# Patient Record
Sex: FEMALE | Race: WHITE | NOT HISPANIC OR LATINO | Employment: OTHER | URBAN - METROPOLITAN AREA
[De-identification: names, ages, dates, MRNs, and addresses within clinical notes are randomized per-mention and may not be internally consistent; named-entity substitution may affect disease eponyms.]

---

## 2017-01-04 RX ORDER — GABAPENTIN 600 MG/1
1200 TABLET ORAL 3 TIMES DAILY
Qty: 180 TAB | Refills: 3 | Status: SHIPPED | OUTPATIENT
Start: 2017-01-04 | End: 2017-02-23

## 2017-01-16 ENCOUNTER — TELEPHONE (OUTPATIENT)
Dept: NEUROLOGY | Facility: MEDICAL CENTER | Age: 67
End: 2017-01-16

## 2017-01-16 DIAGNOSIS — G24.9 DYSTONIA: ICD-10-CM

## 2017-01-16 RX ORDER — CARISOPRODOL 350 MG/1
350 TABLET ORAL 4 TIMES DAILY
Qty: 120 TAB | Refills: 0 | Status: SHIPPED | OUTPATIENT
Start: 2017-01-16 | End: 2017-02-23

## 2017-01-17 NOTE — TELEPHONE ENCOUNTER
Message  Received: Today       Kevon Patel M.D.  Aminta Llamas, [facility]       Caller: Unspecified (Today, 2:05 PM)                     Done. Printed out.      Rx faxed to the pharmacy on file. Called pt and informed her of this. Fax confirmation is scanned into media.

## 2017-02-22 ENCOUNTER — RX ONLY (OUTPATIENT)
Age: 67
Setting detail: RX ONLY
End: 2017-02-22

## 2017-02-22 ENCOUNTER — TELEPHONE (OUTPATIENT)
Dept: NEUROLOGY | Facility: MEDICAL CENTER | Age: 67
End: 2017-02-22

## 2017-02-22 NOTE — TELEPHONE ENCOUNTER
Pt is calling and stating that the Dystonia has increased. She now states it starts at her ankle and goes all the way up to her neck. She is getting really bad yadira horses in her left calf muscle. It is extremely painful. She is asking for a referral to a dystonia specialist for this. Also she stated that her left hand tremor has increased and she is taking the Sinemet 25/100 mg 1 tablet every 2 hours and the Sinemet CR 50/200 mg at bedtime and Gabapentin 600 mg  2 tablets TID. She is asking if there can be a medication change. Please advise. Thank you. ERROL

## 2017-02-23 ENCOUNTER — OFFICE VISIT (OUTPATIENT)
Dept: NEUROLOGY | Facility: MEDICAL CENTER | Age: 67
End: 2017-02-23
Payer: MEDICARE

## 2017-02-23 VITALS
BODY MASS INDEX: 19.12 KG/M2 | OXYGEN SATURATION: 95 % | SYSTOLIC BLOOD PRESSURE: 98 MMHG | TEMPERATURE: 98.8 F | HEART RATE: 72 BPM | HEIGHT: 64 IN | RESPIRATION RATE: 16 BRPM | WEIGHT: 112 LBS | DIASTOLIC BLOOD PRESSURE: 60 MMHG

## 2017-02-23 DIAGNOSIS — G20.A1 PARKINSON'S DISEASE: Primary | ICD-10-CM

## 2017-02-23 DIAGNOSIS — G24.9 DYSTONIA: ICD-10-CM

## 2017-02-23 DIAGNOSIS — M62.838 MUSCLE SPASM: ICD-10-CM

## 2017-02-23 PROCEDURE — 3014F SCREEN MAMMO DOC REV: CPT | Mod: 8P | Performed by: PSYCHIATRY & NEUROLOGY

## 2017-02-23 PROCEDURE — G8484 FLU IMMUNIZE NO ADMIN: HCPCS | Performed by: PSYCHIATRY & NEUROLOGY

## 2017-02-23 PROCEDURE — 99214 OFFICE O/P EST MOD 30 MIN: CPT | Performed by: PSYCHIATRY & NEUROLOGY

## 2017-02-23 PROCEDURE — G8419 CALC BMI OUT NRM PARAM NOF/U: HCPCS | Performed by: PSYCHIATRY & NEUROLOGY

## 2017-02-23 PROCEDURE — 4040F PNEUMOC VAC/ADMIN/RCVD: CPT | Performed by: PSYCHIATRY & NEUROLOGY

## 2017-02-23 PROCEDURE — G8432 DEP SCR NOT DOC, RNG: HCPCS | Performed by: PSYCHIATRY & NEUROLOGY

## 2017-02-23 PROCEDURE — 1101F PT FALLS ASSESS-DOCD LE1/YR: CPT | Mod: 8P | Performed by: PSYCHIATRY & NEUROLOGY

## 2017-02-23 PROCEDURE — 3017F COLORECTAL CA SCREEN DOC REV: CPT | Performed by: PSYCHIATRY & NEUROLOGY

## 2017-02-23 PROCEDURE — 1036F TOBACCO NON-USER: CPT | Performed by: PSYCHIATRY & NEUROLOGY

## 2017-02-23 RX ORDER — METAXALONE 800 MG/1
800 TABLET ORAL 4 TIMES DAILY
Qty: 150 TAB | Refills: 0 | Status: SHIPPED | OUTPATIENT
Start: 2017-02-23 | End: 2017-03-30 | Stop reason: SDUPTHER

## 2017-02-23 RX ORDER — PRAMIPEXOLE DIHYDROCHLORIDE 0.12 MG/1
0.12 TABLET ORAL 3 TIMES DAILY
COMMUNITY
End: 2017-08-29

## 2017-02-23 RX ORDER — TRIHEXYPHENIDYL HYDROCHLORIDE 2 MG/1
2 TABLET ORAL 3 TIMES DAILY
Qty: 90 TAB | Refills: 3 | Status: SHIPPED | OUTPATIENT
Start: 2017-02-23 | End: 2017-04-07

## 2017-02-23 RX ORDER — GABAPENTIN 600 MG/1
TABLET ORAL
Qty: 180 TAB | Refills: 2 | Status: SHIPPED | OUTPATIENT
Start: 2017-02-23 | End: 2017-08-31 | Stop reason: SDUPTHER

## 2017-02-23 NOTE — PROGRESS NOTES
Subjective:      Delmis Draper is a 66 y.o. 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    HPI    Dystonia: Delmis's dystonic symptoms, still independent of the Sinemet dosing regimen she is on, seemed to be worsening. Originally just in the right foot and leg, it seems to be involving the axial muscles and the head and right upper extremity. This results in flexion of the head and neck to the right, as well as the dystonic posturing with the leg and now with wrist flexion. The episodes can be quite painful and sustained, especially they occur at nighttime. Skelaxin 800 mg 3 times a day provides benefit, she would like to take this at least 4 times a day understanding measure. Review of the records historically indicates that she has never been on Artane or Cogentin, she is not sure if she had been on them in the past. She still uses her Valium 5 mg twice a day, would like to be able to increase the dose slightly.    Parkinson's disease: She is still on Sinemet 25/100, 1 tablet every 2 hours for a total of 7 doses/day, Sinemet CR 50/200 at bedtime, and Mirapex 0.125 mg at bedtime was added by her primary care physician. The latter addition his surgery helped her sleep, but she is not aware of notable improvement throughout the daytime. If she waits longer on the interval between doses of Sinemet, the drug does wear off. There still can be peak dose dyskinesias at times. She denies major, daily fluctuations, on/off episodes, or freezing episodes. She still has to walk using a cane or walker to maintain balance. Her sleep remains disturbed, but with medication, this is helped. Swallowing is not adversely affected. Voice volume and quality as well are being maintained. Her tremor is still problematic when it is present.    Medical, surgical and family histories are reviewed, there are no new drug allergies. She is asking about the possibility of a  "referral to a movement disorder specialist. She is on her Sinemet regimen as above, gabapentin 600 mg, 3 times a day and 1800 mg daily at bedtime, Skelaxin 800 mg 3 times a day, melatonin, multivitamin daily, and the rest as per the electronic health record.    Review of Systems   All other systems reviewed and are negative.       Objective:     BP 98/60 mmHg  Pulse 72  Temp(Src) 37.1 °C (98.8 °F)  Resp 16  Ht 1.626 m (5' 4.02\")  Wt 50.803 kg (112 lb)  BMI 19.22 kg/m2  SpO2 95%  LMP 01/01/1990     Physical Exam    She appears in no acute distress. Vital signs are stable. There is no malar rash or sialorrhea and excessive drooling. The neck is supple. She is fully oriented, there is no aphasia. PERRLA/EOMI, visual fields are grossly full, there is no hypophonia. She is in a continuous state of movement involving both axial as well as all 4 extremities. There is no painful, sustained dystonic posturing with any of the extremities. I cannot document tremor on today's exam when she is at rest, in the upper extremities. There is no appendicular dystaxia. She walks with a walker, station is widened.     Assessment/Plan:     1. Parkinson's disease (CMS-[facility])  We will maintain her present regimen unchanged for now. Referral to a movement disorder specialist at [facility] will be forwarded. Mirapex is at such a low dose, it is not going to provide benefit for her Parkinson symptoms, though it might help with some of the sleep problems that she has (? RLS), so it is reasonable to continue it.    - REFERRAL TO NEUROLOGY    2. Dystonia  The bigger issue, is become almost a bridgette-dystonia associated with her Parkinson's disease. I doubt that this is a drug-induced process, at the same time, dopamine certainly isn't helping. Artane 2 mg, twice a day, will be added, I'm going to hold on changing her other medications being used including Skelaxin and Valium. It is unclear whether gabapentin is providing much benefit, but " again, I will make one change at a time. Side effects of Artane were reviewed in full. Bob would be another option for her. DBS might be an indication both of this condition as well as her Parkinson's disease, again another reason to refer her. Otherwise I will follow-up with her in about 4 or 5 months.    - REFERRAL TO NEUROLOGY  - trihexyphenidyl (ARTANE) 2 MG Tab; Take 1 Tab by mouth 3 times a day.  Dispense: 90 Tab; Refill: 3  - gabapentin (NEURONTIN) 600 MG tablet; 1 tab tid and 3 tab qhs  Dispense: 180 Tab; Refill: 2    Time: Evaluation 25 minutes for exam, review, discussion, and education  Discussion: As mentioned in assessment, over 50% of the time spent face-to-face counseling and coordinating care

## 2017-02-23 NOTE — MR AVS SNAPSHOT
"        Delmis Draper   2017 11:40 AM   Office Visit   MRN: [id number]    Department:  [facility]   Dept Phone:  [phone]    Description:  Female : 1950   Provider:  Kevon Patel M.D.           Reason for Visit     Follow-Up dystonia and Parkinsons      Allergies as of 2017     Allergen Noted Reactions    Bactrim [Sulfamethoxazole W-Trimethoprim] 2014   Hives    Actonel [Risedronate Sodium] 2010       Latex 2011       Sulfa Drugs 2015       Tape 03/15/2011   Rash    Paper tape is ok      You were diagnosed with     Parkinson's disease (CMS-HCC)   [3673466]  -  Primary     Dystonia   [336455]       Muscle spasm   [257092]         Vital Signs     Blood Pressure Pulse Temperature Respirations Height Weight    98/60 mmHg 72 37.1 °C (98.8 °F) 16 1.626 m (5' 4.02\") 50.803 kg (112 lb)    Body Mass Index Oxygen Saturation Last Menstrual Period Smoking Status          19.22 kg/m2 95% 1990 Former Smoker        Basic Information     Date Of Birth Sex Race Ethnicity Preferred Language    1950 Female White Non- English      Problem List              ICD-10-CM Priority Class Noted - Resolved    Osteoporosis M81.0   3/22/2010 - Present    vitamin D deficiency G20   Unknown - Present    Dystonia G24.9   Unknown - Present    Lumbar stenosis Dr. Wyatt M48.06   2012 - Present    Bursitis of hip, right Dr. Wyatt M70.71   2012 - Present    Tendonitis Dr. Wyatt M77.9   2012 - Present    Scoliosis of lumbar spine Dr. 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  2015 - Present    Protein-calorie malnutrition (CMS-[facility]) E46   2015 - Present    Mammographic microcalcification R92.0   2015 - Present    Cellulitis L03.90   2015 - Present    Constipation K59.00   10/17/2015 " - Present    Urinary retention R33.9   10/18/2015 - Present    Weakness R53.1 High  1/9/2016 - Present    Chronic pain G89.29 Medium  7/16/2016 - Present    Dehydration E86.0   7/16/2016 - Present    Hypotension I95.9 High  7/17/2016 - Present    Leukopenia D72.819 Medium  7/17/2016 - Present    Parkinson's disease (CMS-[facility]) G20   8/15/2016 - Present      Health Maintenance        Date Due Completion Dates    IMM DTaP/Tdap/Td Vaccine (1 - Tdap) 8/22/2009 8/21/2009    IMM ZOSTER VACCINE 3/20/2011 ---    BONE DENSITY 3/20/2016 ---    IMM PNEUMOCOCCAL 65+ (ADULT) LOW/MEDIUM RISK SERIES (1 of 2 - PCV13) 3/20/2016 3/13/2012    MAMMOGRAM 3/25/2016 3/25/2015, 5/16/2007, 5/16/2007    IMM INFLUENZA (1) 9/1/2016 10/16/2012    COLONOSCOPY 2/10/2024 2/10/2014 (Done)    Override on 2/10/2014: Done            Current Immunizations     Influenza TIV (IM) 10/16/2012    Pneumococcal polysaccharide vaccine (PPSV-23) 3/13/2012    TD Vaccine 8/21/2009      Below and/or attached are the medications your provider expects you to take. Review all of your home medications and newly ordered medications with your provider and/or pharmacist. Follow medication instructions as directed by your provider and/or pharmacist. Please keep your medication list with you and share with your provider. Update the information when medications are discontinued, doses are changed, or new medications (including over-the-counter products) are added; and carry medication information at all times in the event of emergency situations     Allergies:  BACTRIM - Hives     ACTONEL - (reactions not documented)     LATEX - (reactions not documented)     SULFA DRUGS - (reactions not documented)     TAPE - Rash               Medications  Valid as of: February 23, 2017 - 12:28 PM    Generic Name Brand Name Tablet Size Instructions for use    5-Hydroxytryptophan   Take 10 mg by mouth.        Carbidopa-Levodopa (Tab) SINEMET  MG Take 1 Tab by mouth every 2 hours. Pt  states she takes at 0700/0900/1100/1300/1500/1700/1900 Total of 7 doses during day        Carbidopa-Levodopa (Tab CR) SINEMET CR  MG Take 1 Tab by mouth every bedtime.        DiazePAM (Tab) VALIUM 5 MG Take 10 mg by mouth 2 Times a Day.        Gabapentin (Tab) NEURONTIN 600 MG 1 tab tid and 3 tab qhs        Linaclotide (Cap) Linaclotide 145 MCG Take 145 mg by mouth every morning before breakfast.        Melatonin (Tab) Melatonin 1 MG Take 1 mg by mouth every bedtime.        Metaxalone (Tab) SKELAXIN 800 MG Take 1 Tab by mouth 4 times a day.        Multiple Vitamin   Take 1 Tab by mouth every day.        Polyethylene Glycol 3350   Take  by mouth.        Pramipexole Dihydrochloride (Tab) MIRAPEX 0.125 MG Take 0.125 mg by mouth every day.        Trihexyphenidyl HCl (Tab) ARTANE 2 MG Take 1 Tab by mouth 3 times a day.        Ubiquinol (Cap) Ubiquinol 100 MG Take 100 mg by mouth every day.        .                 Medicines prescribed today were sent to:     [facility] #[id number] - [place], [place] - [facility] [address]    [facility] [address] [place] [address]    Phone: [phone] Fax: [phone]    Open 24 Hours?: No    [facility] - [address] [address]    Phone: [phone] Fax: [phone]    Open 24 Hours?: No      Medication refill instructions:       If your prescription bottle indicates you have medication refills left, it is not necessary to call your provider’s office. Please contact your pharmacy and they will refill your medication.    If your prescription bottle indicates you do not have any refills left, you may request refills at any time through one of the following ways: The online OurStay system (except Urgent Care), by calling your provider’s office, or by asking your pharmacy to contact your provider’s office with a refill request. Medication refills are processed only during regular business hours and may not be available until the next business day. Your provider may  request additional information or to have a follow-up visit with you prior to refilling your medication.   *Please Note: Medication refills are assigned a new Rx number when refilled electronically. Your pharmacy may indicate that no refills were authorized even though a new prescription for the same medication is available at the pharmacy. Please request the medicine by name with the pharmacy before contacting your provider for a refill.        Referral     A referral request has been sent to our patient care coordination department. Please allow 3-5 business days for us to process this request and contact you either by phone or mail. If you do not hear from us by the 5th business day, please call us at [phone].        Other Notes About Your Plan     This appointment is 2-24-16. 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 Comment benzo dependence  2. Comment malnutrition, the patient was given Megace in 4/2015  3. Comment ETOH dependence, or if alcohol use in remission  4. Revise/specify anxiety and depression, possible adjustment disorder: if have both, code as TRACEY, Major depressive disorder; Panic attack if has  5. If the patient is on home oxygen (even on/off) add diagnoses: Chr respiratory failure and home oxygen use  6. Notified in documents that patient had CABG; if so, add CAD as diagnosis, with appropriate medication for the condition  7. Notified in documents that patient hadCVA; if so, add diagnosis of vascular disease  8. Notified in documents that patient had seizures           MyChart Access Code: Activation code not generated  Current MyChart Status: Active

## 2017-02-23 NOTE — TELEPHONE ENCOUNTER
Pt is at appointment with  today at 1140 am and she will address all questions and concerns with him at that time. ERROL

## 2017-02-27 ENCOUNTER — TELEPHONE (OUTPATIENT)
Dept: NEUROLOGY | Facility: MEDICAL CENTER | Age: 67
End: 2017-02-27

## 2017-02-27 NOTE — TELEPHONE ENCOUNTER
Pt is calling and stating that the Artane is really helping her symptoms. She had questions about her medications. All questions and concerns were addressed. ERROL

## 2017-03-02 ENCOUNTER — TELEPHONE (OUTPATIENT)
Dept: NEUROLOGY | Facility: MEDICAL CENTER | Age: 67
End: 2017-03-02

## 2017-03-02 DIAGNOSIS — G20.A1 PARKINSON'S DISEASE: ICD-10-CM

## 2017-03-02 NOTE — TELEPHONE ENCOUNTER
Pt is calling and asking for another referral to PT. Her last one from you on 16 has . She is asking for a new Referral to be sent to [facility] Phone number is [phone]. Please advise. Thank you. ERROL

## 2017-03-03 NOTE — TELEPHONE ENCOUNTER
Pt Notified. She has changed her mind. She now wants to go back to see [facility]      [address]  [place] [address] [phone]       Called and LM for Referrals (Deedee) that the pt has changed her mind and to help make corrections. ERROL

## 2017-03-08 ENCOUNTER — TELEPHONE (OUTPATIENT)
Dept: NEUROLOGY | Facility: MEDICAL CENTER | Age: 67
End: 2017-03-08

## 2017-03-08 DIAGNOSIS — G20.A1 PARKINSON'S DISEASE: ICD-10-CM

## 2017-03-08 NOTE — TELEPHONE ENCOUNTER
Pt is calling and stating that Dr. Hayden at [facility] is not taking new patients so she has to see someone else. She is upset about this. She also states that this morning she has 2 episodes of full body dystonia. She states it took her a very long time to recover from them. She is asking what she can do for thus until she sees a doctor down at [facility]. Also she is asking for a referral for home health. Her 's license is about to  on 3/20/17 since you just saw her can she send over the paperwork from the DMV to complete it or does she need another appointment.  Please advise. Thank you. ERROL

## 2017-03-10 ENCOUNTER — OFFICE VISIT (OUTPATIENT)
Dept: NEUROLOGY | Facility: MEDICAL CENTER | Age: 67
End: 2017-03-10
Payer: MEDICARE

## 2017-03-10 DIAGNOSIS — G20.A1 PARKINSON'S DISEASE: ICD-10-CM

## 2017-03-10 PROCEDURE — 99213 OFFICE O/P EST LOW 20 MIN: CPT | Performed by: PHYSICIAN ASSISTANT

## 2017-03-10 PROCEDURE — 1036F TOBACCO NON-USER: CPT | Performed by: PHYSICIAN ASSISTANT

## 2017-03-10 PROCEDURE — 1101F PT FALLS ASSESS-DOCD LE1/YR: CPT | Mod: 8P | Performed by: PHYSICIAN ASSISTANT

## 2017-03-10 PROCEDURE — 4040F PNEUMOC VAC/ADMIN/RCVD: CPT | Performed by: PHYSICIAN ASSISTANT

## 2017-03-10 PROCEDURE — G8432 DEP SCR NOT DOC, RNG: HCPCS | Performed by: PHYSICIAN ASSISTANT

## 2017-03-10 PROCEDURE — G8484 FLU IMMUNIZE NO ADMIN: HCPCS | Performed by: PHYSICIAN ASSISTANT

## 2017-03-10 PROCEDURE — G8419 CALC BMI OUT NRM PARAM NOF/U: HCPCS | Performed by: PHYSICIAN ASSISTANT

## 2017-03-10 PROCEDURE — 3014F SCREEN MAMMO DOC REV: CPT | Mod: 8P | Performed by: PHYSICIAN ASSISTANT

## 2017-03-10 PROCEDURE — 3017F COLORECTAL CA SCREEN DOC REV: CPT | Performed by: PHYSICIAN ASSISTANT

## 2017-03-10 NOTE — PROGRESS NOTES
Cc:  - parkinson's    Established patient of romel  who suffers from PD  Here today for completion of paperwork for DMV related to their condition.    They report to me that they have been stable but DMV for some reason requires annual completion of DMV paperwork.  DEnies any syncopal spells, seizures, or loss of consciousness.    Review of the medical chart and interviewing patient, I completed paperwork and it is scanned into media.     RTC as previously scheduled.    Total time with this visit: 15    Minutes face-to-face with patient. More than 50% of this visit was spent reviewing medical chart and speaking with the patient about their condition and how it affects their ability to do their job.

## 2017-03-10 NOTE — MR AVS SNAPSHOT
Delmis Draper   3/10/2017 1:40 PM   Office Visit   MRN: [id number]    Department:  [facility]   Dept Phone:  [phone]    Description:  Female : 1950   Provider:  Angélica Sosa PA-C           Reason for Visit     Follow-Up DMV paper work      Allergies as of 3/10/2017     Allergen Noted Reactions    Bactrim [Sulfamethoxazole W-Trimethoprim] 2014   Hives    Actonel [Risedronate Sodium] 2010       Latex 2011       Sulfa Drugs 2015       Tape 03/15/2011   Rash    Paper tape is ok      Vital Signs     Last Menstrual Period Smoking Status                1990 Former Smoker          Basic Information     Date Of Birth Sex Race Ethnicity Preferred Language    1950 Female White Non- English      Your appointments     Mar 30, 2017  2:00 PM   Follow Up Visit with Kevon Patel M.D.   [facility] (--)    [address], [address]  [facility] [address] [phone]           You will be receiving a confirmation call a few days before your appointment from our automated call confirmation system.              Problem List              ICD-10-CM Priority Class Noted - Resolved    Osteoporosis M81.0   3/22/2010 - Present    vitamin D deficiency G20   Unknown - Present    Dystonia G24.9   Unknown - Present    Lumbar stenosis Dr. Wyatt M48.06   2012 - Present    Bursitis of hip, right Dr. Wyatt M70.71   2012 - Present    Tendonitis Dr. Wyatt M77.9   2012 - Present    Scoliosis of lumbar spine Dr. 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  2015 - Present    Protein-calorie malnutrition (CMS-HCC) E46   2015 - Present    Mammographic microcalcification R92.0   2015 - Present    Cellulitis L03.90   2015 - Present    Constipation K59.00   10/17/2015  - Present    Urinary retention R33.9   10/18/2015 - Present    Weakness R53.1 High  1/9/2016 - Present    Chronic pain G89.29 Medium  7/16/2016 - Present    Dehydration E86.0   7/16/2016 - Present    Hypotension I95.9 High  7/17/2016 - Present    Leukopenia D72.819 Medium  7/17/2016 - Present    Parkinson's disease (CMS-[facility]) G20   8/15/2016 - Present      Health Maintenance        Date Due Completion Dates    IMM DTaP/Tdap/Td Vaccine (1 - Tdap) 8/22/2009 8/21/2009    IMM ZOSTER VACCINE 3/20/2011 ---    BONE DENSITY 3/20/2016 ---    IMM PNEUMOCOCCAL 65+ (ADULT) LOW/MEDIUM RISK SERIES (1 of 2 - PCV13) 3/20/2016 3/13/2012    MAMMOGRAM 3/25/2016 3/25/2015, 5/16/2007, 5/16/2007    IMM INFLUENZA (1) 9/1/2016 10/16/2012    COLONOSCOPY 2/10/2024 2/10/2014 (Done)    Override on 2/10/2014: Done            Current Immunizations     Influenza TIV (IM) 10/16/2012    Pneumococcal polysaccharide vaccine (PPSV-23) 3/13/2012    TD Vaccine 8/21/2009      Below and/or attached are the medications your provider expects you to take. Review all of your home medications and newly ordered medications with your provider and/or pharmacist. Follow medication instructions as directed by your provider and/or pharmacist. Please keep your medication list with you and share with your provider. Update the information when medications are discontinued, doses are changed, or new medications (including over-the-counter products) are added; and carry medication information at all times in the event of emergency situations     Allergies:  BACTRIM - Hives     ACTONEL - (reactions not documented)     LATEX - (reactions not documented)     SULFA DRUGS - (reactions not documented)     TAPE - Rash               Medications  Valid as of: March 10, 2017 -  2:10 PM    Generic Name Brand Name Tablet Size Instructions for use    5-Hydroxytryptophan   Take 10 mg by mouth.        Carbidopa-Levodopa (Tab) SINEMET  MG Take 1 Tab by mouth every 2 hours. Pt  states she takes at 0700/0900/1100/1300/1500/1700/1900 Total of 7 doses during day        Carbidopa-Levodopa (Tab CR) SINEMET CR  MG Take 1 Tab by mouth every bedtime.        DiazePAM (Tab) VALIUM 5 MG Take 10 mg by mouth 2 Times a Day.        Gabapentin (Tab) NEURONTIN 600 MG 1 tab tid and 3 tab qhs        Linaclotide (Cap) Linaclotide 145 MCG Take 145 mg by mouth every morning before breakfast.        Melatonin (Tab) Melatonin 1 MG Take 1 mg by mouth every bedtime.        Metaxalone (Tab) SKELAXIN 800 MG Take 1 Tab by mouth 4 times a day.        Multiple Vitamin   Take 1 Tab by mouth every day.        Polyethylene Glycol 3350   Take  by mouth.        Pramipexole Dihydrochloride (Tab) MIRAPEX 0.125 MG Take 0.125 mg by mouth every day.        Trihexyphenidyl HCl (Tab) ARTANE 2 MG Take 1 Tab by mouth 3 times a day.        Ubiquinol (Cap) Ubiquinol 100 MG Take 100 mg by mouth every day.        .                 Medicines prescribed today were sent to:     [facility] #[id number] - [place], [place] - [facility] [address]    [facility] [place] [address]    Phone: [phone] Fax: [phone]    Open 24 Hours?: No    [facility] - [address] [address]    Phone: [phone] Fax: [phone]    Open 24 Hours?: No      Medication refill instructions:       If your prescription bottle indicates you have medication refills left, it is not necessary to call your provider’s office. Please contact your pharmacy and they will refill your medication.    If your prescription bottle indicates you do not have any refills left, you may request refills at any time through one of the following ways: The online [employer] system (except Urgent Care), by calling your provider’s office, or by asking your pharmacy to contact your provider’s office with a refill request. Medication refills are processed only during regular business hours and may not be available until the next business day. Your provider may  request additional information or to have a follow-up visit with you prior to refilling your medication.   *Please Note: Medication refills are assigned a new Rx number when refilled electronically. Your pharmacy may indicate that no refills were authorized even though a new prescription for the same medication is available at the pharmacy. Please request the medicine by name with the pharmacy before contacting your provider for a refill.        Other Notes About Your Plan     This appointment is 2-24-16. 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 Comment benzo dependence  2. Comment malnutrition, the patient was given Megace in 4/2015  3. Comment ETOH dependence, or if alcohol use in remission  4. Revise/specify anxiety and depression, possible adjustment disorder: if have both, code as TRACEY, Major depressive disorder; Panic attack if has  5. If the patient is on home oxygen (even on/off) add diagnoses: Chr respiratory failure and home oxygen use  6. Notified in documents that patient had CABG; if so, add CAD as diagnosis, with appropriate medication for the condition  7. Notified in documents that patient hadCVA; if so, add diagnosis of vascular disease  8. Notified in documents that patient had seizures           MyChart Access Code: Activation code not generated  Current MyChart Status: Active

## 2017-03-11 NOTE — TELEPHONE ENCOUNTER
If this single episode of severe dystonia 2 days ago was isolated, I don't think I want to adjust her Artane dosing for now. At the same time if the dystonia is getting worse, she should add an additional 2 mg tablet of Artane, now 4 mg, twice a day; she was on 2 mg 3 times a day. For the DMV form, however forwarded to the office. A referral for home health can be made.

## 2017-03-14 ENCOUNTER — HOME CARE VISIT (OUTPATIENT)
Dept: HOME HEALTH SERVICES | Facility: HOME HEALTHCARE | Age: 67
End: 2017-03-14

## 2017-03-14 ENCOUNTER — HOME HEALTH ADMISSION (OUTPATIENT)
Dept: HOME HEALTH SERVICES | Facility: HOME HEALTHCARE | Age: 67
End: 2017-03-14
Payer: MEDICARE

## 2017-03-15 ENCOUNTER — HOME CARE VISIT (OUTPATIENT)
Dept: HOME HEALTH SERVICES | Facility: HOME HEALTHCARE | Age: 67
End: 2017-03-15
Payer: MEDICARE

## 2017-03-15 VITALS
WEIGHT: 113.6 LBS | SYSTOLIC BLOOD PRESSURE: 96 MMHG | TEMPERATURE: 98.2 F | HEIGHT: 64 IN | BODY MASS INDEX: 19.39 KG/M2 | HEART RATE: 67 BPM | RESPIRATION RATE: 16 BRPM | DIASTOLIC BLOOD PRESSURE: 62 MMHG

## 2017-03-15 PROCEDURE — 665001 SOC-HOME HEALTH

## 2017-03-15 PROCEDURE — G0162 HHC RN E&M PLAN SVS, 15 MIN: HCPCS

## 2017-03-15 SDOH — ECONOMIC STABILITY: HOUSING INSECURITY: UNSAFE APPLIANCES: 0

## 2017-03-15 SDOH — ECONOMIC STABILITY: HOUSING INSECURITY: UNSAFE COOKING RANGE AREA: 0

## 2017-03-15 ASSESSMENT — ACTIVITIES OF DAILY LIVING (ADL)
OASIS_M1830: 03
HOME_HEALTH_OASIS: 03

## 2017-03-15 ASSESSMENT — PATIENT HEALTH QUESTIONNAIRE - PHQ9
2. FEELING DOWN, DEPRESSED, IRRITABLE, OR HOPELESS: 00
1. LITTLE INTEREST OR PLEASURE IN DOING THINGS: 00

## 2017-03-15 ASSESSMENT — ENCOUNTER SYMPTOMS
VOMITING: DENIES
NAUSEA: DENIES

## 2017-03-16 ENCOUNTER — HOME CARE VISIT (OUTPATIENT)
Dept: HOME HEALTH SERVICES | Facility: HOME HEALTHCARE | Age: 67
End: 2017-03-16
Payer: MEDICARE

## 2017-03-16 VITALS
TEMPERATURE: 208.8 F | DIASTOLIC BLOOD PRESSURE: 64 MMHG | HEART RATE: 67 BPM | RESPIRATION RATE: 18 BRPM | SYSTOLIC BLOOD PRESSURE: 98 MMHG

## 2017-03-16 PROCEDURE — G0152 HHCP-SERV OF OT,EA 15 MIN: HCPCS

## 2017-03-16 PROCEDURE — G0151 HHCP-SERV OF PT,EA 15 MIN: HCPCS

## 2017-03-16 SDOH — ECONOMIC STABILITY: HOUSING INSECURITY
HOME SAFETY: PATIENT HAS A VERY SLIPPERY LINOLEUM FLOOR IN HER BATHROOM AND SCATTER RUGS ALL AROUND THE BATHROOM AND KITCHEN...PATIENT HAS A DOG AND A CAT AND HER PLACE IS IN A MILD FORM OF DISARRAY

## 2017-03-16 ASSESSMENT — ACTIVITIES OF DAILY LIVING (ADL)
EATING_ASSISTANCE: 0
TRANSPORTATION_ASSISTANCE: 5
MEAL_PREP_ASSISTANCE: 2
DRESSING_UB_ASSISTANCE: 0
ORAL_CARE_ASSISTANCE: 0
DRESSING_LB_ASSISTANCE: 0
BATHING_ASSISTIVE_EQUIPMENT_NEEDED: GRAB BAR
TELEPHONE_ASSISTANCE: 0
LAUNDRY_ASSISTANCE: 2
HOUSEKEEPING_ASSISTANCE: 2
BATHING_ASSISTANCE: 2
SHOPPING_ASSISTANCE: 5
TOILETING_EQUIPMENT_USED: RAISED TOILET SEAT WITH ARMRESTS
GROOMING_ASSISTANCE: 0
TOILETING_ASSISTANCE: 2

## 2017-03-16 ASSESSMENT — ENCOUNTER SYMPTOMS: POOR JUDGMENT: 1

## 2017-03-17 ENCOUNTER — HOME CARE VISIT (OUTPATIENT)
Dept: HOME HEALTH SERVICES | Facility: HOME HEALTHCARE | Age: 67
End: 2017-03-17
Payer: MEDICARE

## 2017-03-17 ENCOUNTER — HOME CARE VISIT (OUTPATIENT)
Dept: HOME HEALTH SERVICES | Facility: HOME HEALTHCARE | Age: 67
End: 2017-03-17

## 2017-03-17 VITALS
RESPIRATION RATE: 16 BRPM | TEMPERATURE: 99.6 F | HEART RATE: 68 BPM | DIASTOLIC BLOOD PRESSURE: 64 MMHG | SYSTOLIC BLOOD PRESSURE: 95 MMHG

## 2017-03-17 VITALS — SYSTOLIC BLOOD PRESSURE: 110 MMHG | TEMPERATURE: 98.8 F | RESPIRATION RATE: 20 BRPM | DIASTOLIC BLOOD PRESSURE: 64 MMHG

## 2017-03-17 PROCEDURE — G0156 HHCP-SVS OF AIDE,EA 15 MIN: HCPCS

## 2017-03-19 ASSESSMENT — ACTIVITIES OF DAILY LIVING (ADL)
ADLS_COMMENTS: >
TELEPHONE_ASSISTANCE: 0
TOILETING_ASSISTANCE: 0
DRESSING_LB_ASSISTANCE: 0
GROOMING_ASSISTANCE: 0
BATHING_ASSISTANCE: 0
LAUNDRY_ASSISTANCE: 0
DRESSING_UB_ASSISTANCE: 0
ORAL_CARE_ASSISTANCE: 0
EATING_ASSISTANCE: 0

## 2017-03-21 ENCOUNTER — HOME CARE VISIT (OUTPATIENT)
Dept: HOME HEALTH SERVICES | Facility: HOME HEALTHCARE | Age: 67
End: 2017-03-21
Payer: MEDICARE

## 2017-03-21 VITALS
RESPIRATION RATE: 18 BRPM | DIASTOLIC BLOOD PRESSURE: 55 MMHG | HEART RATE: 92 BPM | TEMPERATURE: 100.2 F | SYSTOLIC BLOOD PRESSURE: 110 MMHG

## 2017-03-21 PROCEDURE — G0152 HHCP-SERV OF OT,EA 15 MIN: HCPCS

## 2017-03-21 PROCEDURE — G0157 HHC PT ASSISTANT EA 15: HCPCS

## 2017-03-22 ENCOUNTER — HOME CARE VISIT (OUTPATIENT)
Dept: HOME HEALTH SERVICES | Facility: HOME HEALTHCARE | Age: 67
End: 2017-03-22
Payer: MEDICARE

## 2017-03-22 VITALS
TEMPERATURE: 99.3 F | RESPIRATION RATE: 16 BRPM | SYSTOLIC BLOOD PRESSURE: 90 MMHG | DIASTOLIC BLOOD PRESSURE: 50 MMHG | HEART RATE: 74 BPM

## 2017-03-22 VITALS
DIASTOLIC BLOOD PRESSURE: 68 MMHG | TEMPERATURE: 210.2 F | HEART RATE: 74 BPM | RESPIRATION RATE: 18 BRPM | SYSTOLIC BLOOD PRESSURE: 115 MMHG

## 2017-03-22 PROCEDURE — G0299 HHS/HOSPICE OF RN EA 15 MIN: HCPCS

## 2017-03-22 SDOH — ECONOMIC STABILITY: HOUSING INSECURITY: UNSAFE APPLIANCES: 0

## 2017-03-22 SDOH — ECONOMIC STABILITY: HOUSING INSECURITY: UNSAFE COOKING RANGE AREA: 0

## 2017-03-22 ASSESSMENT — ENCOUNTER SYMPTOMS
NAUSEA: DENIES
VOMITING: DENIES

## 2017-03-22 ASSESSMENT — ACTIVITIES OF DAILY LIVING (ADL)
SHOPPING_ASSISTANCE: 6
DRESSING_UB_ASSISTANCE: 3
EATING_ASSISTANCE: 0
ORAL_CARE_ASSISTANCE: 0
BATHING_ASSISTANCE: 1
LAUNDRY_ASSISTANCE: 6
MEAL_PREP_ASSISTANCE: 6
TOILETING_ASSISTANCE: 0
DRESSING_LB_ASSISTANCE: 3
TRANSPORTATION_ASSISTANCE: 6
TELEPHONE_ASSISTANCE: 0
HOUSEKEEPING_ASSISTANCE: 6
TRANSPORTATION COMMENTS: DYSTONIA
GROOMING_ASSISTANCE: 0

## 2017-03-23 ENCOUNTER — HOME CARE VISIT (OUTPATIENT)
Dept: HOME HEALTH SERVICES | Facility: HOME HEALTHCARE | Age: 67
End: 2017-03-23
Payer: MEDICARE

## 2017-03-23 VITALS
HEART RATE: 74 BPM | RESPIRATION RATE: 16 BRPM | TEMPERATURE: 208.4 F | DIASTOLIC BLOOD PRESSURE: 50 MMHG | SYSTOLIC BLOOD PRESSURE: 90 MMHG

## 2017-03-23 VITALS
RESPIRATION RATE: 16 BRPM | HEART RATE: 65 BPM | TEMPERATURE: 99.8 F | DIASTOLIC BLOOD PRESSURE: 60 MMHG | SYSTOLIC BLOOD PRESSURE: 92 MMHG

## 2017-03-23 PROCEDURE — G0157 HHC PT ASSISTANT EA 15: HCPCS

## 2017-03-23 PROCEDURE — G0152 HHCP-SERV OF OT,EA 15 MIN: HCPCS

## 2017-03-23 SDOH — ECONOMIC STABILITY: HOUSING INSECURITY: HOME SAFETY: LOTS OF CLERICAL CLUTTER AROUND THE HOME

## 2017-03-24 ENCOUNTER — HOME CARE VISIT (OUTPATIENT)
Dept: HOME HEALTH SERVICES | Facility: HOME HEALTHCARE | Age: 67
End: 2017-03-24
Payer: MEDICARE

## 2017-03-27 ENCOUNTER — HOME CARE VISIT (OUTPATIENT)
Dept: HOME HEALTH SERVICES | Facility: HOME HEALTHCARE | Age: 67
End: 2017-03-27
Payer: MEDICARE

## 2017-03-28 ENCOUNTER — HOME CARE VISIT (OUTPATIENT)
Dept: HOME HEALTH SERVICES | Facility: HOME HEALTHCARE | Age: 67
End: 2017-03-28
Payer: MEDICARE

## 2017-03-28 ENCOUNTER — TELEPHONE (OUTPATIENT)
Dept: NEUROLOGY | Facility: MEDICAL CENTER | Age: 67
End: 2017-03-28

## 2017-03-28 VITALS — RESPIRATION RATE: 18 BRPM | HEART RATE: 72 BPM | TEMPERATURE: 95 F

## 2017-03-28 PROCEDURE — G0152 HHCP-SERV OF OT,EA 15 MIN: HCPCS

## 2017-03-28 NOTE — TELEPHONE ENCOUNTER
Pt is calling and asking for another copy of the referral to be faxed to her. She lost the other copy given to her. Referral mailed to pt. ERROL

## 2017-03-29 ENCOUNTER — HOME CARE VISIT (OUTPATIENT)
Dept: HOME HEALTH SERVICES | Facility: HOME HEALTHCARE | Age: 67
End: 2017-03-29
Payer: MEDICARE

## 2017-03-29 VITALS
TEMPERATURE: 99.6 F | HEART RATE: 84 BPM | DIASTOLIC BLOOD PRESSURE: 54 MMHG | RESPIRATION RATE: 16 BRPM | SYSTOLIC BLOOD PRESSURE: 88 MMHG

## 2017-03-29 VITALS
SYSTOLIC BLOOD PRESSURE: 118 MMHG | TEMPERATURE: 99 F | RESPIRATION RATE: 16 BRPM | HEART RATE: 64 BPM | DIASTOLIC BLOOD PRESSURE: 70 MMHG

## 2017-03-29 PROCEDURE — G0151 HHCP-SERV OF PT,EA 15 MIN: HCPCS

## 2017-03-29 PROCEDURE — G0162 HHC RN E&M PLAN SVS, 15 MIN: HCPCS

## 2017-03-29 SDOH — ECONOMIC STABILITY: HOUSING INSECURITY: UNSAFE COOKING RANGE AREA: 0

## 2017-03-29 SDOH — ECONOMIC STABILITY: HOUSING INSECURITY: UNSAFE APPLIANCES: 0

## 2017-03-29 ASSESSMENT — ENCOUNTER SYMPTOMS
NAUSEA: DENIES
VOMITING: DENIES
RESPIRATORY SYMPTOMS COMMENTS: RESPIRATIONS ARE EVEN AND UNLABORED WITH NO SOB NOTED

## 2017-03-29 ASSESSMENT — ACTIVITIES OF DAILY LIVING (ADL)
HOUSEKEEPING_ASSISTANCE: 6
EATING_ASSISTANCE: 0
SHOPPING_ASSISTANCE: 6
TOILETING_ASSISTANCE: 0
DRESSING_LB_ASSISTANCE: 5
TELEPHONE_ASSISTANCE: 0
BATHING_ASSISTANCE: 5
TRANSPORTATION_ASSISTANCE: 6
MEAL_PREP_ASSISTANCE: 6
GROOMING_ASSISTANCE: 0
ORAL_CARE_ASSISTANCE: 0
LAUNDRY_ASSISTANCE: 6
DRESSING_UB_ASSISTANCE: 5

## 2017-03-30 ENCOUNTER — HOME CARE VISIT (OUTPATIENT)
Dept: HOME HEALTH SERVICES | Facility: HOME HEALTHCARE | Age: 67
End: 2017-03-30
Payer: MEDICARE

## 2017-03-30 VITALS
HEART RATE: 65 BPM | RESPIRATION RATE: 18 BRPM | DIASTOLIC BLOOD PRESSURE: 72 MMHG | TEMPERATURE: 206.6 F | SYSTOLIC BLOOD PRESSURE: 120 MMHG

## 2017-03-30 DIAGNOSIS — G24.9 DYSTONIA: ICD-10-CM

## 2017-03-30 PROCEDURE — G0152 HHCP-SERV OF OT,EA 15 MIN: HCPCS

## 2017-03-30 RX ORDER — METAXALONE 800 MG/1
TABLET ORAL
Qty: 150 TAB | Refills: 1 | Status: SHIPPED | OUTPATIENT
Start: 2017-03-30 | End: 2017-09-27 | Stop reason: SDUPTHER

## 2017-03-31 ENCOUNTER — HOME CARE VISIT (OUTPATIENT)
Dept: HOME HEALTH SERVICES | Facility: HOME HEALTHCARE | Age: 67
End: 2017-03-31
Payer: MEDICARE

## 2017-03-31 PROCEDURE — G0155 HHCP-SVS OF CSW,EA 15 MIN: HCPCS

## 2017-04-03 ENCOUNTER — HOME CARE VISIT (OUTPATIENT)
Dept: HOME HEALTH SERVICES | Facility: HOME HEALTHCARE | Age: 67
End: 2017-04-03
Payer: MEDICARE

## 2017-04-04 ENCOUNTER — HOME CARE VISIT (OUTPATIENT)
Dept: HOME HEALTH SERVICES | Facility: HOME HEALTHCARE | Age: 67
End: 2017-04-04
Payer: MEDICARE

## 2017-04-04 PROCEDURE — G0152 HHCP-SERV OF OT,EA 15 MIN: HCPCS

## 2017-04-04 ASSESSMENT — ENCOUNTER SYMPTOMS: AGORAPHOBIA: 1

## 2017-04-05 ENCOUNTER — HOME CARE VISIT (OUTPATIENT)
Dept: HOME HEALTH SERVICES | Facility: HOME HEALTHCARE | Age: 67
End: 2017-04-05
Payer: MEDICARE

## 2017-04-05 VITALS — SYSTOLIC BLOOD PRESSURE: 98 MMHG | RESPIRATION RATE: 16 BRPM | TEMPERATURE: 99 F | DIASTOLIC BLOOD PRESSURE: 70 MMHG

## 2017-04-05 PROCEDURE — G0162 HHC RN E&M PLAN SVS, 15 MIN: HCPCS

## 2017-04-05 SDOH — ECONOMIC STABILITY: HOUSING INSECURITY: UNSAFE COOKING RANGE AREA: 0

## 2017-04-05 SDOH — ECONOMIC STABILITY: HOUSING INSECURITY: UNSAFE APPLIANCES: 0

## 2017-04-05 ASSESSMENT — ACTIVITIES OF DAILY LIVING (ADL)
BATHING_ASSISTANCE: 5
TELEPHONE_ASSISTANCE: 0
GROOMING_ASSISTANCE: 0
EATING_ASSISTANCE: 0
MEAL_PREP_ASSISTANCE: 5
LAUNDRY_ASSISTANCE: 5
SHOPPING_ASSISTANCE: 6
DRESSING_UB_ASSISTANCE: 0
ORAL_CARE_ASSISTANCE: 0
DRESSING_LB_ASSISTANCE: 0
TRANSPORTATION_ASSISTANCE: 6
HOUSEKEEPING_ASSISTANCE: 5
TOILETING_ASSISTANCE: 0

## 2017-04-06 ENCOUNTER — TELEPHONE (OUTPATIENT)
Dept: NEUROLOGY | Facility: MEDICAL CENTER | Age: 67
End: 2017-04-06

## 2017-04-06 ENCOUNTER — HOME CARE VISIT (OUTPATIENT)
Dept: HOME HEALTH SERVICES | Facility: HOME HEALTHCARE | Age: 67
End: 2017-04-06
Payer: MEDICARE

## 2017-04-06 DIAGNOSIS — G24.9 DYSTONIA: ICD-10-CM

## 2017-04-06 PROCEDURE — G0152 HHCP-SERV OF OT,EA 15 MIN: HCPCS

## 2017-04-06 NOTE — TELEPHONE ENCOUNTER
Pt is calling and stating that the Artane 2 mg TID is not helping her Dystonia and Parkinson's. She states the Tizanidine 2 mg 1.5 tablets 6 times a day worked better. She is calling and asking for a Rx for the Tizanidine 2 mg instead of the Artane. Should she wait until she is seen down at [facility]? Please advise. Thank you. ERROL

## 2017-04-07 ENCOUNTER — HOME CARE VISIT (OUTPATIENT)
Dept: HOME HEALTH SERVICES | Facility: HOME HEALTHCARE | Age: 67
End: 2017-04-07
Payer: MEDICARE

## 2017-04-07 ENCOUNTER — TELEPHONE (OUTPATIENT)
Dept: NEUROLOGY | Facility: MEDICAL CENTER | Age: 67
End: 2017-04-07

## 2017-04-07 VITALS — DIASTOLIC BLOOD PRESSURE: 66 MMHG | SYSTOLIC BLOOD PRESSURE: 96 MMHG | RESPIRATION RATE: 17 BRPM | TEMPERATURE: 94.2 F

## 2017-04-07 RX ORDER — TIZANIDINE 2 MG/1
TABLET ORAL
Qty: 270 TAB | Refills: 4 | Status: SHIPPED | OUTPATIENT
Start: 2017-04-07 | End: 2017-08-29

## 2017-04-07 ASSESSMENT — ACTIVITIES OF DAILY LIVING (ADL)
TELEPHONE_ASSISTANCE: 1
DRESSING_LB_ASSISTANCE: 0
DRESSING_UB_ASSISTANCE: 0
SHOPPING_ASSISTANCE: 4
TRANSPORTATION_ASSISTANCE: 4
HOUSEKEEPING_ASSISTANCE: 2
EATING_ASSISTANCE: 0
TOILETING_ASSISTANCE: 0
GROOMING_ASSISTANCE: 0
MEAL_PREP_ASSISTANCE: 0
BATHING_ASSISTANCE: 2
LAUNDRY_ASSISTANCE: 2
ORAL_CARE_ASSISTANCE: 0

## 2017-04-07 ASSESSMENT — ENCOUNTER SYMPTOMS: DEBILITATING PAIN: 1

## 2017-04-07 NOTE — TELEPHONE ENCOUNTER
Pt calling asking if she can get a rx for Tizanidine 2 MG, 1- 1/2 tabs 6 times a day. So Dr. Escobedo was prescribing her this medication which seemed to help her with the dystonia. She is no longer taking the Artane, it's causing constipation. She would like a rx to be sent to [facility] as [facility] deliver her meds to her house.

## 2017-04-10 ENCOUNTER — HOSPITAL ENCOUNTER (EMERGENCY)
Facility: MEDICAL CENTER | Age: 67
End: 2017-04-10
Attending: EMERGENCY MEDICINE
Payer: MEDICARE

## 2017-04-10 ENCOUNTER — APPOINTMENT (OUTPATIENT)
Dept: RADIOLOGY | Facility: MEDICAL CENTER | Age: 67
End: 2017-04-10
Attending: EMERGENCY MEDICINE
Payer: MEDICARE

## 2017-04-10 ENCOUNTER — HOME CARE VISIT (OUTPATIENT)
Dept: HOME HEALTH SERVICES | Facility: HOME HEALTHCARE | Age: 67
End: 2017-04-10
Payer: MEDICARE

## 2017-04-10 VITALS
WEIGHT: 108 LBS | HEART RATE: 69 BPM | SYSTOLIC BLOOD PRESSURE: 125 MMHG | RESPIRATION RATE: 16 BRPM | OXYGEN SATURATION: 96 % | BODY MASS INDEX: 18.44 KG/M2 | HEIGHT: 64 IN | TEMPERATURE: 97.5 F | DIASTOLIC BLOOD PRESSURE: 65 MMHG

## 2017-04-10 DIAGNOSIS — S30.0XXA LUMBAR CONTUSION, INITIAL ENCOUNTER: ICD-10-CM

## 2017-04-10 DIAGNOSIS — S80.02XA CONTUSION OF LEFT KNEE, INITIAL ENCOUNTER: ICD-10-CM

## 2017-04-10 PROCEDURE — 72131 CT LUMBAR SPINE W/O DYE: CPT

## 2017-04-10 PROCEDURE — A9270 NON-COVERED ITEM OR SERVICE: HCPCS | Performed by: EMERGENCY MEDICINE

## 2017-04-10 PROCEDURE — 73501 X-RAY EXAM HIP UNI 1 VIEW: CPT | Mod: LT

## 2017-04-10 PROCEDURE — 72128 CT CHEST SPINE W/O DYE: CPT

## 2017-04-10 PROCEDURE — 73564 X-RAY EXAM KNEE 4 OR MORE: CPT | Mod: LT

## 2017-04-10 PROCEDURE — 700102 HCHG RX REV CODE 250 W/ 637 OVERRIDE(OP): Performed by: EMERGENCY MEDICINE

## 2017-04-10 PROCEDURE — 99285 EMERGENCY DEPT VISIT HI MDM: CPT

## 2017-04-10 RX ORDER — TRAMADOL HYDROCHLORIDE 50 MG/1
50-100 TABLET ORAL EVERY 6 HOURS PRN
Qty: 15 TAB | Refills: 0 | Status: SHIPPED | OUTPATIENT
Start: 2017-04-10 | End: 2018-03-07

## 2017-04-10 RX ORDER — HYDROCODONE BITARTRATE AND ACETAMINOPHEN 5; 325 MG/1; MG/1
1 TABLET ORAL ONCE
Status: COMPLETED | OUTPATIENT
Start: 2017-04-10 | End: 2017-04-10

## 2017-04-10 RX ADMIN — HYDROCODONE BITARTRATE AND ACETAMINOPHEN 1 TABLET: 5; 325 TABLET ORAL at 06:17

## 2017-04-10 NOTE — ED NOTES
"Chief Complaint   Patient presents with   • T-5000 FALL     Pt was walking up stairs tonight carrying two water glasses and fell back about 6 stairs onto tile. Pt denies hitting her head or any LOC. C/o pain to her left knee, hip and mid low back.      /65 mmHg  Pulse 90  Temp(Src) 36.6 °C (97.9 °F)  Resp 16  Ht 1.626 m (5' 4.02\")  Wt 48.988 kg (108 lb)  BMI 18.53 kg/m2  SpO2 94%  LMP 01/01/1990  Pt in gown, on monitor, chart up for ERP.   "

## 2017-04-10 NOTE — ED AVS SNAPSHOT
XODIS Access Code: Activation code not generated  Current XODIS Status: Active    [employer]  A secure, online tool to manage your health information     [employer]’s XODIS® is a secure, online tool that connects you to your personalized health information from the privacy of your home -- day or night - making it very easy for you to manage your healthcare. Once the activation process is completed, you can even access your medical information using the XODIS alfredo, which is available for free in the [employer] Alfredo store or Google Play store.     XODIS provides the following levels of access (as shown below):   My Chart Features   [facility] Primary Care Doctor [facility]  Specialists [facility]  Urgent  Care Non-[facility]  Primary Care  Doctor   Email your healthcare team securely and privately 24/7 X X X X   Manage appointments: schedule your next appointment; view details of past/upcoming appointments X      Request prescription refills. X      View recent personal medical records, including lab and immunizations X X X X   View health record, including health history, allergies, medications X X X X   Read reports about your outpatient visits, procedures, consult and ER notes X X X X   See your discharge summary, which is a recap of your hospital and/or ER visit that includes your diagnosis, lab results, and care plan. X X       How to register for XODIS:  1. Go to  [email or web address].  2. Click on the Sign Up Now box, which takes you to the New Member Sign Up page. You will need to provide the following information:  a. Enter your XODIS Access Code exactly as it appears at the top of this page. (You will not need to use this code after you’ve completed the sign-up process. If you do not sign up before the expiration date, you must request a new code.)   b. Enter your date of birth.   c. Enter your home email address.   d. Click Submit, and follow the next screen’s instructions.  3. Create a XODIS ID. This will  be your [employer] login ID and cannot be changed, so think of one that is secure and easy to remember.  4. Create a [employer] password. You can change your password at any time.  5. Enter your Password Reset Question and Answer. This can be used at a later time if you forget your password.   6. Enter your e-mail address. This allows you to receive e-mail notifications when new information is available in [employer].  7. Click Sign Up. You can now view your health information.    For assistance activating your [employer] account, call [phone]

## 2017-04-10 NOTE — ED PROVIDER NOTES
"ED Provider Note    CHIEF COMPLAINT  Chief Complaint   Patient presents with   • T-5000 FALL     Pt was walking up stairs tonight carrying two water glasses and fell back about 6 stairs onto tile. Pt denies hitting her head or any LOC. C/o pain to her left knee, hip and mid low back.        HPI  Delmis Draper is a 67 y.o. female who presents for evaluation of fall with mid and lower back pain left hip and knee pain. The patient has Parkinson's disease. She has chronic resting tremor. She is apparently going up some stairs fell down 5-6 stairs did not hit her head. She denies loss of consciousness. She landed on her mid and low back. Denies any numbness weakness or tingling to the upper or lower extremities no neck pain. She also reports left hip and knee pain. She denies any anticoagulant use. No fevers or chills. Denies any abdominal pain or upper extremity pain   REVIEW OF SYSTEMS  See HPI for further details. No loss of consciousness numbness weakness or tingling All other systems are negative.     PAST MEDICAL HISTORY  Past Medical History   Diagnosis Date   • Disc disorder    • Dystonia    • vitamin D deficiency    • OSTEOPOROSIS 3/22/2010   • Parkinson disease (CMS-[facility])      \"atypical\"   • Low blood pressure reading    • Cellulitis 7/19/2012     right low extremity   • Bowel obstruction (CMS-[facility])      fecal impaction   • Anesthesia      hypotensive in the past   • Other specified disorder of intestines      constipation   • Heart burn    • Pain      right hip, right knee   • Abnormal finding on mammography, microcalcification    • Cataract    • GERD (gastroesophageal reflux disease)    • Dilated bile duct    • Peripheral neuropathy (CMS-[facility])    • Leukopenia    • Insulin resistance    • Dysuria    • Malaise and fatigue    • Dystonia    • Rash, skin    • Hypotension    • Scoliosis    • Bursitis    • Spinal stenosis, lumbar    • Hypertension      pt can run very low   • Unspecified disorder of thyroid      " had partial thyroidectomy   • Thyrotoxicosis remote     S/P I-131 Rx / subtotal thyroidectomy       FAMILY HISTORY  No history of bleeding disorder    SOCIAL HISTORY  Social History     Social History   • Marital Status:      Spouse Name: N/A   • Number of Children: 2   • Years of Education: N/A     Social History Main Topics   • Smoking status: Former Smoker -- 0.50 packs/day for 10 years     Quit date: 01/01/1985   • Smokeless tobacco: Never Used   • Alcohol Use: No      Comment: none since age 31   • Drug Use: No   • Sexual Activity: No      Comment: , 2 boys, diasability     Other Topics Concern   • None     Social History Narrative     Nonsmoker no IV drugs  SURGICAL HISTORY  Past Surgical History   Procedure Laterality Date   • Thyroidectomy  1967     partial   • Gastroscopy with biopsy  5/9/2012     Performed by CIERRA SUAZO at [facility]   • Egd w/endoscopic ultrasound  5/9/2012     Performed by CIERRA SUAZO at [facility]   • Cataract phaco with iol  4/14/2014     Performed by Roland Becerra M.D. at [facility]   • Cataract phaco with iol  5/12/2014     Performed by Roland Becerra M.D. at [facility]   • Breast biopsy Left 5/5/2015     Procedure: BREAST BIOPSY;  Surgeon: Deedee Bautista M.D.;  Location: SURGERY SAME DAY [facility];  Service:    • Gyn surgery  1985, 1988     c sec x 2   • Knee arthrotomy  1964, 1968     right   • Other       left fifth digit       CURRENT MEDICATIONS  Home Medications     Reviewed by Claudine Chavarria R.N. (Registered Nurse) on 04/10/17 at 0602  Med List Status: <None>    Medication Last Dose Status    5-Hydroxytryptophan (5-HTP PO)  Active    CALCIUM CITRATE-VITAMIN D3 PO  Active    carbidopa-levodopa (SINEMET)  MG Tab  Active    carbidopa-levodopa SR (SINEMET CR)  MG per tablet  Active    Cholecalciferol (D3 SUPER STRENGTH) 2000 UNIT Cap  Active    diazepam (VALIUM) 5 MG Tab  Active  "   docusate sodium (COLACE) 100 MG Cap  Active    gabapentin (NEURONTIN) 600 MG tablet  Active    HYDROmorphone (DILAUDID) 2 MG Tab  Active    Linaclotide 145 MCG Cap not taking Active    Magnesium Citrate Powder  Active    Melatonin 1 MG Tab  Active    metaxalone (SKELAXIN) 800 MG Tab  Active    Multiple Vitamin (MULTI VITAMIN DAILY PO)  Active    Polyethylene Glycol 3350 (MIRALAX PO)  Active    pramipexole (MIRAPEX) 0.125 MG Tab  Active    Taurine 1000 MG Cap  Active    tizanidine (ZANAFLEX) 2 MG tablet  Active    Turmeric 500 MG Cap  Active    Ubiquinol 100 MG Cap  Active                ALLERGIES  Allergies   Allergen Reactions   • Bactrim [Sulfamethoxazole W-Trimethoprim] Hives   • Actonel [Risedronate Sodium]    • Latex    • Sulfa Drugs    • Tape Rash     Paper tape is ok       PHYSICAL EXAM  VITAL SIGNS: /65 mmHg  Pulse 90  Temp(Src) 36.6 °C (97.9 °F)  Resp 16  Ht 1.626 m (5' 4.02\")  Wt 48.988 kg (108 lb)  BMI 18.53 kg/m2  SpO2 94%  LMP 01/01/1990 Room air O2: 94    Constitutional: Well developed, Well nourished, No acute distress, Non-toxic appearance.   HENT: Normocephalic, Atraumatic, Bilateral external ears normal, Oropharynx moist, No oral exudates, Nose normal.   Eyes: PERRLA, EOMI, Conjunctiva normal, No discharge.   Neck: Normal range of motion, No midline bony tenderness, Supple, No stridor.   Cardiovascular: Normal heart rate, Normal rhythm, No murmurs, No rubs, No gallops.   Thorax & Lungs: Normal breath sounds, No respiratory distress, No wheezing, No chest tenderness.   Abdomen: Bowel sounds normal, Soft, No tenderness, No masses, No pulsatile masses.   Skin: Warm, Dry, No erythema, No rash.   Back: Bony tenderness at T8-9-10 as well as lumbar 234. No step-offs tenderness, No CVA tenderness.   Extremities: Intact distal pulses, No edema, No tenderness, No cyanosis, No clubbing.   Musculoskeletal: Mild pain with range of motion left hip no shortening or internal or external rotation. " Minimal bony tenderness on the anterior left knee no crepitus no effusion  Neurologic: Alert & oriented x 3, Normal motor function, Normal sensory function, No focal deficits noted.   Psychiatric: Affect normal, Judgment normal, Mood normal.       RADIOLOGY/PROCEDURES  CT-TSPINE W/O PLUS RECONS   Final Result      1.  No acute fracture or gross malalignment   2.  Mild thoracic spondylosis   3.  Osteopenia   4.  Emphysema      CT-LSPINE W/O PLUS RECONS   Final Result      1.  No acute fracture   2.  Lumbar spondylosis and levoconvex scoliosis   3.  osteopenia   4.  Distended urinary bladder and mild BILATERAL hydronephrosis; this is similar to the prior study      DX-HIP-UNILATERAL-WITH PELVIS-1 VIEW LEFT   Final Result      No radiographic evidence of acute traumatic injury.   Mild BILATERAL hip osteoarthritis      DX-KNEE COMPLETE 4+ LEFT   Final Result      1.  No radiographic evidence of acute traumatic injury.   2.  Mild osteoarthritis            COURSE & MEDICAL DECISION MAKING  Pertinent Labs & Imaging studies reviewed. (See chart for details)  Patient was given oral pain medication. She did not have injury to the head or neck therefore I did not CAT scan these areas. CT scan of the thoracic and lumbar spine demonstrates typical osteoporosis and arthritis but no evidence of compression fracture subluxation dislocation etc. Radiographs of the left hip and knee are negative and she did not have any shortening or significant pain in the left hip therefore I had low suspicion for occult fracture. Patient was given some oral pain medication here. I'll discharge her home with some tramadol. Follow up with PCP for ongoing management    FINAL IMPRESSION  1.  1. Lumbar contusion, initial encounter    2. Contusion of left knee, initial encounter             Electronically signed by: Sukhdev Williamson, 4/10/2017 6:10 AM

## 2017-04-10 NOTE — ED AVS SNAPSHOT
4/10/2017          Delmis Draper  [address]  [place] [address]    Dear Delmis:    [facility] wants to ensure your discharge home is safe and you or your loved ones have had all your questions answered regarding your care after you leave the hospital.    You may receive a telephone call within two days of your discharge.  This call is to make certain you understand your discharge instructions as well as ensure we provided you with the best care possible during your stay with us.     The call will only last approximately 3-5 minutes and will be done by a nurse.    Once again, we want to ensure your discharge home is safe and that you have a clear understanding of any next steps in your care.  If you have any questions or concerns, please do not hesitate to contact us, we are here for you.  Thank you for choosing [facility] for your healthcare needs.    Sincerely,    Gerson Cordon    [facility]

## 2017-04-10 NOTE — ED AVS SNAPSHOT
Home Care Instructions                                                                                                                Delmis Draper   MRN: [id number]    Department:  [facility], Emergency Dept   Date of Visit:  4/10/2017            [facility], Emergency Dept    [address]    [place] [address]    Phone:  [phone]      You were seen by     Sukhdev Williamson M.D.      Your Diagnosis Was     Lumbar contusion, initial encounter     S30.0XXA       These are the medications you received during your hospitalization from 04/10/2017 0557 to 04/10/2017 0722     Date/Time Order Dose Route Action    04/10/2017 0617 hydrocodone-acetaminophen (NORCO) 5-325 MG per tablet 1 Tab 1 Tab Oral Given      Medication Information     Review all of your home medications and newly ordered medications with your primary doctor and/or pharmacist as soon as possible. Follow medication instructions as directed by your doctor and/or pharmacist.     Please keep your complete medication list with you and share with your physician. Update the information when medications are discontinued, doses are changed, or new medications (including over-the-counter products) are added; and carry medication information at all times in the event of emergency situations.               Medication List      START taking these medications        Instructions    Morning Afternoon Evening Bedtime    tramadol 50 MG Tabs   Commonly known as:  ULTRAM        Take 1-2 Tabs by mouth every 6 hours as needed for Moderate Pain.   Dose:   mg                          ASK your doctor about these medications        Instructions    Morning Afternoon Evening Bedtime    5-HTP PO        Take 10 mg by mouth.   Dose:  10 mg                        CALCIUM CITRATE-VITAMIN D3 PO        Take 1 Tab by mouth every day. 1000mg ca, 400 iu of vitamin d-3   Dose:  1 Tab                        * carbidopa-levodopa  MG Tabs  "  Commonly known as:  SINEMET        Take 1 Tab by mouth every 2 hours. Pt states she takes at 0700/0900/1100/1300/1500/1700/1900 Total of 7 doses during day   Dose:  1 Tab                        * carbidopa-levodopa SR  MG per tablet   Commonly known as:  SINEMET CR        Take 1 Tab by mouth every bedtime.   Dose:  1 Tab                        D3 SUPER STRENGTH 2000 UNIT Caps   Generic drug:  Cholecalciferol        Take 1 Cap by mouth every day.   Dose:  1 Cap                        diazepam 5 MG Tabs   Commonly known as:  VALIUM        Take 10 mg by mouth 2 Times a Day.   Dose:  10 mg                        DILAUDID 2 MG Tabs   Generic drug:  HYDROmorphone        Take 2 mg by mouth 1 time daily as needed. fo dystonic episodes, pt reports takes \"rarely\".   Dose:  2 mg                        docusate sodium 100 MG Caps   Commonly known as:  COLACE        Take 300 mg by mouth every day.   Dose:  300 mg                        gabapentin 600 MG tablet   Commonly known as:  NEURONTIN        1 tab tid and 3 tab qhs                        Linaclotide 145 MCG Caps        Take 145 mg by mouth every morning before breakfast.   Dose:  145 mg                        Magnesium Citrate Powd        Take 2 Doses by mouth every day. 2 teaspoon daily   Dose:  2 Dose                        Melatonin 1 MG Tabs        Take 1 mg by mouth every bedtime.   Dose:  1 mg                        metaxalone 800 MG Tabs   Commonly known as:  SKELAXIN        TAKE ONE TABLET BY MOUTH FOUR TIMES A DAY                        MIRALAX PO        Take  by mouth.                        MULTI VITAMIN DAILY PO        Take 1 Tab by mouth every day.   Dose:  1 Tab                        pramipexole 0.125 MG Tabs   Commonly known as:  MIRAPEX        Take 0.125 mg by mouth 3 times a day.   Dose:  0.125 mg                        Taurine 1000 MG Caps        Take 1,000 mg by mouth every day.   Dose:  1000 mg                        tizanidine 2 MG tablet   "   Commonly known as:  ZANAFLEX        1.5 tab 6 times as day                        Turmeric 500 MG Caps        Take 1 Cap by mouth every day.   Dose:  1 Cap                        Ubiquinol 100 MG Caps        Take 100 mg by mouth every day.   Dose:  100 mg                        * Notice:  This list has 2 medication(s) that are the same as other medications prescribed for you. Read the directions carefully, and ask your doctor or other care provider to review them with you.         Where to Get Your Medications      You can get these medications from any pharmacy     Bring a paper prescription for each of these medications    - tramadol 50 MG Tabs            Procedures and tests performed during your visit     CT-LSPINE W/O PLUS RECONS    CT-TSPINE W/O PLUS RECONS    DX-HIP-UNILATERAL-WITH PELVIS-1 VIEW LEFT    DX-KNEE COMPLETE 4+ LEFT        Discharge Instructions       Blunt Trauma  You have been evaluated for injuries. You have been examined and your caregiver has not found injuries serious enough to require hospitalization.  It is common to have multiple bruises and sore muscles following an accident. These tend to feel worse for the first 24 hours. You will feel more stiffness and soreness over the next several hours and worse when you wake up the first morning after your accident. After this point, you should begin to improve with each passing day. The amount of improvement depends on the amount of damage done in the accident.  Following your accident, if some part of your body does not work as it should, or if the pain in any area continues to increase, you should return to the Emergency Department for re-evaluation.   HOME CARE INSTRUCTIONS   Routine care for sore areas should include:  · Ice to sore areas every 2 hours for 20 minutes while awake for the next 2 days.  · Drink extra fluids (not alcohol).  · Take a hot or warm shower or bath once or twice a day to increase blood flow to sore muscles. This  "will help you \"limber up\".  · Activity as tolerated. Lifting may aggravate neck or back pain.  · Only take over-the-counter or prescription medicines for pain, discomfort, or fever as directed by your caregiver. Do not use aspirin. This may increase bruising or increase bleeding if there are small areas where this is happening.  SEEK IMMEDIATE MEDICAL CARE IF:  · Numbness, tingling, weakness, or problem with the use of your arms or legs.  · A severe headache is not relieved with medications.  · There is a change in bowel or bladder control.  · Increasing pain in any areas of the body.  · Short of breath or dizzy.  · Nauseated, vomiting, or sweating.  · Increasing belly (abdominal) discomfort.  · Blood in urine, stool, or vomiting blood.  · Pain in either shoulder in an area where a shoulder strap would be.  · Feelings of lightheadedness or if you have a fainting episode.  Sometimes it is not possible to identify all injuries immediately after the trauma. It is important that you continue to monitor your condition after the emergency department visit. If you feel you are not improving, or improving more slowly than should be expected, call your physician. If you feel your symptoms (problems) are worsening, return to the Emergency Department immediately.  Document Released: 09/13/2002 Document Revised: 03/11/2013 Document Reviewed: 08/05/2009  [employer]ared[employer]® Patient Information ©2014 [employer].    Contusion  A contusion is a deep bruise. Contusions happen when an injury causes bleeding under the skin. Signs of bruising include pain, puffiness (swelling), and discolored skin. The contusion may turn blue, purple, or yellow.  HOME CARE   · Put ice on the injured area.  · Put ice in a plastic bag.  · Place a towel between your skin and the bag.  · Leave the ice on for 15-20 minutes, 03-04 times a day.  · Only take medicine as told by your doctor.  · Rest the injured area.  · If possible, raise (elevate) the injured area to " lessen puffiness.  GET HELP RIGHT AWAY IF:   · You have more bruising or puffiness.  · You have pain that is getting worse.  · Your puffiness or pain is not helped by medicine.  MAKE SURE YOU:   · Understand these instructions.  · Will watch your condition.  · Will get help right away if you are not doing well or get worse.     This information is not intended to replace advice given to you by your health care provider. Make sure you discuss any questions you have with your health care provider.     Document Released: 06/05/2009 Document Revised: 03/11/2013 Document Reviewed: 10/22/2012  Open-Plug Interactive Patient Education ©2016 [employer]    Bone Bruise   A bone bruise is a small hidden fracture of the bone. It typically occurs with bones located close to the surface of the skin.   SYMPTOMS  · The pain lasts longer than a normal bruise.  · The bruised area is difficult to use.  · There may be discoloration or swelling of the bruised area.  · When a bone bruise is found with injury to the anterior cruciate ligament (in the knee) there is often an increased:  · Amount of fluid in the knee  · Time the fluid in the knee lasts.  · Number of days until you are walking normally and regaining the motion you had before the injury.  · Number of days with pain from the injury.  DIAGNOSIS   It can only be seen on X-rays known as MRIs. This stands for magnetic resonance imaging. A regular X-ray taken of a bone bruise would appear to be normal. A bone bruise is a common injury in the knee and the heel bone (calcaneus). The problems are similar to those produced by stress fractures, which are bone injuries caused by overuse. A bone bruise may also be a sign of other injuries. For example, bone bruises are commonly found where an anterior cruciate ligament (ACL) in the knee has been pulled away from the bone (ruptured). A ligament is a tough fibrous material that connects bones together to make our joints stable. Bruises of  the bone last a lot longer than bruises of the muscle or tissues beneath the skin. Bone bruises can last from days to months and are often more severe and painful than other bruises.  TREATMENT  Because bone bruises are sudden injuries you cannot often prevent them, other than by being extremely careful. Some things you can do to improve the condition are:  · Apply ice to the sore area for 15-20 minutes, 3-4 times per day while awake for the first 2 days. Put the ice in a plastic bag, and place a towel between the bag of ice and your skin.  · Keep your bruised area raised (elevated) when possible to lessen swelling.  · For activity:  · Use crutches when necessary; do not put weight on the injured leg until you are no longer tender.  · You may walk on your affected part as the pain allows, or as instructed.  · Start weight bearing gradually on the bruised part.  · Continue to use crutches or a cane until you can stand without causing pain, or as instructed.  · If a plaster splint was applied, wear the splint until you are seen for a follow-up examination. Rest it on nothing harder than a pillow the first 24 hours. Do not put weight on it. Do not get it wet. You may take it off to take a shower or bath.  · If an air splint was applied, more air may be blown into or out of the splint as needed for comfort. You may take it off at night and to take a shower or bath.  · Wiggle your toes in the splint several times per day if you are able.  · You may have been given an elastic bandage to use with the plaster splint or alone. The splint is too tight if you have numbness, tingling or if your foot becomes cold and blue. Adjust the bandage to make it comfortable.  · Only take over-the-counter or prescription medicines for pain, discomfort, or fever as directed by your caregiver.  · Follow all instructions for follow up with your caregiver. This includes any orthopedic referrals, physical therapy, and rehabilitation. Any delay  in obtaining necessary care could result in a delay or failure of the bones to heal.  SEEK MEDICAL CARE IF:   · You have an increase in bruising, swelling, or pain.  · You notice coldness of your toes.  · You do not get pain relief with medications.  SEEK IMMEDIATE MEDICAL CARE IF:   · Your toes are numb or blue.  · You have severe pain not controlled with medications.  · If any of the problems that caused you to seek care are becoming worse.  Document Released: 03/09/2005 Document Revised: 03/11/2013 Document Reviewed: 07/22/2009  ExitCare® Patient Information ©2014 [employer].            Patient Information     Patient Information    Following emergency treatment: all patient requiring follow-up care must return either to a private physician or a clinic if your condition worsens before you are able to obtain further medical attention, please return to the emergency room.     Billing Information    At [facility], we work to make the billing process streamlined for our patients.  Our Representatives are here to answer any questions you may have regarding your hospital bill.  If you have insurance coverage and have supplied your insurance information to us, we will submit a claim to your insurer on your behalf.  Should you have any questions regarding your bill, we can be reached online or by phone as follows:  Online: You are able pay your bills online or live chat with our representatives about any billing questions you may have. We are here to help Monday - Friday from 8:00am to 7:30pm and 9:00am - 12:00pm on Saturdays.  Please visit [email or web address]  for more information.   Phone:  [phone] or [phone]    Please note that your emergency physician, surgeon, pathologist, radiologist, anesthesiologist, and other specialists are not employed by [facility] and will therefore bill separately for their services.  Please contact them directly for any questions concerning  their bills at the numbers below:     Emergency Physician Services:  [phone]  [facility]:  [phone]  [facility]:  [phone]  [facility]:  [phone]    1. Your final bill may vary from the amount quoted upon discharge if all procedures are not complete at that time, or if your doctor has additional procedures of which we are not aware. You will receive an additional bill if you return to the Emergency Department at [facility] for suture removal regardless of the facility of which the sutures were placed.     2. Please arrange for settlement of this account at the emergency registration.    3. All self-pay accounts are due in full at the time of treatment.  If you are unable to meet this obligation then payment is expected within 4-5 days.     4. If you have had radiology studies (CT, X-ray, Ultrasound, MRI), you have received a preliminary result during your emergency department visit. Please contact the radiology department [phone] to receive a copy of your final result. Please discuss the Final result with your primary physician or with the follow up physician provided.     Crisis Hotline:  [place] Crisis Hotline:  7-704-CDGWRRO or 1-214.612.5511  [place] Crisis Hotline:    1-522.551.4777 or 406-403-4942         ED Discharge Follow Up Questions    1. In order to provide you with very good care, we would like to follow up with a phone call in the next few days.  May we have your permission to contact you?     YES /  NO    2. What is the best phone number to call you? (       )_____-__________    3. What is the best time to call you?      Morning  /  Afternoon  /  Evening                   Patient Signature:  ____________________________________________________________    Date:  ____________________________________________________________      Your appointments     Apr 11, 2017 11:00 AM   OT-HH-HOME VISIT with JOSELUIS Low  [facility] (--)    [address]  [place] [address]   [phone]            Apr 11, 2017 To Be Determined   AIDE-HH-HOME VISIT with Manan Reno C.N.A.   [facility] (--)    [address]  [place] [address]   [phone]            Apr 12, 2017 To Be Determined   SN-HH-HOME VISIT + HHA SUP with Barbara Sanz R.N.   [facility] (--)    [address]  [place] [address]   [phone]            Apr 13, 2017 11:00 AM   OT-HH-HOME VISIT with Daniel Harrell O.T.   [facility] (--)    [address]  [place] [address]   [phone]            Apr 14, 2017 To Be Determined   AIDE-HH-HOME VISIT with Manan Reno C.N.A.   [facility] (--)    [address]  [place] [address]   [phone]            Apr 18, 2017 To Be Determined   AIDE-HH-HOME VISIT with Manan Reno C.N.A.   [facility] (--)    [address]  [place] [address]   [phone]            Apr 18, 2017 11:00 AM   OT-HH-HOME VISIT with Daniel Harrell O.T.   [facility] (--)    [address]  [place] [address]   [phone]            Apr 19, 2017 To Be Determined   SN-HH-HOME VISIT with Barbara Sanz R.N.   [facility] (--)    [address]  [place] [address]   [phone]            Apr 20, 2017 11:00 AM   OT-HH-HOME VISIT with Daniel Harrell O.T.   [facility] (--)    [address]  [place] [address]   [phone]            Apr 21, 2017 To Be Determined   AIDE-HH-HOME VISIT with Manan Reno C.N.A.   [facility] (--)    [address] [address]   [phone]            Apr 25, 2017 To Be Determined   AIDE-HH-HOME VISIT with Manan Reno C.N.A.   [facility] (--)    [address] [address]   [phone]            Apr 25, 2017 11:00 AM   OT-HH-HOME VISIT with Daniel Harrell O.T.   [facility] (--)    [address]  [facility] [address]   [phone]            Apr 26, 2017 To Be Determined      SN-HH-HOME VISIT + HHA SUP with Barbara Sanz R.N.   [facility] (--)    [address]  [place] [address]   [phone]            Apr 27, 2017 11:00 AM   OT-HH-HOME VISIT with JESSICA LowTStacy   [facility] (--)    [address]  [place] [address]   [phone]            Apr 28, 2017 To Be Determined   AIDE-HH-HOME VISIT with Manan Reno C.N.A.   [facility] (--)    [address]  [place] [address]   [phone]            May 02, 2017 To Be Determined   AIDE-HH-HOME VISIT with Manan Reno C.N.A.   [facility] (--)    [address]  [place] [address]   [phone]            May 02, 2017 11:00 AM   OT-HH-HOME VISIT with JESSICA LowTStacy   [facility] (--)    [address]  [place] [address]   [phone]            May 03, 2017 To Be Determined   SN-HH-HOME VISIT with Barbara Sanz R.N.   [facility] (--)    [address]  [place] [address]   [phone]            May 04, 2017 11:00 AM   OT-HH-HOME VISIT with PING Low.TStacy   [facility] (--)    [address]  [place] [address]   [phone]            May 05, 2017 To Be Determined   AIDE-HH-HOME VISIT with Manan Reno C.N.A.   [facility] (--)    [address]  [place] [address]   [phone]            May 08, 2017 To Be Determined   AIDE-HH-HOME VISIT with Manan Reno C.N.A.   [facility] (--)    [address]  [place] [address]   [phone]            May 11, 2017 To Be Determined   AIDE-HH-HOME VISIT with Manan Reno C.N.A.   [facility] (--)    [facility]  [facility] [address]   [phone]            May 12, 2017 To Be Determined   SN-HH-OASIS D/C+LPN/HHA SUP with Barbara Sanz R.N.   [facility] (--)    [facility] [address] [facility]  [facility] [address]   [phone]

## 2017-04-10 NOTE — DISCHARGE INSTRUCTIONS
"Blunt Trauma  You have been evaluated for injuries. You have been examined and your caregiver has not found injuries serious enough to require hospitalization.  It is common to have multiple bruises and sore muscles following an accident. These tend to feel worse for the first 24 hours. You will feel more stiffness and soreness over the next several hours and worse when you wake up the first morning after your accident. After this point, you should begin to improve with each passing day. The amount of improvement depends on the amount of damage done in the accident.  Following your accident, if some part of your body does not work as it should, or if the pain in any area continues to increase, you should return to the Emergency Department for re-evaluation.   HOME CARE INSTRUCTIONS   Routine care for sore areas should include:  · Ice to sore areas every 2 hours for 20 minutes while awake for the next 2 days.  · Drink extra fluids (not alcohol).  · Take a hot or warm shower or bath once or twice a day to increase blood flow to sore muscles. This will help you \"limber up\".  · Activity as tolerated. Lifting may aggravate neck or back pain.  · Only take over-the-counter or prescription medicines for pain, discomfort, or fever as directed by your caregiver. Do not use aspirin. This may increase bruising or increase bleeding if there are small areas where this is happening.  SEEK IMMEDIATE MEDICAL CARE IF:  · Numbness, tingling, weakness, or problem with the use of your arms or legs.  · A severe headache is not relieved with medications.  · There is a change in bowel or bladder control.  · Increasing pain in any areas of the body.  · Short of breath or dizzy.  · Nauseated, vomiting, or sweating.  · Increasing belly (abdominal) discomfort.  · Blood in urine, stool, or vomiting blood.  · Pain in either shoulder in an area where a shoulder strap would be.  · Feelings of lightheadedness or if you have a fainting " episode.  Sometimes it is not possible to identify all injuries immediately after the trauma. It is important that you continue to monitor your condition after the emergency department visit. If you feel you are not improving, or improving more slowly than should be expected, call your physician. If you feel your symptoms (problems) are worsening, return to the Emergency Department immediately.  Document Released: 09/13/2002 Document Revised: 03/11/2013 Document Reviewed: 08/05/2009  [employer]® Patient Information ©2014 [email or web address].    Contusion  A contusion is a deep bruise. Contusions happen when an injury causes bleeding under the skin. Signs of bruising include pain, puffiness (swelling), and discolored skin. The contusion may turn blue, purple, or yellow.  HOME CARE   · Put ice on the injured area.  · Put ice in a plastic bag.  · Place a towel between your skin and the bag.  · Leave the ice on for 15-20 minutes, 03-04 times a day.  · Only take medicine as told by your doctor.  · Rest the injured area.  · If possible, raise (elevate) the injured area to lessen puffiness.  GET HELP RIGHT AWAY IF:   · You have more bruising or puffiness.  · You have pain that is getting worse.  · Your puffiness or pain is not helped by medicine.  MAKE SURE YOU:   · Understand these instructions.  · Will watch your condition.  · Will get help right away if you are not doing well or get worse.     This information is not intended to replace advice given to you by your health care provider. Make sure you discuss any questions you have with your health care provider.     Document Released: 06/05/2009 Document Revised: 03/11/2013 Document Reviewed: 10/22/2012  [employer] Patient Education ©2016 [employer]    Bone Bruise   A bone bruise is a small hidden fracture of the bone. It typically occurs with bones located close to the surface of the skin.   SYMPTOMS  · The pain lasts longer than a normal bruise.  · The bruised  area is difficult to use.  · There may be discoloration or swelling of the bruised area.  · When a bone bruise is found with injury to the anterior cruciate ligament (in the knee) there is often an increased:  · Amount of fluid in the knee  · Time the fluid in the knee lasts.  · Number of days until you are walking normally and regaining the motion you had before the injury.  · Number of days with pain from the injury.  DIAGNOSIS   It can only be seen on X-rays known as MRIs. This stands for magnetic resonance imaging. A regular X-ray taken of a bone bruise would appear to be normal. A bone bruise is a common injury in the knee and the heel bone (calcaneus). The problems are similar to those produced by stress fractures, which are bone injuries caused by overuse. A bone bruise may also be a sign of other injuries. For example, bone bruises are commonly found where an anterior cruciate ligament (ACL) in the knee has been pulled away from the bone (ruptured). A ligament is a tough fibrous material that connects bones together to make our joints stable. Bruises of the bone last a lot longer than bruises of the muscle or tissues beneath the skin. Bone bruises can last from days to months and are often more severe and painful than other bruises.  TREATMENT  Because bone bruises are sudden injuries you cannot often prevent them, other than by being extremely careful. Some things you can do to improve the condition are:  · Apply ice to the sore area for 15-20 minutes, 3-4 times per day while awake for the first 2 days. Put the ice in a plastic bag, and place a towel between the bag of ice and your skin.  · Keep your bruised area raised (elevated) when possible to lessen swelling.  · For activity:  · Use crutches when necessary; do not put weight on the injured leg until you are no longer tender.  · You may walk on your affected part as the pain allows, or as instructed.  · Start weight bearing gradually on the bruised  part.  · Continue to use crutches or a cane until you can stand without causing pain, or as instructed.  · If a plaster splint was applied, wear the splint until you are seen for a follow-up examination. Rest it on nothing harder than a pillow the first 24 hours. Do not put weight on it. Do not get it wet. You may take it off to take a shower or bath.  · If an air splint was applied, more air may be blown into or out of the splint as needed for comfort. You may take it off at night and to take a shower or bath.  · Wiggle your toes in the splint several times per day if you are able.  · You may have been given an elastic bandage to use with the plaster splint or alone. The splint is too tight if you have numbness, tingling or if your foot becomes cold and blue. Adjust the bandage to make it comfortable.  · Only take over-the-counter or prescription medicines for pain, discomfort, or fever as directed by your caregiver.  · Follow all instructions for follow up with your caregiver. This includes any orthopedic referrals, physical therapy, and rehabilitation. Any delay in obtaining necessary care could result in a delay or failure of the bones to heal.  SEEK MEDICAL CARE IF:   · You have an increase in bruising, swelling, or pain.  · You notice coldness of your toes.  · You do not get pain relief with medications.  SEEK IMMEDIATE MEDICAL CARE IF:   · Your toes are numb or blue.  · You have severe pain not controlled with medications.  · If any of the problems that caused you to seek care are becoming worse.  Document Released: 03/09/2005 Document Revised: 03/11/2013 Document Reviewed: 07/22/2009  [employer]® Patient Information ©2014 [employer].

## 2017-04-11 ENCOUNTER — HOME CARE VISIT (OUTPATIENT)
Dept: HOME HEALTH SERVICES | Facility: HOME HEALTHCARE | Age: 67
End: 2017-04-11
Payer: MEDICARE

## 2017-04-11 VITALS — DIASTOLIC BLOOD PRESSURE: 66 MMHG | SYSTOLIC BLOOD PRESSURE: 96 MMHG | RESPIRATION RATE: 18 BRPM

## 2017-04-11 PROCEDURE — G0152 HHCP-SERV OF OT,EA 15 MIN: HCPCS

## 2017-04-12 ENCOUNTER — HOSPITAL ENCOUNTER (OUTPATIENT)
Facility: MEDICAL CENTER | Age: 67
End: 2017-04-12
Attending: FAMILY MEDICINE
Payer: MEDICARE

## 2017-04-12 ENCOUNTER — HOME CARE VISIT (OUTPATIENT)
Dept: HOME HEALTH SERVICES | Facility: HOME HEALTHCARE | Age: 67
End: 2017-04-12
Payer: MEDICARE

## 2017-04-12 VITALS
RESPIRATION RATE: 18 BRPM | HEART RATE: 51 BPM | TEMPERATURE: 100.5 F | SYSTOLIC BLOOD PRESSURE: 90 MMHG | DIASTOLIC BLOOD PRESSURE: 58 MMHG

## 2017-04-12 LAB
APPEARANCE UR: CLEAR
BILIRUB UR QL STRIP.AUTO: NEGATIVE
COLOR UR: NORMAL
CULTURE IF INDICATED INDCX: NO UA CULTURE
GLUCOSE UR STRIP.AUTO-MCNC: NEGATIVE MG/DL
KETONES UR STRIP.AUTO-MCNC: NEGATIVE MG/DL
LEUKOCYTE ESTERASE UR QL STRIP.AUTO: NEGATIVE
MICRO URNS: NORMAL
NITRITE UR QL STRIP.AUTO: NEGATIVE
PH UR STRIP.AUTO: 6.5 [PH]
PROT UR QL STRIP: NEGATIVE MG/DL
RBC UR QL AUTO: NEGATIVE
SP GR UR STRIP.AUTO: 1.01

## 2017-04-12 PROCEDURE — G0299 HHS/HOSPICE OF RN EA 15 MIN: HCPCS

## 2017-04-12 PROCEDURE — 81003 URINALYSIS AUTO W/O SCOPE: CPT

## 2017-04-12 SDOH — ECONOMIC STABILITY: HOUSING INSECURITY: UNSAFE COOKING RANGE AREA: 0

## 2017-04-12 SDOH — ECONOMIC STABILITY: HOUSING INSECURITY: UNSAFE APPLIANCES: 0

## 2017-04-12 ASSESSMENT — ENCOUNTER SYMPTOMS
NAUSEA: DENIES
DEPRESSED MOOD: 1
VOMITING: DENIES
MENTAL STATUS CHANGE: 0

## 2017-04-12 ASSESSMENT — ACTIVITIES OF DAILY LIVING (ADL)
DRESSING_UB_ASSISTANCE: 0
ORAL_CARE_ASSISTANCE: 0
DRESSING_LB_ASSISTANCE: 0
LAUNDRY_ASSISTANCE: 5
BATHING_ASSISTANCE: 4
HOUSEKEEPING_ASSISTANCE: 5
MEAL_PREP_ASSISTANCE: 5
TOILETING_ASSISTANCE: 0
EATING_ASSISTANCE: 0
SHOPPING_ASSISTANCE: 6
TELEPHONE_ASSISTANCE: 0
TRANSPORTATION_ASSISTANCE: 6
GROOMING_ASSISTANCE: 0

## 2017-04-13 ENCOUNTER — HOME CARE VISIT (OUTPATIENT)
Dept: HOME HEALTH SERVICES | Facility: HOME HEALTHCARE | Age: 67
End: 2017-04-13
Payer: MEDICARE

## 2017-04-13 VITALS
HEART RATE: 62 BPM | TEMPERATURE: 92.1 F | DIASTOLIC BLOOD PRESSURE: 60 MMHG | RESPIRATION RATE: 17 BRPM | SYSTOLIC BLOOD PRESSURE: 95 MMHG

## 2017-04-13 PROCEDURE — G0152 HHCP-SERV OF OT,EA 15 MIN: HCPCS

## 2017-04-17 ENCOUNTER — HOME CARE VISIT (OUTPATIENT)
Dept: HOME HEALTH SERVICES | Facility: HOME HEALTHCARE | Age: 67
End: 2017-04-17
Payer: MEDICARE

## 2017-04-17 ENCOUNTER — HOSPITAL ENCOUNTER (OUTPATIENT)
Facility: MEDICAL CENTER | Age: 67
End: 2017-04-17
Attending: FAMILY MEDICINE
Payer: MEDICARE

## 2017-04-17 VITALS
DIASTOLIC BLOOD PRESSURE: 58 MMHG | TEMPERATURE: 99.1 F | RESPIRATION RATE: 18 BRPM | HEART RATE: 88 BPM | SYSTOLIC BLOOD PRESSURE: 102 MMHG

## 2017-04-17 LAB
25(OH)D3 SERPL-MCNC: 19 NG/ML (ref 30–100)
ALBUMIN SERPL BCP-MCNC: 4.2 G/DL (ref 3.2–4.9)
ALBUMIN/GLOB SERPL: 1.3 G/DL
ALP SERPL-CCNC: 78 U/L (ref 30–99)
ALT SERPL-CCNC: 5 U/L (ref 2–50)
ANION GAP SERPL CALC-SCNC: 10 MMOL/L (ref 0–11.9)
AST SERPL-CCNC: 21 U/L (ref 12–45)
BASOPHILS # BLD AUTO: 0.4 % (ref 0–1.8)
BASOPHILS # BLD: 0.02 K/UL (ref 0–0.12)
BILIRUB SERPL-MCNC: 0.4 MG/DL (ref 0.1–1.5)
BUN SERPL-MCNC: 19 MG/DL (ref 8–22)
CALCIUM SERPL-MCNC: 9.8 MG/DL (ref 8.5–10.5)
CHLORIDE SERPL-SCNC: 106 MMOL/L (ref 96–112)
CHOLEST SERPL-MCNC: 181 MG/DL (ref 100–199)
CO2 SERPL-SCNC: 25 MMOL/L (ref 20–33)
CREAT SERPL-MCNC: 0.65 MG/DL (ref 0.5–1.4)
EOSINOPHIL # BLD AUTO: 0.1 K/UL (ref 0–0.51)
EOSINOPHIL NFR BLD: 2 % (ref 0–6.9)
ERYTHROCYTE [DISTWIDTH] IN BLOOD BY AUTOMATED COUNT: 44.9 FL (ref 35.9–50)
ERYTHROCYTE [SEDIMENTATION RATE] IN BLOOD BY WESTERGREN METHOD: 22 MM/HOUR (ref 0–30)
GFR SERPL CREATININE-BSD FRML MDRD: >60 ML/MIN/1.73 M 2
GLOBULIN SER CALC-MCNC: 3.3 G/DL (ref 1.9–3.5)
GLUCOSE SERPL-MCNC: 82 MG/DL (ref 65–99)
HCT VFR BLD AUTO: 42.6 % (ref 37–47)
HDLC SERPL-MCNC: 74 MG/DL
HGB BLD-MCNC: 13.8 G/DL (ref 12–16)
IMM GRANULOCYTES # BLD AUTO: 0 K/UL (ref 0–0.11)
IMM GRANULOCYTES NFR BLD AUTO: 0 % (ref 0–0.9)
LDLC SERPL CALC-MCNC: 95 MG/DL
LIPASE SERPL-CCNC: 10 U/L (ref 11–82)
LYMPHOCYTES # BLD AUTO: 1.06 K/UL (ref 1–4.8)
LYMPHOCYTES NFR BLD: 21.5 % (ref 22–41)
MAGNESIUM SERPL-MCNC: 2.3 MG/DL (ref 1.5–2.5)
MCH RBC QN AUTO: 31 PG (ref 27–33)
MCHC RBC AUTO-ENTMCNC: 32.4 G/DL (ref 33.6–35)
MCV RBC AUTO: 95.7 FL (ref 81.4–97.8)
MONOCYTES # BLD AUTO: 0.35 K/UL (ref 0–0.85)
MONOCYTES NFR BLD AUTO: 7.1 % (ref 0–13.4)
NEUTROPHILS # BLD AUTO: 3.4 K/UL (ref 2–7.15)
NEUTROPHILS NFR BLD: 69 % (ref 44–72)
NRBC # BLD AUTO: 0 K/UL
NRBC BLD AUTO-RTO: 0 /100 WBC
PLATELET # BLD AUTO: 177 K/UL (ref 164–446)
PMV BLD AUTO: 12.9 FL (ref 9–12.9)
POTASSIUM SERPL-SCNC: 4.1 MMOL/L (ref 3.6–5.5)
PROT SERPL-MCNC: 7.5 G/DL (ref 6–8.2)
RBC # BLD AUTO: 4.45 M/UL (ref 4.2–5.4)
SODIUM SERPL-SCNC: 141 MMOL/L (ref 135–145)
TRIGL SERPL-MCNC: 59 MG/DL (ref 0–149)
TSH SERPL DL<=0.005 MIU/L-ACNC: 1.69 UIU/ML (ref 0.3–3.7)
URATE SERPL-MCNC: 2.5 MG/DL (ref 1.9–8.2)
WBC # BLD AUTO: 4.9 K/UL (ref 4.8–10.8)

## 2017-04-17 PROCEDURE — 82306 VITAMIN D 25 HYDROXY: CPT

## 2017-04-17 PROCEDURE — 80061 LIPID PANEL: CPT

## 2017-04-17 PROCEDURE — 85025 COMPLETE CBC W/AUTO DIFF WBC: CPT

## 2017-04-17 PROCEDURE — 83516 IMMUNOASSAY NONANTIBODY: CPT

## 2017-04-17 PROCEDURE — 80053 COMPREHEN METABOLIC PANEL: CPT

## 2017-04-17 PROCEDURE — A4215 STERILE NEEDLE: HCPCS

## 2017-04-17 PROCEDURE — 84443 ASSAY THYROID STIM HORMONE: CPT

## 2017-04-17 PROCEDURE — G0299 HHS/HOSPICE OF RN EA 15 MIN: HCPCS

## 2017-04-17 PROCEDURE — 82784 ASSAY IGA/IGD/IGG/IGM EACH: CPT

## 2017-04-17 PROCEDURE — 83735 ASSAY OF MAGNESIUM: CPT

## 2017-04-17 PROCEDURE — 84550 ASSAY OF BLOOD/URIC ACID: CPT

## 2017-04-17 PROCEDURE — 83690 ASSAY OF LIPASE: CPT

## 2017-04-17 PROCEDURE — 85652 RBC SED RATE AUTOMATED: CPT

## 2017-04-17 SDOH — ECONOMIC STABILITY: HOUSING INSECURITY: UNSAFE APPLIANCES: 0

## 2017-04-17 SDOH — ECONOMIC STABILITY: HOUSING INSECURITY: UNSAFE COOKING RANGE AREA: 0

## 2017-04-17 ASSESSMENT — ACTIVITIES OF DAILY LIVING (ADL)
ORAL_CARE_ASSISTANCE: 0
HOUSEKEEPING_ASSISTANCE: 6
SHOPPING_ASSISTANCE: 6
TELEPHONE_ASSISTANCE: 0
DRESSING_UB_ASSISTANCE: 0
BATHING_ASSISTANCE: 3
GROOMING_ASSISTANCE: 0
DRESSING_LB_ASSISTANCE: 0
MEAL_PREP_ASSISTANCE: 1
TRANSPORTATION_ASSISTANCE: 6
EATING_ASSISTANCE: 0
TOILETING_ASSISTANCE: 0
LAUNDRY_ASSISTANCE: 6

## 2017-04-17 ASSESSMENT — ENCOUNTER SYMPTOMS
VOMITING: DENIES
RESPIRATORY SYMPTOMS COMMENTS: RESPIRATIONS ARE EVEN AND UNLABORED WITH NO SOB NOTED
DEPRESSED MOOD: 1
NAUSEA: DENIES

## 2017-04-18 ENCOUNTER — HOME CARE VISIT (OUTPATIENT)
Dept: HOME HEALTH SERVICES | Facility: HOME HEALTHCARE | Age: 67
End: 2017-04-18
Payer: MEDICARE

## 2017-04-18 PROCEDURE — G0152 HHCP-SERV OF OT,EA 15 MIN: HCPCS

## 2017-04-19 VITALS — HEART RATE: 62 BPM | SYSTOLIC BLOOD PRESSURE: 95 MMHG | DIASTOLIC BLOOD PRESSURE: 60 MMHG | RESPIRATION RATE: 17 BRPM

## 2017-04-19 LAB
IGA SERPL-MCNC: 327 MG/DL (ref 68–408)
TTG IGA SER IA-ACNC: 1 U/ML (ref 0–3)

## 2017-04-19 ASSESSMENT — ENCOUNTER SYMPTOMS: DEBILITATING PAIN: 1

## 2017-04-20 ENCOUNTER — HOME CARE VISIT (OUTPATIENT)
Dept: HOME HEALTH SERVICES | Facility: HOME HEALTHCARE | Age: 67
End: 2017-04-20
Payer: MEDICARE

## 2017-04-20 PROCEDURE — G0152 HHCP-SERV OF OT,EA 15 MIN: HCPCS

## 2017-04-21 VITALS
HEART RATE: 88 BPM | RESPIRATION RATE: 17 BRPM | DIASTOLIC BLOOD PRESSURE: 60 MMHG | SYSTOLIC BLOOD PRESSURE: 99 MMHG | TEMPERATURE: 92 F

## 2017-04-21 ASSESSMENT — ENCOUNTER SYMPTOMS
DEPRESSED MOOD: 1
DEBILITATING PAIN: 1

## 2017-04-25 ENCOUNTER — HOME CARE VISIT (OUTPATIENT)
Dept: HOME HEALTH SERVICES | Facility: HOME HEALTHCARE | Age: 67
End: 2017-04-25
Payer: MEDICARE

## 2017-04-25 VITALS
HEART RATE: 88 BPM | TEMPERATURE: 93 F | SYSTOLIC BLOOD PRESSURE: 99 MMHG | RESPIRATION RATE: 18 BRPM | DIASTOLIC BLOOD PRESSURE: 60 MMHG

## 2017-04-25 PROCEDURE — G0152 HHCP-SERV OF OT,EA 15 MIN: HCPCS

## 2017-04-26 ENCOUNTER — HOME CARE VISIT (OUTPATIENT)
Dept: HOME HEALTH SERVICES | Facility: HOME HEALTHCARE | Age: 67
End: 2017-04-26
Payer: MEDICARE

## 2017-04-26 VITALS
RESPIRATION RATE: 16 BRPM | HEART RATE: 67 BPM | TEMPERATURE: 99.7 F | SYSTOLIC BLOOD PRESSURE: 110 MMHG | DIASTOLIC BLOOD PRESSURE: 60 MMHG

## 2017-04-26 PROCEDURE — G0162 HHC RN E&M PLAN SVS, 15 MIN: HCPCS

## 2017-04-26 SDOH — ECONOMIC STABILITY: HOUSING INSECURITY: UNSAFE COOKING RANGE AREA: 0

## 2017-04-26 SDOH — ECONOMIC STABILITY: HOUSING INSECURITY: UNSAFE APPLIANCES: 0

## 2017-04-26 ASSESSMENT — ACTIVITIES OF DAILY LIVING (ADL)
TELEPHONE_ASSISTANCE: 0
SHOPPING_ASSISTANCE: 6
BATHING_ASSISTANCE: 5
LAUNDRY_ASSISTANCE: 6
ORAL_CARE_ASSISTANCE: 0
TOILETING_ASSISTANCE: 0
EATING_ASSISTANCE: 0
HOUSEKEEPING_ASSISTANCE: 6
DRESSING_LB_ASSISTANCE: 5
DRESSING_UB_ASSISTANCE: 5
MEAL_PREP_ASSISTANCE: 6
TRANSPORTATION_ASSISTANCE: 6
GROOMING_ASSISTANCE: 0

## 2017-04-26 ASSESSMENT — ENCOUNTER SYMPTOMS
VOMITING: DENIES
NAUSEA: DENIES
RESPIRATORY SYMPTOMS COMMENTS: RESPIRATIONS ARE EVEN AND UNLABORED WITH NO SOB NOTED

## 2017-04-27 ENCOUNTER — HOME CARE VISIT (OUTPATIENT)
Dept: HOME HEALTH SERVICES | Facility: HOME HEALTHCARE | Age: 67
End: 2017-04-27
Payer: MEDICARE

## 2017-04-27 ENCOUNTER — TELEPHONE (OUTPATIENT)
Dept: NEUROLOGY | Facility: MEDICAL CENTER | Age: 67
End: 2017-04-27

## 2017-04-27 PROCEDURE — G0152 HHCP-SERV OF OT,EA 15 MIN: HCPCS

## 2017-04-27 NOTE — TELEPHONE ENCOUNTER
Pt is calling about the referral to home health. Called pt back and all questions and concerns were addressed. ERROL

## 2017-04-28 VITALS
RESPIRATION RATE: 18 BRPM | DIASTOLIC BLOOD PRESSURE: 58 MMHG | SYSTOLIC BLOOD PRESSURE: 90 MMHG | TEMPERATURE: 95.1 F | HEART RATE: 82 BPM

## 2017-04-28 ASSESSMENT — ENCOUNTER SYMPTOMS: DEPRESSED MOOD: 1

## 2017-05-02 ENCOUNTER — TELEPHONE (OUTPATIENT)
Dept: NEUROLOGY | Facility: MEDICAL CENTER | Age: 67
End: 2017-05-02

## 2017-05-02 ENCOUNTER — HOME CARE VISIT (OUTPATIENT)
Dept: HOME HEALTH SERVICES | Facility: HOME HEALTHCARE | Age: 67
End: 2017-05-02
Payer: MEDICARE

## 2017-05-02 PROCEDURE — G0152 HHCP-SERV OF OT,EA 15 MIN: HCPCS

## 2017-05-02 NOTE — TELEPHONE ENCOUNTER
Pt is calling and stating that she is not able to sleep at night due to the tremors and being uncomfortable. She states she is seeing a pattern of when she wakes up at night. It is usually around 2 or 3 in the morning. She states this happens every night. She is asking is there something she can take to help her stay asleep at night like Melatonin or a Prescription medication. Please advise. Thank you. ERROL

## 2017-05-03 ENCOUNTER — HOME CARE VISIT (OUTPATIENT)
Dept: HOME HEALTH SERVICES | Facility: HOME HEALTHCARE | Age: 67
End: 2017-05-03
Payer: MEDICARE

## 2017-05-03 VITALS
HEART RATE: 79 BPM | DIASTOLIC BLOOD PRESSURE: 60 MMHG | TEMPERATURE: 99 F | RESPIRATION RATE: 16 BRPM | SYSTOLIC BLOOD PRESSURE: 92 MMHG

## 2017-05-03 VITALS
TEMPERATURE: 92.1 F | RESPIRATION RATE: 16 BRPM | SYSTOLIC BLOOD PRESSURE: 99 MMHG | HEART RATE: 67 BPM | DIASTOLIC BLOOD PRESSURE: 59 MMHG

## 2017-05-03 PROCEDURE — G0162 HHC RN E&M PLAN SVS, 15 MIN: HCPCS

## 2017-05-03 SDOH — ECONOMIC STABILITY: HOUSING INSECURITY: UNSAFE COOKING RANGE AREA: 0

## 2017-05-03 SDOH — ECONOMIC STABILITY: HOUSING INSECURITY: UNSAFE APPLIANCES: 0

## 2017-05-03 ASSESSMENT — ACTIVITIES OF DAILY LIVING (ADL)
DRESSING_LB_ASSISTANCE: 0
TRANSPORTATION_ASSISTANCE: 6
EATING_ASSISTANCE: 0
HOUSEKEEPING_ASSISTANCE: 6
DRESSING_UB_ASSISTANCE: 0
BATHING_ASSISTANCE: 3
MEAL_PREP_ASSISTANCE: 6
TELEPHONE_ASSISTANCE: 0
DRESSING_LB_ASSISTANCE: 2
ORAL_CARE_ASSISTANCE: 0
SHOPPING_ASSISTANCE: 6
GROOMING_ASSISTANCE: 0
TOILETING_ASSISTANCE: 0
LAUNDRY_ASSISTANCE: 6

## 2017-05-03 ASSESSMENT — ENCOUNTER SYMPTOMS
DEPRESSED MOOD: 1
RESPIRATORY SYMPTOMS COMMENTS: RESPIRATIONS ARE EVEN AND UNLABORED WITH NO SOB NOTED
DEPRESSED MOOD: 1
NAUSEA: DENIES
VOMITING: DENIES

## 2017-05-03 NOTE — TELEPHONE ENCOUNTER
She can try melatonin anywhere from 6 mg up to 12 mg at night. JS    Called and LM for pt with all given information/instructions. Pt is to call back if she has any further questions or concerns. ERROL

## 2017-05-04 ENCOUNTER — HOME CARE VISIT (OUTPATIENT)
Dept: HOME HEALTH SERVICES | Facility: HOME HEALTHCARE | Age: 67
End: 2017-05-04
Payer: MEDICARE

## 2017-05-04 PROCEDURE — G0152 HHCP-SERV OF OT,EA 15 MIN: HCPCS

## 2017-05-05 VITALS — RESPIRATION RATE: 165 BRPM | DIASTOLIC BLOOD PRESSURE: 59 MMHG | SYSTOLIC BLOOD PRESSURE: 98 MMHG | HEART RATE: 70 BPM

## 2017-05-05 ASSESSMENT — ACTIVITIES OF DAILY LIVING (ADL)
BATHING_ASSISTANCE: 2
DRESSING_LB_ASSISTANCE: 0
ORAL_CARE_ASSISTANCE: 0
TOILETING_ASSISTANCE: 0
GROOMING_ASSISTANCE: 0
DRESSING_UB_ASSISTANCE: 0
EATING_ASSISTANCE: 0

## 2017-05-05 ASSESSMENT — ENCOUNTER SYMPTOMS: DEBILITATING PAIN: 1

## 2017-05-09 ENCOUNTER — HOME CARE VISIT (OUTPATIENT)
Dept: HOME HEALTH SERVICES | Facility: HOME HEALTHCARE | Age: 67
End: 2017-05-09
Payer: MEDICARE

## 2017-05-09 PROCEDURE — G0152 HHCP-SERV OF OT,EA 15 MIN: HCPCS

## 2017-05-10 ENCOUNTER — HOME CARE VISIT (OUTPATIENT)
Dept: HOME HEALTH SERVICES | Facility: HOME HEALTHCARE | Age: 67
End: 2017-05-10
Payer: MEDICARE

## 2017-05-10 VITALS
DIASTOLIC BLOOD PRESSURE: 62 MMHG | WEIGHT: 108.25 LBS | BODY MASS INDEX: 18.57 KG/M2 | TEMPERATURE: 99.2 F | SYSTOLIC BLOOD PRESSURE: 92 MMHG | RESPIRATION RATE: 16 BRPM | HEART RATE: 58 BPM

## 2017-05-10 PROCEDURE — G0162 HHC RN E&M PLAN SVS, 15 MIN: HCPCS

## 2017-05-10 SDOH — ECONOMIC STABILITY: HOUSING INSECURITY: UNSAFE COOKING RANGE AREA: 0

## 2017-05-10 SDOH — ECONOMIC STABILITY: HOUSING INSECURITY: UNSAFE APPLIANCES: 0

## 2017-05-10 ASSESSMENT — ACTIVITIES OF DAILY LIVING (ADL): OASIS_M1830: 03

## 2017-05-10 ASSESSMENT — ENCOUNTER SYMPTOMS
NAUSEA: DENIES
VOMITING: DENIES

## 2017-05-11 ENCOUNTER — HOME CARE VISIT (OUTPATIENT)
Dept: HOME HEALTH SERVICES | Facility: HOME HEALTHCARE | Age: 67
End: 2017-05-11
Payer: MEDICARE

## 2017-05-11 VITALS
RESPIRATION RATE: 16 BRPM | DIASTOLIC BLOOD PRESSURE: 60 MMHG | HEART RATE: 70 BPM | TEMPERATURE: 94.1 F | SYSTOLIC BLOOD PRESSURE: 99 MMHG

## 2017-05-11 PROCEDURE — G0152 HHCP-SERV OF OT,EA 15 MIN: HCPCS

## 2017-05-11 ASSESSMENT — ACTIVITIES OF DAILY LIVING (ADL): TRANSPORTATION COMMENTS: HIGH FALLS RISK

## 2017-05-11 ASSESSMENT — ENCOUNTER SYMPTOMS: DEPRESSED MOOD: 1

## 2017-05-15 ENCOUNTER — TELEPHONE (OUTPATIENT)
Dept: NEUROLOGY | Facility: MEDICAL CENTER | Age: 67
End: 2017-05-15

## 2017-05-15 NOTE — TELEPHONE ENCOUNTER
Pt is calling and asking if Dr. Rai Milse has been in touch with you. She sees him at [facility]. They were discussing Botox for her Dystonia in her Right Ankle and foot. They wanted your thoughts on this as well. Please advise. Thank you. ERROL

## 2017-05-16 ENCOUNTER — HOME CARE VISIT (OUTPATIENT)
Dept: HOME HEALTH SERVICES | Facility: HOME HEALTHCARE | Age: 67
End: 2017-05-16
Payer: MEDICARE

## 2017-05-16 VITALS
HEART RATE: 70 BPM | DIASTOLIC BLOOD PRESSURE: 62 MMHG | SYSTOLIC BLOOD PRESSURE: 100 MMHG | RESPIRATION RATE: 16 BRPM | TEMPERATURE: 96.7 F

## 2017-05-16 PROCEDURE — 665003 FOLLOW UP-HOME HEALTH

## 2017-05-16 PROCEDURE — G0152 HHCP-SERV OF OT,EA 15 MIN: HCPCS

## 2017-05-16 NOTE — TELEPHONE ENCOUNTER
Tell the treating physicians that I have no objection at all to attempting Botox for the dystonia she has. JS    Called and LM for pt with all given information/instructions. Pt is to call back if she has any further questions or concerns. ERROL

## 2017-05-17 ENCOUNTER — HOME CARE VISIT (OUTPATIENT)
Dept: HOME HEALTH SERVICES | Facility: HOME HEALTHCARE | Age: 67
End: 2017-05-17
Payer: MEDICARE

## 2017-05-17 VITALS
WEIGHT: 110 LBS | BODY MASS INDEX: 18.87 KG/M2 | HEART RATE: 66 BPM | RESPIRATION RATE: 16 BRPM | SYSTOLIC BLOOD PRESSURE: 110 MMHG | TEMPERATURE: 98.4 F | DIASTOLIC BLOOD PRESSURE: 70 MMHG

## 2017-05-17 PROCEDURE — G0162 HHC RN E&M PLAN SVS, 15 MIN: HCPCS

## 2017-05-17 SDOH — ECONOMIC STABILITY: HOUSING INSECURITY: UNSAFE COOKING RANGE AREA: 0

## 2017-05-17 SDOH — ECONOMIC STABILITY: HOUSING INSECURITY: UNSAFE APPLIANCES: 0

## 2017-05-17 ASSESSMENT — ACTIVITIES OF DAILY LIVING (ADL)
TOILETING_ASSISTANCE: 4
BATHING_ASSISTANCE: 4
TRANSPORTATION COMMENTS: DYSTONIA
TRANSPORTATION_ASSISTANCE: 6
LAUNDRY_ASSISTANCE: 5
DRESSING_UB_ASSISTANCE: 4
GROOMING_ASSISTANCE: 0
EATING_ASSISTANCE: 0
ORAL_CARE_ASSISTANCE: 0
HOUSEKEEPING_ASSISTANCE: 5
SHOPPING_ASSISTANCE: 6
DRESSING_LB_ASSISTANCE: 4
TELEPHONE_ASSISTANCE: 0
MEAL_PREP_ASSISTANCE: 5

## 2017-05-17 ASSESSMENT — ENCOUNTER SYMPTOMS
NAUSEA: DENIES
VOMITING: DENIES

## 2017-05-18 ENCOUNTER — HOME CARE VISIT (OUTPATIENT)
Dept: HOME HEALTH SERVICES | Facility: HOME HEALTHCARE | Age: 67
End: 2017-05-18
Payer: MEDICARE

## 2017-05-18 PROCEDURE — G0152 HHCP-SERV OF OT,EA 15 MIN: HCPCS

## 2017-05-21 ASSESSMENT — ENCOUNTER SYMPTOMS: DEPRESSED MOOD: 1

## 2017-05-22 ENCOUNTER — HOME CARE VISIT (OUTPATIENT)
Dept: HOME HEALTH SERVICES | Facility: HOME HEALTHCARE | Age: 67
End: 2017-05-22
Payer: MEDICARE

## 2017-05-22 PROCEDURE — G0152 HHCP-SERV OF OT,EA 15 MIN: HCPCS

## 2017-05-22 ASSESSMENT — ENCOUNTER SYMPTOMS: DEPRESSED MOOD: 1

## 2017-05-23 ENCOUNTER — HOME CARE VISIT (OUTPATIENT)
Dept: HOME HEALTH SERVICES | Facility: HOME HEALTHCARE | Age: 67
End: 2017-05-23
Payer: MEDICARE

## 2017-05-24 ENCOUNTER — HOME CARE VISIT (OUTPATIENT)
Dept: HOME HEALTH SERVICES | Facility: HOME HEALTHCARE | Age: 67
End: 2017-05-24
Payer: MEDICARE

## 2017-05-24 VITALS
SYSTOLIC BLOOD PRESSURE: 98 MMHG | HEART RATE: 60 BPM | DIASTOLIC BLOOD PRESSURE: 60 MMHG | RESPIRATION RATE: 16 BRPM | TEMPERATURE: 98.1 F

## 2017-05-24 PROCEDURE — G0162 HHC RN E&M PLAN SVS, 15 MIN: HCPCS

## 2017-05-24 SDOH — ECONOMIC STABILITY: HOUSING INSECURITY: UNSAFE APPLIANCES: 0

## 2017-05-24 SDOH — ECONOMIC STABILITY: HOUSING INSECURITY: UNSAFE COOKING RANGE AREA: 0

## 2017-05-24 ASSESSMENT — ACTIVITIES OF DAILY LIVING (ADL)
TRANSPORTATION_ASSISTANCE: 6
GROOMING_ASSISTANCE: 0
TOILETING_ASSISTANCE: 0
LAUNDRY_ASSISTANCE: 5
DRESSING_UB_ASSISTANCE: 0
EATING_ASSISTANCE: 0
ORAL_CARE_ASSISTANCE: 0
DRESSING_LB_ASSISTANCE: 0
SHOPPING_ASSISTANCE: 5
MEAL_PREP_ASSISTANCE: 5
BATHING_ASSISTANCE: 0
TELEPHONE_ASSISTANCE: 0
HOUSEKEEPING_ASSISTANCE: 5

## 2017-05-24 ASSESSMENT — ENCOUNTER SYMPTOMS: NAUSEA: DENIES

## 2017-05-25 ENCOUNTER — HOME CARE VISIT (OUTPATIENT)
Dept: HOME HEALTH SERVICES | Facility: HOME HEALTHCARE | Age: 67
End: 2017-05-25
Payer: MEDICARE

## 2017-05-25 VITALS — RESPIRATION RATE: 16 BRPM | SYSTOLIC BLOOD PRESSURE: 98 MMHG | DIASTOLIC BLOOD PRESSURE: 60 MMHG

## 2017-05-25 PROCEDURE — G0152 HHCP-SERV OF OT,EA 15 MIN: HCPCS

## 2017-05-26 ENCOUNTER — HOME CARE VISIT (OUTPATIENT)
Dept: HOME HEALTH SERVICES | Facility: HOME HEALTHCARE | Age: 67
End: 2017-05-26
Payer: MEDICARE

## 2017-05-26 VITALS — RESPIRATION RATE: 17 BRPM | HEART RATE: 69 BPM

## 2017-05-26 PROCEDURE — G0152 HHCP-SERV OF OT,EA 15 MIN: HCPCS

## 2017-05-31 ENCOUNTER — OFFICE VISIT (OUTPATIENT)
Dept: URGENT CARE | Facility: PHYSICIAN GROUP | Age: 67
End: 2017-05-31
Payer: MEDICARE

## 2017-05-31 ENCOUNTER — HOME CARE VISIT (OUTPATIENT)
Dept: HOME HEALTH SERVICES | Facility: HOME HEALTHCARE | Age: 67
End: 2017-05-31
Payer: MEDICARE

## 2017-05-31 VITALS
SYSTOLIC BLOOD PRESSURE: 110 MMHG | TEMPERATURE: 99 F | HEIGHT: 63 IN | HEART RATE: 80 BPM | RESPIRATION RATE: 20 BRPM | DIASTOLIC BLOOD PRESSURE: 60 MMHG | WEIGHT: 109 LBS | OXYGEN SATURATION: 93 % | BODY MASS INDEX: 19.31 KG/M2

## 2017-05-31 DIAGNOSIS — J01.00 ACUTE MAXILLARY SINUSITIS, RECURRENCE NOT SPECIFIED: ICD-10-CM

## 2017-05-31 DIAGNOSIS — H92.03 OTALGIA, BILATERAL: ICD-10-CM

## 2017-05-31 PROCEDURE — G0162 HHC RN E&M PLAN SVS, 15 MIN: HCPCS

## 2017-05-31 PROCEDURE — G8420 CALC BMI NORM PARAMETERS: HCPCS | Performed by: NURSE PRACTITIONER

## 2017-05-31 PROCEDURE — G8432 DEP SCR NOT DOC, RNG: HCPCS | Performed by: NURSE PRACTITIONER

## 2017-05-31 PROCEDURE — 1036F TOBACCO NON-USER: CPT | Performed by: NURSE PRACTITIONER

## 2017-05-31 PROCEDURE — 99214 OFFICE O/P EST MOD 30 MIN: CPT | Performed by: NURSE PRACTITIONER

## 2017-05-31 PROCEDURE — 3014F SCREEN MAMMO DOC REV: CPT | Mod: 8P | Performed by: NURSE PRACTITIONER

## 2017-05-31 PROCEDURE — 4040F PNEUMOC VAC/ADMIN/RCVD: CPT | Performed by: NURSE PRACTITIONER

## 2017-05-31 PROCEDURE — 1101F PT FALLS ASSESS-DOCD LE1/YR: CPT | Mod: 8P | Performed by: NURSE PRACTITIONER

## 2017-05-31 RX ORDER — LORAZEPAM 2 MG/1
1 TABLET ORAL
COMMUNITY
End: 2017-08-29

## 2017-05-31 RX ORDER — FLUTICASONE PROPIONATE 50 MCG
1 SPRAY, SUSPENSION (ML) NASAL DAILY
Qty: 16 G | Refills: 0 | Status: SHIPPED | OUTPATIENT
Start: 2017-05-31 | End: 2017-08-29

## 2017-05-31 RX ORDER — CARBIDOPA AND LEVODOPA 50; 200 MG/1; MG/1
TABLET, EXTENDED RELEASE ORAL
COMMUNITY
End: 2017-05-31

## 2017-05-31 RX ORDER — DOXYCYCLINE HYCLATE 100 MG
100 TABLET ORAL 2 TIMES DAILY
Qty: 14 TAB | Refills: 0 | Status: SHIPPED | OUTPATIENT
Start: 2017-05-31 | End: 2017-06-07

## 2017-05-31 RX ORDER — ENTACAPONE 200 MG/1
200 TABLET ORAL
COMMUNITY
Start: 2012-04-11 | End: 2017-08-29

## 2017-05-31 RX ORDER — GABAPENTIN 600 MG/1
600 TABLET ORAL
COMMUNITY
End: 2017-05-31

## 2017-05-31 ASSESSMENT — PAIN SCALES - GENERAL: PAINLEVEL: 2=MINIMAL-SLIGHT

## 2017-05-31 NOTE — PROGRESS NOTES
"Chief Complaint   Patient presents with   • Otalgia     Pt complains of Lt ear pain, cerumen impaction, muffled hearing.       HISTORY OF PRESENT ILLNESS: Patient is a 67 y.o. female who presents today due to left ear pain and muffled hearing. She is concerned about potential cerumen impaction. She admits to associated chills, \"feeling hot\", and left sided sinus pressure over the past week. She denies associated cough, difficulty breathing, confusion, nausea, vomiting or diarrhea. She has not tried OTC cold/sinus medication at home without much improvement. She denies a history of seasonal allergies or sinus infections in the past. No known ill contacts at home. No recent antibiotic usage.       Patient Active Problem List    Diagnosis Date Noted   • Hypotension 07/17/2016     Priority: High   • Weakness 01/09/2016     Priority: High   • Sedative, hypnotic or anxiolytic dependence (CMS-HCC) 05/02/2015     Priority: High   • Leukopenia 07/17/2016     Priority: Medium   • Chronic pain 07/16/2016     Priority: Medium   • Parkinson's disease (CMS-HCC) 08/15/2016   • Dehydration 07/16/2016   • Urinary retention 10/18/2015   • Constipation 10/17/2015   • Cellulitis 07/20/2015   • Mammographic microcalcification 05/05/2015   • Protein-calorie malnutrition (CMS-HCC) 05/02/2015   • Hyponatremia 09/12/2013   • Lumbar stenosis Dr. Wyatt 05/24/2012   • Bursitis of hip, right Dr. Wyatt 05/24/2012   • Tendonitis Dr. Wyatt 05/24/2012   • Scoliosis of lumbar spine Dr. Wyatt 05/24/2012   • Chronic hypotension 05/24/2012   • Osteoporosis 03/22/2010   • vitamin D deficiency    • Dystonia        Allergies:Bactrim; Actonel; Latex; Sulfa drugs; and Tape    Current Outpatient Prescriptions Ordered in [facility]   Medication Sig Dispense Refill   • entacapone (COMTAN) 200 MG Tab Take 200 mg by mouth.     • carisoprodol (SOMA) 350 MG Tab Take 350 mg by mouth at bedtime as needed for Muscle Spasms (Sleeping).     • metaxalone (SKELAXIN) " "800 MG Tab TAKE ONE TABLET BY MOUTH FOUR TIMES A  Tab 1   • Taurine 1000 MG Cap Take 1,000 mg by mouth every day.     • Turmeric 500 MG Cap Take 1 Cap by mouth every day.     • docusate sodium (COLACE) 100 MG Cap Take 300 mg by mouth every day.     • Magnesium Citrate Powder Take 2 Doses by mouth every day. 2 teaspoon daily     • Cholecalciferol (D3 SUPER STRENGTH) 2000 UNIT Cap Take 1 Cap by mouth every day.     • pramipexole (MIRAPEX) 0.125 MG Tab Take 0.125 mg by mouth 3 times a day.     • gabapentin (NEURONTIN) 600 MG tablet 1 tab tid and 3 tab qhs 180 Tab 2   • Polyethylene Glycol 3350 (MIRALAX PO) Take  by mouth.     • diazepam (VALIUM) 5 MG Tab Take 10 mg by mouth 2 Times a Day.     • carbidopa-levodopa (SINEMET)  MG Tab Take 1 Tab by mouth every 2 hours. Pt states she takes at 0700/0900/1100/1300/1500/1700/1900 Total of 7 doses during day 210 Tab 6   • carbidopa-levodopa SR (SINEMET CR)  MG per tablet Take 1 Tab by mouth every bedtime. 30 Tab 6   • Multiple Vitamin (MULTI VITAMIN DAILY PO) Take 1 Tab by mouth every day.     • Melatonin 1 MG Tab Take 1 mg by mouth every bedtime.     • Ubiquinol 100 MG Cap Take 100 mg by mouth every day.     • lorazepam (ATIVAN) 2 MG tablet Take 1 mg by mouth.     • tramadol (ULTRAM) 50 MG Tab Take 1-2 Tabs by mouth every 6 hours as needed for Moderate Pain. 15 Tab 0   • tizanidine (ZANAFLEX) 2 MG tablet 1.5 tab 6 times as day 270 Tab 4   • CALCIUM CITRATE-VITAMIN D3 PO Take 1 Tab by mouth every day. 1000mg ca, 400 iu of vitamin d-3     • HYDROmorphone (DILAUDID) 2 MG Tab Take 2 mg by mouth 1 time daily as needed. fo dystonic episodes, pt reports takes \"rarely\".      • 5-Hydroxytryptophan (5-HTP PO) Take 10 mg by mouth.     • Linaclotide 145 MCG Cap Take 145 mg by mouth every morning before breakfast.       No current Epic-ordered facility-administered medications on file.       Past Medical History   Diagnosis Date   • Disc disorder    • Dystonia    • " "vitamin D deficiency    • OSTEOPOROSIS 3/22/2010   • Parkinson disease (CMS-[facility])      \"atypical\"   • Low blood pressure reading    • Cellulitis 2012     right low extremity   • Bowel obstruction (CMS-HCC)      fecal impaction   • Anesthesia      hypotensive in the past   • Other specified disorder of intestines      constipation   • Heart burn    • Pain      right hip, right knee   • Abnormal finding on mammography, microcalcification    • Cataract    • GERD (gastroesophageal reflux disease)    • Dilated bile duct    • Peripheral neuropathy    • Leukopenia    • Insulin resistance    • Dysuria    • Malaise and fatigue    • Dystonia    • Rash, skin    • Hypotension    • Scoliosis    • Bursitis    • Spinal stenosis, lumbar    • Hypertension      pt can run very low   • Unspecified disorder of thyroid      had partial thyroidectomy   • Thyrotoxicosis remote     S/P I-131 Rx / subtotal thyroidectomy       Social History   Substance Use Topics   • Smoking status: Former Smoker -- 0.50 packs/day for 10 years     Quit date: 1985   • Smokeless tobacco: Never Used   • Alcohol Use: No      Comment: none since age 31       Family Status   Relation Status Death Age   • Mother     • Father     • Sister Alive    • Maternal Aunt     • Maternal Uncle     • Paternal Aunt     • Paternal Uncle     • Maternal Grandmother     • Maternal Grandfather     • Paternal Grandfather     • Paternal Grandmother       Family History   Problem Relation Age of Onset   • Arthritis Mother      Giant Cell Arteritis   • Lung Disease Father      COPD/Emphysema   • Hyperlipidemia Sister    • Heart Disease Maternal Aunt    • Heart Disease Maternal Uncle    • Heart Disease Paternal Aunt    • Heart Disease Paternal Uncle    • Heart Disease Maternal Grandmother    • Hypertension Maternal Grandmother    • Hyperlipidemia Maternal Grandmother    • Heart Disease Maternal " "Grandfather    • Hypertension Maternal Grandfather    • Hyperlipidemia Maternal Grandfather    • Heart Disease Paternal Grandfather    • Hypertension Paternal Grandfather    • Hyperlipidemia Paternal Grandfather    • Cancer Paternal Grandfather      Bone       ROS:  Review of Systems   Constitutional: Positive for subjective fever, chills, fatigue. Negative for weight loss.   HENT: Positive for left sided sinus pressure, ear pain, muffled ear pain, mild nasal congestion. Negative for sore throat, nosebleeds, neck pain.    Eyes: Negative for vision changes.   Cardiovascular: Negative for chest pain, palpitations, orthopnea and leg swelling.   Respiratory: Negative for cough, sputum production, shortness of breath and wheezing.   Gastrointestinal: Negative for abdominal pain, nausea, vomiting or diarrhea.    Skin: Negative for rash, diaphoresis.     Exam:  /60 mmHg  Pulse 80  Temp(Src) 37.2 °C (99 °F)  Resp 20  Ht 1.6 m (5' 2.99\")  Wt 49.442 kg (109 lb)  BMI 19.31 kg/m2  SpO2 93%  LMP 01/01/1990  Breastfeeding? No    General: well-nourished, well-developed female in NAD  Head: normocephalic, atraumatic  Eyes: PERRLA, no conjunctival injection, acuity grossly intact, lids normal.  Ears: normal shape and symmetry, no tenderness, no discharge. External canals are without any significant edema or erythema. Tympanic membranes are without any inflammation, no effusion. No cerumen impaction noted. Gross auditory acuity is intact.  Nose: symmetrical without tenderness, erythema and swelling noted bilateral turbinates, clear discharge. Left maxillary sinus tenderness.   Mouth/Throat: reasonable hygiene, no exudates or tonsillar enlargement. Erythema is present.   Neck: no masses, range of motion within normal limits, no tracheal deviation. No obvious thyroid enlargement.   Lymph: no cervical adenopathy. No supraclavicular adenopathy.   Neuro: alert and oriented. Cranial nerves 1-12 grossly intact. No sensory " deficit.   Cardiovascular: regular rate and rhythm. No edema.  Pulmonary: no distress. Chest is symmetrical with respiration, no wheezes, crackles, or rhonchi.   Musculoskeletal: no clubbing, appropriate muscle tone, gait is stable.  Skin: warm, dry, intact, no clubbing, no cyanosis, no rashes.   Psych: appropriate mood, affect, judgement.         Assessment/Plan:  1. Otalgia, bilateral  fluticasone (FLONASE) 50 MCG/ACT nasal spray    doxycycline (VIBRAMYCIN) 100 MG Tab   2. Acute maxillary sinusitis, recurrence not specified  fluticasone (FLONASE) 50 MCG/ACT nasal spray    doxycycline (VIBRAMYCIN) 100 MG Tab           Antibiotic as directed for suspected maxillary sinusitis causing otalgia, potential side effects of medication discussed. Flonase as directed.   Sleep with HOB elevated, humidifier at night, rest, increase fluid intake.   Supportive care, differential diagnoses, and indications for immediate follow-up discussed with patient.   Pathogenesis of diagnosis discussed including typical length and natural progression.   Instructed to return to clinic or nearest emergency department for any change in condition, further concerns, or worsening of symptoms.  Patient states understanding of the plan of care and discharge instructions.  Instructed to make an appointment, for follow up, with their primary care provider.        Please note that this dictation was created using voice recognition software. I have made every reasonable attempt to correct obvious errors, but I expect that there are errors of grammar and possibly content that I did not discover before finalizing the note.      DEREK Dimas.

## 2017-05-31 NOTE — MR AVS SNAPSHOT
"        Delmis Draper   2017 3:50 PM   Office Visit   MRN: [id number]    Department:  [facility]   Dept Phone:  [phone]    Description:  Female : 1950   Provider:  ROBE Harden           Reason for Visit     Otalgia Pt complains of Lt ear pain, cerumen impaction, muffled hearing.      Allergies as of 2017     Allergen Noted Reactions    Bactrim [Sulfamethoxazole W-Trimethoprim] 2014   Hives    Actonel [Risedronate Sodium] 2010       Latex 2011       Sulfa Drugs 2015       Tape 03/15/2011   Rash    Paper tape is ok      You were diagnosed with     Otalgia, bilateral   [[id number]]       Acute maxillary sinusitis, recurrence not specified   [[id number]]         Vital Signs     Blood Pressure Pulse Temperature Respirations Height Weight    110/60 mmHg 80 37.2 °C (99 °F) 20 1.6 m (5' 2.99\") 49.442 kg (109 lb)    Body Mass Index Oxygen Saturation Last Menstrual Period Breastfeeding? Smoking Status       19.31 kg/m2 93% 1990 No Former Smoker       Basic Information     Date Of Birth Sex Race Ethnicity Preferred Language    1950 Female White Non- English      Problem List              ICD-10-CM Priority Class Noted - Resolved    Osteoporosis M81.0   3/22/2010 - Present    vitamin D deficiency G20   Unknown - Present    Dystonia G24.9   Unknown - Present    Lumbar stenosis Dr. Wyatt M48.06   2012 - Present    Bursitis of hip, right Dr. Wyatt M70.71   2012 - Present    Tendonitis Dr. Wyatt M77.9   2012 - Present    Scoliosis of lumbar spine Dr. 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  2015 - Present    Protein-calorie malnutrition (CMS-HCC) E46   2015 - Present    Mammographic microcalcification R92.0   2015 - Present    Cellulitis L03.90   2015 - " Present    Constipation K59.00   10/17/2015 - Present    Urinary retention R33.9   10/18/2015 - Present    Weakness R53.1 High  1/9/2016 - Present    Chronic pain G89.29 Medium  7/16/2016 - Present    Dehydration E86.0   7/16/2016 - Present    Hypotension I95.9 High  7/17/2016 - Present    Leukopenia D72.819 Medium  7/17/2016 - Present    Parkinson's disease (CMS-HCC) G20   8/15/2016 - Present      Health Maintenance        Date Due Completion Dates    IMM DTaP/Tdap/Td Vaccine (1 - Tdap) 8/22/2009 8/21/2009    IMM ZOSTER VACCINE 3/20/2011 ---    BONE DENSITY 3/20/2016 ---    IMM PNEUMOCOCCAL 65+ (ADULT) LOW/MEDIUM RISK SERIES (1 of 2 - PCV13) 3/20/2016 3/13/2012    MAMMOGRAM 3/25/2016 3/25/2015, 5/16/2007, 5/16/2007    COLONOSCOPY 2/10/2024 2/10/2014 (Done)    Override on 2/10/2014: Done            Current Immunizations     Influenza TIV (IM) 10/16/2012    Pneumococcal polysaccharide vaccine (PPSV-23) 3/13/2012    TD Vaccine 8/21/2009      Below and/or attached are the medications your provider expects you to take. Review all of your home medications and newly ordered medications with your provider and/or pharmacist. Follow medication instructions as directed by your provider and/or pharmacist. Please keep your medication list with you and share with your provider. Update the information when medications are discontinued, doses are changed, or new medications (including over-the-counter products) are added; and carry medication information at all times in the event of emergency situations     Allergies:  BACTRIM - Hives     ACTONEL - (reactions not documented)     LATEX - (reactions not documented)     SULFA DRUGS - (reactions not documented)     TAPE - Rash               Medications  Valid as of: May 31, 2017 -  5:11 PM    Generic Name Brand Name Tablet Size Instructions for use    5-Hydroxytryptophan   Take 10 mg by mouth.        Calcium Citrate-Vitamin D   Take 1 Tab by mouth every day. 1000mg ca, 400 iu of  "vitamin d-3        Carbidopa-Levodopa (Tab) SINEMET  MG Take 1 Tab by mouth every 2 hours. Pt states she takes at 0700/0900/1100/1300/1500/1700/1900 Total of 7 doses during day        Carbidopa-Levodopa (Tab CR) SINEMET CR  MG Take 1 Tab by mouth every bedtime.        Carisoprodol (Tab) SOMA 350 MG Take 350 mg by mouth at bedtime as needed for Muscle Spasms (Sleeping).        Cholecalciferol (Cap) Cholecalciferol 2000 UNIT Take 1 Cap by mouth every day.        DiazePAM (Tab) VALIUM 5 MG Take 10 mg by mouth 2 Times a Day.        Docusate Sodium (Cap) COLACE 100 MG Take 300 mg by mouth every day.        Doxycycline Hyclate (Tab) VIBRAMYCIN 100 MG Take 1 Tab by mouth 2 times a day for 7 days.        Entacapone (Tab) COMTAN 200 MG Take 200 mg by mouth.        Fluticasone Propionate (Suspension) FLONASE 50 MCG/ACT Spray 1 Spray in nose every day.        Gabapentin (Tab) NEURONTIN 600 MG 1 tab tid and 3 tab qhs        HYDROmorphone HCl (Tab) DILAUDID 2 MG Take 2 mg by mouth 1 time daily as needed. fo dystonic episodes, pt reports takes \"rarely\".         Linaclotide (Cap) Linaclotide 145 MCG Take 145 mg by mouth every morning before breakfast.        LORazepam (Tab) ATIVAN 2 MG Take 1 mg by mouth.        Magnesium Citrate (Powder) Magnesium Citrate  Take 2 Doses by mouth every day. 2 teaspoon daily        Melatonin (Tab) Melatonin 1 MG Take 1 mg by mouth every bedtime.        Metaxalone (Tab) SKELAXIN 800 MG TAKE ONE TABLET BY MOUTH FOUR TIMES A DAY        Multiple Vitamin   Take 1 Tab by mouth every day.        Polyethylene Glycol 3350   Take  by mouth.        Pramipexole Dihydrochloride (Tab) MIRAPEX 0.125 MG Take 0.125 mg by mouth 3 times a day.        Taurine (Cap) Taurine 1000 MG Take 1,000 mg by mouth every day.        TiZANidine HCl (Tab) ZANAFLEX 2 MG 1.5 tab 6 times as day        TraMADol HCl (Tab) ULTRAM 50 MG Take 1-2 Tabs by mouth every 6 hours as needed for Moderate Pain.        Turmeric (Cap) " Turmeric 500 MG Take 1 Cap by mouth every day.        Ubiquinol (Cap) Ubiquinol 100 MG Take 100 mg by mouth every day.        .                 Medicines prescribed today were sent to:     [facility][id number] - [place] - [address] [place] [address]    Phone: [phone] Fax: [phone]    Open 24 Hours?: No    [facility] - [place] - [address] [place] [address]    Phone: [phone] Fax: [phone]    Open 24 Hours?: No      Medication refill instructions:       If your prescription bottle indicates you have medication refills left, it is not necessary to call your provider’s office. Please contact your pharmacy and they will refill your medication.    If your prescription bottle indicates you do not have any refills left, you may request refills at any time through one of the following ways: The online [employer] system (except Urgent Care), by calling your provider’s office, or by asking your pharmacy to contact your provider’s office with a refill request. Medication refills are processed only during regular business hours and may not be available until the next business day. Your provider may request additional information or to have a follow-up visit with you prior to refilling your medication.   *Please Note: Medication refills are assigned a new Rx number when refilled electronically. Your pharmacy may indicate that no refills were authorized even though a new prescription for the same medication is available at the pharmacy. Please request the medicine by name with the pharmacy before contacting your provider for a refill.        Other Notes About Your Plan     This appointment is 2-24-16. 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 Comment benzo dependence  2. Comment malnutrition,  the patient was given Megace in 4/2015  3. Comment ETOH dependence, or if alcohol use in remission  4. Revise/specify anxiety and depression, possible adjustment disorder: if have both, code as TRACEY, Major depressive disorder; Panic attack if has  5. If the patient is on home oxygen (even on/off) add diagnoses: Chr respiratory failure and home oxygen use  6. Notified in documents that patient had CABG; if so, add CAD as diagnosis, with appropriate medication for the condition  7. Notified in documents that patient hadCVA; if so, add diagnosis of vascular disease  8. Notified in documents that patient had seizures           MyChart Access Code: Activation code not generated  Current MyChart Status: Active

## 2017-06-01 VITALS
SYSTOLIC BLOOD PRESSURE: 90 MMHG | RESPIRATION RATE: 18 BRPM | DIASTOLIC BLOOD PRESSURE: 60 MMHG | HEART RATE: 87 BPM | TEMPERATURE: 99.4 F

## 2017-06-01 SDOH — ECONOMIC STABILITY: HOUSING INSECURITY: UNSAFE COOKING RANGE AREA: 0

## 2017-06-01 SDOH — ECONOMIC STABILITY: HOUSING INSECURITY: UNSAFE APPLIANCES: 0

## 2017-06-01 ASSESSMENT — ACTIVITIES OF DAILY LIVING (ADL)
OASIS_M1830: 00
HOME_HEALTH_OASIS: 00
HOME_HEALTH_OASIS: 01

## 2017-06-30 ENCOUNTER — TELEPHONE (OUTPATIENT)
Dept: NEUROLOGY | Facility: MEDICAL CENTER | Age: 67
End: 2017-06-30

## 2017-06-30 NOTE — TELEPHONE ENCOUNTER
"I received a call from Delmis Draper today. She sounded very disoriented, almost lethargic. She asked if you would write her a prescription for an antidepressant, she cannot afford Pristiq. She stated that she is having difficulty sleeping, with a marked lack of energy. Regarding her upcoming appointment with Dr. Vang in [place], she wants to know \"What do we hope to accomplish with this evaluation? Are we ruling out something?\" Again the patient seemed confused in general. Please advise. Thanks!  - CR   119pm  "

## 2017-07-18 NOTE — TELEPHONE ENCOUNTER
Kevon Patel M.D.  Efra Cotto, Med Ass't        Caller: Unspecified (2 weeks ago)                     Unfortunately, I do not remember writing for the Pristiq, she is going to have to talk with the doctor who did if she wants to substitute because she is not tolerating the drug well. If she is more confused than usual, we should probably recommend that she visited an emergency room as well.

## 2017-08-16 ENCOUNTER — OFFICE VISIT (OUTPATIENT)
Dept: URGENT CARE | Facility: PHYSICIAN GROUP | Age: 67
End: 2017-08-16
Payer: MEDICARE

## 2017-08-16 VITALS
SYSTOLIC BLOOD PRESSURE: 88 MMHG | HEIGHT: 63 IN | BODY MASS INDEX: 19.31 KG/M2 | WEIGHT: 109 LBS | TEMPERATURE: 99.7 F | HEART RATE: 84 BPM | DIASTOLIC BLOOD PRESSURE: 60 MMHG | RESPIRATION RATE: 16 BRPM | OXYGEN SATURATION: 92 %
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
## 2017-08-16 DIAGNOSIS — S51.811A SKIN TEAR OF RIGHT FOREARM WITHOUT COMPLICATION, INITIAL ENCOUNTER: Primary | ICD-10-CM

## 2017-08-16 DIAGNOSIS — R30.0 DYSURIA: ICD-10-CM

## 2017-08-16 LAB
APPEARANCE UR: CLEAR
BILIRUB UR STRIP-MCNC: NEGATIVE MG/DL
COLOR UR AUTO: YELLOW
GLUCOSE UR STRIP.AUTO-MCNC: NEGATIVE MG/DL
KETONES UR STRIP.AUTO-MCNC: NEGATIVE MG/DL
LEUKOCYTE ESTERASE UR QL STRIP.AUTO: NEGATIVE
NITRITE UR QL STRIP.AUTO: NEGATIVE
PH UR STRIP.AUTO: 6 [PH] (ref 5–8)
PROT UR QL STRIP: NEGATIVE MG/DL
RBC UR QL AUTO: NEGATIVE
SP GR UR STRIP.AUTO: 1.01
UROBILINOGEN UR STRIP-MCNC: NEGATIVE MG/DL

## 2017-08-16 PROCEDURE — 99214 OFFICE O/P EST MOD 30 MIN: CPT | Performed by: PHYSICIAN ASSISTANT

## 2017-08-16 PROCEDURE — 81002 URINALYSIS NONAUTO W/O SCOPE: CPT | Performed by: PHYSICIAN ASSISTANT

## 2017-08-16 RX ORDER — CLINDAMYCIN HYDROCHLORIDE 300 MG/1
300 CAPSULE ORAL 3 TIMES DAILY
Qty: 30 CAP | Refills: 0 | Status: SHIPPED | OUTPATIENT
Start: 2017-08-16 | End: 2017-08-29

## 2017-08-16 NOTE — PATIENT INSTRUCTIONS
Skin Tear Care  A skin tear is a wound in which the top layer of skin has peeled off. This is a common problem with aging because the skin becomes thinner and more fragile as a person gets older. In addition, some medicines, such as oral corticosteroids, can lead to skin thinning if taken for long periods of time.   A skin tear is often repaired with tape or skin adhesive strips. This keeps the skin that has been peeled off in contact with the healthier skin beneath. Depending on the location of the wound, a bandage (dressing) may be applied over the tape or skin adhesive strips. Sometimes, during the healing process, the skin turns black and dies. Even when this happens, the torn skin acts as a good dressing until the skin underneath gets healthier and repairs itself.  HOME CARE INSTRUCTIONS   · Change dressings once per day or as directed by your caregiver.  ¨ Gently clean the skin tear and the area around the tear using saline solution or mild soap and water.  ¨ Do not rub the injured skin dry. Let the area air dry.  ¨ Apply petroleum jelly or an antibiotic cream or ointment to keep the tear moist. This will help the wound heal. Do not allow a scab to form.  ¨ If the dressing sticks before the next dressing change, moisten it with warm soapy water and gently remove it.  · Protect the injured skin until it has healed.  · Only take over-the-counter or prescription medicines as directed by your caregiver.  · Take showers or baths using warm soapy water. Apply a new dressing after the shower or bath.  · Keep all follow-up appointments as directed by your caregiver.    SEEK IMMEDIATE MEDICAL CARE IF:   · You have redness, swelling, or increasing pain in the skin tear.  · You have pus coming from the skin tear.  · You have chills.  · You have a red streak that goes away from the skin tear.  · You have a bad smell coming from the tear or dressing.  · You have a fever or persistent symptoms for more than 2-3 days.  · You  have a fever and your symptoms suddenly get worse.  MAKE SURE YOU:  · Understand these instructions.  · Will watch this condition.  · Will get help right away if your child is not doing well or gets worse.     This information is not intended to replace advice given to you by your health care provider. Make sure you discuss any questions you have with your health care provider.     Document Released: 09/12/2002 Document Revised: 09/11/2013 Document Reviewed: 07/01/2013  [employer] Patient Education ©2016 [employer]

## 2017-08-16 NOTE — PROGRESS NOTES
"Subjective:      Pt is a 67 y.o. female who presents with Laceration            Laceration   The incident occurred less than 1 hour ago. The laceration is located on the right arm. The laceration is 5 cm in size. The laceration mechanism was a blunt object. The pain is at a severity of 3/10. The pain is mild. The pain has been constant since onset. She reports no foreign bodies present. Her tetanus status is unknown.   Pt states she lost her balance and fell over a marlo litter box tearing the skin of her right forearm on the box. Pt has not taken any Rx medications for this condition. Pt denies LOC, head, neck or back trauma but notes she has balance issues due to her Parkinson's. Pt states the pain is a 3/10, aching in nature and worse this morning. Pt also notes mild dysuria x 2 days and is concerned for UTI.  Pt denies CP, SOB, NVD, paresthesias, headaches, dizziness, change in vision, hives, or other joint pain. The pt's medication list, problem list, and allergies have been evaluated and reviewed during today's visit.    PMH:  Past Medical History   Diagnosis Date   • Disc disorder    • Dystonia    • vitamin D deficiency    • OSTEOPOROSIS 3/22/2010   • Parkinson disease (CMS-[facility])      \"atypical\"   • Low blood pressure reading    • Cellulitis 7/19/2012     right low extremity   • Bowel obstruction (CMS-[facility])      fecal impaction   • Anesthesia      hypotensive in the past   • Other specified disorder of intestines      constipation   • Heart burn    • Pain      right hip, right knee   • Abnormal finding on mammography, microcalcification    • Cataract    • GERD (gastroesophageal reflux disease)    • Dilated bile duct    • Peripheral neuropathy    • Leukopenia    • Insulin resistance    • Dysuria    • Malaise and fatigue    • Dystonia    • Rash, skin    • Hypotension    • Scoliosis    • Bursitis    • Spinal stenosis, lumbar    • Hypertension      pt can run very low   • Unspecified disorder of thyroid      had " partial thyroidectomy   • Thyrotoxicosis remote     S/P I-131 Rx / subtotal thyroidectomy       PSH:  Past Surgical History   Procedure Laterality Date   • Thyroidectomy       partial   • Gastroscopy with biopsy  2012     Performed by CIERRA SUAZO at [facility]   • Egd w/endoscopic ultrasound  2012     Performed by CEIRRA SUAZO at [facility]   • Cataract phaco with iol  2014     Performed by Roland [place]   • Cataract phaco with iol  2014     Performed by Roland Becerra M[place] at [place]   • Breast biopsy Left 2015     Procedure: BREAST BIOPSY;  Surgeon: Deedee Bautista M.D.;  Location: SURGERY SAME DAY [facility];  Service:    • Gyn surgery  ,      c sec x 2   • Knee arthrotomy  ,      right   • Other       left fifth digit       Fam Hx:    family history includes Arthritis in her mother; Cancer in her paternal grandfather; Heart Disease in her maternal aunt, maternal grandfather, maternal grandmother, maternal uncle, paternal aunt, paternal grandfather, and paternal uncle; Hyperlipidemia in her maternal grandfather, maternal grandmother, paternal grandfather, and sister; Hypertension in her maternal grandfather, maternal grandmother, and paternal grandfather; Lung Disease in her father.  Family Status   Relation Status Death Age   • Mother     • Father     • Sister Alive    • Maternal Aunt     • Maternal Uncle     • Paternal Aunt     • Paternal Uncle     • Maternal Grandmother     • Maternal Grandfather     • Paternal Grandfather     • Paternal Grandmother         Soc HX:  Social History     Social History   • Marital Status:      Spouse Name: N/A   • Number of Children: 2   • Years of Education: N/A     Occupational History   • Not on file.     Social History Main Topics   • Smoking status: Former  "Smoker -- 0.50 packs/day for 10 years     Quit date: 01/01/1985   • Smokeless tobacco: Never Used   • Alcohol Use: No      Comment: none since age 31   • Drug Use: No   • Sexual Activity: No      Comment: , 2 boys, diasability     Other Topics Concern   • Not on file     Social History Narrative         Medications:    Current outpatient prescriptions:   •  entacapone (COMTAN) 200 MG Tab, Take 200 mg by mouth., Disp: , Rfl:   •  lorazepam (ATIVAN) 2 MG tablet, Take 1 mg by mouth., Disp: , Rfl:   •  fluticasone (FLONASE) 50 MCG/ACT nasal spray, Spray 1 Spray in nose every day., Disp: 16 g, Rfl: 0  •  carisoprodol (SOMA) 350 MG Tab, Take 350 mg by mouth at bedtime as needed for Muscle Spasms (Sleeping)., Disp: , Rfl:   •  tramadol (ULTRAM) 50 MG Tab, Take 1-2 Tabs by mouth every 6 hours as needed for Moderate Pain., Disp: 15 Tab, Rfl: 0  •  tizanidine (ZANAFLEX) 2 MG tablet, 1.5 tab 6 times as day, Disp: 270 Tab, Rfl: 4  •  metaxalone (SKELAXIN) 800 MG Tab, TAKE ONE TABLET BY MOUTH FOUR TIMES A DAY, Disp: 150 Tab, Rfl: 1  •  Taurine 1000 MG Cap, Take 1,000 mg by mouth every day., Disp: , Rfl:   •  Turmeric 500 MG Cap, Take 1 Cap by mouth every day., Disp: , Rfl:   •  docusate sodium (COLACE) 100 MG Cap, Take 300 mg by mouth every day., Disp: , Rfl:   •  Magnesium Citrate Powder, Take 2 Doses by mouth every day. 2 teaspoon daily, Disp: , Rfl:   •  Cholecalciferol (D3 SUPER STRENGTH) 2000 UNIT Cap, Take 1 Cap by mouth every day., Disp: , Rfl:   •  CALCIUM CITRATE-VITAMIN D3 PO, Take 1 Tab by mouth every day. 1000mg ca, 400 iu of vitamin d-3, Disp: , Rfl:   •  HYDROmorphone (DILAUDID) 2 MG Tab, Take 2 mg by mouth 1 time daily as needed. fo dystonic episodes, pt reports takes \"rarely\". , Disp: , Rfl:   •  pramipexole (MIRAPEX) 0.125 MG Tab, Take 0.125 mg by mouth 3 times a day., Disp: , Rfl:   •  gabapentin (NEURONTIN) 600 MG tablet, 1 tab tid and 3 tab qhs, Disp: 180 Tab, Rfl: 2  •  Polyethylene Glycol 3350 " (MIRALAX PO), Take  by mouth., Disp: , Rfl:   •  5-Hydroxytryptophan (5-HTP PO), Take 10 mg by mouth., Disp: , Rfl:   •  diazepam (VALIUM) 5 MG Tab, Take 10 mg by mouth 2 Times a Day., Disp: , Rfl:   •  carbidopa-levodopa (SINEMET)  MG Tab, Take 1 Tab by mouth every 2 hours. Pt states she takes at 0700/0900/1100/1300/1500/1700/1900 Total of 7 doses during day, Disp: 210 Tab, Rfl: 6  •  carbidopa-levodopa SR (SINEMET CR)  MG per tablet, Take 1 Tab by mouth every bedtime., Disp: 30 Tab, Rfl: 6  •  Multiple Vitamin (MULTI VITAMIN DAILY PO), Take 1 Tab by mouth every day., Disp: , Rfl:   •  Melatonin 1 MG Tab, Take 1 mg by mouth every bedtime., Disp: , Rfl:   •  Linaclotide 145 MCG Cap, Take 145 mg by mouth every morning before breakfast., Disp: , Rfl:   •  Ubiquinol 100 MG Cap, Take 100 mg by mouth every day., Disp: , Rfl:       Allergies:  Bactrim; Actonel; Latex; Sulfa drugs; and Tape        ROS  Review of Systems   Constitutional: Negative for fever, chills and malaise/fatigue.   HENT: Negative for congestion and sore throat.    Eyes: Negative for blurred vision, double vision and photophobia.   Respiratory: Negative for cough and shortness of breath.    Cardiovascular: Negative for chest pain and palpitations.   Gastrointestinal: Negative for nausea, vomiting, abdominal pain, diarrhea and constipation.   Genitourinary: Positive for dysuria, urgency and frequency. Negative for hematuria and flank pain.   Musculoskeletal: Negative for joint pain and myalgias.   Skin: Negative for rash. +right forearm skin tear  Neurological: Negative for dizziness, tingling and headaches.   Endo/Heme/Allergies: Does not bruise/bleed easily.   Psychiatric/Behavioral: Negative for depression. The patient is not nervous/anxious.           Objective:     [facility] 01/01/1990     Physical Exam   Skin: Skin is warm. No abrasion, no bruising, no burn, no ecchymosis, no laceration, no lesion, no petechiae and no rash noted. She is not  diaphoretic. No cyanosis or erythema. No pallor. Nails show no clubbing.              Physical Exam   Constitutional: She is oriented to person, place, and time. She appears well-developed and well-nourished. No distress.   HENT:   Head: Normocephalic and atraumatic.   Right Ear: External ear normal.   Left Ear: External ear normal.   Nose: Nose normal.   Mouth/Throat: Oropharynx is clear and moist. No oropharyngeal exudate.   Eyes: Conjunctivae normal and EOM are normal. Pupils are equal, round, and reactive to light.   Neck: Normal range of motion. Neck supple. No thyromegaly present.   Cardiovascular: Normal rate, regular rhythm, normal heart sounds and intact distal pulses.  Exam reveals no gallop and no friction rub.    No murmur heard.  Pulmonary/Chest: Effort normal and breath sounds normal. No respiratory distress. She has no wheezes. She has no rales. She exhibits no tenderness.   Abdominal: Soft. Bowel sounds are normal. She exhibits no distension and no mass. There is no tenderness. There is no rebound and no guarding.   Genitourinary:        Pt deferred   Musculoskeletal: Normal range of motion. +parkensonian shaking and trembling noted on exam  Lymphadenopathy:     She has no cervical adenopathy.   Neurological: She is alert and oriented to person, place, and time.   Psychiatric: She has a normal mood and affect. Her behavior is normal. Judgment and thought content normal.          Assessment/Plan:     1. Skin tear of right forearm without complication, initial encounter      2. Fall with injury, initial encounter      3. Dysuria      - POC urinalysis-->WNL    Clindamycin + Probiotics for abx coverage of dirty wound  RICE therapy discussed  Gentle ROM exercises discussed  WBAT RUE  Ice/heat therapy discussed  NSAIDs for pain control  Rest, fluids encouraged.  AVS with medical info given.  Pt was in full understanding and agreement with the plan.  Follow-up as needed if symptoms worsen or fail to  improve.

## 2017-08-16 NOTE — MR AVS SNAPSHOT
"        Delmis Draper   2017 10:30 AM   Office Visit   MRN: [id number]    Department:  [facility]   Dept Phone:  [phone]    Description:  Female : 1950   Provider:  Zuhair Barrera PA-C           Reason for Visit     Laceration Skin tear occured 1 hour ago.  PT states she tripped and scraped her arm on her cat liter box.    Urinary Pain           Allergies as of 2017     Allergen Noted Reactions    Bactrim [Sulfamethoxazole W-Trimethoprim] 2014   Hives    Actonel [Risedronate Sodium] 2010       Latex 2011       Sulfa Drugs 2015       Tape 03/15/2011   Rash    Paper tape is ok      You were diagnosed with     Skin tear of right forearm without complication, initial encounter   [[id number]]  -  Primary     Fall with injury, initial encounter   [[id number]]       Dysuria   [788.1.ICD-9-CM]         Vital Signs     Blood Pressure Pulse Temperature Respirations Height Weight    88/60 mmHg 84 37.6 °C (99.7 °F) 16 1.6 m (5' 2.99\") 49.442 kg (109 lb)    Body Mass Index Oxygen Saturation Last Menstrual Period Smoking Status          19.31 kg/m2 92% 1990 Former Smoker        Basic Information     Date Of Birth Sex Race Ethnicity Preferred Language    1950 Female White Non- English      Problem List              ICD-10-CM Priority Class Noted - Resolved    Osteoporosis M81.0   3/22/2010 - Present    vitamin D deficiency G20   Unknown - Present    Dystonia G24.9   Unknown - Present    Lumbar stenosis Dr. Wyatt M48.06   2012 - Present    Bursitis of hip, right Dr. Wyatt M70.71   2012 - Present    Tendonitis Dr. Wyatt M77.9   2012 - Present    Scoliosis of lumbar spine Dr. 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  2015 - Present    Protein-calorie malnutrition (CMS-HCC) E46   " 5/2/2015 - Present    Mammographic microcalcification R92.0   5/5/2015 - Present    Cellulitis L03.90   7/20/2015 - Present    Constipation K59.00   10/17/2015 - Present    Urinary retention R33.9   10/18/2015 - Present    Weakness R53.1 High  1/9/2016 - Present    Chronic pain G89.29 Medium  7/16/2016 - Present    Dehydration E86.0   7/16/2016 - Present    Hypotension I95.9 High  7/17/2016 - Present    Leukopenia D72.819 Medium  7/17/2016 - Present    Parkinson's disease (CMS-[facility]) G20   8/15/2016 - Present      Health Maintenance        Date Due Completion Dates    IMM DTaP/Tdap/Td Vaccine (1 - Tdap) 8/22/2009 8/21/2009    IMM ZOSTER VACCINE 3/20/2011 ---    BONE DENSITY 3/20/2016 ---    IMM PNEUMOCOCCAL 65+ (ADULT) LOW/MEDIUM RISK SERIES (1 of 2 - PCV13) 3/20/2016 3/13/2012    MAMMOGRAM 3/25/2016 3/25/2015, 5/16/2007, 5/16/2007    IMM INFLUENZA (1) 9/1/2017 10/16/2012    COLONOSCOPY 2/10/2024 2/10/2014 (Done)    Override on 2/10/2014: Done            Results     POCT Urinalysis      Component Value Standard Range & Units    POC Color Yellow Negative    POC Appearance Clear Negative    POC Leukocyte Esterase Negative Negative    POC Nitrites Negative Negative    POC Urobiligen Negative Negative (0.2) mg/dL    POC Protein Negative Negative mg/dL    POC Urine PH 6.0 5.0 - 8.0    POC Blood Negative Negative    POC Specific Gravity 1.010 <1.005 - >1.030    POC Ketones Negative Negative mg/dL    POC Biliruben Negative Negative mg/dL    POC Glucose Negative Negative mg/dL                        Current Immunizations     Influenza TIV (IM) 10/16/2012    Pneumococcal polysaccharide vaccine (PPSV-23) 3/13/2012    TD Vaccine 8/21/2009      Below and/or attached are the medications your provider expects you to take. Review all of your home medications and newly ordered medications with your provider and/or pharmacist. Follow medication instructions as directed by your provider and/or pharmacist. Please keep your medication  "list with you and share with your provider. Update the information when medications are discontinued, doses are changed, or new medications (including over-the-counter products) are added; and carry medication information at all times in the event of emergency situations     Allergies:  BACTRIM - Hives     ACTONEL - (reactions not documented)     LATEX - (reactions not documented)     SULFA DRUGS - (reactions not documented)     TAPE - Rash               Medications  Valid as of: August 16, 2017 - 11:26 AM    Generic Name Brand Name Tablet Size Instructions for use    5-Hydroxytryptophan   Take 10 mg by mouth.        Calcium Citrate-Vitamin D   Take 1 Tab by mouth every day. 1000mg ca, 400 iu of vitamin d-3        Carbidopa-Levodopa (Tab) SINEMET  MG Take 1 Tab by mouth every 2 hours. Pt states she takes at 0700/0900/1100/1300/1500/1700/1900 Total of 7 doses during day        Carbidopa-Levodopa (Tab CR) SINEMET CR  MG Take 1 Tab by mouth every bedtime.        Carisoprodol (Tab) SOMA 350 MG Take 350 mg by mouth at bedtime as needed for Muscle Spasms (Sleeping).        Cholecalciferol (Cap) Cholecalciferol 2000 UNIT Take 1 Cap by mouth every day.        Clindamycin HCl (Cap) CLEOCIN 300 MG Take 1 Cap by mouth 3 times a day.        DiazePAM (Tab) VALIUM 5 MG Take 10 mg by mouth 2 Times a Day.        Docusate Sodium (Cap) COLACE 100 MG Take 300 mg by mouth every day.        Entacapone (Tab) COMTAN 200 MG Take 200 mg by mouth.        Fluticasone Propionate (Suspension) FLONASE 50 MCG/ACT Spray 1 Spray in nose every day.        Gabapentin (Tab) NEURONTIN 600 MG 1 tab tid and 3 tab qhs        HYDROmorphone HCl (Tab) DILAUDID 2 MG Take 2 mg by mouth 1 time daily as needed. fo dystonic episodes, pt reports takes \"rarely\".         Linaclotide (Cap) Linaclotide 145 MCG Take 145 mg by mouth every morning before breakfast.        LORazepam (Tab) ATIVAN 2 MG Take 1 mg by mouth.        Magnesium Citrate (Powder) " Magnesium Citrate  Take 2 Doses by mouth every day. 2 teaspoon daily        Melatonin (Tab) Melatonin 1 MG Take 1 mg by mouth every bedtime.        Metaxalone (Tab) SKELAXIN 800 MG TAKE ONE TABLET BY MOUTH FOUR TIMES A DAY        Multiple Vitamin   Take 1 Tab by mouth every day.        Polyethylene Glycol 3350   Take  by mouth.        Pramipexole Dihydrochloride (Tab) MIRAPEX 0.125 MG Take 0.125 mg by mouth 3 times a day.        Taurine (Cap) Taurine 1000 MG Take 1,000 mg by mouth every day.        TiZANidine HCl (Tab) ZANAFLEX 2 MG 1.5 tab 6 times as day        TraMADol HCl (Tab) ULTRAM 50 MG Take 1-2 Tabs by mouth every 6 hours as needed for Moderate Pain.        Turmeric (Cap) Turmeric 500 MG Take 1 Cap by mouth every day.        Ubiquinol (Cap) Ubiquinol 100 MG Take 100 mg by mouth every day.        .                 Medicines prescribed today were sent to:     [facility] #[id number] - [place] - [address] [place] [address]    Phone: [phone] Fax: [phone]    Open 24 Hours?: No      Medication refill instructions:       If your prescription bottle indicates you have medication refills left, it is not necessary to call your provider’s office. Please contact your pharmacy and they will refill your medication.    If your prescription bottle indicates you do not have any refills left, you may request refills at any time through one of the following ways: The online Sup[employer] system (except Urgent Care), by calling your provider’s office, or by asking your pharmacy to contact your provider’s office with a refill request. Medication refills are processed only during regular business hours and may not be available until the next business day. Your provider may request additional information or to have a follow-up visit with you prior to refilling your medication.   *Please Note: Medication refills are assigned a new Rx number when refilled electronically. Your pharmacy may indicate that no  refills were authorized even though a new prescription for the same medication is available at the pharmacy. Please request the medicine by name with the pharmacy before contacting your provider for a refill.        Instructions    Skin Tear Care  A skin tear is a wound in which the top layer of skin has peeled off. This is a common problem with aging because the skin becomes thinner and more fragile as a person gets older. In addition, some medicines, such as oral corticosteroids, can lead to skin thinning if taken for long periods of time.   A skin tear is often repaired with tape or skin adhesive strips. This keeps the skin that has been peeled off in contact with the healthier skin beneath. Depending on the location of the wound, a bandage (dressing) may be applied over the tape or skin adhesive strips. Sometimes, during the healing process, the skin turns black and dies. Even when this happens, the torn skin acts as a good dressing until the skin underneath gets healthier and repairs itself.  HOME CARE INSTRUCTIONS   · Change dressings once per day or as directed by your caregiver.  ¨ Gently clean the skin tear and the area around the tear using saline solution or mild soap and water.  ¨ Do not rub the injured skin dry. Let the area air dry.  ¨ Apply petroleum jelly or an antibiotic cream or ointment to keep the tear moist. This will help the wound heal. Do not allow a scab to form.  ¨ If the dressing sticks before the next dressing change, moisten it with warm soapy water and gently remove it.  · Protect the injured skin until it has healed.  · Only take over-the-counter or prescription medicines as directed by your caregiver.  · Take showers or baths using warm soapy water. Apply a new dressing after the shower or bath.  · Keep all follow-up appointments as directed by your caregiver.    SEEK IMMEDIATE MEDICAL CARE IF:   · You have redness, swelling, or increasing pain in the skin tear.  · You have pus coming  from the skin tear.  · You have chills.  · You have a red streak that goes away from the skin tear.  · You have a bad smell coming from the tear or dressing.  · You have a fever or persistent symptoms for more than 2-3 days.  · You have a fever and your symptoms suddenly get worse.  MAKE SURE YOU:  · Understand these instructions.  · Will watch this condition.  · Will get help right away if your child is not doing well or gets worse.     This information is not intended to replace advice given to you by your health care provider. Make sure you discuss any questions you have with your health care provider.     Document Released: 09/12/2002 Document Revised: 09/11/2013 Document Reviewed: 07/01/2013  [employer] Interactive Patient Education ©2016 [employer]         Other Notes About Your Plan     This appointment is 2-24-16. 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 Comment benzo dependence  2. Comment malnutrition, the patient was given Megace in 4/2015  3. Comment ETOH dependence, or if alcohol use in remission  4. Revise/specify anxiety and depression, possible adjustment disorder: if have both, code as TRACEY, Major depressive disorder; Panic attack if has  5. If the patient is on home oxygen (even on/off) add diagnoses: Chr respiratory failure and home oxygen use  6. Notified in documents that patient had CABG; if so, add CAD as diagnosis, with appropriate medication for the condition  7. Notified in documents that patient hadCVA; if so, add diagnosis of vascular disease  8. Notified in documents that patient had seizures           MyChart Access Code: Activation code not generated  Current MyChart Status: Active

## 2017-08-23 ENCOUNTER — TELEPHONE (OUTPATIENT)
Dept: NEUROLOGY | Facility: MEDICAL CENTER | Age: 67
End: 2017-08-23

## 2017-08-23 NOTE — TELEPHONE ENCOUNTER
Called in per Dr. Patel note 2/23/17 Carbidopa-Levodopa  #210 6 refills patient takes it every 2 hours for a total of 7 doses.   Called in Per Dr. Patel note 2/23/17 Carbidopa-Levodopa  #30 6 refills patient takes at bedtime to:  [id number] - [place], [place] - [address] [phone] (Phone)  [phone] (Fax)

## 2017-08-29 ENCOUNTER — PATIENT OUTREACH (OUTPATIENT)
Dept: HEALTH INFORMATION MANAGEMENT | Facility: OTHER | Age: 67
End: 2017-08-29

## 2017-08-29 RX ORDER — CHOLECALCIFEROL (VITAMIN D3) 125 MCG
1 CAPSULE ORAL
COMMUNITY
End: 2018-03-07

## 2017-08-29 RX ORDER — DIAPER,BRIEF,ADULT, DISPOSABLE
1 EACH MISCELLANEOUS DAILY
Status: ON HOLD | COMMUNITY
End: 2018-03-13

## 2017-08-29 RX ORDER — THEANINE 100 MG
1 CAPSULE ORAL DAILY
COMMUNITY
End: 2018-03-07

## 2017-08-29 NOTE — PROGRESS NOTES
Outbound call to Delmis for medication reconciliation.    Updated allergy and medication lists.    Patient demonstrates adherence to medication schedule and understanding of indications for medications. Delmis has a routine and sets out her medications for the day each morning. She reports no missed doses in the past week.    Medications that meet Beer's criteria for potentially inappropriate use in patients over 65 include:  Carisoprodol - Patient takes one nightly for sleep and muscle spasms. She states this is effective.  She denies side effects.   Metaxalone - Patient takes 4 times daily to help with muscle spasms. She denies side effects. Patient follows with neurology for Parkinson's.     Patient reports she is no longer taking tizanidine. She has tried baclofen in the past and it was not effective for tremor.      Patient denies any side effects from medications.     Patient feels satisfied with current medication regimen.      Patient can afford current medications.    Delmis does not use tobacco.     Plan:    Recommend Prevnar vaccination, annual flu vaccination, and shingles vaccination.   Advised patient use caution with carisoprodol, metaxalone, and diazepam. Patient does not combine carisoprodol and metaxalone.   Provided patient my contact information should medication questions/concerns arise.     Shannen Ramirez, PharmD

## 2017-08-31 DIAGNOSIS — G24.9 DYSTONIA: ICD-10-CM

## 2017-09-01 RX ORDER — GABAPENTIN 600 MG/1
TABLET ORAL
Qty: 180 TAB | Refills: 6 | Status: SHIPPED | OUTPATIENT
Start: 2017-09-01 | End: 2018-03-07

## 2017-09-27 ENCOUNTER — TELEPHONE (OUTPATIENT)
Dept: NEUROLOGY | Facility: MEDICAL CENTER | Age: 67
End: 2017-09-27

## 2017-09-27 DIAGNOSIS — G24.9 DYSTONIA: ICD-10-CM

## 2017-09-27 RX ORDER — METAXALONE 800 MG/1
800 TABLET ORAL 4 TIMES DAILY
Qty: 150 TAB | Refills: 3 | Status: SHIPPED | OUTPATIENT
Start: 2017-09-27 | End: 2017-10-23 | Stop reason: CLARIF

## 2017-09-27 NOTE — TELEPHONE ENCOUNTER
Thank you for letting me know about this patient's condition. She has had an episode in the record from June of this year where she had called quite confused and seemed lethargic. She was asking for a different medication for depression, but it was not a medication I had written for and she was directed back to the prescribing physician. We heard nothing else from her until now, most of the phone conversations in the record had to do with medication refills. I agree that with her present symptoms especially with an inability to breathe comfortably that she needs to go to the emergency room.

## 2017-09-27 NOTE — TELEPHONE ENCOUNTER
----- Message from Carrillo Echols Ass't sent at 9/27/2017  3:59 PM PDT -----  Pt has called a couple times today, first about her medication not being ordered second time she called she told me she wasn't feeling well that she couldn't breath and was shaking I immediately told her she needed to go to the ER. She insisted that I let Jorge know I told her I would send him a message immediately.

## 2017-09-27 NOTE — TELEPHONE ENCOUNTER
Spoke with patient today in regards to an Rx refill for Skelaxin 800mg. Rx approved by Dr. Patel. I called [facility] to confirm that the Rx had been received and they stated they would have this ready for her later this afternoon. I did call the patient back to relay that the pharmacy had received the prescription and she was agreeable.

## 2017-09-28 ENCOUNTER — TELEPHONE (OUTPATIENT)
Dept: NEUROLOGY | Facility: MEDICAL CENTER | Age: 67
End: 2017-09-28

## 2017-09-28 NOTE — TELEPHONE ENCOUNTER
----- Message from Laura Andrew Med Ass't sent at 9/28/2017 10:42 AM PDT -----  Dalton called this new medication in for the Patient  ----- Message -----  From: Emely Palmer, Med Ass't  Sent: 9/27/2017   3:59 PM  To: Laura Andrew Med Ass't, #    Pt has called a couple times today, first about her medication not being ordered second time she called she told me she wasn't feeling well that she couldn't breath and was shaking I immediately told her she needed to go to the ER. She insisted that I let Jorge know I told her I would send him a message immediately.

## 2017-09-28 NOTE — TELEPHONE ENCOUNTER
This sounds like replay from yesterday. I did receive the message about the patient being told to go to the emergency room, I indicated my agreement with this. We took care of the prescription issue yesterday for Skelaxin, please see the telephone note from yesterday.

## 2017-10-11 ENCOUNTER — TELEPHONE (OUTPATIENT)
Dept: NEUROLOGY | Facility: MEDICAL CENTER | Age: 67
End: 2017-10-11

## 2017-10-11 DIAGNOSIS — G24.9 DYSTONIA: ICD-10-CM

## 2017-10-11 RX ORDER — CARISOPRODOL 350 MG/1
350 TABLET ORAL
Qty: 30 TAB | Refills: 6 | Status: SHIPPED | OUTPATIENT
Start: 2017-10-11 | End: 2017-10-23 | Stop reason: SDUPTHER

## 2017-10-11 NOTE — TELEPHONE ENCOUNTER
"Patient is requesting a refill on her Soma 350mg tabs. Dr. Patel has prescribed it to her in the past but it states it was discontinued due to \"patient decision.\"    Can you please refill/ advise?    Thank you,.  "

## 2017-10-16 ENCOUNTER — TELEPHONE (OUTPATIENT)
Dept: NEUROLOGY | Facility: MEDICAL CENTER | Age: 67
End: 2017-10-16

## 2017-10-16 DIAGNOSIS — G20.A1 PARKINSON'S DISEASE: ICD-10-CM

## 2017-10-17 NOTE — TELEPHONE ENCOUNTER
Patient called for Dr. Patel and is wondering if he can prescribe entacapone? She is having very bad tremors and states this has worked for her in the past. Please advise.

## 2017-10-19 RX ORDER — ENTACAPONE 200 MG/1
200 TABLET ORAL 3 TIMES DAILY
Qty: 90 TAB | Refills: 2 | Status: SHIPPED | OUTPATIENT
Start: 2017-10-19 | End: 2017-10-23

## 2017-10-19 NOTE — TELEPHONE ENCOUNTER
We will try one medication adjustment at a time. She seems to be exquisitely sensitive to dopamine level adjustments, Comtan 200 mg can be taken 3 times a day with the first, third and fifth dose of her Sinemet.

## 2017-10-23 ENCOUNTER — OFFICE VISIT (OUTPATIENT)
Dept: NEUROLOGY | Facility: MEDICAL CENTER | Age: 67
End: 2017-10-23
Payer: MEDICARE

## 2017-10-23 ENCOUNTER — HOSPITAL ENCOUNTER (OUTPATIENT)
Dept: LAB | Facility: MEDICAL CENTER | Age: 67
End: 2017-10-23
Attending: FAMILY MEDICINE
Payer: MEDICARE

## 2017-10-23 VITALS
HEIGHT: 62 IN | SYSTOLIC BLOOD PRESSURE: 100 MMHG | RESPIRATION RATE: 15 BRPM | BODY MASS INDEX: 19.84 KG/M2 | TEMPERATURE: 99.2 F | WEIGHT: 107.8 LBS | DIASTOLIC BLOOD PRESSURE: 62 MMHG | OXYGEN SATURATION: 94 % | HEART RATE: 82 BPM

## 2017-10-23 DIAGNOSIS — G21.8 OTHER SECONDARY PARKINSONISM (HCC): Primary | ICD-10-CM

## 2017-10-23 DIAGNOSIS — G24.9 DYSTONIA: ICD-10-CM

## 2017-10-23 LAB
25(OH)D3 SERPL-MCNC: 15 NG/ML (ref 30–100)
ALBUMIN SERPL BCP-MCNC: 4.3 G/DL (ref 3.2–4.9)
ALBUMIN/GLOB SERPL: 1.3 G/DL
ALP SERPL-CCNC: 64 U/L (ref 30–99)
ALT SERPL-CCNC: 5 U/L (ref 2–50)
ANION GAP SERPL CALC-SCNC: 9 MMOL/L (ref 0–11.9)
APPEARANCE UR: CLEAR
AST SERPL-CCNC: 21 U/L (ref 12–45)
BASOPHILS # BLD AUTO: 1.1 % (ref 0–1.8)
BASOPHILS # BLD: 0.05 K/UL (ref 0–0.12)
BILIRUB SERPL-MCNC: 0.7 MG/DL (ref 0.1–1.5)
BILIRUB UR QL STRIP.AUTO: NEGATIVE
BUN SERPL-MCNC: 18 MG/DL (ref 8–22)
CALCIUM SERPL-MCNC: 9.9 MG/DL (ref 8.5–10.5)
CHLORIDE SERPL-SCNC: 106 MMOL/L (ref 96–112)
CHOLEST SERPL-MCNC: 213 MG/DL (ref 100–199)
CO2 SERPL-SCNC: 25 MMOL/L (ref 20–33)
COLOR UR: ABNORMAL
CREAT SERPL-MCNC: 0.73 MG/DL (ref 0.5–1.4)
EOSINOPHIL # BLD AUTO: 0.05 K/UL (ref 0–0.51)
EOSINOPHIL NFR BLD: 1.1 % (ref 0–6.9)
ERYTHROCYTE [DISTWIDTH] IN BLOOD BY AUTOMATED COUNT: 45.9 FL (ref 35.9–50)
GFR SERPL CREATININE-BSD FRML MDRD: >60 ML/MIN/1.73 M 2
GLOBULIN SER CALC-MCNC: 3.3 G/DL (ref 1.9–3.5)
GLUCOSE SERPL-MCNC: 81 MG/DL (ref 65–99)
GLUCOSE UR STRIP.AUTO-MCNC: NEGATIVE MG/DL
HCT VFR BLD AUTO: 42.7 % (ref 37–47)
HDLC SERPL-MCNC: 75 MG/DL
HGB BLD-MCNC: 13.9 G/DL (ref 12–16)
IMM GRANULOCYTES # BLD AUTO: 0.02 K/UL (ref 0–0.11)
IMM GRANULOCYTES NFR BLD AUTO: 0.4 % (ref 0–0.9)
KETONES UR STRIP.AUTO-MCNC: ABNORMAL MG/DL
LDLC SERPL CALC-MCNC: 130 MG/DL
LEUKOCYTE ESTERASE UR QL STRIP.AUTO: NEGATIVE
LYMPHOCYTES # BLD AUTO: 1.24 K/UL (ref 1–4.8)
LYMPHOCYTES NFR BLD: 26.2 % (ref 22–41)
MCH RBC QN AUTO: 31.1 PG (ref 27–33)
MCHC RBC AUTO-ENTMCNC: 32.6 G/DL (ref 33.6–35)
MCV RBC AUTO: 95.5 FL (ref 81.4–97.8)
MICRO URNS: ABNORMAL
MONOCYTES # BLD AUTO: 0.31 K/UL (ref 0–0.85)
MONOCYTES NFR BLD AUTO: 6.6 % (ref 0–13.4)
NEUTROPHILS # BLD AUTO: 3.06 K/UL (ref 2–7.15)
NEUTROPHILS NFR BLD: 64.6 % (ref 44–72)
NITRITE UR QL STRIP.AUTO: NEGATIVE
NRBC # BLD AUTO: 0 K/UL
NRBC BLD AUTO-RTO: 0 /100 WBC
PH UR STRIP.AUTO: 6.5 [PH]
PLATELET # BLD AUTO: 167 K/UL (ref 164–446)
PMV BLD AUTO: 12.9 FL (ref 9–12.9)
POTASSIUM SERPL-SCNC: 4 MMOL/L (ref 3.6–5.5)
PROT SERPL-MCNC: 7.6 G/DL (ref 6–8.2)
PROT UR QL STRIP: NEGATIVE MG/DL
RBC # BLD AUTO: 4.47 M/UL (ref 4.2–5.4)
RBC UR QL AUTO: NEGATIVE
RHEUMATOID FACT SER IA-ACNC: <10 IU/ML (ref 0–14)
SODIUM SERPL-SCNC: 140 MMOL/L (ref 135–145)
SP GR UR STRIP.AUTO: 1.02
T4 SERPL-MCNC: 5.4 UG/DL (ref 4–12)
TRIGL SERPL-MCNC: 42 MG/DL (ref 0–149)
TSH SERPL DL<=0.005 MIU/L-ACNC: 2.14 UIU/ML (ref 0.3–3.7)
URATE SERPL-MCNC: 2.3 MG/DL (ref 1.9–8.2)
UROBILINOGEN UR STRIP.AUTO-MCNC: 0.2 MG/DL
WBC # BLD AUTO: 4.7 K/UL (ref 4.8–10.8)

## 2017-10-23 PROCEDURE — 36415 COLL VENOUS BLD VENIPUNCTURE: CPT

## 2017-10-23 PROCEDURE — 84436 ASSAY OF TOTAL THYROXINE: CPT

## 2017-10-23 PROCEDURE — 86038 ANTINUCLEAR ANTIBODIES: CPT

## 2017-10-23 PROCEDURE — 86431 RHEUMATOID FACTOR QUANT: CPT

## 2017-10-23 PROCEDURE — 84550 ASSAY OF BLOOD/URIC ACID: CPT

## 2017-10-23 PROCEDURE — 85025 COMPLETE CBC W/AUTO DIFF WBC: CPT

## 2017-10-23 PROCEDURE — 99215 OFFICE O/P EST HI 40 MIN: CPT | Performed by: PSYCHIATRY & NEUROLOGY

## 2017-10-23 PROCEDURE — 84443 ASSAY THYROID STIM HORMONE: CPT

## 2017-10-23 PROCEDURE — 82306 VITAMIN D 25 HYDROXY: CPT

## 2017-10-23 PROCEDURE — 80061 LIPID PANEL: CPT

## 2017-10-23 PROCEDURE — 81003 URINALYSIS AUTO W/O SCOPE: CPT

## 2017-10-23 PROCEDURE — 80053 COMPREHEN METABOLIC PANEL: CPT

## 2017-10-23 RX ORDER — CARISOPRODOL 350 MG/1
350 TABLET ORAL 4 TIMES DAILY
Qty: 120 TAB | Refills: 6 | Status: SHIPPED | OUTPATIENT
Start: 2017-10-23 | End: 2018-03-01 | Stop reason: SDUPTHER

## 2017-10-23 ASSESSMENT — ENCOUNTER SYMPTOMS
SENSORY CHANGE: 0
FOCAL WEAKNESS: 1
FALLS: 0
TREMORS: 1
LOSS OF CONSCIOUSNESS: 0

## 2017-10-23 ASSESSMENT — PATIENT HEALTH QUESTIONNAIRE - PHQ9: CLINICAL INTERPRETATION OF PHQ2 SCORE: 0

## 2017-10-23 ASSESSMENT — PAIN SCALES - GENERAL: PAINLEVEL: 7=MODERATE-SEVERE PAIN

## 2017-10-24 ENCOUNTER — TELEPHONE (OUTPATIENT)
Dept: NEUROLOGY | Facility: MEDICAL CENTER | Age: 67
End: 2017-10-24

## 2017-10-24 NOTE — TELEPHONE ENCOUNTER
Patient called and wants to know what Dr. Patel hopes to accomplish/ what is supposed to improve by taking her off the Sinemet. Please advise.

## 2017-10-24 NOTE — PROGRESS NOTES
Subjective:      Delmis Draper is a 67 y.o. female who presents With a friend, for follow-up, with a history of atypical Parkinson's disease and associated dystonia.     HPI    Last seen 8 months ago, her symptoms continue essentially unabated. She has called the office requesting different medications in the hopes of obtaining some additional help, but even adding Comtan to the 7 doses of Sinemet she is already on his provided no benefit. The dystonia continues. Artane was attempted, providing little benefit.     She was seen at [facility] by Dr. Holman, who had additional medical records from her previous evaluations at [facility] and [place]. This included Chandana Barton and Ian, dating back from 2007 through 2014. Initial impressions with the possibility of an atypical dystonic syndrome versus atypical Parkinson's disease, cortical basal ganglionic degeneration, and even a functional movement disorder. Dr. Sosa on several occasions documented patient's symptoms spontaneously remitting and then regressing when he had left the room and then reappeared, respectively. During this time, Duc was obtained in 2014, was completely normal, thus the suspicion of a functional movement disorder was raised even further. Her most recent evaluation again was consistent with his impression. Recommendations were to get her off dopamine entirely and to pursue more aggressive neuropsychological and psychiatric interventions. Dr. Vang's note indicated that these possibilities were mentioned and discussed with the patient.    The patient herself denies that major discussions were held in regards to her condition. She is very anxious about getting off anything, convinced that the dopamine and muscle relaxers are helping her symptoms.    Medical, surgical and family histories are reviewed, there are no new drug allergies. She is on Sinemet 25/107 times a day and Sinemet CR 50/200 every evening, Skelaxin 4  "times a day and Soma 350 mg in the evening, the rest as per the electronic health record.     Review of Systems   Musculoskeletal: Negative for falls.   Neurological: Positive for tremors and focal weakness. Negative for sensory change and loss of consciousness.        Objective:     /62   Pulse 82   Temp 37.3 °C (99.2 °F)   Resp 15   Ht 1.575 m (5' 2\")   Wt 48.9 kg (107 lb 12.8 oz)   LMP 01/01/1990   SpO2 94%   BMI 19.72 kg/m²      Physical Exam    She appears in no acute distress, though becomes quite upset and increasingly tremulous with discussions about getting her off medication. She is cooperative. Vital signs are stable. There is no malar rash, sialorrhea or evidence of excessive drooling. She is not hypophonic. The neck is supple. Cardiac evaluation is unremarkable. She is fully oriented. There is no hypophonia or tachyphemia. PERRLA/EOMI and visual fields are grossly full. She is tremulous throughout, there may be some slight rigidity. When she is otherwise distracted in conversation, the tremulousness in both hands attenuates completely. She has significant dystonic posturing in both legs. She stood slowly and cautiously with some assistance. She uses a walker to ambulate, the dystonia in the feet does not seem to be as apparent. There is no appendicular dystaxia with the upper extremities.     Assessment/Plan:     1. 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 I doubt that this is Parkinson's disease especially in the absence of abnormality on her Duc scan. Excessive dopamine certainly could worsen the dystonia, another reason to get her off Sinemet entirely.    Face-to-face time was spent at length reviewing all the " "above. I talked with her as to why this may not be Parkinson's disease or parkinsonism, rather a movement disorder related to a \"functional\" process, something we do not really have an answer for. She was told her that Sinemet must be discontinued for the beginning of any improvement to be seen. She fought back at this insisting that she did not want to be taken off dopamine. She became quite concerned about symptomatic worsening with sudden changes, not having medication available, despite reassurances from this physician and what I suspect came from Dr. Vang at [facility]. It became quite a circular discussion with the patient achieving no other end. She was offered the opportunity to follow with another neurologist if she did not agree her feel comfortable with my recommendations, she refused this possibility.    Comtan will be discontinued, the daily Sinemet 25/100 dosing will be reduced by one tablet daily every week until she is off the medicine entirely by the eighth week. Then the Sinemet CR 50/200 will be discontinued. In the meantime, Skelaxin will be discontinued, Soma 350 mg tablets will be substituted taken 4 times a day. How long it may take before things improve consistently remains to be seen, but maybe weeks even after the Sinemet is discontinued entirely. I will follow-up with her in 6 months, phone contact in the interim.    - carbidopa-levodopa (SINEMET)  MG Tab; Pt states she takes at 0700/0900/1100/1300/1500/1700/1900 Total of 7 doses during day. Every week, reduce by 1 tab daily until off  Dispense: 200 Tab; Refill: 0    2. Dystonia  Though this still could be a primary organic movement disorder, in which case there are other medicines that can be used, dopamine is deathly not one treatment that is an option for her, she does not suffer from dopamine-sensitive dystonia. She was also told that getting off Sinemet may in fact provide significant benefit and thus her symptoms may not warrant " treatment. Despite this, she became quite adamant about staying on medication, regardless. She is already failed Artane, but she had taken this while she is on Sinemet, so it may warrant another trial off dopamine.    - carisoprodol (SOMA) 350 MG Tab; Take 1 Tab by mouth 4 times a day.  Dispense: 120 Tab; Refill: 6    Time: Evaluation of 40 minutes for exam, review, discussion, and education  Discussion: As mentioned in the assessment, over 80% of the time was spent face-to-face counseling and coordinating care.

## 2017-10-25 NOTE — TELEPHONE ENCOUNTER
You can review my note from my office visit with this patient yesterday. I spent 45 minutes with her in a rather circular arguments trying to have her understand the rationale for my getting her off medication. I told her that Sinemet is not helping her in any way, it may in fact be making some of the dystonia and unusual, painful posturing she does suffer from worse, and thus I cannot in good conscience use this medication any further. Off the medication since it is not providing any therapeutic efficacy, she should feel no worse, may be better if in fact the dystonia improves.

## 2017-10-26 LAB
NUCLEAR IGG SER QL IA: DETECTED
NUCLEAR IGG TITR SER IF: ABNORMAL {TITER}

## 2017-11-01 ENCOUNTER — TELEPHONE (OUTPATIENT)
Dept: NEUROLOGY | Facility: MEDICAL CENTER | Age: 67
End: 2017-11-01

## 2017-11-01 NOTE — TELEPHONE ENCOUNTER
Spoke with patient in regards to her Sinemet. She states when she tried to wean off of at the rate Dr. Patel had suggested was too fast for her comfort and she was completely miserable. She stated after talking to her compound pharmacist she is more comfortable weaning herself off at a rate of 1 less pill a week. Her original dosage was 7 tabs of Sinemet daily and she is now taking 6 doses a day. She would like to continue taking away 1 dose per week and wanted to see what the doctor thought about this. Please advise.

## 2017-11-08 NOTE — TELEPHONE ENCOUNTER
I do not know if taking more gabapentin at bedtime really provide much benefit in regards to her insomnia. He can certainly attempt, if it does work, although better. As for a referral back to [facility], I want to wait until she is off medication as had been recommended by the movement disorder specialists there.

## 2017-11-15 ENCOUNTER — HOSPITAL ENCOUNTER (EMERGENCY)
Facility: MEDICAL CENTER | Age: 67
End: 2017-11-15
Attending: EMERGENCY MEDICINE
Payer: MEDICARE

## 2017-11-15 ENCOUNTER — TELEPHONE (OUTPATIENT)
Dept: NEUROLOGY | Facility: MEDICAL CENTER | Age: 67
End: 2017-11-15

## 2017-11-15 VITALS
DIASTOLIC BLOOD PRESSURE: 63 MMHG | RESPIRATION RATE: 22 BRPM | SYSTOLIC BLOOD PRESSURE: 111 MMHG | WEIGHT: 105 LBS | BODY MASS INDEX: 19.32 KG/M2 | TEMPERATURE: 99 F | OXYGEN SATURATION: 94 % | HEART RATE: 65 BPM | HEIGHT: 62 IN

## 2017-11-15 DIAGNOSIS — G24.9 DYSTONIA: ICD-10-CM

## 2017-11-15 DIAGNOSIS — G20.C ATYPICAL PARKINSONISM: ICD-10-CM

## 2017-11-15 LAB
APPEARANCE UR: ABNORMAL
BACTERIA #/AREA URNS HPF: NEGATIVE /HPF
BILIRUB UR QL STRIP.AUTO: NEGATIVE
COLOR UR: YELLOW
EPI CELLS #/AREA URNS HPF: NEGATIVE /HPF
GLUCOSE UR STRIP.AUTO-MCNC: NEGATIVE MG/DL
HYALINE CASTS #/AREA URNS LPF: ABNORMAL /LPF
KETONES UR STRIP.AUTO-MCNC: NEGATIVE MG/DL
LEUKOCYTE ESTERASE UR QL STRIP.AUTO: NEGATIVE
MICRO URNS: ABNORMAL
NITRITE UR QL STRIP.AUTO: NEGATIVE
PH UR STRIP.AUTO: 8 [PH]
PROT UR QL STRIP: NEGATIVE MG/DL
RBC # URNS HPF: ABNORMAL /HPF
RBC UR QL AUTO: NEGATIVE
SP GR UR STRIP.AUTO: 1.01
UROBILINOGEN UR STRIP.AUTO-MCNC: 0.2 MG/DL
WBC #/AREA URNS HPF: ABNORMAL /HPF

## 2017-11-15 PROCEDURE — 99284 EMERGENCY DEPT VISIT MOD MDM: CPT

## 2017-11-15 PROCEDURE — 81001 URINALYSIS AUTO W/SCOPE: CPT

## 2017-11-15 PROCEDURE — 700102 HCHG RX REV CODE 250 W/ 637 OVERRIDE(OP): Performed by: EMERGENCY MEDICINE

## 2017-11-15 PROCEDURE — A9270 NON-COVERED ITEM OR SERVICE: HCPCS | Performed by: EMERGENCY MEDICINE

## 2017-11-15 RX ORDER — HYDROCODONE BITARTRATE AND ACETAMINOPHEN 5; 325 MG/1; MG/1
2 TABLET ORAL ONCE
Status: COMPLETED | OUTPATIENT
Start: 2017-11-15 | End: 2017-11-15

## 2017-11-15 RX ORDER — METAXALONE 800 MG/1
800 TABLET ORAL ONCE
Status: COMPLETED | OUTPATIENT
Start: 2017-11-15 | End: 2017-11-15

## 2017-11-15 RX ADMIN — METAXALONE 800 MG: 800 TABLET ORAL at 14:17

## 2017-11-15 RX ADMIN — HYDROCODONE BITARTRATE AND ACETAMINOPHEN 2 TABLET: 5; 325 TABLET ORAL at 14:07

## 2017-11-15 ASSESSMENT — LIFESTYLE VARIABLES: DO YOU DRINK ALCOHOL: NO

## 2017-11-15 NOTE — ED PROVIDER NOTES
ED Provider Note    Scribed for Sonia Rodriguez M.D. by Edgar Larry. 11/15/2017, 1:20 PM.    Primary care provider: Raul Iverson M.D.  Means of arrival: EMS  History obtained from: Patient  History limited by: None    CHIEF COMPLAINT  Chief Complaint   Patient presents with   • Shaking     Pt BIB EMS from home/ pt with increased tremors/shaking, reports of MD taking pt off/reducing pt's parkinsons medication.    • Tremors       HPI  Delmis Draper is a 67 y.o. female with a history of Parkinson's disease who presents to the Emergency Department complaining of worsened shaking and tremors onset after being cut back on her Parkinson's medication a couple of weeks ago. Patient states she was instructed by her MD to cut back on her Levodopa to one pill per week. So far, she is down to one or two pills. Patient reports she now has worsened shaking and tremors that has been severe enough to cause difficulty ambulating. When she is able to walk, she notes that she loses her balance. Patient also reports associated sweats and intermittent sputum production. She denies any nausea, vomiting, diarrhea, fevers, or cough.    REVIEW OF SYSTEMS  Pertinent positives include shaking, tremors, sweats, sputum production. Pertinent negatives include no nausea, vomiting, diarrhea, fevers, cough. As above, all other systems reviewed and are negative.   See HPI for further details.   C    PAST MEDICAL HISTORY  Past Medical History:   Diagnosis Date   • Abnormal finding on mammography, microcalcification    • Anesthesia     hypotensive in the past   • Bowel obstruction     fecal impaction   • Bursitis    • Cataract    • Cellulitis 7/19/2012    right low extremity   • Dilated bile duct    • Disc disorder    • Dystonia    • Dystonia    • Dysuria    • GERD (gastroesophageal reflux disease)    • Heart burn    • Hypertension     pt can run very low   • Hypotension    • Insulin resistance    • Leukopenia    • Low blood pressure  "reading    • Malaise and fatigue    • OSTEOPOROSIS 3/22/2010   • Other specified disorder of intestines     constipation   • Pain     right hip, right knee   • Parkinson disease (CMS-HCC)     \"atypical\"   • Peripheral neuropathy (CMS-HCC)    • Rash, skin    • Scoliosis    • Spinal stenosis, lumbar    • Thyrotoxicosis remote    S/P I-131 Rx / subtotal thyroidectomy   • Unspecified disorder of thyroid     had partial thyroidectomy   • vitamin D deficiency        SURGICAL HISTORY  Past Surgical History:   Procedure Laterality Date   • BREAST BIOPSY Left 5/5/2015    Procedure: BREAST BIOPSY;  Surgeon: Deedee Bautista M.D.;  Location: SURGERY SAME DAY [facility];  Service:    • CATARACT PHACO WITH IOL  5/12/2014    Performed by Roland Becerra M.D. at SURGERY [facility]   • CATARACT PHACO WITH IOL  4/14/2014    Performed by Roland Becerra M.D. at [facility]   • GASTROSCOPY WITH BIOPSY  5/9/2012    Performed by CIERRA SUAZO at SURGERY [facility]   • EGD W/ENDOSCOPIC ULTRASOUND  5/9/2012    Performed by CIERRA SUAZO at SURGERY [facility]   • THYROIDECTOMY  1967    partial   • GYN SURGERY  1985, 1988    c sec x 2   • KNEE ARTHROTOMY  1964, 1968    right   • OTHER      left fifth digit       SOCIAL HISTORY  Social History   Substance Use Topics   • Smoking status: Former Smoker     Packs/day: 0.50     Years: 10.00     Quit date: 1/1/1985   • Smokeless tobacco: Never Used   • Alcohol use No      Comment: none since age 31      History   Drug Use No       FAMILY HISTORY  Family History   Problem Relation Age of Onset   • Arthritis Mother      Giant Cell Arteritis   • Lung Disease Father      COPD/Emphysema   • Hyperlipidemia Sister    • Heart Disease Maternal Aunt    • Heart Disease Maternal Uncle    • Heart Disease Paternal Aunt    • Heart Disease Paternal Uncle    • Heart Disease Maternal Grandmother    • Hypertension Maternal Grandmother    • Hyperlipidemia Maternal Grandmother  "   • Heart Disease Maternal Grandfather    • Hypertension Maternal Grandfather    • Hyperlipidemia Maternal Grandfather    • Heart Disease Paternal Grandfather    • Hypertension Paternal Grandfather    • Hyperlipidemia Paternal Grandfather    • Cancer Paternal Grandfather      Bone       CURRENT MEDICATIONS  No current facility-administered medications on file prior to encounter.      Current Outpatient Prescriptions on File Prior to Encounter   Medication Sig Dispense Refill   • carisoprodol (SOMA) 350 MG Tab Take 1 Tab by mouth 4 times a day. 120 Tab 6   • carbidopa-levodopa (SINEMET)  MG Tab Pt states she takes at 0700/0900/1100/1300/1500/1700/1900 Total of 7 doses during day. Every week, reduce by 1 tab daily until off 200 Tab 0   • gabapentin (NEURONTIN) 600 MG tablet TAKE ONE TABLET BY MOUTH THREE TIMES A DAY AND 3 TABLETS AT BEDTIME 180 Tab 6   • Melatonin 5 MG Tab Take 1 tablet by mouth every bedtime.     • CREATINE PO Take 1 Capsule by mouth 2 Times a Day.     • Linaclotide 290 MCG Cap Take 1 Capsule by mouth 1 time daily as needed.     • LevOCARNitine L-Tartrate (L-CARNITINE) 500 MG Cap Take 1 Capsule by mouth every day.     • L-Theanine 100 MG Cap Take 1 Capsule by mouth every day.     • tramadol (ULTRAM) 50 MG Tab Take 1-2 Tabs by mouth every 6 hours as needed for Moderate Pain. 15 Tab 0   • Turmeric 500 MG Cap Take 1 Cap by mouth every day.     • docusate sodium (COLACE) 100 MG Cap Take 300 mg by mouth every day.     • Magnesium Citrate Powder Take 2 Doses by mouth every day. 2 teaspoon daily     • CALCIUM CITRATE-VITAMIN D3 PO Take 1 Tab by mouth every day. 1000mg ca, 400 iu of vitamin d-3     • diazepam (VALIUM) 5 MG Tab Take 10 mg by mouth 2 Times a Day.     • carbidopa-levodopa SR (SINEMET CR)  MG per tablet Take 1 Tab by mouth every bedtime. 30 Tab 6   • Multiple Vitamin (MULTI VITAMIN DAILY PO) Take 1 Tab by mouth every day.     • Ubiquinol 100 MG Cap Take 100 mg by mouth every day.    "     ALLERGIES  Allergies   Allergen Reactions   • Bactrim [Sulfamethoxazole W-Trimethoprim] Hives   • Actonel [Risedronate Sodium]    • Latex Rash     Irritation of skin    • Sulfa Drugs    • Tape Rash     Paper tape is ok       PHYSICAL EXAM  VITAL SIGNS: /58   Pulse 96   Temp 37.2 °C (99 °F)   Resp (!) 22   Ht 1.575 m (5' 2\")   Wt 47.6 kg (105 lb)   LMP 01/01/1990   BMI 19.20 kg/m²   Vitals reviewed.  Consitutional: Well-developed, quite thin. Negative for: distress.  HENT: Normocephalic, right external ear normal, left external ear normal, oropharynx clear and moist.  Eyes: Conjunctivae normal, extraocular movements normal. Negative for: discharge in right and left eye, icterus.  Neck: Range of motion normal, supple. Negative for cervical adenopathy.  Cardiovascular: Mild tachycardia, regular rhythm, heart sounds normal, intact distal pulses. Negative for: murmur, rub, gallop.  Pulmonary/Chest Wall: Effort normal, breath sounds normal. Negative for: respiratory distress, wheezes, rales, rhonchi.   Abdominal: Soft, bowel sounds normal. Negative for: distention, tenderness, rebound, guarding.  Musculoskeletal: Normal range of motion. Negative for edema.  Neurological: Alert and oriented x3. No focal deficits. Generalized stiffness and cogwheeling.  Skin: Warm, dry. Resolving ecchymosis to anterior knees, left greater than right. Healing skin tear to right forearm. Negative for rash.  Psych: Mood/affect normal, behavior normal, judgment normal.    DIAGNOSTIC STUDIES / PROCEDURES    LABS  Results for orders placed or performed during the hospital encounter of 11/15/17   URINALYSIS (UA)   Result Value Ref Range    Color Yellow     Character Cloudy (A)     Specific Gravity 1.014 <1.035    Ph 8.0 5.0 - 8.0    Glucose Negative Negative mg/dL    Ketones Negative Negative mg/dL    Protein Negative Negative mg/dL    Bilirubin Negative Negative    Urobilinogen, Urine 0.2 Negative    Nitrite Negative Negative    " "Leukocyte Esterase Negative Negative    Occult Blood Negative Negative    Micro Urine Req Microscopic    URINE MICROSCOPIC (W/UA)   Result Value Ref Range    WBC 0-2 /hpf    RBC 2-5 (A) /hpf    Bacteria Negative None /hpf    Epithelial Cells Negative /hpf    Hyaline Cast 0-2 /lpf      All labs reviewed by me.    COURSE & MEDICAL DECISION MAKING  Nursing notes, VS, PMSFHx reviewed in chart.    Obtained and reviewed past medical records from the patient's recent visits with Dr. Mcneil in his office where he wanted to wean her off the levodopa over approximately 7 weeks because he does not feel that her atypical Parkinson's and dystonia is dopamine sensitive.    1:20 PM Patient seen and examined at bedside. The patient presents with increased shaking and tremors since cutting back on her levodopa a couple of weeks ago and the differential diagnosis includes but is not limited to medication interaction, UTI. Discussed plan of care which includes urine test and contacting neuro. Patient understands and agrees. Ordered urine microscopic, UA. Patient will be treated with norco 5-325 mg    1:25 PM Paged neurology    1:30 PM - I discussed the patient's case and the above findings with Dr. Brandon (neurology) who recommended for patient to go back up on her Parkinson's medication and to put her back on her muscle relaxers. Patient then stated that she has plenty of levodopa and Skelaxin at home and does not need a prescription for this.    1:40 PM Patient reevaluated at bedside. Discussed further plan of care which includes increasing her Parkinson's medication and putting her on muscle relaxers. The patient will be discharged and should return if symptoms worsen or if new symptoms arise. The patient understands and agrees to plan.  Discharge vitals: /58   Pulse 88   Temp 37.2 °C (99 °F)   Resp (!) 22   Ht 1.575 m (5' 2\")   Wt 47.6 kg (105 lb)   LMP 01/01/1990   SpO2 93%   BMI 19.20 kg/m²       The patient will " return for new or worsening symptoms and is stable at the time of discharge.      DISPOSITION:  Patient will be discharged home in stable condition.    FOLLOW UP:  Kevon Patel M.D.  [address] [place] [id number] [phone]    Call today  to discuss your medication options.  Until then, go back up on your levadopa.      OUTPATIENT MEDICATIONS:  Discharge Medication List as of 11/15/2017  4:37 PM           FINAL IMPRESSION  1. Dystonia    2. Atypical Parkinsonism (CMS-HCC)          Edgar PANDYA (Scribe), am scribing for, and in the presence of, Sonia Rodriguez M.D..    Electronically signed by: Edgar Larry (Scribe), 11/15/2017    Sonia PANDYA M.D. personally performed the services described in this documentation, as scribed by Edgar Larry in my presence, and it is both accurate and complete.    The note accurately reflects work and decisions made by me.  Sonia Rodriguez  11/15/2017  6:42 PM

## 2017-11-15 NOTE — TELEPHONE ENCOUNTER
Spoke with patient this morning who wanted to speak with Dr. Patel about her Sinemet. She is currently being weaned off the medication and is concerned that she is being taken off too fast (1 dose is being taken off a week). She called very agitated and stated she felt very weak, could not move her legs or make it to her commode to use the restroom. Patient was very distressed and stated she was at home alone. Per recommendation of Dr. Patel she was instructed to call 911 and go to the emergency room. She was concerned that the on-call doctor would not be familiar with her case or how to help her/ recommend how she wean off of her Sinemet and she seemed hesitant to go, but I did reiterate to her that if she could not move and could not reach the restroom by herself and was this distressed that she needed to call 911 to be evaluated. She was concerned about leaving her animals at home alone and I again recommended she contact her  to inform him of the situation and again to call 911. She stated when she got off the phone that she would call 911.

## 2017-11-15 NOTE — ED NOTES
Chief Complaint   Patient presents with   • Shaking     Pt BIB EMS from home/ pt with increased tremors/shaking, reports of MD taking pt off/reducing pt's parkinsons medication.    • Tremors

## 2017-11-15 NOTE — DISCHARGE INSTRUCTIONS
Dystonia  Dystonia is a condition that makes your muscles contract without warning (muscle spasms). It can make doing everyday tasks hard. There are different forms of dystonia. The condition can affect just one part of your body, or it can affect larger areas of your body. Dystonia affects people in different ways. In some people, it is mild and goes away over time, while others may need treatment. Although there is no cure for dystonia, you can manage the condition with treatment.  CAUSES   Dystonia may be caused by:  · Genetics. This means you inherited the genes that cause you to be at risk for dystonia.  · An abnormality in the part of your brain that controls movement (basal ganglia).  Dystonia may also be acquired. If you have acquired dystonia, you developed the condition after:  · Brain injury.  · Infection.  · Drug reaction.  The cause of dystonia may also not be known (idiopathic dystonia).   SIGNS AND SYMPTOMS  Signs and symptoms of dystonia can depend on which type of the condition you have. Common signs and symptoms include:  · Muscle twitches or spasms around your eyes (blepharospasm).  · Foot cramping or dragging.  · Pulling of your neck to one side (torticollis).  · Muscles spasms of the face.  · Spasms of the voice box.  · Tremors.  · Awkward and painful positions.  · Muscle cramping after muscle use.  DIAGNOSIS   Your health care provider can diagnose dystonia based on your symptoms and medical history. Your health care provider will also do a physical exam. You may also have:   · A blood test to check for genes that cause dystonia.  · Brain imaging tests to rule out other causes of your symptoms.  There are no tests that can diagnose other causes of dystonia.  TREATMENT   There are no treatments that can cure or prevent dystonia. Treatment to manage dystonia may include:   · Injecting the affected muscles with a chemical (botulinum) that blocks muscle spasms. This treatment can block spasms for a  few days to a few months.  · Medicines to relax muscles.  HOME CARE INSTRUCTIONS  · Physical therapy to improve muscle strength and movement may be suggested by your health care provider. Continue your physical therapy exercises at home as instructed by your physical therapist.  · Make sure you have a good support system. Let your health care provider know if you are struggling with stress or anxiety.  · Keep all follow-up visits as directed by your health care provider. This is important.  · Take medicines only as directed by your health care provider.  SEEK MEDICAL CARE IF:  · Your condition is changing or getting worse.  · You need more support at home.     This information is not intended to replace advice given to you by your health care provider. Make sure you discuss any questions you have with your health care provider.     Document Released: 12/08/2003 Document Revised: 01/08/2016 Document Reviewed: 02/11/2015  [employer] Patient Education ©2016 [employer]

## 2017-11-16 NOTE — ED NOTES
Pt given discharge instructions/pt understands to begin taking own medication pt has at home/ home care instructions explained, pt verbalized understanding of instructions given, pt to ER lobby via wheelchair, to be driven home by .

## 2017-11-25 ENCOUNTER — HOSPITAL ENCOUNTER (EMERGENCY)
Facility: MEDICAL CENTER | Age: 67
End: 2017-11-25
Attending: EMERGENCY MEDICINE
Payer: MEDICARE

## 2017-11-25 ENCOUNTER — APPOINTMENT (OUTPATIENT)
Dept: RADIOLOGY | Facility: MEDICAL CENTER | Age: 67
End: 2017-11-25
Attending: EMERGENCY MEDICINE
Payer: MEDICARE

## 2017-11-25 VITALS
RESPIRATION RATE: 18 BRPM | DIASTOLIC BLOOD PRESSURE: 57 MMHG | BODY MASS INDEX: 21.27 KG/M2 | SYSTOLIC BLOOD PRESSURE: 102 MMHG | WEIGHT: 116.29 LBS | OXYGEN SATURATION: 92 % | TEMPERATURE: 97.6 F | HEART RATE: 83 BPM

## 2017-11-25 DIAGNOSIS — K59.00 CONSTIPATION, UNSPECIFIED CONSTIPATION TYPE: ICD-10-CM

## 2017-11-25 DIAGNOSIS — R10.84 GENERALIZED ABDOMINAL PAIN: ICD-10-CM

## 2017-11-25 LAB
ALBUMIN SERPL BCP-MCNC: 3.8 G/DL (ref 3.2–4.9)
ALBUMIN/GLOB SERPL: 1.5 G/DL
ALP SERPL-CCNC: 58 U/L (ref 30–99)
ALT SERPL-CCNC: 5 U/L (ref 2–50)
ANION GAP SERPL CALC-SCNC: 8 MMOL/L (ref 0–11.9)
APPEARANCE UR: CLEAR
AST SERPL-CCNC: 22 U/L (ref 12–45)
BASOPHILS # BLD AUTO: 0.4 % (ref 0–1.8)
BASOPHILS # BLD: 0.04 K/UL (ref 0–0.12)
BILIRUB SERPL-MCNC: 0.9 MG/DL (ref 0.1–1.5)
BILIRUB UR QL STRIP.AUTO: NEGATIVE
BUN SERPL-MCNC: 12 MG/DL (ref 8–22)
CALCIUM SERPL-MCNC: 8.2 MG/DL (ref 8.4–10.2)
CHLORIDE SERPL-SCNC: 105 MMOL/L (ref 96–112)
CO2 SERPL-SCNC: 22 MMOL/L (ref 20–33)
COLOR UR: YELLOW
CREAT SERPL-MCNC: 0.85 MG/DL (ref 0.5–1.4)
EOSINOPHIL # BLD AUTO: 0.14 K/UL (ref 0–0.51)
EOSINOPHIL NFR BLD: 1.5 % (ref 0–6.9)
ERYTHROCYTE [DISTWIDTH] IN BLOOD BY AUTOMATED COUNT: 46.3 FL (ref 35.9–50)
GFR SERPL CREATININE-BSD FRML MDRD: >60 ML/MIN/1.73 M 2
GLOBULIN SER CALC-MCNC: 2.5 G/DL (ref 1.9–3.5)
GLUCOSE SERPL-MCNC: 93 MG/DL (ref 65–99)
GLUCOSE UR STRIP.AUTO-MCNC: NEGATIVE MG/DL
HCT VFR BLD AUTO: 39 % (ref 37–47)
HGB BLD-MCNC: 12.9 G/DL (ref 12–16)
IMM GRANULOCYTES # BLD AUTO: 0.04 K/UL (ref 0–0.11)
IMM GRANULOCYTES NFR BLD AUTO: 0.4 % (ref 0–0.9)
KETONES UR STRIP.AUTO-MCNC: NEGATIVE MG/DL
LEUKOCYTE ESTERASE UR QL STRIP.AUTO: NEGATIVE
LIPASE SERPL-CCNC: 17 U/L (ref 7–58)
LYMPHOCYTES # BLD AUTO: 1.43 K/UL (ref 1–4.8)
LYMPHOCYTES NFR BLD: 15.8 % (ref 22–41)
MCH RBC QN AUTO: 31.6 PG (ref 27–33)
MCHC RBC AUTO-ENTMCNC: 33.1 G/DL (ref 33.6–35)
MCV RBC AUTO: 95.6 FL (ref 81.4–97.8)
MICRO URNS: NORMAL
MONOCYTES # BLD AUTO: 0.64 K/UL (ref 0–0.85)
MONOCYTES NFR BLD AUTO: 7.1 % (ref 0–13.4)
NEUTROPHILS # BLD AUTO: 6.77 K/UL (ref 2–7.15)
NEUTROPHILS NFR BLD: 74.8 % (ref 44–72)
NITRITE UR QL STRIP.AUTO: NEGATIVE
NRBC # BLD AUTO: 0 K/UL
NRBC BLD AUTO-RTO: 0 /100 WBC
PH UR STRIP.AUTO: 7 [PH]
PLATELET # BLD AUTO: 180 K/UL (ref 164–446)
PMV BLD AUTO: 11.8 FL (ref 9–12.9)
POTASSIUM SERPL-SCNC: 3.8 MMOL/L (ref 3.6–5.5)
PROT SERPL-MCNC: 6.3 G/DL (ref 6–8.2)
PROT UR QL STRIP: NEGATIVE MG/DL
RBC # BLD AUTO: 4.08 M/UL (ref 4.2–5.4)
RBC UR QL AUTO: NEGATIVE
SODIUM SERPL-SCNC: 135 MMOL/L (ref 135–145)
SP GR UR STRIP.AUTO: 1.01
WBC # BLD AUTO: 9.1 K/UL (ref 4.8–10.8)

## 2017-11-25 PROCEDURE — 80053 COMPREHEN METABOLIC PANEL: CPT

## 2017-11-25 PROCEDURE — 51701 INSERT BLADDER CATHETER: CPT

## 2017-11-25 PROCEDURE — 83690 ASSAY OF LIPASE: CPT

## 2017-11-25 PROCEDURE — 304562 HCHG STAT O2 MASK/CANNULA

## 2017-11-25 PROCEDURE — 96375 TX/PRO/DX INJ NEW DRUG ADDON: CPT

## 2017-11-25 PROCEDURE — 74022 RADEX COMPL AQT ABD SERIES: CPT

## 2017-11-25 PROCEDURE — 85025 COMPLETE CBC W/AUTO DIFF WBC: CPT

## 2017-11-25 PROCEDURE — 81003 URINALYSIS AUTO W/O SCOPE: CPT

## 2017-11-25 PROCEDURE — 99285 EMERGENCY DEPT VISIT HI MDM: CPT

## 2017-11-25 PROCEDURE — 96374 THER/PROPH/DIAG INJ IV PUSH: CPT

## 2017-11-25 PROCEDURE — 36415 COLL VENOUS BLD VENIPUNCTURE: CPT

## 2017-11-25 PROCEDURE — 700111 HCHG RX REV CODE 636 W/ 250 OVERRIDE (IP): Performed by: EMERGENCY MEDICINE

## 2017-11-25 RX ORDER — ONDANSETRON 2 MG/ML
4 INJECTION INTRAMUSCULAR; INTRAVENOUS ONCE
Status: COMPLETED | OUTPATIENT
Start: 2017-11-25 | End: 2017-11-25

## 2017-11-25 RX ORDER — MORPHINE SULFATE 4 MG/ML
4 INJECTION, SOLUTION INTRAMUSCULAR; INTRAVENOUS ONCE
Status: COMPLETED | OUTPATIENT
Start: 2017-11-25 | End: 2017-11-25

## 2017-11-25 RX ADMIN — MORPHINE SULFATE 4 MG: 4 INJECTION INTRAVENOUS at 16:53

## 2017-11-25 RX ADMIN — ONDANSETRON 4 MG: 2 INJECTION, SOLUTION INTRAMUSCULAR; INTRAVENOUS at 16:53

## 2017-11-25 ASSESSMENT — PAIN SCALES - GENERAL
PAINLEVEL_OUTOF10: 0
PAINLEVEL_OUTOF10: ASSUMED PAIN PRESENT

## 2017-11-25 NOTE — ED NOTES
BIB [facility] from home. Pt c/o abd pain x 3 days and constipation x 5 days. Pt given 100mcg Fentanyl PTA by [facility]. Pt with call light in reach and gurney in low position for pt safety.

## 2017-11-26 NOTE — ED PROVIDER NOTES
"ED Provider Note    CHIEF COMPLAINT  Chief Complaint   Patient presents with   • Abdominal Pain   • Constipation       HPI  Delmis Draper is a 67 y.o. female who presentsFor evaluation of abdominal pain and constipation. The patient has a history of MS as well as Parkinson's disease. She states she has not had a bowel movement in 5 days has been having abdominal pain over the past 3 days. She's had some nausea but no vomiting. She's had no fevers. She has no history of bowel obstruction. She states that she took mag citrate yesterday with no improvement. She is also taken some bowel stimulants or no improvement. She has been having issues with dystonia recently.    REVIEW OF SYSTEMS  See HPI for further details. All other systems are negative.     PAST MEDICAL HISTORY  Past Medical History:   Diagnosis Date   • Abnormal finding on mammography, microcalcification    • Anesthesia     hypotensive in the past   • Bowel obstruction     fecal impaction   • Bursitis    • Cataract    • Cellulitis 7/19/2012    right low extremity   • Dilated bile duct    • Disc disorder    • Dystonia    • Dystonia    • Dysuria    • GERD (gastroesophageal reflux disease)    • Heart burn    • Hypertension     pt can run very low   • Hypotension    • Insulin resistance    • Leukopenia    • Low blood pressure reading    • Malaise and fatigue    • OSTEOPOROSIS 3/22/2010   • Other specified disorder of intestines     constipation   • Pain     right hip, right knee   • Parkinson disease (CMS-HCC)     \"atypical\"   • Peripheral neuropathy (CMS-HCC)    • Rash, skin    • Scoliosis    • Spinal stenosis, lumbar    • Thyrotoxicosis remote    S/P I-131 Rx / subtotal thyroidectomy   • Unspecified disorder of thyroid     had partial thyroidectomy   • vitamin D deficiency        FAMILY HISTORY  Family History   Problem Relation Age of Onset   • Arthritis Mother      Giant Cell Arteritis   • Lung Disease Father      COPD/Emphysema   • Hyperlipidemia Sister "    • Heart Disease Maternal Aunt    • Heart Disease Maternal Uncle    • Heart Disease Paternal Aunt    • Heart Disease Paternal Uncle    • Heart Disease Maternal Grandmother    • Hypertension Maternal Grandmother    • Hyperlipidemia Maternal Grandmother    • Heart Disease Maternal Grandfather    • Hypertension Maternal Grandfather    • Hyperlipidemia Maternal Grandfather    • Heart Disease Paternal Grandfather    • Hypertension Paternal Grandfather    • Hyperlipidemia Paternal Grandfather    • Cancer Paternal Grandfather      Bone       SOCIAL HISTORY  Social History     Social History   • Marital status:      Spouse name: N/A   • Number of children: 2   • Years of education: N/A     Social History Main Topics   • Smoking status: Former Smoker     Packs/day: 0.50     Years: 10.00     Quit date: 1/1/1985   • Smokeless tobacco: Never Used   • Alcohol use No      Comment: none since age 31   • Drug use: No   • Sexual activity: No      Comment: , 2 boys, diasability     Other Topics Concern   • Not on file     Social History Narrative   • No narrative on file       SURGICAL HISTORY  Past Surgical History:   Procedure Laterality Date   • BREAST BIOPSY Left 5/5/2015    Procedure: BREAST BIOPSY;  Surgeon: Deedee Bautista M.D.;  Location: SURGERY SAME DAY [facility];  Service:    • CATARACT PHACO WITH IOL  5/12/2014    Performed by Roland Becerra M.D. at [facility]   • CATARACT PHACO WITH IOL  4/14/2014    Performed by Roland Becerra M.D. at [facility]   • GASTROSCOPY WITH BIOPSY  5/9/2012    Performed by CIERRA SUAZO at [facility]   • EGD W/ENDOSCOPIC ULTRASOUND  5/9/2012    Performed by CIERRA SUAZO at [facility]   • THYROIDECTOMY  1967    partial   • GYN SURGERY  1985, 1988    c sec x 2   • KNEE ARTHROTOMY  1964, 1968    right   • OTHER      left fifth digit       CURRENT MEDICATIONS  Home Medications    **Home medications have not yet  been reviewed for this encounter**         ALLERGIES  Allergies   Allergen Reactions   • Bactrim [Sulfamethoxazole W-Trimethoprim] Hives   • Actonel [Risedronate Sodium]    • Latex Rash     Irritation of skin    • Sulfa Drugs    • Tape Rash     Paper tape is ok       PHYSICAL EXAM  VITAL SIGNS: /57   Pulse 83   Temp 36.4 °C (97.6 °F)   Resp 20   Wt 52.7 kg (116 lb 4.7 oz)   LMP 01/01/1990   SpO2 95%   BMI 21.27 kg/m²     Constitutional: Well developed, Well nourished,  Non-toxic appearance.   HENT: Normocephalic, Atraumatic.   Eyes:  EOMI, Conjunctiva normal, No discharge.   Cardiovascular: Normal heart rate.   Thorax & Lungs: No respiratory distress.   Abdomen: Distended and tympanitic with diffuse tenderness.  Skin: Warm, Dry.   Extremities:  No edema.  Neurologic: Awake alert and oriented x 3. Cranial nerves II through XII are intact. Normal motor function, No focal deficits noted.   Psychiatric: Affect normal, Judgment normal, Mood normal.         RADIOLOGY/PROCEDURES  DX-ABDOMEN COMPLETE WITH AP OR PA CXR   Final Result         Moderate constipation pattern of the colon with large volume of the stool in the distal colon.            COURSE & MEDICAL DECISION MAKING  Pertinent Labs & Imaging studies reviewed. (See chart for details)  This is a 67-year-old here for evaluation of abdominal pain and constipation. X-rays demonstrate a moderate constipation pattern with a large volume of stool in the distal colon. Laboratory workup included chemistries which were unremarkable to include liver function studies and lipase. Urinalysis is negative. CBC shows normal white count. I performed digital disimpaction. I was able to get an extraordinarily large amount of stool out of the patient. I recommended an enema bed she has refused at this point. She would like to go home and try some more mag citrate. In this case since I think she is able to push the stool down and I think the large blockages probably been  relieved she will probably benefit from mag citrate. I will discharge her in the care of her . She will follow-up with her primary care provider. She is given discharge instructions on abdominal pain and constipation.    FINAL IMPRESSION  1. Abdominal pain  2. Constipation  3.         Electronically signed by: Paul Vyas, 11/25/2017 4:35 PM

## 2017-11-26 NOTE — ED NOTES
Pt provided with and lifted onto bedside commode. Given water per request for pt to take home medication as approved by Dr. Vyas.

## 2017-11-26 NOTE — DISCHARGE INSTRUCTIONS
Abdominal Pain (Nonspecific)  Your exam might not show the exact reason you have abdominal pain. Since there are many different causes of abdominal pain, another checkup and more tests may be needed. It is very important to follow up for lasting (persistent) or worsening symptoms. A possible cause of abdominal pain in any person who still has his or her appendix is acute appendicitis. Appendicitis is often hard to diagnose. Normal blood tests, urine tests, ultrasound, and CT scans do not completely rule out early appendicitis or other causes of abdominal pain. Sometimes, only the changes that happen over time will allow appendicitis and other causes of abdominal pain to be determined. Other potential problems that may require surgery may also take time to become more apparent. Because of this, it is important that you follow all of the instructions below.  HOME CARE INSTRUCTIONS   · Rest as much as possible.   · Do not eat solid food until your pain is gone.   · While adults or children have pain: A diet of water, weak decaffeinated tea, broth or bouillon, gelatin, oral rehydration solutions (ORS), frozen ice pops, or ice chips may be helpful.   · When pain is gone in adults or children: Start a light diet (dry toast, crackers, applesauce, or white rice). Increase the diet slowly as long as it does not bother you. Eat no dairy products (including cheese and eggs) and no spicy, fatty, fried, or high-fiber foods.   · Use no alcohol, caffeine, or cigarettes.   · Take your regular medicines unless your caregiver told you not to.   · Take any prescribed medicine as directed.   · Only take over-the-counter or prescription medicines for pain, discomfort, or fever as directed by your caregiver. Do not give aspirin to children.   If your caregiver has given you a follow-up appointment, it is very important to keep that appointment. Not keeping the appointment could result in a permanent injury and/or lasting (chronic) pain  and/or disability. If there is any problem keeping the appointment, you must call to reschedule.   SEEK IMMEDIATE MEDICAL CARE IF:   · Your pain is not gone in 24 hours.   · Your pain becomes worse, changes location, or feels different.   · You or your child has an oral temperature above 102° F (38.9° C), not controlled by medicine.   · Your baby is older than 3 months with a rectal temperature of 102° F (38.9° C) or higher.   · Your baby is 3 months old or younger with a rectal temperature of 100.4° F (38° C) or higher.   · You have shaking chills.   · You keep throwing up (vomiting) or cannot drink liquids.   · There is blood in your vomit or you see blood in your bowel movements.   · Your bowel movements become dark or black.   · You have frequent bowel movements.   · Your bowel movements stop (become blocked) or you cannot pass gas.   · You have bloody, frequent, or painful urination.   · You have yellow discoloration in the skin or whites of the eyes.   · Your stomach becomes bloated or bigger.   · You have dizziness or fainting.   · You have chest or back pain.   MAKE SURE YOU:   · Understand these instructions.   · Will watch your condition.   · Will get help right away if you are not doing well or get worse.   Document Released: 12/18/2006 Document Revised: 03/11/2013 Document Reviewed: 11/15/2010  [employer]® Patient Information ©2013 [employer].  Constipation, Adult  Constipation is when a person has fewer than three bowel movements a week, has difficulty having a bowel movement, or has stools that are dry, hard, or larger than normal. As people grow older, constipation is more common. A low-fiber diet, not taking in enough fluids, and taking certain medicines may make constipation worse.   CAUSES   · Certain medicines, such as antidepressants, pain medicine, iron supplements, antacids, and water pills.    · Certain diseases, such as diabetes, irritable bowel syndrome (IBS), thyroid disease, or  depression.    · Not drinking enough water.    · Not eating enough fiber-rich foods.    · Stress or travel.    · Lack of physical activity or exercise.    · Ignoring the urge to have a bowel movement.    · Using laxatives too much.    SIGNS AND SYMPTOMS   · Having fewer than three bowel movements a week.    · Straining to have a bowel movement.    · Having stools that are hard, dry, or larger than normal.    · Feeling full or bloated.    · Pain in the lower abdomen.    · Not feeling relief after having a bowel movement.    DIAGNOSIS   Your health care provider will take a medical history and perform a physical exam. Further testing may be done for severe constipation. Some tests may include:  · A barium enema X-ray to examine your rectum, colon, and, sometimes, your small intestine.    · A sigmoidoscopy to examine your lower colon.    · A colonoscopy to examine your entire colon.  TREATMENT   Treatment will depend on the severity of your constipation and what is causing it. Some dietary treatments include drinking more fluids and eating more fiber-rich foods. Lifestyle treatments may include regular exercise. If these diet and lifestyle recommendations do not help, your health care provider may recommend taking over-the-counter laxative medicines to help you have bowel movements. Prescription medicines may be prescribed if over-the-counter medicines do not work.   HOME CARE INSTRUCTIONS   · Eat foods that have a lot of fiber, such as fruits, vegetables, whole grains, and beans.  · Limit foods high in fat and processed sugars, such as french fries, hamburgers, cookies, candies, and soda.    · A fiber supplement may be added to your diet if you cannot get enough fiber from foods.    · Drink enough fluids to keep your urine clear or pale yellow.    · Exercise regularly or as directed by your health care provider.    · Go to the restroom when you have the urge to go. Do not hold it.    · Only take over-the-counter or  prescription medicines as directed by your health care provider. Do not take other medicines for constipation without talking to your health care provider first.    SEEK IMMEDIATE MEDICAL CARE IF:   · You have bright red blood in your stool.    · Your constipation lasts for more than 4 days or gets worse.    · You have abdominal or rectal pain.    · You have thin, pencil-like stools.    · You have unexplained weight loss.  MAKE SURE YOU:   · Understand these instructions.  · Will watch your condition.  · Will get help right away if you are not doing well or get worse.     This information is not intended to replace advice given to you by your health care provider. Make sure you discuss any questions you have with your health care provider.     Document Released: 09/15/2005 Document Revised: 01/08/2016 Document Reviewed: 09/29/2014  [employer] Interactive Patient Education ©2016 [employer]

## 2017-11-26 NOTE — ED NOTES
Pt used call bell,  had returned her to bed and helped her get dressed. Pt refusing enema, verbalized understanding that it was meant to help her with her complaint of constipation which brought her in today. Pt reports she is unable to urinate, made aware that her bladder was drained with straight cath at time of arrival. Pt requesting d/c home. ERP notified, at bedside to discuss plan with pt.

## 2017-11-26 NOTE — ED NOTES
"ERP at bedside to perform digital rectal disimpaction. This RN at bedside as chaperone, privacy maintained. Pt agreeable to procedure, states \" enemas don't work on me.\"  "

## 2017-11-26 NOTE — ED NOTES
Patient verbalized understanding of discharge instructions, no questions at this time. VS stable, patient will be wheeled to exit with d/c instructions in hand.

## 2017-12-04 ENCOUNTER — OFFICE VISIT (OUTPATIENT)
Dept: NEUROLOGY | Facility: MEDICAL CENTER | Age: 67
End: 2017-12-04
Payer: MEDICARE

## 2017-12-04 VITALS
OXYGEN SATURATION: 96 % | DIASTOLIC BLOOD PRESSURE: 56 MMHG | TEMPERATURE: 98.6 F | WEIGHT: 109 LBS | HEIGHT: 62 IN | HEART RATE: 79 BPM | RESPIRATION RATE: 16 BRPM | BODY MASS INDEX: 20.06 KG/M2 | SYSTOLIC BLOOD PRESSURE: 90 MMHG

## 2017-12-04 DIAGNOSIS — G21.8 OTHER SECONDARY PARKINSONISM (HCC): Primary | ICD-10-CM

## 2017-12-04 PROCEDURE — 99213 OFFICE O/P EST LOW 20 MIN: CPT | Performed by: PSYCHIATRY & NEUROLOGY

## 2017-12-04 ASSESSMENT — ENCOUNTER SYMPTOMS
FALLS: 0
MEMORY LOSS: 0
TREMORS: 1

## 2017-12-04 NOTE — PROGRESS NOTES
"Subjective:      Delmis Draper is a 67 y.o. female who presents with her caregiver from [facility], with a history of parkinsonism associated with dystonia.    HPI    Delmis has continued to have a rather rough time getting off Sinemet, despite reassurances and a slow titration to avoid any type of atypical rebound phenomena or withdrawal. She is now on Sinemet 25/100, 4 times a day. She is cutting down by one tablet every other week. She still states that everything is getting worse as she calms down and that she can \"no longer do this\". She has been admitted to the emergency room on at least one occasion because of increasing tremor, associated with sweats, possibly fever, and myalgias. She is always discharged in stable condition.    Medical, surgical and family histories are reviewed, there are no new drug allergies. She is on Sinemet 25/100, 4 times a day, Soma 350 mg, 4 times a day, Neurontin 600 mg, 3 times a day, and 1800 mg daily at bedtime, the rest as per the electronic health record, noncontributory from my standpoint.    Review of Systems   Musculoskeletal: Negative for falls.   Neurological: Positive for tremors.   Psychiatric/Behavioral: Negative for memory loss.   All other systems reviewed and are negative.       Objective:     BP (!) 90/56   Pulse 79   Temp 37 °C (98.6 °F)   Resp 16   Ht 1.575 m (5' 2\")   Wt 49.4 kg (109 lb)   LMP 01/01/1990   SpO2 96%   BMI 19.94 kg/m²      Physical Exam    She appears in some mild distress. She is diffusely tremulous, standing and leaning against the exam table. Her vital signs are stable though her blood pressures a little low at 90/56. There is no malar rash for sialorrhea. The neck is supple. Cardiac evaluation is unremarkable. The generalized slowness of movements is unchanged, though the severity fluctuates depending on distraction on the part of the patient. There is no tremor with today's exam, the dystonic posturing with " the right foot fluctuates as well. There is no appendicular dystaxia. Voice quality and volume are normal. PERRLA/EOMI and visual fields are full.     Assessment/Plan:     1. Other secondary parkinsonism (CMS-HCC)  She was again told directly that she does not suffer from Parkinson's disease, and though she has parkinsonian symptoms, this does not mean she has the illness itself. She has been on very high doses of dopamine for quite a long time, so we will continue a slow titration, and I'm willing to extend a stay at a given dose for 2 weeks, prior to dropping the dose by another tablet every day. Was told again that she needs to be off medication for a while, possibly weeks or month or so to really get a baseline, but again it was reassuring that I suspect she will do better than what she believes is possible. She was told that coming off the drug will not put her at risk for neuroleptic malignant syndrome, something she now seems to be focused on. I will follow-up with her in about 5 months.    Time: Evaluation of 20 minutes for exam, review, discussion, and education  Discussion: As mentioned in the assessment, over 80% of the time spent face-to-face counseling and coordinating care

## 2017-12-15 ENCOUNTER — HOSPITAL ENCOUNTER (OUTPATIENT)
Dept: LAB | Facility: MEDICAL CENTER | Age: 67
End: 2017-12-15
Attending: FAMILY MEDICINE
Payer: MEDICARE

## 2017-12-15 PROCEDURE — 36415 COLL VENOUS BLD VENIPUNCTURE: CPT

## 2017-12-15 PROCEDURE — 82384 ASSAY THREE CATECHOLAMINES: CPT

## 2017-12-16 ENCOUNTER — HOSPITAL ENCOUNTER (OUTPATIENT)
Dept: RADIOLOGY | Facility: MEDICAL CENTER | Age: 67
End: 2017-12-16
Attending: OBSTETRICS & GYNECOLOGY
Payer: MEDICARE

## 2017-12-16 DIAGNOSIS — Z12.31 VISIT FOR SCREENING MAMMOGRAM: ICD-10-CM

## 2017-12-16 PROCEDURE — G0202 SCR MAMMO BI INCL CAD: HCPCS

## 2017-12-20 LAB
DOPAMINE SERPL-MCNC: 5891 PG/ML (ref 0–20)
EPINEPH PLAS-MCNC: 24 PG/ML (ref 10–200)
NOREPINEPH PLAS-MCNC: 769 PG/ML (ref 80–520)

## 2017-12-28 ENCOUNTER — TELEPHONE (OUTPATIENT)
Dept: NEUROLOGY | Facility: MEDICAL CENTER | Age: 67
End: 2017-12-28

## 2017-12-28 NOTE — TELEPHONE ENCOUNTER
Marisol caregiver called LM to call back.  Called back spoke with Delmis she states she cannot walk due to foot inverting, she gets completely paralyzed between meds, cannot  her legs, she was 20 min late taking meds and couldn't move. Working on long term policy to have caregiver 24/7, she states it is very painful. Pt next appt 4/2/17    Please advise

## 2017-12-29 NOTE — TELEPHONE ENCOUNTER
At present, hopefully, she is off Sinemet entirely, she has continued to slow the titration down, but she still needs to get off the drug completely. I'm not sure what to make of this new symptom complex.

## 2018-01-05 ENCOUNTER — TELEPHONE (OUTPATIENT)
Dept: NEUROLOGY | Facility: MEDICAL CENTER | Age: 68
End: 2018-01-05

## 2018-01-05 NOTE — TELEPHONE ENCOUNTER
Received a call from Marisol, patient's caregiver from [facility] stating that the patient is experiencing a lot of tremors and dystonia. Marisol confirmed that the patient is still taking 4 Sinemet daily

## 2018-01-07 ENCOUNTER — HOSPITAL ENCOUNTER (EMERGENCY)
Facility: MEDICAL CENTER | Age: 68
End: 2018-01-07
Attending: EMERGENCY MEDICINE
Payer: MEDICARE

## 2018-01-07 ENCOUNTER — APPOINTMENT (OUTPATIENT)
Dept: RADIOLOGY | Facility: MEDICAL CENTER | Age: 68
End: 2018-01-07
Attending: EMERGENCY MEDICINE
Payer: MEDICARE

## 2018-01-07 VITALS
RESPIRATION RATE: 16 BRPM | BODY MASS INDEX: 19.79 KG/M2 | TEMPERATURE: 98.2 F | SYSTOLIC BLOOD PRESSURE: 93 MMHG | HEIGHT: 63 IN | WEIGHT: 111.66 LBS | HEART RATE: 66 BPM | DIASTOLIC BLOOD PRESSURE: 71 MMHG | OXYGEN SATURATION: 97 %

## 2018-01-07 DIAGNOSIS — G24.9 DYSTONIA: ICD-10-CM

## 2018-01-07 DIAGNOSIS — R55 NEAR SYNCOPE: ICD-10-CM

## 2018-01-07 DIAGNOSIS — E86.0 DEHYDRATION: ICD-10-CM

## 2018-01-07 LAB
ALBUMIN SERPL BCP-MCNC: 4 G/DL (ref 3.2–4.9)
ALBUMIN/GLOB SERPL: 1.2 G/DL
ALP SERPL-CCNC: 56 U/L (ref 30–99)
ALT SERPL-CCNC: <5 U/L (ref 2–50)
ANION GAP SERPL CALC-SCNC: 5 MMOL/L (ref 0–11.9)
APPEARANCE UR: CLEAR
AST SERPL-CCNC: 20 U/L (ref 12–45)
BASOPHILS # BLD AUTO: 0.7 % (ref 0–1.8)
BASOPHILS # BLD: 0.03 K/UL (ref 0–0.12)
BILIRUB SERPL-MCNC: 0.4 MG/DL (ref 0.1–1.5)
BILIRUB UR QL STRIP.AUTO: NEGATIVE
BUN SERPL-MCNC: 18 MG/DL (ref 8–22)
CALCIUM SERPL-MCNC: 9.1 MG/DL (ref 8.5–10.5)
CHLORIDE SERPL-SCNC: 108 MMOL/L (ref 96–112)
CK SERPL-CCNC: 46 U/L (ref 0–154)
CO2 SERPL-SCNC: 23 MMOL/L (ref 20–33)
COLOR UR: YELLOW
CREAT SERPL-MCNC: 0.76 MG/DL (ref 0.5–1.4)
CULTURE IF INDICATED INDCX: NO UA CULTURE
EOSINOPHIL # BLD AUTO: 0.15 K/UL (ref 0–0.51)
EOSINOPHIL NFR BLD: 3.3 % (ref 0–6.9)
ERYTHROCYTE [DISTWIDTH] IN BLOOD BY AUTOMATED COUNT: 46.7 FL (ref 35.9–50)
GFR SERPL CREATININE-BSD FRML MDRD: >60 ML/MIN/1.73 M 2
GLOBULIN SER CALC-MCNC: 3.3 G/DL (ref 1.9–3.5)
GLUCOSE SERPL-MCNC: 86 MG/DL (ref 65–99)
GLUCOSE UR STRIP.AUTO-MCNC: NEGATIVE MG/DL
HCT VFR BLD AUTO: 41.6 % (ref 37–47)
HGB BLD-MCNC: 13.6 G/DL (ref 12–16)
IMM GRANULOCYTES # BLD AUTO: 0 K/UL (ref 0–0.11)
IMM GRANULOCYTES NFR BLD AUTO: 0 % (ref 0–0.9)
KETONES UR STRIP.AUTO-MCNC: NEGATIVE MG/DL
LEUKOCYTE ESTERASE UR QL STRIP.AUTO: NEGATIVE
LYMPHOCYTES # BLD AUTO: 1.25 K/UL (ref 1–4.8)
LYMPHOCYTES NFR BLD: 27.4 % (ref 22–41)
MCH RBC QN AUTO: 31.5 PG (ref 27–33)
MCHC RBC AUTO-ENTMCNC: 32.7 G/DL (ref 33.6–35)
MCV RBC AUTO: 96.3 FL (ref 81.4–97.8)
MICRO URNS: NORMAL
MONOCYTES # BLD AUTO: 0.37 K/UL (ref 0–0.85)
MONOCYTES NFR BLD AUTO: 8.1 % (ref 0–13.4)
NEUTROPHILS # BLD AUTO: 2.77 K/UL (ref 2–7.15)
NEUTROPHILS NFR BLD: 60.5 % (ref 44–72)
NITRITE UR QL STRIP.AUTO: NEGATIVE
NRBC # BLD AUTO: 0 K/UL
NRBC BLD-RTO: 0 /100 WBC
PH UR STRIP.AUTO: 5.5 [PH]
PLATELET # BLD AUTO: 185 K/UL (ref 164–446)
PMV BLD AUTO: 12.2 FL (ref 9–12.9)
POTASSIUM SERPL-SCNC: 4.8 MMOL/L (ref 3.6–5.5)
PROT SERPL-MCNC: 7.3 G/DL (ref 6–8.2)
PROT UR QL STRIP: NEGATIVE MG/DL
RBC # BLD AUTO: 4.32 M/UL (ref 4.2–5.4)
RBC UR QL AUTO: NEGATIVE
SODIUM SERPL-SCNC: 136 MMOL/L (ref 135–145)
SP GR UR STRIP.AUTO: 1.01
TROPONIN I SERPL-MCNC: <0.01 NG/ML (ref 0–0.04)
UROBILINOGEN UR STRIP.AUTO-MCNC: 0.2 MG/DL
WBC # BLD AUTO: 4.6 K/UL (ref 4.8–10.8)

## 2018-01-07 PROCEDURE — 96375 TX/PRO/DX INJ NEW DRUG ADDON: CPT

## 2018-01-07 PROCEDURE — 700111 HCHG RX REV CODE 636 W/ 250 OVERRIDE (IP): Performed by: EMERGENCY MEDICINE

## 2018-01-07 PROCEDURE — 99285 EMERGENCY DEPT VISIT HI MDM: CPT

## 2018-01-07 PROCEDURE — 94760 N-INVAS EAR/PLS OXIMETRY 1: CPT

## 2018-01-07 PROCEDURE — 93005 ELECTROCARDIOGRAM TRACING: CPT | Performed by: EMERGENCY MEDICINE

## 2018-01-07 PROCEDURE — 85025 COMPLETE CBC W/AUTO DIFF WBC: CPT

## 2018-01-07 PROCEDURE — 36415 COLL VENOUS BLD VENIPUNCTURE: CPT

## 2018-01-07 PROCEDURE — 82550 ASSAY OF CK (CPK): CPT

## 2018-01-07 PROCEDURE — 96361 HYDRATE IV INFUSION ADD-ON: CPT

## 2018-01-07 PROCEDURE — 96365 THER/PROPH/DIAG IV INF INIT: CPT

## 2018-01-07 PROCEDURE — 96376 TX/PRO/DX INJ SAME DRUG ADON: CPT

## 2018-01-07 PROCEDURE — 81003 URINALYSIS AUTO W/O SCOPE: CPT

## 2018-01-07 PROCEDURE — 700102 HCHG RX REV CODE 250 W/ 637 OVERRIDE(OP): Mod: JG | Performed by: EMERGENCY MEDICINE

## 2018-01-07 PROCEDURE — 84484 ASSAY OF TROPONIN QUANT: CPT

## 2018-01-07 PROCEDURE — A9270 NON-COVERED ITEM OR SERVICE: HCPCS | Mod: JG | Performed by: EMERGENCY MEDICINE

## 2018-01-07 PROCEDURE — 700105 HCHG RX REV CODE 258: Performed by: EMERGENCY MEDICINE

## 2018-01-07 PROCEDURE — 96366 THER/PROPH/DIAG IV INF ADDON: CPT

## 2018-01-07 PROCEDURE — 80053 COMPREHEN METABOLIC PANEL: CPT

## 2018-01-07 RX ORDER — HYDROMORPHONE HYDROCHLORIDE 2 MG/ML
0.5 INJECTION, SOLUTION INTRAMUSCULAR; INTRAVENOUS; SUBCUTANEOUS ONCE
Status: COMPLETED | OUTPATIENT
Start: 2018-01-07 | End: 2018-01-07

## 2018-01-07 RX ORDER — CARBIDOPA AND LEVODOPA 50; 200 MG/1; MG/1
1 TABLET, EXTENDED RELEASE ORAL ONCE
Status: COMPLETED | OUTPATIENT
Start: 2018-01-07 | End: 2018-01-07

## 2018-01-07 RX ORDER — METAXALONE 800 MG/1
800 TABLET ORAL ONCE
Status: DISCONTINUED | OUTPATIENT
Start: 2018-01-07 | End: 2018-01-07

## 2018-01-07 RX ORDER — MAGNESIUM SULFATE HEPTAHYDRATE 40 MG/ML
2 INJECTION, SOLUTION INTRAVENOUS ONCE
Status: COMPLETED | OUTPATIENT
Start: 2018-01-07 | End: 2018-01-07

## 2018-01-07 RX ORDER — SODIUM CHLORIDE 9 MG/ML
1000 INJECTION, SOLUTION INTRAVENOUS ONCE
Status: COMPLETED | OUTPATIENT
Start: 2018-01-07 | End: 2018-01-07

## 2018-01-07 RX ORDER — DIAZEPAM 5 MG/1
10 TABLET ORAL ONCE
Status: COMPLETED | OUTPATIENT
Start: 2018-01-07 | End: 2018-01-07

## 2018-01-07 RX ORDER — CARISOPRODOL 350 MG/1
350 TABLET ORAL ONCE
Status: COMPLETED | OUTPATIENT
Start: 2018-01-07 | End: 2018-01-07

## 2018-01-07 RX ADMIN — CARBIDOPA AND LEVODOPA 1 TABLET: 50; 200 TABLET, EXTENDED RELEASE ORAL at 22:05

## 2018-01-07 RX ADMIN — HYDROMORPHONE HYDROCHLORIDE 0.5 MG: 2 INJECTION INTRAMUSCULAR; INTRAVENOUS; SUBCUTANEOUS at 20:23

## 2018-01-07 RX ADMIN — HYDROMORPHONE HYDROCHLORIDE 0.5 MG: 2 INJECTION INTRAMUSCULAR; INTRAVENOUS; SUBCUTANEOUS at 21:31

## 2018-01-07 RX ADMIN — SODIUM CHLORIDE 1000 ML: 9 INJECTION, SOLUTION INTRAVENOUS at 21:22

## 2018-01-07 RX ADMIN — FENTANYL CITRATE 50 MCG: 50 INJECTION, SOLUTION INTRAMUSCULAR; INTRAVENOUS at 19:07

## 2018-01-07 RX ADMIN — CARISOPRODOL 350 MG: 350 TABLET ORAL at 20:22

## 2018-01-07 RX ADMIN — MAGNESIUM SULFATE IN WATER 2 G: 40 INJECTION, SOLUTION INTRAVENOUS at 20:25

## 2018-01-07 RX ADMIN — DIAZEPAM 10 MG: 5 TABLET ORAL at 18:30

## 2018-01-07 RX ADMIN — SODIUM CHLORIDE 1000 ML: 9 INJECTION, SOLUTION INTRAVENOUS at 18:30

## 2018-01-07 RX ADMIN — FENTANYL CITRATE 50 MCG: 50 INJECTION, SOLUTION INTRAMUSCULAR; INTRAVENOUS at 18:30

## 2018-01-07 ASSESSMENT — PAIN SCALES - GENERAL
PAINLEVEL_OUTOF10: 0
PAINLEVEL_OUTOF10: 7
PAINLEVEL_OUTOF10: 2

## 2018-01-08 ENCOUNTER — PATIENT OUTREACH (OUTPATIENT)
Dept: HEALTH INFORMATION MANAGEMENT | Facility: OTHER | Age: 68
End: 2018-01-08

## 2018-01-08 NOTE — ED NOTES
Pt up to BS cammode, reports feeling better. Shaking and muscle spasms have decreased, tolerable per pt.

## 2018-01-08 NOTE — ED NOTES
Pt reports shaking and spasms subside briefly, then return. Pt placed on bedside commode, was unable to void. ERP informed.

## 2018-01-08 NOTE — DISCHARGE INSTRUCTIONS
Drink more fluids, please don't miss your medications. Return if you have worsening pain, pass out, chest pain, or severe headache.   Dehydration, Adult  Dehydration is when you lose more fluids from the body than you take in. Vital organs like the kidneys, brain, and heart cannot function without a proper amount of fluids and salt. Any loss of fluids from the body can cause dehydration.   CAUSES   · Vomiting.  · Diarrhea.  · Excessive sweating.  · Excessive urine output.  · Fever.  SYMPTOMS   Mild dehydration  · Thirst.  · Dry lips.  · Slightly dry mouth.  Moderate dehydration  · Very dry mouth.  · Sunken eyes.  · Skin does not bounce back quickly when lightly pinched and released.  · Dark urine and decreased urine production.  · Decreased tear production.  · Headache.  Severe dehydration  · Very dry mouth.  · Extreme thirst.  · Rapid, weak pulse (more than 100 beats per minute at rest).  · Cold hands and feet.  · Not able to sweat in spite of heat and temperature.  · Rapid breathing.  · Blue lips.  · Confusion and lethargy.  · Difficulty being awakened.  · Minimal urine production.  · No tears.  DIAGNOSIS   Your caregiver will diagnose dehydration based on your symptoms and your exam. Blood and urine tests will help confirm the diagnosis. The diagnostic evaluation should also identify the cause of dehydration.  TREATMENT   Treatment of mild or moderate dehydration can often be done at home by increasing the amount of fluids that you drink. It is best to drink small amounts of fluid more often. Drinking too much at one time can make vomiting worse. Refer to the home care instructions below.  Severe dehydration needs to be treated at the hospital where you will probably be given intravenous (IV) fluids that contain water and electrolytes.  HOME CARE INSTRUCTIONS   · Ask your caregiver about specific rehydration instructions.  · Drink enough fluids to keep your urine clear or pale yellow.  · Drink small amounts  frequently if you have nausea and vomiting.  · Eat as you normally do.  · Avoid:  ¨ Foods or drinks high in sugar.  ¨ Carbonated drinks.  ¨ Juice.  ¨ Extremely hot or cold fluids.  ¨ Drinks with caffeine.  ¨ Fatty, greasy foods.  ¨ Alcohol.  ¨ Tobacco.  ¨ Overeating.  ¨ Gelatin desserts.  · Wash your hands well to avoid spreading bacteria and viruses.  · Only take over-the-counter or prescription medicines for pain, discomfort, or fever as directed by your caregiver.  · Ask your caregiver if you should continue all prescribed and over-the-counter medicines.  · Keep all follow-up appointments with your caregiver.  SEEK MEDICAL CARE IF:  · You have abdominal pain and it increases or stays in one area (localizes).  · You have a rash, stiff neck, or severe headache.  · You are irritable, sleepy, or difficult to awaken.  · You are weak, dizzy, or extremely thirsty.  SEEK IMMEDIATE MEDICAL CARE IF:   · You are unable to keep fluids down or you get worse despite treatment.  · You have frequent episodes of vomiting or diarrhea.  · You have blood or green matter (bile) in your vomit.  · You have blood in your stool or your stool looks black and tarry.  · You have not urinated in 6 to 8 hours, or you have only urinated a small amount of very dark urine.  · You have a fever.  · You faint.  MAKE SURE YOU:   · Understand these instructions.  · Will watch your condition.  · Will get help right away if you are not doing well or get worse.     This information is not intended to replace advice given to you by your health care provider. Make sure you discuss any questions you have with your health care provider.     Document Released: 12/18/2006 Document Revised: 03/11/2013 Document Reviewed: 08/06/2012  [employer] Interactive Patient Education ©2016 [employer]          Near-Syncope  Near-syncope (commonly known as near fainting) is sudden weakness, dizziness, or feeling like you might pass out. This can happen when getting up or  while standing for a long time. It is caused by a sudden decrease in blood flow to the brain, which can occur for various reasons. Most of the reasons are not serious.   HOME CARE  Watch your condition for any changes.  · Have someone stay with you until you feel stable.  · If you feel like you are going to pass out:  ¨ Lie down right away.  ¨ Prop your feet up if you can.  ¨ Breathe deeply and steadily.  ¨ Move only when the feeling has gone away. Most of the time, this feeling lasts only a few minutes. You may feel tired for several hours.  · Drink enough fluids to keep your pee (urine) clear or pale yellow.  · If you are taking blood pressure or heart medicine, stand up slowly.  · Follow up with your doctor as told.  GET HELP RIGHT AWAY IF:   · You have a severe headache.  · You have unusual pain in the chest, belly (abdomen), or back.  · You have bleeding from the mouth or butt (rectum), or you have black or tarry poop (stool).  · You feel your heart beat differently than normal, or you have a very fast pulse.  · You pass out, or you twitch and shake when you pass out.  · You pass out when sitting or lying down.  · You feel confused.  · You have trouble walking.  · You are weak.  · You have vision problems.  MAKE SURE YOU:   · Understand these instructions.  · Will watch your condition.  · Will get help right away if you are not doing well or get worse.     This information is not intended to replace advice given to you by your health care provider. Make sure you discuss any questions you have with your health care provider.     Document Released: 06/05/2009 Document Revised: 01/08/2016 Document Reviewed: 05/23/2014  [employer] Interactive Patient Education ©2016 [employer]

## 2018-01-08 NOTE — ED PROVIDER NOTES
"ED Provider Note    Scribed for Moy Cisneros M.D. by Rey Anna. 1/7/2018, 5:54 PM.    Primary care provider: Raul Iverson M.D.  Means of arrival: Walk-in  History obtained from: Patient  History limited by: None    CHIEF COMPLAINT  Chief Complaint   Patient presents with   • Dizziness     Near syncope while  was transferring patient to commode, patient states \"I've been passing out\" for the past few days       HPI  Delmis Draper is a 67 y.o. Female with a history of atypical Parkinson's disease who presents to the Emergency Department for evaluation of near syncope that began this evening after her  attempted to lift her to the beside commode from supine position. She experienced severe dizziness, lightheadedness, and vision loss when lifted from her bed, but she did not lose consciousness. Patient was helped to the floor by her . She also reports experiencing syncope and dizziness while being held up by her  last weekstanding up with her  last week. The patient reports associated constant muscle tightness and pain of the lower extremities and diaphragm that began this morning, diaphoresis, shaking, and dark urine.  She reports that she is typically diaphoretic when experiencing her shaking. She reports that she is hypotensive at baseline. Patient also reports chronic dysuria that has not increased from baseline. She denies chest pain, coughing, fever, nausea, vomiting, diarrhea, or decreased PO fluid intake. Patient reports that she has been drinking a large volume of Gatorade today, but also stated that she believes she is dehydrated. The patient reports that Dr. Mcneil, Neurology, told her that continuing to take Sinemet would worsen her shaking, but she reports that her symptoms have been increasing severity since she decreased her dosage. Patient has not decreased the dosages of any of her other medications. She has decreased her daily Sinemet from seven " to four a day, but she forgot to take her 10:00 AM doses today. Patient also missed her 4:00 PM dose of valium today as well as her Skelaxin. She normally takes 10 mg of Valium daily. She is allergic to sulfa drugs.    REVIEW OF SYSTEMS  Pertinent positives include near syncope, severe dizziness, lightheadedness, constant muscle tightness and pain of the lower extremities and diaphragm, diaphoresis, shaking, dark urine, and vision loss. Pertinent negatives include loss of consciousness, chest pain, coughing, fever, nausea, vomiting, diarrhea, or decreased PO fluid intake. All other systems negative.  C    PAST MEDICAL HISTORY   has a past medical history of Abnormal finding on mammography, microcalcification; Anesthesia; Bowel obstruction; Bursitis; Cataract; Cellulitis (7/19/2012); Dilated bile duct; Disc disorder; Dystonia; Dystonia; Dysuria; GERD (gastroesophageal reflux disease); Heart burn; Hypertension; Hypotension; Insulin resistance; Leukopenia; Low blood pressure reading; Malaise and fatigue; OSTEOPOROSIS (3/22/2010); Other specified disorder of intestines; Pain; Parkinson disease (CMS-[facility]); Peripheral neuropathy (CMS-[facility]); Rash, skin; Scoliosis; Spinal stenosis, lumbar; Thyrotoxicosis (remote); Unspecified disorder of thyroid; and vitamin D deficiency.    SURGICAL HISTORY   has a past surgical history that includes thyroidectomy (1967); gastroscopy with biopsy (5/9/2012); egd w/endoscopic ultrasound (5/9/2012); cataract phaco with iol (4/14/2014); cataract phaco with iol (5/12/2014); breast biopsy (Left, 5/5/2015); gyn surgery (1985, 1988); knee arthrotomy (1964, 1968); and other.    SOCIAL HISTORY  Social History   Substance Use Topics   • Smoking status: Former Smoker     Packs/day: 0.50     Years: 10.00     Quit date: 1/1/1985   • Smokeless tobacco: Never Used   • Alcohol use No      Comment: none since age 31      History   Drug Use No       FAMILY HISTORY  Family History   Problem Relation Age of  Onset   • Arthritis Mother      Giant Cell Arteritis   • Lung Disease Father      COPD/Emphysema   • Hyperlipidemia Sister    • Heart Disease Maternal Aunt    • Heart Disease Maternal Uncle    • Heart Disease Paternal Aunt    • Heart Disease Paternal Uncle    • Heart Disease Maternal Grandmother    • Hypertension Maternal Grandmother    • Hyperlipidemia Maternal Grandmother    • Heart Disease Maternal Grandfather    • Hypertension Maternal Grandfather    • Hyperlipidemia Maternal Grandfather    • Heart Disease Paternal Grandfather    • Hypertension Paternal Grandfather    • Hyperlipidemia Paternal Grandfather    • Cancer Paternal Grandfather      Bone       CURRENT MEDICATIONS  No current facility-administered medications on file prior to encounter.      Current Outpatient Prescriptions on File Prior to Encounter   Medication Sig Dispense Refill   • carisoprodol (SOMA) 350 MG Tab Take 1 Tab by mouth 4 times a day. 120 Tab 6   • carbidopa-levodopa (SINEMET)  MG Tab Pt states she takes at 0700/0900/1100/1300/1500/1700/1900 Total of 7 doses during day. Every week, reduce by 1 tab daily until off 200 Tab 0   • gabapentin (NEURONTIN) 600 MG tablet TAKE ONE TABLET BY MOUTH THREE TIMES A DAY AND 3 TABLETS AT BEDTIME 180 Tab 6   • Melatonin 5 MG Tab Take 1 tablet by mouth every bedtime.     • CREATINE PO Take 1 Capsule by mouth 2 Times a Day.     • Linaclotide 290 MCG Cap Take 1 Capsule by mouth 1 time daily as needed.     • LevOCARNitine L-Tartrate (L-CARNITINE) 500 MG Cap Take 1 Capsule by mouth every day.     • L-Theanine 100 MG Cap Take 1 Capsule by mouth every day.     • tramadol (ULTRAM) 50 MG Tab Take 1-2 Tabs by mouth every 6 hours as needed for Moderate Pain. 15 Tab 0   • Turmeric 500 MG Cap Take 1 Cap by mouth every day.     • docusate sodium (COLACE) 100 MG Cap Take 300 mg by mouth every day.     • Magnesium Citrate Powder Take 2 Doses by mouth every day. 2 teaspoon daily     • CALCIUM CITRATE-VITAMIN D3 PO  "Take 1 Tab by mouth every day. 1000mg ca, 400 iu of vitamin d-3     • diazepam (VALIUM) 5 MG Tab Take 10 mg by mouth 2 Times a Day.     • Multiple Vitamin (MULTI VITAMIN DAILY PO) Take 1 Tab by mouth every day.     • Ubiquinol 100 MG Cap Take 100 mg by mouth every day.         ALLERGIES  Allergies   Allergen Reactions   • Bactrim [Sulfamethoxazole W-Trimethoprim] Hives   • Actonel [Risedronate Sodium]    • Latex Rash     Irritation of skin    • Sulfa Drugs    • Tape Rash     Paper tape is ok       PHYSICAL EXAM  VITAL SIGNS: BP (!) 93/71   Pulse 94   Temp 36.3 °C (97.3 °F)   Resp 16   Ht 1.6 m (5' 3\")   Wt 50.7 kg (111 lb 10.6 oz)   LMP 01/01/1990   SpO2 93%   BMI 19.78 kg/m²     Constitutional: Well developed, Well nourished, Moderate distress.   HENT: Normocephalic, Atraumatic, Dry mucous membranes  Eyes: Conjunctiva normal, No discharge.   Cardiovascular: Normal heart rate, Normal rhythm, No murmurs, equal pulses.   Pulmonary: Normal breath sounds, No respiratory distress, No wheezing, No rales, No rhonchi.  Abdomen:Soft, No tenderness, No masses, no rebound, no guarding.   Back: Right CVA tenderness.  Musculoskeletal: No major deformities noted, No tenderness.   Skin: Warm, Dry, No erythema, No rash.   Neurologic: Alert & oriented x 3, Constant tremor in the right upper extremity that is somewhat distractible. Bilateral lower extremities:Muscle spasms in thighs, calve muscles, and feet.  Psychiatric: Affect normal, Judgment normal, Mood normal.     LABS  Results for orders placed or performed during the hospital encounter of 01/07/18   CBC WITH DIFFERENTIAL   Result Value Ref Range    WBC 4.6 (L) 4.8 - 10.8 K/uL    RBC 4.32 4.20 - 5.40 M/uL    Hemoglobin 13.6 12.0 - 16.0 g/dL    Hematocrit 41.6 37.0 - 47.0 %    MCV 96.3 81.4 - 97.8 fL    MCH 31.5 27.0 - 33.0 pg    MCHC 32.7 (L) 33.6 - 35.0 g/dL    RDW 46.7 35.9 - 50.0 fL    Platelet Count 185 164 - 446 K/uL    MPV 12.2 9.0 - 12.9 fL    Neutrophils-Polys " 60.50 44.00 - 72.00 %    Lymphocytes 27.40 22.00 - 41.00 %    Monocytes 8.10 0.00 - 13.40 %    Eosinophils 3.30 0.00 - 6.90 %    Basophils 0.70 0.00 - 1.80 %    Immature Granulocytes 0.00 0.00 - 0.90 %    Nucleated RBC 0.00 /100 WBC    Neutrophils (Absolute) 2.77 2.00 - 7.15 K/uL    Lymphs (Absolute) 1.25 1.00 - 4.80 K/uL    Monos (Absolute) 0.37 0.00 - 0.85 K/uL    Eos (Absolute) 0.15 0.00 - 0.51 K/uL    Baso (Absolute) 0.03 0.00 - 0.12 K/uL    Immature Granulocytes (abs) 0.00 0.00 - 0.11 K/uL    NRBC (Absolute) 0.00 K/uL   COMP METABOLIC PANEL   Result Value Ref Range    Sodium 136 135 - 145 mmol/L    Potassium 4.8 3.6 - 5.5 mmol/L    Chloride 108 96 - 112 mmol/L    Co2 23 20 - 33 mmol/L    Anion Gap 5.0 0.0 - 11.9    Glucose 86 65 - 99 mg/dL    Bun 18 8 - 22 mg/dL    Creatinine 0.76 0.50 - 1.40 mg/dL    Calcium 9.1 8.5 - 10.5 mg/dL    AST(SGOT) 20 12 - 45 U/L    ALT(SGPT) <5 2 - 50 U/L    Alkaline Phosphatase 56 30 - 99 U/L    Total Bilirubin 0.4 0.1 - 1.5 mg/dL    Albumin 4.0 3.2 - 4.9 g/dL    Total Protein 7.3 6.0 - 8.2 g/dL    Globulin 3.3 1.9 - 3.5 g/dL    A-G Ratio 1.2 g/dL   TROPONIN   Result Value Ref Range    Troponin I <0.01 0.00 - 0.04 ng/mL   URINALYSIS CULTURE, IF INDICATED   Result Value Ref Range    Color Yellow     Character Clear     Specific Gravity 1.015 <1.035    Ph 5.5 5.0 - 8.0    Glucose Negative Negative mg/dL    Ketones Negative Negative mg/dL    Protein Negative Negative mg/dL    Bilirubin Negative Negative    Urobilinogen, Urine 0.2 Negative    Nitrite Negative Negative    Leukocyte Esterase Negative Negative    Occult Blood Negative Negative    Micro Urine Req see below     Culture Indicated No UA Culture   CREATINE KINASE   Result Value Ref Range    CPK Total 46 0 - 154 U/L   ESTIMATED GFR   Result Value Ref Range    GFR If African American >60 >60 mL/min/1.73 m 2    GFR If Non African American >60 >60 mL/min/1.73 m 2     All labs reviewed by me.    COURSE & MEDICAL DECISION  MAKING  Pertinent Labs & Imaging studies reviewed. (See chart for details)    Obtained and reviewed note written by Dr. Mcneil, Neurology, on 12/4/2017 which revealed that he does not believe her Parkinsonian symptoms are associated with Parkinson's.They are attempting to ween her off Sinemet. She was seen here in 11/2017 for increased shaking at that time.    5:54 PM - Patient seen and examined at bedside. She requested pain medications. Patient will be treated with NS infusion 2 liters for dehydration as well as Sinemet  mg tablet, fentanyl injection 50 mcg, diazepam tablet 10 mg, and fentanyl injection 50 mcg. Ordered creatine kinase, CBC with differential, CMP, Troponin, U/A culture if indicated, EKG, old EKG, and estimated GFR to evaluate her symptoms. The differential diagnoses include but are not limited to: dehydration, electrolyte abnormality, medication withdrawal, UTI, infectious process     6:24 PM I discussed the patient's case and the above findings with Pharmacy who stated that withdrawals from Sinemet are possible.    7:57 PM Recheck: Patient re-evaluated at beside. Patient reports feeling that she has not been provided pain relief and she is still experiencing muscles spams. Discussed patient's condition and treatment plan. The patient understood and is in agreement. I ordered hydromorphone injection 0.5 ml, magnesium sulfate IVPB premix 2 g, and carisoprodol tablet 350 mg to treat.    9:20 PM Recheck: Patient re-evaluated at beside. Patient still denies any decrease in her pain. Discussed patient's condition, labs, and treatment plan. The patient understood and is in agreement. I ordered hydromorphone injection 0.5 mg for pain control.    9:27 PM We discussed possible methods of pain management. I ordered carbidopa-levodopa SR  mg tablet and hydromorphone injection 0.5 mg.    11:21 PM - Re-examined; The patient is resting in bed. She is no longer shaking and she is capable of sitting  up. She feels better and would like to go home.I discussed plans for discharge with a referral to her primary care, Dr. Patel, Neurology, and instructed to return to the ED if her symptoms worsen. Patient understands and agrees.    Medical Decision Making: I think this is possibly worsening of the patient's chronic dystonia secondary to missing a dose of her Sinemet. After treatment with benzodiazepines, Soma, IV magnesium and pain medication her spasms have improved to the point where she was able to use a bedside commode. At this point time patient like to go home. I think her episode of right headedness is likely secondary to dehydration. Her BUN to creatinine ratio suggests dehydration as well as for dry mucous membranes. Patient was given IV fluids and with that she feels much better. She is not tachycardic her blood pressure is normal her electrolytes are otherwise unremarkable I think at this point in time she can be discharged home to continue oral rehydration.    The patient will return for new or worsening symptoms and is stable at the time of discharge.      DISPOSITION:  Patient will be discharged home in stable condition.    FOLLOW UP:  Raul Iverson M.D.  [address]  [place] [address]  [phone]    Schedule an appointment as soon as possible for a visit in 2 days      Kevon Patel M.D.  [address]  [place] [id number] [phone]    Schedule an appointment as soon as possible for a visit in 1 week        FINAL IMPRESSION  1. Dehydration    2. Near syncope    3. Dystonia          Rey PANDYA (Marielenaibclark), am scribing for, and in the presence of, Moy Cisneros M.D.    Electronically signed by: Rey Anna (Marielenaibclark), 1/7/2018    Moy PANDYA M.D. personally performed the services described in this documentation, as scribed by Rey Anna in my presence, and it is both accurate and complete.    The note accurately reflects work and  decisions made by me.  Moy Cisneros  1/8/2018  1:28 AM

## 2018-01-08 NOTE — ED NOTES
Report from Ingrid LOWE. Pt sitting up in [facility] shaking all extremities. C/o increased pain and shaking, requesting pain medication and a muscle relaxer. ERP informed. Further orders received. Pt medicated per orders. Pt comfort measures provided. Update don POC. Spouse at BS.

## 2018-01-08 NOTE — ED NOTES
".  Chief Complaint   Patient presents with   • Dizziness     Near syncope while  was transferring patient to commode, patient states \"I've been passing out\" for the past few days     .Pulse 94   Temp 36.3 °C (97.3 °F)   Resp 16   Ht 1.6 m (5' 3\")   Wt 48.5 kg (107 lb)   LMP 01/01/1990   SpO2 93%   BMI 18.95 kg/m²     BIB EMS from home with above complaint in addition to increase in \"shaking\", patient has hx of atypical parkinson's, weaning off of Sinemet per PCP, patient missed medication doses this morning  "

## 2018-01-08 NOTE — ED NOTES
Discharge instructions provided, pt verbalizes understanding. Pt awake, alert, VSS. Pt taken out of ER via w/c with spouse.

## 2018-01-08 NOTE — PROGRESS NOTES
1/8/18 at 1:24 PM--Incoming VM from pt at 12:32 PM.  Placed phone call to pt s/p ER discharge 1/7/18.  Pt is inquiring about her labs in ER.  Informed pt that her labs done in ER were all WNL.  Pt states she is having some pain with urination.  Instructed pt to f/u with PCP.  Pt states she will call to schedule an appt with her PCP.  Has no further questions or concerns at this time.

## 2018-01-15 ENCOUNTER — HOSPITAL ENCOUNTER (EMERGENCY)
Facility: MEDICAL CENTER | Age: 68
End: 2018-01-16
Attending: EMERGENCY MEDICINE
Payer: MEDICARE

## 2018-01-15 DIAGNOSIS — R33.9 URINARY RETENTION: ICD-10-CM

## 2018-01-15 DIAGNOSIS — K59.00 CONSTIPATION, UNSPECIFIED CONSTIPATION TYPE: ICD-10-CM

## 2018-01-15 DIAGNOSIS — G20.A1 PARKINSON'S DISEASE: ICD-10-CM

## 2018-01-15 DIAGNOSIS — F41.9 ANXIETY: ICD-10-CM

## 2018-01-15 LAB
ALBUMIN SERPL BCP-MCNC: 4.2 G/DL (ref 3.2–4.9)
ALBUMIN/GLOB SERPL: 1.4 G/DL
ALP SERPL-CCNC: 70 U/L (ref 30–99)
ALT SERPL-CCNC: 10 U/L (ref 2–50)
ANION GAP SERPL CALC-SCNC: 8 MMOL/L (ref 0–11.9)
AST SERPL-CCNC: 24 U/L (ref 12–45)
BASOPHILS # BLD AUTO: 0.7 % (ref 0–1.8)
BASOPHILS # BLD: 0.04 K/UL (ref 0–0.12)
BILIRUB SERPL-MCNC: 0.7 MG/DL (ref 0.1–1.5)
BUN SERPL-MCNC: 16 MG/DL (ref 8–22)
CALCIUM SERPL-MCNC: 9.2 MG/DL (ref 8.4–10.2)
CHLORIDE SERPL-SCNC: 105 MMOL/L (ref 96–112)
CO2 SERPL-SCNC: 26 MMOL/L (ref 20–33)
CREAT SERPL-MCNC: 0.85 MG/DL (ref 0.5–1.4)
EOSINOPHIL # BLD AUTO: 0.15 K/UL (ref 0–0.51)
EOSINOPHIL NFR BLD: 2.5 % (ref 0–6.9)
ERYTHROCYTE [DISTWIDTH] IN BLOOD BY AUTOMATED COUNT: 46.3 FL (ref 35.9–50)
GLOBULIN SER CALC-MCNC: 3 G/DL (ref 1.9–3.5)
GLUCOSE SERPL-MCNC: 100 MG/DL (ref 65–99)
HCT VFR BLD AUTO: 42.5 % (ref 37–47)
HGB BLD-MCNC: 13.9 G/DL (ref 12–16)
IMM GRANULOCYTES # BLD AUTO: 0.01 K/UL (ref 0–0.11)
IMM GRANULOCYTES NFR BLD AUTO: 0.2 % (ref 0–0.9)
LACTATE BLD-SCNC: 0.96 MMOL/L (ref 0.5–2)
LIPASE SERPL-CCNC: 22 U/L (ref 7–58)
LYMPHOCYTES # BLD AUTO: 1.1 K/UL (ref 1–4.8)
LYMPHOCYTES NFR BLD: 18 % (ref 22–41)
MCH RBC QN AUTO: 31.7 PG (ref 27–33)
MCHC RBC AUTO-ENTMCNC: 32.7 G/DL (ref 33.6–35)
MCV RBC AUTO: 96.8 FL (ref 81.4–97.8)
MONOCYTES # BLD AUTO: 0.43 K/UL (ref 0–0.85)
MONOCYTES NFR BLD AUTO: 7 % (ref 0–13.4)
NEUTROPHILS # BLD AUTO: 4.38 K/UL (ref 2–7.15)
NEUTROPHILS NFR BLD: 71.6 % (ref 44–72)
NRBC # BLD AUTO: 0 K/UL
NRBC BLD-RTO: 0 /100 WBC
PLATELET # BLD AUTO: 175 K/UL (ref 164–446)
PMV BLD AUTO: 11.8 FL (ref 9–12.9)
POTASSIUM SERPL-SCNC: 3.9 MMOL/L (ref 3.6–5.5)
PROT SERPL-MCNC: 7.2 G/DL (ref 6–8.2)
RBC # BLD AUTO: 4.39 M/UL (ref 4.2–5.4)
SODIUM SERPL-SCNC: 139 MMOL/L (ref 135–145)
SPECIMEN DRAWN FROM PATIENT: NORMAL
WBC # BLD AUTO: 6.1 K/UL (ref 4.8–10.8)

## 2018-01-15 PROCEDURE — 99284 EMERGENCY DEPT VISIT MOD MDM: CPT

## 2018-01-15 PROCEDURE — 80053 COMPREHEN METABOLIC PANEL: CPT

## 2018-01-15 PROCEDURE — 85025 COMPLETE CBC W/AUTO DIFF WBC: CPT

## 2018-01-15 PROCEDURE — 83605 ASSAY OF LACTIC ACID: CPT

## 2018-01-15 PROCEDURE — 83690 ASSAY OF LIPASE: CPT

## 2018-01-15 PROCEDURE — 81003 URINALYSIS AUTO W/O SCOPE: CPT

## 2018-01-15 RX ORDER — DIAZEPAM 5 MG/1
10 TABLET ORAL ONCE
Status: COMPLETED | OUTPATIENT
Start: 2018-01-16 | End: 2018-01-16

## 2018-01-15 RX ORDER — DIAZEPAM 5 MG/1
TABLET ORAL
Status: COMPLETED
Start: 2018-01-15 | End: 2018-01-16

## 2018-01-15 RX ORDER — ENEMA 19; 7 G/133ML; G/133ML
2 ENEMA RECTAL ONCE
Status: DISCONTINUED | OUTPATIENT
Start: 2018-01-15 | End: 2018-01-16 | Stop reason: HOSPADM

## 2018-01-15 RX ORDER — DIAZEPAM 5 MG/1
5 TABLET ORAL ONCE
Status: DISCONTINUED | OUTPATIENT
Start: 2018-01-16 | End: 2018-01-15

## 2018-01-16 VITALS
TEMPERATURE: 98 F | BODY MASS INDEX: 19.49 KG/M2 | OXYGEN SATURATION: 94 % | HEART RATE: 77 BPM | WEIGHT: 110.01 LBS | DIASTOLIC BLOOD PRESSURE: 62 MMHG | RESPIRATION RATE: 16 BRPM | SYSTOLIC BLOOD PRESSURE: 100 MMHG | HEIGHT: 63 IN

## 2018-01-16 LAB
APPEARANCE UR: CLEAR
BILIRUB UR QL STRIP.AUTO: NEGATIVE
COLOR UR: YELLOW
CULTURE IF INDICATED INDCX: NO UA CULTURE
GLUCOSE UR STRIP.AUTO-MCNC: NEGATIVE MG/DL
KETONES UR STRIP.AUTO-MCNC: NEGATIVE MG/DL
LEUKOCYTE ESTERASE UR QL STRIP.AUTO: NEGATIVE
MICRO URNS: NORMAL
NITRITE UR QL STRIP.AUTO: NEGATIVE
PH UR STRIP.AUTO: 6 [PH]
PROT UR QL STRIP: NEGATIVE MG/DL
RBC UR QL AUTO: NEGATIVE
SP GR UR STRIP.AUTO: 1.01

## 2018-01-16 PROCEDURE — 36415 COLL VENOUS BLD VENIPUNCTURE: CPT

## 2018-01-16 PROCEDURE — A9270 NON-COVERED ITEM OR SERVICE: HCPCS | Performed by: EMERGENCY MEDICINE

## 2018-01-16 PROCEDURE — 700102 HCHG RX REV CODE 250 W/ 637 OVERRIDE(OP): Performed by: EMERGENCY MEDICINE

## 2018-01-16 RX ADMIN — DIAZEPAM 10 MG: 5 TABLET ORAL at 00:00

## 2018-01-16 ASSESSMENT — ENCOUNTER SYMPTOMS
SORE THROAT: 0
NERVOUS/ANXIOUS: 1
BACK PAIN: 0
CONSTIPATION: 1
FEVER: 0
COUGH: 0
NAUSEA: 1
SHORTNESS OF BREATH: 0
TREMORS: 1

## 2018-01-16 NOTE — ED PROVIDER NOTES
ED Provider Note     1/15/2018  10:53 PM    Means of Arrival: Walk In  History obtained by: patient  Limitations: none    CHIEF COMPLAINT  Constipation    HPI  Delmis Draper is a 67 y.o. female with atypical Parkinson's disease who presents with concerns of constipation and unable to urinate.  She says she has had to go to [facility] department multiple times in the past due to constipation and fecal impaction. Her last bowel movement was over 6 days ago. She takes magnesium, vitamin C, docusate. She says constipation problems to 2 Parkinson's disease. She also has some discomfort and pelvic region. Bladder scan done at triage shows over 300 mL of urine in bladder. She has not any fevers. No chest pains. No shortness of breath. No other abdominal pains.    REVIEW OF SYSTEMS  Review of Systems   Constitutional: Negative for fever and malaise/fatigue.   HENT: Negative for sore throat.    Respiratory: Negative for cough and shortness of breath.    Cardiovascular: Negative for chest pain.   Gastrointestinal: Positive for constipation and nausea.   Genitourinary: Negative for dysuria.        See hpi   Musculoskeletal: Negative for back pain.   Neurological: Positive for tremors.   Psychiatric/Behavioral: The patient is nervous/anxious.    All other systems reviewed and are negative.    See HPI for further details.    PAST MEDICAL HISTORY   has a past medical history of Abnormal finding on mammography, microcalcification; Anesthesia; Bowel obstruction; Bursitis; Cataract; Cellulitis (7/19/2012); Dilated bile duct; Disc disorder; Dystonia; Dystonia; Dysuria; GERD (gastroesophageal reflux disease); Heart burn; Hypertension; Hypotension; Insulin resistance; Leukopenia; Low blood pressure reading; Malaise and fatigue; OSTEOPOROSIS (3/22/2010); Other specified disorder of intestines; Pain; Parkinson disease (CMS-[facility]); Peripheral neuropathy (CMS-[facility]); Rash, skin; Scoliosis; Spinal stenosis, lumbar; Thyrotoxicosis (remote);  "Unspecified disorder of thyroid; and vitamin D deficiency.    SOCIAL HISTORY  Social History     Social History Main Topics   • Smoking status: Former Smoker     Packs/day: 0.50     Years: 10.00     Quit date: 1/1/1985   • Smokeless tobacco: Never Used   • Alcohol use No      Comment: none since age 31   • Drug use: No   • Sexual activity: No      Comment: , 2 boys, diasability       SURGICAL HISTORY   has a past surgical history that includes thyroidectomy (1967); gastroscopy with biopsy (5/9/2012); egd w/endoscopic ultrasound (5/9/2012); cataract phaco with iol (4/14/2014); cataract phaco with iol (5/12/2014); breast biopsy (Left, 5/5/2015); gyn surgery (1985, 1988); knee arthrotomy (1964, 1968); and other.    CURRENT MEDICATIONS  Home Medications    **Home medications have not yet been reviewed for this encounter**         ALLERGIES  Allergies   Allergen Reactions   • Bactrim [Sulfamethoxazole W-Trimethoprim] Hives   • Actonel [Risedronate Sodium]    • Latex Rash     Irritation of skin    • Sulfa Drugs    • Tape Rash     Paper tape is ok       PHYSICAL EXAM  VITAL SIGNS: /62   Pulse 77   Temp 36.7 °C (98 °F)   Resp 16   Ht 1.6 m (5' 3\")   Wt 49.9 kg (110 lb 0.2 oz)   LMP 01/01/1990   SpO2 94%   BMI 19.49 kg/m²     Pulse ox interpretation: I interpret this pulse ox as normal.  Constitutional: Alert in no apparent distress.Thin extremities.  HENT: No signs of trauma, Bilateral external ears normal, Nose normal.   Eyes: Pupils are equal, Conjunctiva normal, Non-icteric.   Neck: Normal range of motion, No tenderness, Supple, No stridor.   Lymphatic: No lymphadenopathy noted.   Cardiovascular: Regular rate and rhythm, no murmurs. Symmetric distal pulses. No cyanosis of extremities. No peripheral edema of extremities.  Thorax & Lungs: Normal breath sounds, No respiratory distress, No wheezing, No chest tenderness.   Abdomen: Bowel sounds normal, Soft but distended, mild suprapubic discomfort, No " "masses, No pulsatile masses. No peritoneal signs.  Skin: Warm, Dry, No erythema, No rash.   Back: No midline bony tenderness, No CVA tenderness.   Musculoskeletal: Good range of motion in all major joints. No tenderness to palpation or major deformities noted.   Neurologic: Alert , Normal motor function, Normal sensory function, No focal deficits noted. Resting tremor.  Psychiatric: Affect normal, Judgment normal, Mood normal.   Physical Exam      DIAGNOSTIC STUDIES / PROCEDURES    LABS  Pertinent Labs & Imaging studies reviewed. (See chart for details)    RADIOLOGY  Pertinent Labs & Imaging studies reviewed. (See chart for details)    COURSE & MEDICAL DECISION MAKING  Pertinent Labs & Imaging studies reviewed. (See chart for details)    10:53 PM This is an emergent evaluation of a  67 y.o. female with \"atypical parkinsons disease\" who presents with concerns of fecal impaction after no bowel movement for the past 6 days and urinary retention. Physical exam significant for distended abdomen, resting tremor.  The differential diagnosis includes but is not limited to dehydration, fecal impaction, pancreatitis, electrolyte abnormalities, post obstructive uropathy. Ordered for cbc, cmp, lipase, lactic acid to evaluate. We will do in and out cath for urine and give enema for constipation.     12:32 AM  Refused enema. Straight catheterization relieved approximately 600cc of urine. Labs within acceptable limits. Nursing performed digital stimulation which helped to have bowel movement here. She was given valium which she is prescribed for anxiety. She says she feels much better now and is ready for discharge. I encouraged her to continue with stool softeners, laxatives, and hydration to help prevent constipation.     The patient will not drink alcohol nor drive with prescribed medications. The patient will return for worsening symptoms and is stable at the time of discharge. The patient verbalizes understanding.    FINAL " IMPRESSION    ICD-10-CM   1. Constipation, unspecified constipation type K59.00   2. Urinary retention R33.9   3. Anxiety F41.9   4. Parkinson's disease (CMS-[facility]) G20            Electronically signed by: Girma Burns II, 1/15/2018 10:53 PM

## 2018-01-16 NOTE — DISCHARGE INSTRUCTIONS
Constipation, Adult  Constipation is when a person has fewer than three bowel movements a week, has difficulty having a bowel movement, or has stools that are dry, hard, or larger than normal. As people grow older, constipation is more common. A low-fiber diet, not taking in enough fluids, and taking certain medicines may make constipation worse.   CAUSES   · Certain medicines, such as antidepressants, pain medicine, iron supplements, antacids, and water pills.    · Certain diseases, such as diabetes, irritable bowel syndrome (IBS), thyroid disease, or depression.    · Not drinking enough water.    · Not eating enough fiber-rich foods.    · Stress or travel.    · Lack of physical activity or exercise.    · Ignoring the urge to have a bowel movement.    · Using laxatives too much.    SIGNS AND SYMPTOMS   · Having fewer than three bowel movements a week.    · Straining to have a bowel movement.    · Having stools that are hard, dry, or larger than normal.    · Feeling full or bloated.    · Pain in the lower abdomen.    · Not feeling relief after having a bowel movement.    DIAGNOSIS   Your health care provider will take a medical history and perform a physical exam. Further testing may be done for severe constipation. Some tests may include:  · A barium enema X-ray to examine your rectum, colon, and, sometimes, your small intestine.    · A sigmoidoscopy to examine your lower colon.    · A colonoscopy to examine your entire colon.  TREATMENT   Treatment will depend on the severity of your constipation and what is causing it. Some dietary treatments include drinking more fluids and eating more fiber-rich foods. Lifestyle treatments may include regular exercise. If these diet and lifestyle recommendations do not help, your health care provider may recommend taking over-the-counter laxative medicines to help you have bowel movements. Prescription medicines may be prescribed if over-the-counter medicines do not work.    HOME CARE INSTRUCTIONS   · Eat foods that have a lot of fiber, such as fruits, vegetables, whole grains, and beans.  · Limit foods high in fat and processed sugars, such as french fries, hamburgers, cookies, candies, and soda.    · A fiber supplement may be added to your diet if you cannot get enough fiber from foods.    · Drink enough fluids to keep your urine clear or pale yellow.    · Exercise regularly or as directed by your health care provider.    · Go to the restroom when you have the urge to go. Do not hold it.    · Only take over-the-counter or prescription medicines as directed by your health care provider. Do not take other medicines for constipation without talking to your health care provider first.    SEEK IMMEDIATE MEDICAL CARE IF:   · You have bright red blood in your stool.    · Your constipation lasts for more than 4 days or gets worse.    · You have abdominal or rectal pain.    · You have thin, pencil-like stools.    · You have unexplained weight loss.  MAKE SURE YOU:   · Understand these instructions.  · Will watch your condition.  · Will get help right away if you are not doing well or get worse.     This information is not intended to replace advice given to you by your health care provider. Make sure you discuss any questions you have with your health care provider.     Document Released: 09/15/2005 Document Revised: 01/08/2016 Document Reviewed: 09/29/2014  [employer] Interactive Patient Education ©2016 [employer]      Acute Urinary Retention, Female  Acute urinary retention is the temporary inability to urinate. This is an uncommon problem in women. It can be caused by:  · Infection.  · A side effect of a medicine.  · A problem in a nearby organ that presses or squeezes on the bladder or the urethra (the tube that drains the bladder).  · Psychological problems.  ·  Surgery on your bladder, urethra, or pelvic organs that causes obstruction to the outflow of urine from your bladder.  HOME  CARE INSTRUCTIONS   If you are sent home with a Ta catheter and a drainage system, you will need to discuss the best course of action with your health care provider. While the catheter is in, maintain a good intake of fluids. Keep the drainage bag emptied and lower than your catheter. This is so that contaminated urine will not flow back into your bladder, which could lead to a urinary tract infection.  There are two main types of drainage bags. One is a large bag that usually is used at night. It has a good capacity that will allow you to sleep through the night without having to empty it. The second type is called a leg bag. It has a smaller capacity so it needs to be emptied more frequently. However, the main advantage is that it can be attached by a leg strap and goes underneath your clothing, allowing you the freedom to move about or leave your home.  Only take over-the-counter or prescription medicines for pain, discomfort, or fever as directed by your health care provider.   SEEK MEDICAL CARE IF:  · You develop a low-grade fever.  · You experience spasms or leakage of urine with the spasms.  SEEK IMMEDIATE MEDICAL CARE IF:   · You develop chills or fever.  · Your catheter stops draining urine.  · Your catheter falls out.  · You start to develop increased bleeding that does not respond to rest and increased fluid intake.  MAKE SURE YOU:  · Understand these instructions.  · Will watch your condition.  · Will get help right away if you are not doing well or get worse.     This information is not intended to replace advice given to you by your health care provider. Make sure you discuss any questions you have with your health care provider.     Document Released: 12/17/2007 Document Revised: 05/03/2016 Document Reviewed: 05/29/2014  [employer] Interactive Patient Education ©2016 [employer]

## 2018-01-16 NOTE — ED NOTES
IV removed, discharge instructions given. Educated on when to return to ed and follow up. Pt verbalized understanding. Pt taken to lobby via wheelchair.  to drive pt home.

## 2018-01-16 NOTE — ED NOTES
"Pt adamantly refusing fleets enema and states \"they dont work.\" pt requesting digital disimpaction. This RN attempting disimpaction with some relief. Pt states \"my bladder feels weird, put the brief on so I can empty it.\" pt cleaned up and changed.  "

## 2018-01-16 NOTE — ED NOTES
Pt was evaluated by MD  Orders rec'ed  In and out cath done 575 cc of clear yellow urine emptied  SL placed w/ bld drawn, sent to lab   Pt aware of POC  Comfort measures given

## 2018-01-23 ENCOUNTER — TELEPHONE (OUTPATIENT)
Dept: NEUROLOGY | Facility: MEDICAL CENTER | Age: 68
End: 2018-01-23

## 2018-01-23 NOTE — TELEPHONE ENCOUNTER
Again, please inform the patient and her healthcare providers that I can do nothing about her symptoms until she is off the Sinemet entirely. Only then can I determine if in fact her symptoms are due to anything else rather than the drug. Again, she has been told that the drug is not necessary, but that it can cause symptoms such as what she has been suffering from. As long as she remains on the drug, I can do very little else to help her, this will be the same answer they will be given.

## 2018-01-26 NOTE — TELEPHONE ENCOUNTER
"Patient called back and started explaining her symptoms to me again that she has dystonia and that Dr. Patel doesn't seem to understand that, and that she has lost the use of her right leg completely and that she is walking on her ankle. She thinks that if Dr. Patel knew this information and saw her then he would be doing something to treat the dystonia right now, and \"the fact that he is doing nothing to treat me makes him a bad doctor.\" I again explained the the patient that Dr. Patel is not able to move forward with his treatment plan until she complies and is off of the Sinemet, a plan she has known about since her last appointment in October 2017. She is adamant that the Sinemet was helping her symptoms of dystonia and is very reluctant to want to get off of it. She then said she thinks he is being \"very cavalier\" about her treatment and says he \"lacks compassion.\" I asked her what she would like for me to do at this point and she wanted me to tell Dr. Patel exactly what she said. She mentioned perhaps going to see doctors back in [place] or Dr. Brandon who saw her in the hospital once before. I let her know I would relay the information to Dr. Patel.   "

## 2018-01-27 NOTE — TELEPHONE ENCOUNTER
"Unfortunately, and I understand the patient's frustration with her continuing symptoms, but I have always been quite clear that she needs to get off medication completely before subsequent treatment can be provided. She has been to a movement disorder specialist who concurs with this recommendation, but until she is off medication completely, but there is little else that I'm comfortable doing. It is unfortunate she feels that I'm \"cavalier\" and \"lacking compassion\" in regards to her care, but I do not believe adding medication or reversing the important step she has already taken will be helpful. If she does choose to change health care providers, I can make recommendations, but I also cannot guarantee that these providers will be able to assume her care.  "

## 2018-01-30 ENCOUNTER — TELEPHONE (OUTPATIENT)
Dept: NEUROLOGY | Facility: MEDICAL CENTER | Age: 68
End: 2018-01-30

## 2018-01-30 NOTE — TELEPHONE ENCOUNTER
Spoke to Dr. Bloch and Dr. Patel together about this patient. Dr. Bloch says no to going back on her schedule as she is full, and is seeing only MS patients at this time. Dr. Patel says the patient is noncompliant and it is in his not that he doesn't want to see this patient any further if she continues to be noncompliant. Both physicians have suggested referring her to [facility], and Dr. Pulliam's office.    L/M for this patient to call me back to discuss care options for her. 1/30/18 @ 9:53AM

## 2018-03-01 DIAGNOSIS — G24.9 DYSTONIA: ICD-10-CM

## 2018-03-05 RX ORDER — CARISOPRODOL 350 MG/1
350 TABLET ORAL 4 TIMES DAILY
Qty: 120 TAB | Refills: 5 | Status: SHIPPED | OUTPATIENT
Start: 2018-03-05 | End: 2018-03-07

## 2018-03-07 ENCOUNTER — APPOINTMENT (OUTPATIENT)
Dept: RADIOLOGY | Facility: MEDICAL CENTER | Age: 68
End: 2018-03-07
Attending: HOSPITALIST
Payer: MEDICARE

## 2018-03-07 ENCOUNTER — HOSPITAL ENCOUNTER (OUTPATIENT)
Facility: MEDICAL CENTER | Age: 68
End: 2018-03-14
Attending: EMERGENCY MEDICINE | Admitting: HOSPITALIST
Payer: MEDICARE

## 2018-03-07 ENCOUNTER — RESOLUTE PROFESSIONAL BILLING HOSPITAL PROF FEE (OUTPATIENT)
Dept: HOSPITALIST | Facility: MEDICAL CENTER | Age: 68
End: 2018-03-07
Payer: MEDICARE

## 2018-03-07 DIAGNOSIS — G20.A1 PARKINSON'S DISEASE: ICD-10-CM

## 2018-03-07 DIAGNOSIS — G24.9 DYSTONIA: ICD-10-CM

## 2018-03-07 PROBLEM — D69.6 THROMBOCYTOPENIA (HCC): Status: ACTIVE | Noted: 2018-03-07
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
## 2018-03-07 LAB
ALBUMIN SERPL BCP-MCNC: 3.8 G/DL (ref 3.2–4.9)
ALBUMIN/GLOB SERPL: 1.4 G/DL
ALP SERPL-CCNC: 58 U/L (ref 30–99)
ALT SERPL-CCNC: <5 U/L (ref 2–50)
ANION GAP SERPL CALC-SCNC: 9 MMOL/L (ref 0–11.9)
AST SERPL-CCNC: 24 U/L (ref 12–45)
BASOPHILS # BLD AUTO: 0.6 % (ref 0–1.8)
BASOPHILS # BLD: 0.03 K/UL (ref 0–0.12)
BILIRUB SERPL-MCNC: 0.3 MG/DL (ref 0.1–1.5)
BUN SERPL-MCNC: 19 MG/DL (ref 8–22)
CALCIUM SERPL-MCNC: 8.9 MG/DL (ref 8.5–10.5)
CHLORIDE SERPL-SCNC: 109 MMOL/L (ref 96–112)
CO2 SERPL-SCNC: 21 MMOL/L (ref 20–33)
CREAT SERPL-MCNC: 0.63 MG/DL (ref 0.5–1.4)
EOSINOPHIL # BLD AUTO: 0.11 K/UL (ref 0–0.51)
EOSINOPHIL NFR BLD: 2.1 % (ref 0–6.9)
ERYTHROCYTE [DISTWIDTH] IN BLOOD BY AUTOMATED COUNT: 50.3 FL (ref 35.9–50)
EST. AVERAGE GLUCOSE BLD GHB EST-MCNC: 105 MG/DL
GLOBULIN SER CALC-MCNC: 2.8 G/DL (ref 1.9–3.5)
GLUCOSE SERPL-MCNC: 64 MG/DL (ref 65–99)
HBA1C MFR BLD: 5.3 % (ref 0–5.6)
HCT VFR BLD AUTO: 39.7 % (ref 37–47)
HGB BLD-MCNC: 12.5 G/DL (ref 12–16)
IMM GRANULOCYTES # BLD AUTO: 0 K/UL (ref 0–0.11)
IMM GRANULOCYTES NFR BLD AUTO: 0 % (ref 0–0.9)
INR PPP: 1 (ref 0.87–1.13)
LIPASE SERPL-CCNC: 17 U/L (ref 11–82)
LYMPHOCYTES # BLD AUTO: 1.29 K/UL (ref 1–4.8)
LYMPHOCYTES NFR BLD: 25.1 % (ref 22–41)
MCH RBC QN AUTO: 31.8 PG (ref 27–33)
MCHC RBC AUTO-ENTMCNC: 31.5 G/DL (ref 33.6–35)
MCV RBC AUTO: 101 FL (ref 81.4–97.8)
MONOCYTES # BLD AUTO: 0.4 K/UL (ref 0–0.85)
MONOCYTES NFR BLD AUTO: 7.8 % (ref 0–13.4)
NEUTROPHILS # BLD AUTO: 3.31 K/UL (ref 2–7.15)
NEUTROPHILS NFR BLD: 64.4 % (ref 44–72)
NRBC # BLD AUTO: 0.02 K/UL
NRBC BLD-RTO: 0.4 /100 WBC
PLATELET # BLD AUTO: 140 K/UL (ref 164–446)
PMV BLD AUTO: 12.8 FL (ref 9–12.9)
POTASSIUM SERPL-SCNC: 4.8 MMOL/L (ref 3.6–5.5)
PROT SERPL-MCNC: 6.6 G/DL (ref 6–8.2)
PROTHROMBIN TIME: 12.9 SEC (ref 12–14.6)
RBC # BLD AUTO: 3.93 M/UL (ref 4.2–5.4)
SODIUM SERPL-SCNC: 139 MMOL/L (ref 135–145)
TROPONIN I SERPL-MCNC: <0.01 NG/ML (ref 0–0.04)
WBC # BLD AUTO: 5.1 K/UL (ref 4.8–10.8)

## 2018-03-07 PROCEDURE — G0378 HOSPITAL OBSERVATION PER HR: HCPCS

## 2018-03-07 PROCEDURE — 96376 TX/PRO/DX INJ SAME DRUG ADON: CPT

## 2018-03-07 PROCEDURE — 99285 EMERGENCY DEPT VISIT HI MDM: CPT

## 2018-03-07 PROCEDURE — 73610 X-RAY EXAM OF ANKLE: CPT | Mod: LT

## 2018-03-07 PROCEDURE — 85025 COMPLETE CBC W/AUTO DIFF WBC: CPT

## 2018-03-07 PROCEDURE — 80053 COMPREHEN METABOLIC PANEL: CPT

## 2018-03-07 PROCEDURE — A9270 NON-COVERED ITEM OR SERVICE: HCPCS | Mod: JG | Performed by: HOSPITALIST

## 2018-03-07 PROCEDURE — 700102 HCHG RX REV CODE 250 W/ 637 OVERRIDE(OP): Mod: JG | Performed by: HOSPITALIST

## 2018-03-07 PROCEDURE — 83036 HEMOGLOBIN GLYCOSYLATED A1C: CPT

## 2018-03-07 PROCEDURE — 700111 HCHG RX REV CODE 636 W/ 250 OVERRIDE (IP): Performed by: EMERGENCY MEDICINE

## 2018-03-07 PROCEDURE — 96374 THER/PROPH/DIAG INJ IV PUSH: CPT

## 2018-03-07 PROCEDURE — 700111 HCHG RX REV CODE 636 W/ 250 OVERRIDE (IP): Performed by: HOSPITALIST

## 2018-03-07 PROCEDURE — 94760 N-INVAS EAR/PLS OXIMETRY 1: CPT

## 2018-03-07 PROCEDURE — 96375 TX/PRO/DX INJ NEW DRUG ADDON: CPT

## 2018-03-07 PROCEDURE — 73610 X-RAY EXAM OF ANKLE: CPT | Mod: RT

## 2018-03-07 PROCEDURE — 83690 ASSAY OF LIPASE: CPT

## 2018-03-07 PROCEDURE — 84484 ASSAY OF TROPONIN QUANT: CPT

## 2018-03-07 PROCEDURE — A9270 NON-COVERED ITEM OR SERVICE: HCPCS | Mod: JG | Performed by: EMERGENCY MEDICINE

## 2018-03-07 PROCEDURE — 85610 PROTHROMBIN TIME: CPT

## 2018-03-07 PROCEDURE — 36415 COLL VENOUS BLD VENIPUNCTURE: CPT

## 2018-03-07 PROCEDURE — 700102 HCHG RX REV CODE 250 W/ 637 OVERRIDE(OP): Mod: JG | Performed by: EMERGENCY MEDICINE

## 2018-03-07 PROCEDURE — 99220 PR INITIAL OBSERVATION CARE,LEVL III: CPT | Performed by: HOSPITALIST

## 2018-03-07 PROCEDURE — 700105 HCHG RX REV CODE 258: Performed by: HOSPITALIST

## 2018-03-07 PROCEDURE — 304561 HCHG STAT O2

## 2018-03-07 RX ORDER — METAXALONE 800 MG/1
800 TABLET ORAL 4 TIMES DAILY
COMMUNITY
End: 2018-06-13

## 2018-03-07 RX ORDER — METAXALONE 800 MG/1
800 TABLET ORAL 4 TIMES DAILY
Status: DISCONTINUED | OUTPATIENT
Start: 2018-03-07 | End: 2018-03-14 | Stop reason: HOSPADM

## 2018-03-07 RX ORDER — ONDANSETRON 4 MG/1
4 TABLET, ORALLY DISINTEGRATING ORAL EVERY 4 HOURS PRN
Status: DISCONTINUED | OUTPATIENT
Start: 2018-03-07 | End: 2018-03-14 | Stop reason: HOSPADM

## 2018-03-07 RX ORDER — OXYCODONE HYDROCHLORIDE 5 MG/1
5 TABLET ORAL
Status: DISCONTINUED | OUTPATIENT
Start: 2018-03-07 | End: 2018-03-10

## 2018-03-07 RX ORDER — BACLOFEN 10 MG/1
10 TABLET ORAL ONCE
Status: COMPLETED | OUTPATIENT
Start: 2018-03-07 | End: 2018-03-07

## 2018-03-07 RX ORDER — CARBIDOPA AND LEVODOPA 50; 200 MG/1; MG/1
1 TABLET, EXTENDED RELEASE ORAL EVERY EVENING
Status: DISCONTINUED | OUTPATIENT
Start: 2018-03-07 | End: 2018-03-14 | Stop reason: HOSPADM

## 2018-03-07 RX ORDER — ERGOCALCIFEROL 1.25 MG/1
50000 CAPSULE ORAL
COMMUNITY
End: 2018-06-10

## 2018-03-07 RX ORDER — CARISOPRODOL 350 MG/1
350 TABLET ORAL EVERY EVENING
Status: DISCONTINUED | OUTPATIENT
Start: 2018-03-07 | End: 2018-03-14 | Stop reason: HOSPADM

## 2018-03-07 RX ORDER — CARBIDOPA AND LEVODOPA 50; 200 MG/1; MG/1
1 TABLET, EXTENDED RELEASE ORAL
COMMUNITY

## 2018-03-07 RX ORDER — OXYCODONE HYDROCHLORIDE 5 MG/1
2.5 TABLET ORAL
Status: DISCONTINUED | OUTPATIENT
Start: 2018-03-07 | End: 2018-03-10

## 2018-03-07 RX ORDER — CARISOPRODOL 350 MG/1
350 TABLET ORAL EVERY EVENING
Status: ON HOLD | COMMUNITY
End: 2018-03-19

## 2018-03-07 RX ORDER — SODIUM CHLORIDE 9 MG/ML
INJECTION, SOLUTION INTRAVENOUS CONTINUOUS
Status: DISCONTINUED | OUTPATIENT
Start: 2018-03-07 | End: 2018-03-08

## 2018-03-07 RX ORDER — BISACODYL 10 MG
10 SUPPOSITORY, RECTAL RECTAL
Status: DISCONTINUED | OUTPATIENT
Start: 2018-03-07 | End: 2018-03-14 | Stop reason: HOSPADM

## 2018-03-07 RX ORDER — ACETAMINOPHEN 325 MG/1
650 TABLET ORAL EVERY 6 HOURS PRN
Status: DISCONTINUED | OUTPATIENT
Start: 2018-03-07 | End: 2018-03-14 | Stop reason: HOSPADM

## 2018-03-07 RX ORDER — POLYETHYLENE GLYCOL 3350 17 G/17G
1 POWDER, FOR SOLUTION ORAL
Status: DISCONTINUED | OUTPATIENT
Start: 2018-03-07 | End: 2018-03-14 | Stop reason: HOSPADM

## 2018-03-07 RX ORDER — DIPHENHYDRAMINE HYDROCHLORIDE 50 MG/ML
50 INJECTION INTRAMUSCULAR; INTRAVENOUS ONCE
Status: COMPLETED | OUTPATIENT
Start: 2018-03-07 | End: 2018-03-07

## 2018-03-07 RX ORDER — DIAZEPAM 5 MG/1
10 TABLET ORAL 2 TIMES DAILY
Status: DISCONTINUED | OUTPATIENT
Start: 2018-03-07 | End: 2018-03-14 | Stop reason: HOSPADM

## 2018-03-07 RX ORDER — AMOXICILLIN 250 MG
2 CAPSULE ORAL 2 TIMES DAILY
Status: DISCONTINUED | OUTPATIENT
Start: 2018-03-07 | End: 2018-03-14 | Stop reason: HOSPADM

## 2018-03-07 RX ORDER — GABAPENTIN 400 MG/1
2400 CAPSULE ORAL EVERY EVENING
Status: DISCONTINUED | OUTPATIENT
Start: 2018-03-07 | End: 2018-03-14 | Stop reason: HOSPADM

## 2018-03-07 RX ORDER — GABAPENTIN 600 MG/1
1800 TABLET ORAL EVERY EVENING
COMMUNITY
End: 2018-03-18

## 2018-03-07 RX ORDER — HALOPERIDOL 5 MG/ML
5 INJECTION INTRAMUSCULAR EVERY 4 HOURS PRN
Status: DISCONTINUED | OUTPATIENT
Start: 2018-03-07 | End: 2018-03-14 | Stop reason: HOSPADM

## 2018-03-07 RX ORDER — DIPHENHYDRAMINE HYDROCHLORIDE 50 MG/ML
25 INJECTION INTRAMUSCULAR; INTRAVENOUS EVERY 4 HOURS PRN
Status: DISCONTINUED | OUTPATIENT
Start: 2018-03-07 | End: 2018-03-14 | Stop reason: HOSPADM

## 2018-03-07 RX ORDER — MORPHINE SULFATE 4 MG/ML
2 INJECTION, SOLUTION INTRAMUSCULAR; INTRAVENOUS
Status: DISCONTINUED | OUTPATIENT
Start: 2018-03-07 | End: 2018-03-10

## 2018-03-07 RX ORDER — ONDANSETRON 2 MG/ML
4 INJECTION INTRAMUSCULAR; INTRAVENOUS EVERY 4 HOURS PRN
Status: DISCONTINUED | OUTPATIENT
Start: 2018-03-07 | End: 2018-03-14 | Stop reason: HOSPADM

## 2018-03-07 RX ADMIN — DIPHENHYDRAMINE HYDROCHLORIDE 25 MG: 50 INJECTION, SOLUTION INTRAMUSCULAR; INTRAVENOUS at 23:53

## 2018-03-07 RX ADMIN — GABAPENTIN 2400 MG: 400 CAPSULE ORAL at 21:28

## 2018-03-07 RX ADMIN — CARISOPRODOL 350 MG: 350 TABLET ORAL at 23:13

## 2018-03-07 RX ADMIN — DIAZEPAM 10 MG: 5 TABLET ORAL at 21:29

## 2018-03-07 RX ADMIN — BACLOFEN 10 MG: 10 TABLET ORAL at 19:06

## 2018-03-07 RX ADMIN — CARBIDOPA AND LEVODOPA 1 TABLET: 50; 200 TABLET, EXTENDED RELEASE ORAL at 21:39

## 2018-03-07 RX ADMIN — SODIUM CHLORIDE: 9 INJECTION, SOLUTION INTRAVENOUS at 22:16

## 2018-03-07 RX ADMIN — DIPHENHYDRAMINE HYDROCHLORIDE 50 MG: 50 INJECTION, SOLUTION INTRAMUSCULAR; INTRAVENOUS at 16:54

## 2018-03-07 RX ADMIN — MORPHINE SULFATE 2 MG: 4 INJECTION INTRAVENOUS at 22:29

## 2018-03-07 RX ADMIN — STANDARDIZED SENNA CONCENTRATE AND DOCUSATE SODIUM 2 TABLET: 8.6; 5 TABLET, FILM COATED ORAL at 21:39

## 2018-03-07 RX ADMIN — METAXALONE 800 MG: 800 TABLET ORAL at 21:40

## 2018-03-07 ASSESSMENT — COGNITIVE AND FUNCTIONAL STATUS - GENERAL
CLIMB 3 TO 5 STEPS WITH RAILING: A LOT
WALKING IN HOSPITAL ROOM: A LOT
TURNING FROM BACK TO SIDE WHILE IN FLAT BAD: A LOT
MOVING FROM LYING ON BACK TO SITTING ON SIDE OF FLAT BED: A LOT
MOBILITY SCORE: 12
PERSONAL GROOMING: A LOT
SUGGESTED CMS G CODE MODIFIER DAILY ACTIVITY: CL
STANDING UP FROM CHAIR USING ARMS: A LOT
TOILETING: A LOT
SUGGESTED CMS G CODE MODIFIER MOBILITY: CL
HELP NEEDED FOR BATHING: A LOT
MOVING TO AND FROM BED TO CHAIR: A LOT
DAILY ACTIVITIY SCORE: 12
EATING MEALS: A LOT
DRESSING REGULAR UPPER BODY CLOTHING: A LOT
DRESSING REGULAR LOWER BODY CLOTHING: A LOT

## 2018-03-07 ASSESSMENT — LIFESTYLE VARIABLES
DO YOU DRINK ALCOHOL: NO
EVER_SMOKED: YES
DO YOU DRINK ALCOHOL: NO

## 2018-03-07 ASSESSMENT — ENCOUNTER SYMPTOMS
BACK PAIN: 1
TREMORS: 1
CARDIOVASCULAR NEGATIVE: 1
GASTROINTESTINAL NEGATIVE: 1
FALLS: 1
NECK PAIN: 1
CONSTITUTIONAL NEGATIVE: 1
BLURRED VISION: 1
MYALGIAS: 1
RESPIRATORY NEGATIVE: 1
DEPRESSION: 1

## 2018-03-07 ASSESSMENT — COPD QUESTIONNAIRES
COPD SCREENING SCORE: 3
HAVE YOU SMOKED AT LEAST 100 CIGARETTES IN YOUR ENTIRE LIFE: NO/DON'T KNOW
DURING THE PAST 4 WEEKS HOW MUCH DID YOU FEEL SHORT OF BREATH: NONE/LITTLE OF THE TIME
DO YOU EVER COUGH UP ANY MUCUS OR PHLEGM?: YES, A FEW DAYS A WEEK OR MONTH

## 2018-03-07 ASSESSMENT — PAIN SCALES - GENERAL
PAINLEVEL_OUTOF10: 8
PAINLEVEL_OUTOF10: 5
PAINLEVEL_OUTOF10: 5
PAINLEVEL_OUTOF10: 6

## 2018-03-07 ASSESSMENT — PATIENT HEALTH QUESTIONNAIRE - PHQ9
SUM OF ALL RESPONSES TO PHQ QUESTIONS 1-9: 0
1. LITTLE INTEREST OR PLEASURE IN DOING THINGS: NOT AT ALL
SUM OF ALL RESPONSES TO PHQ9 QUESTIONS 1 AND 2: 0
2. FEELING DOWN, DEPRESSED, IRRITABLE, OR HOPELESS: NOT AT ALL

## 2018-03-07 NOTE — ED TRIAGE NOTES
BIB [facility], pt unable to walk due to flare up of Parkinsons and increasing tremors and increasing posturing of B LEs.  Pt demonstrates severe tremor on R UE at time of ED arrival.  C/O being hot and requiring 2 L NC oxygen.

## 2018-03-08 LAB
ANION GAP SERPL CALC-SCNC: 4 MMOL/L (ref 0–11.9)
BASOPHILS # BLD AUTO: 1 % (ref 0–1.8)
BASOPHILS # BLD: 0.04 K/UL (ref 0–0.12)
BUN SERPL-MCNC: 18 MG/DL (ref 8–22)
CALCIUM SERPL-MCNC: 8.7 MG/DL (ref 8.5–10.5)
CHLORIDE SERPL-SCNC: 111 MMOL/L (ref 96–112)
CO2 SERPL-SCNC: 27 MMOL/L (ref 20–33)
CREAT SERPL-MCNC: 0.78 MG/DL (ref 0.5–1.4)
CRP SERPL HS-MCNC: 0.6 MG/L (ref 0–7.5)
EOSINOPHIL # BLD AUTO: 0.13 K/UL (ref 0–0.51)
EOSINOPHIL NFR BLD: 3.2 % (ref 0–6.9)
ERYTHROCYTE [DISTWIDTH] IN BLOOD BY AUTOMATED COUNT: 49.2 FL (ref 35.9–50)
ERYTHROCYTE [SEDIMENTATION RATE] IN BLOOD BY WESTERGREN METHOD: 12 MM/HOUR (ref 0–30)
GLUCOSE SERPL-MCNC: 91 MG/DL (ref 65–99)
HCT VFR BLD AUTO: 37.1 % (ref 37–47)
HGB BLD-MCNC: 12 G/DL (ref 12–16)
IMM GRANULOCYTES # BLD AUTO: 0.01 K/UL (ref 0–0.11)
IMM GRANULOCYTES NFR BLD AUTO: 0.2 % (ref 0–0.9)
LYMPHOCYTES # BLD AUTO: 1.44 K/UL (ref 1–4.8)
LYMPHOCYTES NFR BLD: 35.6 % (ref 22–41)
MCH RBC QN AUTO: 32.3 PG (ref 27–33)
MCHC RBC AUTO-ENTMCNC: 32.3 G/DL (ref 33.6–35)
MCV RBC AUTO: 100 FL (ref 81.4–97.8)
MONOCYTES # BLD AUTO: 0.33 K/UL (ref 0–0.85)
MONOCYTES NFR BLD AUTO: 8.2 % (ref 0–13.4)
NEUTROPHILS # BLD AUTO: 2.09 K/UL (ref 2–7.15)
NEUTROPHILS NFR BLD: 51.8 % (ref 44–72)
NRBC # BLD AUTO: 0 K/UL
NRBC BLD-RTO: 0 /100 WBC
PLATELET # BLD AUTO: 128 K/UL (ref 164–446)
PMV BLD AUTO: 12.4 FL (ref 9–12.9)
POTASSIUM SERPL-SCNC: 3.7 MMOL/L (ref 3.6–5.5)
RBC # BLD AUTO: 3.71 M/UL (ref 4.2–5.4)
SODIUM SERPL-SCNC: 142 MMOL/L (ref 135–145)
WBC # BLD AUTO: 4 K/UL (ref 4.8–10.8)

## 2018-03-08 PROCEDURE — 80048 BASIC METABOLIC PNL TOTAL CA: CPT

## 2018-03-08 PROCEDURE — 36415 COLL VENOUS BLD VENIPUNCTURE: CPT

## 2018-03-08 PROCEDURE — 85652 RBC SED RATE AUTOMATED: CPT

## 2018-03-08 PROCEDURE — G8979 MOBILITY GOAL STATUS: HCPCS | Mod: CK

## 2018-03-08 PROCEDURE — G8987 SELF CARE CURRENT STATUS: HCPCS | Mod: CM

## 2018-03-08 PROCEDURE — 86141 C-REACTIVE PROTEIN HS: CPT

## 2018-03-08 PROCEDURE — 96376 TX/PRO/DX INJ SAME DRUG ADON: CPT

## 2018-03-08 PROCEDURE — A9270 NON-COVERED ITEM OR SERVICE: HCPCS | Mod: JG | Performed by: HOSPITALIST

## 2018-03-08 PROCEDURE — 700111 HCHG RX REV CODE 636 W/ 250 OVERRIDE (IP): Performed by: HOSPITALIST

## 2018-03-08 PROCEDURE — G0378 HOSPITAL OBSERVATION PER HR: HCPCS

## 2018-03-08 PROCEDURE — 96375 TX/PRO/DX INJ NEW DRUG ADDON: CPT

## 2018-03-08 PROCEDURE — 700102 HCHG RX REV CODE 250 W/ 637 OVERRIDE(OP): Mod: JG | Performed by: HOSPITALIST

## 2018-03-08 PROCEDURE — 85025 COMPLETE CBC W/AUTO DIFF WBC: CPT

## 2018-03-08 PROCEDURE — 97166 OT EVAL MOD COMPLEX 45 MIN: CPT

## 2018-03-08 PROCEDURE — 96372 THER/PROPH/DIAG INJ SC/IM: CPT

## 2018-03-08 PROCEDURE — G8978 MOBILITY CURRENT STATUS: HCPCS | Mod: CM

## 2018-03-08 PROCEDURE — 97163 PT EVAL HIGH COMPLEX 45 MIN: CPT

## 2018-03-08 PROCEDURE — 99226 PR SUBSEQUENT OBSERVATION CARE,LEVEL III: CPT | Performed by: INTERNAL MEDICINE

## 2018-03-08 PROCEDURE — G8988 SELF CARE GOAL STATUS: HCPCS | Mod: CK

## 2018-03-08 RX ORDER — OXYCODONE HYDROCHLORIDE AND ACETAMINOPHEN 325; 5 MG/5ML; MG/5ML
5 SOLUTION ORAL EVERY 4 HOURS PRN
COMMUNITY
End: 2018-03-18

## 2018-03-08 RX ADMIN — METAXALONE 800 MG: 800 TABLET ORAL at 15:21

## 2018-03-08 RX ADMIN — DIPHENHYDRAMINE HYDROCHLORIDE 25 MG: 50 INJECTION, SOLUTION INTRAMUSCULAR; INTRAVENOUS at 16:55

## 2018-03-08 RX ADMIN — DIPHENHYDRAMINE HYDROCHLORIDE 25 MG: 50 INJECTION, SOLUTION INTRAMUSCULAR; INTRAVENOUS at 09:03

## 2018-03-08 RX ADMIN — CARBIDOPA AND LEVODOPA 1 TABLET: 25; 100 TABLET ORAL at 20:26

## 2018-03-08 RX ADMIN — STANDARDIZED SENNA CONCENTRATE AND DOCUSATE SODIUM 2 TABLET: 8.6; 5 TABLET, FILM COATED ORAL at 20:25

## 2018-03-08 RX ADMIN — Medication 90 MG: at 09:42

## 2018-03-08 RX ADMIN — GABAPENTIN 2400 MG: 400 CAPSULE ORAL at 20:25

## 2018-03-08 RX ADMIN — METAXALONE 800 MG: 800 TABLET ORAL at 09:04

## 2018-03-08 RX ADMIN — STANDARDIZED SENNA CONCENTRATE AND DOCUSATE SODIUM 1 TABLET: 8.6; 5 TABLET, FILM COATED ORAL at 09:03

## 2018-03-08 RX ADMIN — CARBIDOPA AND LEVODOPA 1 TABLET: 25; 100 TABLET ORAL at 23:18

## 2018-03-08 RX ADMIN — CARBIDOPA AND LEVODOPA 1 TABLET: 25; 100 TABLET ORAL at 12:42

## 2018-03-08 RX ADMIN — DIAZEPAM 10 MG: 5 TABLET ORAL at 05:51

## 2018-03-08 RX ADMIN — CARBIDOPA AND LEVODOPA 1 TABLET: 50; 200 TABLET, EXTENDED RELEASE ORAL at 20:57

## 2018-03-08 RX ADMIN — METAXALONE 800 MG: 800 TABLET ORAL at 12:42

## 2018-03-08 RX ADMIN — ENOXAPARIN SODIUM 40 MG: 100 INJECTION SUBCUTANEOUS at 09:04

## 2018-03-08 RX ADMIN — CARISOPRODOL 350 MG: 350 TABLET ORAL at 20:25

## 2018-03-08 RX ADMIN — DIPHENHYDRAMINE HYDROCHLORIDE 25 MG: 50 INJECTION, SOLUTION INTRAMUSCULAR; INTRAVENOUS at 20:57

## 2018-03-08 RX ADMIN — DIPHENHYDRAMINE HYDROCHLORIDE 25 MG: 50 INJECTION, SOLUTION INTRAMUSCULAR; INTRAVENOUS at 13:01

## 2018-03-08 RX ADMIN — DIPHENHYDRAMINE HYDROCHLORIDE 25 MG: 50 INJECTION, SOLUTION INTRAMUSCULAR; INTRAVENOUS at 03:58

## 2018-03-08 RX ADMIN — HALOPERIDOL LACTATE 5 MG: 5 INJECTION, SOLUTION INTRAMUSCULAR at 23:18

## 2018-03-08 RX ADMIN — MORPHINE SULFATE 2 MG: 4 INJECTION INTRAVENOUS at 20:35

## 2018-03-08 RX ADMIN — DIAZEPAM 10 MG: 5 TABLET ORAL at 15:21

## 2018-03-08 RX ADMIN — CARBIDOPA AND LEVODOPA 1 TABLET: 25; 100 TABLET ORAL at 15:21

## 2018-03-08 RX ADMIN — MORPHINE SULFATE 2 MG: 4 INJECTION INTRAVENOUS at 16:55

## 2018-03-08 RX ADMIN — CARBIDOPA AND LEVODOPA 1 TABLET: 25; 100 TABLET ORAL at 09:42

## 2018-03-08 ASSESSMENT — PAIN SCALES - GENERAL
PAINLEVEL_OUTOF10: 8
PAINLEVEL_OUTOF10: 8
PAINLEVEL_OUTOF10: 0

## 2018-03-08 ASSESSMENT — COGNITIVE AND FUNCTIONAL STATUS - GENERAL
TOILETING: A LOT
CLIMB 3 TO 5 STEPS WITH RAILING: TOTAL
DRESSING REGULAR UPPER BODY CLOTHING: A LOT
DRESSING REGULAR UPPER BODY CLOTHING: A LOT
SUGGESTED CMS G CODE MODIFIER DAILY ACTIVITY: CL
HELP NEEDED FOR BATHING: A LOT
MOBILITY SCORE: 12
SUGGESTED CMS G CODE MODIFIER DAILY ACTIVITY: CK
PERSONAL GROOMING: A LOT
PERSONAL GROOMING: A LITTLE
TURNING FROM BACK TO SIDE WHILE IN FLAT BAD: A LOT
SUGGESTED CMS G CODE MODIFIER MOBILITY: CL
SUGGESTED CMS G CODE MODIFIER MOBILITY: CM
CLIMB 3 TO 5 STEPS WITH RAILING: A LOT
WALKING IN HOSPITAL ROOM: A LOT
HELP NEEDED FOR BATHING: A LOT
DAILY ACTIVITIY SCORE: 12
EATING MEALS: A LOT
EATING MEALS: A LITTLE
DRESSING REGULAR LOWER BODY CLOTHING: A LOT
MOVING TO AND FROM BED TO CHAIR: A LOT
MOVING FROM LYING ON BACK TO SITTING ON SIDE OF FLAT BED: A LOT
STANDING UP FROM CHAIR USING ARMS: A LOT
TURNING FROM BACK TO SIDE WHILE IN FLAT BAD: A LOT
MOBILITY SCORE: 9
STANDING UP FROM CHAIR USING ARMS: TOTAL
WALKING IN HOSPITAL ROOM: TOTAL
MOVING FROM LYING ON BACK TO SITTING ON SIDE OF FLAT BED: A LOT
TOILETING: A LOT
MOVING TO AND FROM BED TO CHAIR: A LOT
DAILY ACTIVITIY SCORE: 14
DRESSING REGULAR LOWER BODY CLOTHING: A LOT

## 2018-03-08 ASSESSMENT — ENCOUNTER SYMPTOMS
DIAPHORESIS: 0
CHILLS: 0
SENSORY CHANGE: 0
FLANK PAIN: 0
WEAKNESS: 1
HEADACHES: 0
PALPITATIONS: 0
FEVER: 0
MYALGIAS: 1
DOUBLE VISION: 0
SHORTNESS OF BREATH: 0
FOCAL WEAKNESS: 0
MEMORY LOSS: 0
NAUSEA: 0
COUGH: 0
NERVOUS/ANXIOUS: 0
SPEECH CHANGE: 0
ABDOMINAL PAIN: 0
BLURRED VISION: 0

## 2018-03-08 ASSESSMENT — ACTIVITIES OF DAILY LIVING (ADL): TOILETING: REQUIRES ASSIST

## 2018-03-08 ASSESSMENT — GAIT ASSESSMENTS: GAIT LEVEL OF ASSIST: UNABLE TO PARTICIPATE

## 2018-03-08 NOTE — ASSESSMENT & PLAN NOTE
Related to parkinsonism.  Some concern for other process being investigated as outpatient by neurology

## 2018-03-08 NOTE — DIETARY
"Nutrition services: Day 0 of admit.  Delmis Draper is a 67 y.o. female with admitting DX of Parkinsonism, Fall  Pt alerted to RD per nutrition screening: poor PO intake pta, recent unplanned wt loss, and low BMI <19 (=13.98)    Spoke with pt at bedside, pt appeared extremely thin and was experiencing constant tremors. Pt states that she has a healthy appetite however accessibility to consuming food is the obstacle to adequate nutritional intake. Pt reports that at home her  helps her with breakfast and a home health caregiver helps her with lunch and dinner. Pt reports that she follows a healthy diet, such as a Mediterranean diet - focusing on lots of fresh fruits and vegetables. When asked about UBW pt reports 108-112# is \"healthy for me\", asked when she last weighed this and she said its been awhile. Current wt per bed scale = 35.8 kg (79#), however unable to determine any wt loss severity as pt vague in report of wt hx.     Pt reports that she used to take glucerna shakes for added nutrition, however reported that she had to cut back on protein intake d/t Sinemet medication regimen. Therefore pt did not want any protein shakes/supplements at this time in fear of interference with med absorption. Obtained some meal preferences in order to encourage adequate intake, pt was agreeable to a milkshake as a snack, however did not want added protein. Discussed asking for help with meal intake during admit - pt verbalized understanding.    Assessment:  Height: 160 cm (5' 3\")  Weight: 35.8 kg (78 lb 14.8 oz)  Body mass index is 13.98 kg/m².  Diet/Intake: Regular; % consumed x 1 meal at this time     Evaluation:   1. Pt with parkinson's disease and has difficulty feeding self  2. Pt likely has increased energy needs 2' to constant tremors - pt reported she was \"working up a sweat\" during visit  3. Pt requires assistance with meals to ensure adequate PO intake   4. Sinemet and protein interaction limiting " pt's supplement options; per MAR pt receives Sinemet 6 times daily as well as 1 larger dose each evening - making timing of protein an obstacle, discussed consuming protein at least 30 minutes after taking Sinemet to ensure medicine is absorbed prior to protein intake     Malnutrition Risk: Pt likely with chronic disease malnutrition r/t Parkinson's disease leading to inadequate energy intake    Recommendations/Plan:  1. Please assist pt with meals in order to ensure she is able to take in an adequate amount of PO at each meal   2. Encourage intake of meals  3. Document intake of all meals as % taken in ADL's to provide interdisciplinary communication across all shifts.   4. Monitor weight.  5. Nutrition rep will continue to see patient for ongoing meal and snack preferences.  6. RD to monitor PO intake, wt trends, and nutrition labs/meds

## 2018-03-08 NOTE — ED PROVIDER NOTES
ED Provider Note    CHIEF COMPLAINT  Chief Complaint   Patient presents with   • Shaking   • Tremors       HPI  Delmis Draper is a 67 y.o. Female here for evaluation of tremors and dystonia. The patient states she has a history of dystonia, and that at times she has too much pain and cramping to get it to stop at home, on her own. She has no vomiting, no fevers, she states that she is having trouble walking because of her ankles earlier today, and fell backwards but was caught by her caregiver. She did not strike the ground, she did not hit her head. Patient has not had any medication changes.    PAST MEDICAL HISTORY   has a past medical history of Abnormal finding on mammography, microcalcification; Anesthesia; Bowel obstruction; Bursitis; Cataract; Cellulitis (7/19/2012); Dilated bile duct; Disc disorder; Dystonia; Dystonia; Dysuria; GERD (gastroesophageal reflux disease); Heart burn; Hypertension; Hypotension; Insulin resistance; Leukopenia; Low blood pressure reading; Malaise and fatigue; OSTEOPOROSIS (3/22/2010); Other specified disorder of intestines; Pain; Parkinson disease (CMS-[facility]); Peripheral neuropathy (CMS-[facility]); Rash, skin; Scoliosis; Spinal stenosis, lumbar; Thyrotoxicosis (remote); Unspecified disorder of thyroid; and vitamin D deficiency.    SOCIAL HISTORY  Social History     Social History Main Topics   • Smoking status: Former Smoker     Packs/day: 0.50     Years: 10.00     Quit date: 1/1/1985   • Smokeless tobacco: Never Used   • Alcohol use No      Comment: none since age 31   • Drug use: No   • Sexual activity: No      Comment: , 2 boys, diasability       SURGICAL HISTORY   has a past surgical history that includes thyroidectomy (1967); gastroscopy with biopsy (5/9/2012); egd w/endoscopic ultrasound (5/9/2012); cataract phaco with iol (4/14/2014); cataract phaco with iol (5/12/2014); breast biopsy (Left, 5/5/2015); gyn surgery (1985, 1988); knee arthrotomy (1964, 1968); and  "other.    CURRENT MEDICATIONS  Home Medications     Reviewed by Nuzhat Baldwin R.N. (Registered Nurse) on 03/07/18 at 1537  Med List Status: Not Addressed   Medication Last Dose Status   CALCIUM CITRATE-VITAMIN D3 PO 12/4/2017 Active   carbidopa-levodopa (SINEMET)  MG Tab 12/4/2017 Active   carisoprodol (SOMA) 350 MG Tab  Active   CREATINE PO 12/4/2017 Active   diazepam (VALIUM) 5 MG Tab 12/4/2017 Active   docusate sodium (COLACE) 100 MG Cap 12/4/2017 Active   gabapentin (NEURONTIN) 600 MG tablet 12/4/2017 Active   L-Theanine 100 MG Cap 10/23/2017 Active   LevOCARNitine L-Tartrate (L-CARNITINE) 500 MG Cap 12/4/2017 Active   Linaclotide 290 MCG Cap 10/23/2017 Active   Magnesium Citrate Powder 12/4/2017 Active   Melatonin 5 MG Tab 12/4/2017 Active   Multiple Vitamin (MULTI VITAMIN DAILY PO) 12/4/2017 Active   tramadol (ULTRAM) 50 MG Tab 10/22/2017 Active   Turmeric 500 MG Cap 12/4/2017 Active   Ubiquinol 100 MG Cap 12/4/2017 Active                ALLERGIES  Allergies   Allergen Reactions   • Bactrim [Sulfamethoxazole W-Trimethoprim] Hives   • Actonel [Risedronate Sodium]    • Latex Rash     Irritation of skin    • Sulfa Drugs    • Tape Rash     Paper tape is ok       REVIEW OF SYSTEMS  See HPI for further details. Review of systems as above, otherwise all other systems are negative.     PHYSICAL EXAM  VITAL SIGNS: /51   Pulse 85   Temp 37.1 °C (98.8 °F)   Resp (!) 23   Ht 1.6 m (5' 3\")   Wt 43 kg (94 lb 12.8 oz)   LMP 01/01/1990   SpO2 97%   BMI 16.79 kg/m²     Constitutional: Well developed, well nourished. mild acute distress.  HEENT: Normocephalic, atraumatic. MMM  Neck: Supple, Full range of motion   Chest/Pulmonary:  No respiratory distress.  Equal expansion   Musculoskeletal: No deformity, no edema, neurovascular intact.   Neuro: Clear speech, appropriate, cooperative, cranial nerves II-XII grossly intact.  parkinson's tremor noted, R > L sided upper ext.  Contracted lower ext at ankles. "   Psych: Normal mood and affect    Results for orders placed or performed during the hospital encounter of 03/07/18   CBC WITH DIFFERENTIAL   Result Value Ref Range    WBC 5.1 4.8 - 10.8 K/uL    RBC 3.93 (L) 4.20 - 5.40 M/uL    Hemoglobin 12.5 12.0 - 16.0 g/dL    Hematocrit 39.7 37.0 - 47.0 %    .0 (H) 81.4 - 97.8 fL    MCH 31.8 27.0 - 33.0 pg    MCHC 31.5 (L) 33.6 - 35.0 g/dL    RDW 50.3 (H) 35.9 - 50.0 fL    Platelet Count 140 (L) 164 - 446 K/uL    MPV 12.8 9.0 - 12.9 fL    Neutrophils-Polys 64.40 44.00 - 72.00 %    Lymphocytes 25.10 22.00 - 41.00 %    Monocytes 7.80 0.00 - 13.40 %    Eosinophils 2.10 0.00 - 6.90 %    Basophils 0.60 0.00 - 1.80 %    Immature Granulocytes 0.00 0.00 - 0.90 %    Nucleated RBC 0.40 /100 WBC    Neutrophils (Absolute) 3.31 2.00 - 7.15 K/uL    Lymphs (Absolute) 1.29 1.00 - 4.80 K/uL    Monos (Absolute) 0.40 0.00 - 0.85 K/uL    Eos (Absolute) 0.11 0.00 - 0.51 K/uL    Baso (Absolute) 0.03 0.00 - 0.12 K/uL    Immature Granulocytes (abs) 0.00 0.00 - 0.11 K/uL    NRBC (Absolute) 0.02 K/uL   COMP METABOLIC PANEL   Result Value Ref Range    Sodium 139 135 - 145 mmol/L    Potassium 4.8 3.6 - 5.5 mmol/L    Chloride 109 96 - 112 mmol/L    Co2 21 20 - 33 mmol/L    Anion Gap 9.0 0.0 - 11.9    Glucose 64 (L) 65 - 99 mg/dL    Bun 19 8 - 22 mg/dL    Creatinine 0.63 0.50 - 1.40 mg/dL    Calcium 8.9 8.5 - 10.5 mg/dL    AST(SGOT) 24 12 - 45 U/L    ALT(SGPT) <5 2 - 50 U/L    Alkaline Phosphatase 58 30 - 99 U/L    Total Bilirubin 0.3 0.1 - 1.5 mg/dL    Albumin 3.8 3.2 - 4.9 g/dL    Total Protein 6.6 6.0 - 8.2 g/dL    Globulin 2.8 1.9 - 3.5 g/dL    A-G Ratio 1.4 g/dL   LIPASE   Result Value Ref Range    Lipase 17 11 - 82 U/L   PROTHROMBIN TIME   Result Value Ref Range    PT 12.9 12.0 - 14.6 sec    INR 1.00 0.87 - 1.13   TROPONIN   Result Value Ref Range    Troponin I <0.01 0.00 - 0.04 ng/mL   ESTIMATED GFR   Result Value Ref Range    GFR If African American >60 >60 mL/min/1.73 m 2    GFR If Non  African American >60 >60 mL/min/1.73 m 2       PROCEDURES     MEDICAL RECORD  I have reviewed patient's medical record and pertinent results are listed above.    COURSE & MEDICAL DECISION MAKING  I have reviewed any medical record information, laboratory studies and radiographic results as noted above.    The patient will be admitted to Dr. Covington, for further evaluation and treatment.     FINAL IMPRESSION  1. Dystonia    2. Parkinson's disease (CMS-[facility])            Electronically signed by: Tima Vasques, 3/7/2018 6:03 PM

## 2018-03-08 NOTE — THERAPY
"Occupational Therapy Evaluation completed.   Functional Status: Pt is a 66 y/o female admitted with GLF and increased tremors, BLE weakness, with hx of Parkinson's and dystonia. She is currently requiring maxA for bed mobility, posterior lean in sitting. BLE with dystonia. RUE with resting tremor more prominent than LUE. She required maxA to don underwear via rolling, maxA for socks. Pt's BUE AROM limited at the shoulders, weakness throughout equally. Pt stands with modA, full contact assist from therapist and needing help positioning her BLE into proper stance. Pt limited by weakness, fatigue, impaired balance, and impaired muscle tone which impacts independence in aDLs and functional mobility.   Plan of Care: Will benefit from Occupational Therapy 3 times per week  Discharge Recommendations:  Equipment: Will Continue to Assess for Equipment Needs. Post-acute therapy Discharge to a transitional care facility for continued skilled therapy services.    See \"Rehab Therapy-Acute\" Patient Summary Report for complete documentation.    "

## 2018-03-08 NOTE — PROGRESS NOTES
[facility] Hospitalist Progress Note    Date of Service: 3/8/2018    Chief Complaint  67 y.o. female admitted 3/7/2018 with parkinson's disease s/p recent sinemet dosing decrease with Fall and weakness, now requiring Max assistance.    Interval Problem Update  B/l LE weakness  Unable to ambulate  Has 5 hour caregiver  Needs snf    Consultants/Specialty  none    Disposition  Needs snf in 2-3 days with improvement  Referral placed        Review of Systems   Constitutional: Negative for chills, diaphoresis, fever and malaise/fatigue.   HENT: Negative for congestion.    Eyes: Negative for blurred vision and double vision.   Respiratory: Negative for cough and shortness of breath.    Cardiovascular: Negative for palpitations and leg swelling.   Gastrointestinal: Negative for abdominal pain and nausea.   Genitourinary: Negative for dysuria and flank pain.   Musculoskeletal: Positive for myalgias.   Neurological: Positive for weakness. Negative for sensory change, speech change, focal weakness and headaches.   Psychiatric/Behavioral: Negative for memory loss. The patient is not nervous/anxious.       Physical Exam  Laboratory/Imaging   Hemodynamics  Temp (24hrs), Av.6 °C (97.9 °F), Min:36.2 °C (97.2 °F), Max:37.1 °C (98.8 °F)   Temperature: 36.5 °C (97.7 °F)  Pulse  Av.8  Min: 60  Max: 108 Heart Rate (Monitored): 83  Blood Pressure : (!) 99/59 (rn  notified), NIBP: 106/63      Respiratory      Respiration: 16, Pulse Oximetry: 99 %        RUL Breath Sounds: Clear, RML Breath Sounds: Diminished, RLL Breath Sounds: Clear, RUPALI Breath Sounds: Diminished, LLL Breath Sounds: Diminished    Fluids    Intake/Output Summary (Last 24 hours) at 18 0904  Last data filed at 18 0400   Gross per 24 hour   Intake             1095 ml   Output              250 ml   Net              845 ml       Nutrition  Orders Placed This Encounter   Procedures   • Diet Order     Standing Status:   Standing     Number of Occurrences:   1      Order Specific Question:   Diet:     Answer:   Regular [1]     Physical Exam   Constitutional: She is oriented to person, place, and time. She appears well-nourished.   HENT:   Head: Normocephalic and atraumatic.   Nose: Nose normal.   Mouth/Throat: No oropharyngeal exudate.   Eyes: EOM are normal. Pupils are equal, round, and reactive to light.   Neck: Neck supple. No JVD present. No thyromegaly present.   Cardiovascular: Normal rate and intact distal pulses.    No murmur heard.  Pulmonary/Chest: Breath sounds normal. No respiratory distress. She has no wheezes.   Abdominal: Soft. Bowel sounds are normal. She exhibits no distension. There is no tenderness.   Musculoskeletal: She exhibits tenderness. She exhibits no edema.   Neurological: She is alert and oriented to person, place, and time. No cranial nerve deficit. She exhibits abnormal muscle tone. Coordination abnormal.   Skin: Skin is warm and dry. No rash noted. She is not diaphoretic. No erythema.   Psychiatric: She has a normal mood and affect. Her behavior is normal. Judgment and thought content normal.   Nursing note and vitals reviewed.      Recent Labs      03/07/18   1539  03/08/18   0345   WBC  5.1  4.0*   RBC  3.93*  3.71*   HEMOGLOBIN  12.5  12.0   HEMATOCRIT  39.7  37.1   MCV  101.0*  100.0*   MCH  31.8  32.3   MCHC  31.5*  32.3*   RDW  50.3*  49.2   PLATELETCT  140*  128*   MPV  12.8  12.4     Recent Labs      03/07/18   1539  03/08/18   0345   SODIUM  139  142   POTASSIUM  4.8  3.7   CHLORIDE  109  111   CO2  21  27   GLUCOSE  64*  91   BUN  19  18   CREATININE  0.63  0.78   CALCIUM  8.9  8.7     Recent Labs      03/07/18   1539   INR  1.00                  Assessment/Plan     * Fall   Assessment & Plan    Related to dystonia, now with inability to ambulate, whereas in the past few days able to ambulate some with walker.  Will order PT and OT for evaluation.   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  Will increase medication back to 6 times daily, close outpatient follow-up with neurology.         Thrombocytopenia (CMS-HCC)   Assessment & Plan    Without current bleed.  Monitor.         Dystonia- (present on admission)   Assessment & Plan    Related to parkinsonism.  Some concern for other process being investigated as outpatient by neurology           Quality-Core Measures   Reviewed items::  Labs reviewed, Medications reviewed and Radiology images reviewed  DVT prophylaxis pharmacological::  Enoxaparin (Lovenox)  Ulcer Prophylaxis::  Not indicated  Assessed for rehabilitation services:  Patient was assess for and/or received rehabilitation services during this hospitalization

## 2018-03-08 NOTE — RESPIRATORY CARE
COPD EDUCATION by COPD CLINICAL EDUCATOR  3/8/2018 at 6:17 AM by Laura Goss     Patient reviewed by COPD education team. Patient does not qualify for COPD program.

## 2018-03-08 NOTE — CARE PLAN
Problem: Respiratory:  Goal: Respiratory status will improve  Outcome: PROGRESSING AS EXPECTED  Pt currently on 2 liters of oxygen. Pt requesting oxygen stating that she feels more comfortable with it on. Education provided to pt. Assessment completed. Oxygen saturation above 90.   Intervention: Educate and encourage coughing and deep breathing  Pt states that she is unable to perform education provided       Problem: Fluid Volume:  Goal: Will maintain balanced intake and output  Outcome: PROGRESSING AS EXPECTED  Education provided on appropriate intake. Pt concerned about taking PO medications due to the need to urinate. Pt educated on the importance and on the staff present to help either onto bedpan or to urinate.

## 2018-03-08 NOTE — THERAPY
"Physical Therapy Evaluation completed.   Bed Mobility:  Supine to Sit: Moderate Assist  Transfers: Sit to Stand: Moderate Assist  Gait: Level Of Assist: Unable to Participate with Will Continue to Assess for Equipment Needs       Plan of Care: Will benefit from Physical Therapy 3 times per week  Discharge Recommendations: Equipment: Will Continue to Assess for Equipment Needs.     See \"Rehab Therapy-Acute\" Patient Summary Report for complete documentation.     RN notified of PT visit, cleared for PT evaluation. Pt presented to PT for primary risk reduction for LOB/falling. Pt. presents with imparied balance, impaired corrdination, weakness, pain, impaired gait, and decreased activity tolerance. PtStacy aviles requires Mod to Max A for all functional mobility at this time as she is primarily limited by resting tremors and dystonia in BLE. Demonstrates with a posterior lean while sitting EOB; attempts to hold self upright with railings; however continues to require assist due to resting tremors and posterior lean. Pt. was able to stand with PT w/ Min to Mod A; unable to march in place due to dystonia; pt. decreases in resting tremors in BLE once standing. PtStacy aviles does not present with functional balance and strength to be able to ambulate at this time. Appropriate for transfers at this time. Pt. will benefit from continued skilled PT in acute care setting with recommendation for post acute therapy services prior to d/c home given current objective findings, age, PLOF, enviornmental barries, and limited social support.   "

## 2018-03-08 NOTE — PROGRESS NOTES
2 RN skin check performed. Pt sacrum red but blanching, perineal care performed, barrier wipes and cream used, mepilex dressing applied to sacrum. Pt has some generalized bruising and redness. Bilateral feet red and blanching. L arm red and blanching.

## 2018-03-08 NOTE — PROGRESS NOTES
Pt arrived to unit via gurney and unable to ambulate to hospital bed. Slide board and 3 x assist used. Pt complaining of tremors. Orders received via telephone with read back and medicated per MAR. Pt refusing SCD's due to increasing tremors. Education provided. Pt refusing pneumonia and influenza vaccines at this time but would like to readdress this before discharge. Infection prevention education provided as well as [facility] information packet. Education provided on administered medications and POC. Questions and concerns addressed. Hourly rounding and Q2 hour turns in place.

## 2018-03-08 NOTE — H&P
" Hospital Medicine History and Physical    Date of Service  3/7/2018    Chief Complaint  Chief Complaint   Patient presents with   • Shaking   • Tremors   fall    History of Presenting Illness  67 y.o. female with history of parkinsonism was apparently in her usual state of health until 2-3 weeks prior to admission, when it was felt that her dosing of sinemet should be decreased.  This was occurring, and patient noted worsening tremors in her upper extremities, and contractures in her lower extremities, with associated severe pain, which she describes as \"tearing of her tendons\".  She states that these symptoms are somewhat relieved by benadryl as well as muscle relaxants, otherwise are mostly constant in nature.  She has noted worsening and increased frequency of pain with decreased sinemet.  She currently denies chest pain or shortness of breath.  She reports that on the day of admission, while attempting to ambulate she fell to the ground, without hitting her head but with injuring her lower extremities.    Primary Care Physician  Raul Iverson M.D.    Consultants  None    Code Status  Full code     Review of Systems  Review of Systems   Constitutional: Negative.    HENT: Negative.    Eyes: Positive for blurred vision.   Respiratory: Negative.    Cardiovascular: Negative.    Gastrointestinal: Negative.    Genitourinary: Positive for dysuria.   Musculoskeletal: Positive for back pain, falls, joint pain, myalgias and neck pain.   Skin: Negative.    Neurological: Positive for tremors.   Endo/Heme/Allergies: Negative.    Psychiatric/Behavioral: Positive for depression.        Past Medical History  Past Medical History:   Diagnosis Date   • Cellulitis 7/19/2012    right low extremity   • OSTEOPOROSIS 3/22/2010   • Abnormal finding on mammography, microcalcification    • Anesthesia     hypotensive in the past   • Bowel obstruction     fecal impaction   • Bursitis    • Cataract    • Dilated bile duct    • Disc disorder  " "  • Dystonia    • Dystonia    • Dysuria    • GERD (gastroesophageal reflux disease)    • Heart burn    • Hypertension     pt can run very low   • Hypotension    • Insulin resistance    • Leukopenia    • Low blood pressure reading    • Malaise and fatigue    • Other specified disorder of intestines     constipation   • Pain     right hip, right knee   • Parkinson disease (CMS-HCC)     \"atypical\"   • Peripheral neuropathy (CMS-HCC)    • Rash, skin    • Scoliosis    • Spinal stenosis, lumbar    • Thyrotoxicosis remote    S/P I-131 Rx / subtotal thyroidectomy   • Unspecified disorder of thyroid     had partial thyroidectomy   • vitamin D deficiency        Surgical History  Past Surgical History:   Procedure Laterality Date   • BREAST BIOPSY Left 5/5/2015    Procedure: BREAST BIOPSY;  Surgeon: Deedee Bautista M.D.;  Location: SURGERY SAME DAY [facility];  Service:    • CATARACT PHACO WITH IOL  5/12/2014    Performed by Roland Becerra M.D. at [facility]   • CATARACT PHACO WITH IOL  4/14/2014    Performed by Roland Becerra M.D. at [facility]   • GASTROSCOPY WITH BIOPSY  5/9/2012    Performed by CIERRA SUAZO at [facility]   • EGD W/ENDOSCOPIC ULTRASOUND  5/9/2012    Performed by CIERRA SUAZO at [facility]   • THYROIDECTOMY  1967    partial   • GYN SURGERY  1985, 1988    c sec x 2   • KNEE ARTHROTOMY  1964, 1968    right   • OTHER      left fifth digit       Medications  No current facility-administered medications on file prior to encounter.      Current Outpatient Prescriptions on File Prior to Encounter   Medication Sig Dispense Refill   • CREATINE PO Take 1 Capsule by mouth 2 Times a Day.     • LevOCARNitine L-Tartrate (L-CARNITINE) 500 MG Cap Take 1 Capsule by mouth every day.     • Turmeric 500 MG Cap Take 1 Cap by mouth every day.     • docusate sodium (COLACE) 100 MG Cap Take 300 mg by mouth every day.     • Magnesium Citrate Powder Take 2 Doses by " mouth every 48 hours. 2 teaspoons every other days     • diazepam (VALIUM) 5 MG Tab Take 10 mg by mouth 2 Times a Day. 0700 and 1600     • Multiple Vitamin (MULTI VITAMIN DAILY PO) Take 1 Tab by mouth every day.     • Ubiquinol 100 MG Cap Take 100 mg by mouth every day. coQ10 and ubiquinol         Family History  Family History   Problem Relation Age of Onset   • Arthritis Mother      Giant Cell Arteritis   • Lung Disease Father      COPD/Emphysema   • Hyperlipidemia Sister    • Heart Disease Maternal Aunt    • Heart Disease Maternal Uncle    • Heart Disease Paternal Aunt    • Heart Disease Paternal Uncle    • Heart Disease Maternal Grandmother    • Hypertension Maternal Grandmother    • Hyperlipidemia Maternal Grandmother    • Heart Disease Maternal Grandfather    • Hypertension Maternal Grandfather    • Hyperlipidemia Maternal Grandfather    • Heart Disease Paternal Grandfather    • Hypertension Paternal Grandfather    • Hyperlipidemia Paternal Grandfather    • Cancer Paternal Grandfather      Bone       Social History  Social History   Substance Use Topics   • Smoking status: Former Smoker     Packs/day: 0.50     Years: 10.00     Quit date: 1985   • Smokeless tobacco: Never Used   • Alcohol use No      Comment: none since age 31       Allergies  Allergies   Allergen Reactions   • Bactrim [Sulfamethoxazole W-Trimethoprim] Hives   • Actonel [Risedronate Sodium]    • Latex Rash     Irritation of skin    • Sulfa Drugs    • Tape Rash     Paper tape is ok        Physical Exam  Laboratory   Hemodynamics  Temp (24hrs), Av.1 °C (98.8 °F), Min:37.1 °C (98.8 °F), Max:37.1 °C (98.8 °F)   Temperature: 37.1 °C (98.8 °F)  Pulse  Av  Min: 67  Max: 108    Blood Pressure : 112/51, NIBP: 129/71      Respiratory      Respiration: 15, Pulse Oximetry: 98 %        RUL Breath Sounds: Diminished, RML Breath Sounds: Diminished, RLL Breath Sounds: Diminished, RUPALI Breath Sounds: Diminished, LLL Breath Sounds:  Diminished    Physical Exam   Constitutional: She is oriented to person, place, and time. She appears well-developed and well-nourished. No distress.   HENT:   Head: Normocephalic and atraumatic.   Eyes: Pupils are equal, round, and reactive to light.   Neck: Normal range of motion. Neck supple. No tracheal deviation present. No thyromegaly present.   Cardiovascular: Normal rate, regular rhythm and normal heart sounds.  Exam reveals no gallop and no friction rub.    No murmur heard.  Pulmonary/Chest: Effort normal and breath sounds normal. No respiratory distress. She has no wheezes. She has no rales.   Abdominal: Soft. Bowel sounds are normal. She exhibits no distension. There is no tenderness. There is no rebound.   Musculoskeletal: Normal range of motion. She exhibits no edema.   Lymphadenopathy:     She has no cervical adenopathy.   Neurological: She is alert and oriented to person, place, and time. No cranial nerve deficit. She exhibits abnormal muscle tone. Coordination abnormal.   Contracture at knees and ankles bilaterally, painful to palpation or passive movement    Skin: Skin is warm and dry. She is not diaphoretic.   Psychiatric: She has a normal mood and affect.   Nursing note and vitals reviewed.      Recent Labs      03/07/18   1539   WBC  5.1   RBC  3.93*   HEMOGLOBIN  12.5   HEMATOCRIT  39.7   MCV  101.0*   MCH  31.8   MCHC  31.5*   RDW  50.3*   PLATELETCT  140*   MPV  12.8     Recent Labs      03/07/18   1539   SODIUM  139   POTASSIUM  4.8   CHLORIDE  109   CO2  21   GLUCOSE  64*   BUN  19   CREATININE  0.63   CALCIUM  8.9     Recent Labs      03/07/18   1539   ALTSGPT  <5   ASTSGOT  24   ALKPHOSPHAT  58   TBILIRUBIN  0.3   LIPASE  17   GLUCOSE  64*     Recent Labs      03/07/18   1539   INR  1.00             Lab Results   Component Value Date    TROPONINI <0.01 03/07/2018     Urinalysis:    Lab Results  Component Value Date/Time   SPECGRAVITY 1.010 01/15/2018 2300   GLUCOSEUR Negative 01/15/2018  2300   KETONES Negative 01/15/2018 2300   NITRITE Negative 01/15/2018 2300   WBCURINE 0-2 11/15/2017 1359   RBCURINE 2-5 (A) 11/15/2017 1359   BACTERIA Negative 11/15/2017 1359   EPITHELCELL Negative 11/15/2017 1359        Imaging  No new imaging for review    Assessment/Plan     I anticipate this patient is appropriate for observation status at this time.    * Fall   Assessment & Plan    Related to dystonia, now with inability to ambulate, whereas in the past few days able to ambulate some with walker.  Will order PT and OT for evaluation.         Other secondary parkinsonism (CMS-HCC)- (present on admission)   Assessment & Plan    Being closely followed by neurology, was recently decreasing sinemet dosing, with result of increased spasticity, and pain from the same.  Will increase medication back to 6 times daily, close outpatient follow-up with neurology.         Thrombocytopenia (CMS-HCC)   Assessment & Plan    Without current bleed.  Monitor.         Dystonia- (present on admission)   Assessment & Plan    Related to parkinsonism.  Some concern for other process being investigated as outpatient by neurology             VTE prophylaxis: SCD, Lovenox.

## 2018-03-08 NOTE — ASSESSMENT & PLAN NOTE
Related to dystonia, now with inability to ambulate, whereas in the past few days able to ambulate some with walker.PT and OT for evaluation; moderate assist    Unsafe dc home  Rehab referral: Not a candidate per Dr. Alvarado  Palliative care referral  snf referral  Ankle xray neg    3/11 encourage activity: mod assist    3/12 encourage therapy,still resistant to snf placement  Concerned about losing her care giver, d/w SW  Pt does have support of her  after 5p, when caregivers are not with her    Improved strength, home health order place

## 2018-03-09 PROCEDURE — G0378 HOSPITAL OBSERVATION PER HR: HCPCS

## 2018-03-09 PROCEDURE — A9270 NON-COVERED ITEM OR SERVICE: HCPCS | Mod: JG | Performed by: HOSPITALIST

## 2018-03-09 PROCEDURE — 700102 HCHG RX REV CODE 250 W/ 637 OVERRIDE(OP): Mod: JG | Performed by: HOSPITALIST

## 2018-03-09 PROCEDURE — 99226 PR SUBSEQUENT OBSERVATION CARE,LEVEL III: CPT | Performed by: INTERNAL MEDICINE

## 2018-03-09 PROCEDURE — 96372 THER/PROPH/DIAG INJ SC/IM: CPT

## 2018-03-09 PROCEDURE — 700111 HCHG RX REV CODE 636 W/ 250 OVERRIDE (IP): Performed by: HOSPITALIST

## 2018-03-09 PROCEDURE — 96376 TX/PRO/DX INJ SAME DRUG ADON: CPT

## 2018-03-09 RX ADMIN — METAXALONE 800 MG: 800 TABLET ORAL at 15:05

## 2018-03-09 RX ADMIN — CARBIDOPA AND LEVODOPA 1 TABLET: 25; 100 TABLET ORAL at 20:19

## 2018-03-09 RX ADMIN — GABAPENTIN 2400 MG: 400 CAPSULE ORAL at 20:18

## 2018-03-09 RX ADMIN — OXYCODONE HYDROCHLORIDE 5 MG: 5 TABLET ORAL at 21:44

## 2018-03-09 RX ADMIN — ENOXAPARIN SODIUM 40 MG: 100 INJECTION SUBCUTANEOUS at 08:32

## 2018-03-09 RX ADMIN — DIAZEPAM 10 MG: 5 TABLET ORAL at 15:13

## 2018-03-09 RX ADMIN — CARBIDOPA AND LEVODOPA 1 TABLET: 25; 100 TABLET ORAL at 05:14

## 2018-03-09 RX ADMIN — CARISOPRODOL 350 MG: 350 TABLET ORAL at 20:18

## 2018-03-09 RX ADMIN — Medication 90 MG: at 08:32

## 2018-03-09 RX ADMIN — DIAZEPAM 10 MG: 5 TABLET ORAL at 05:14

## 2018-03-09 RX ADMIN — CARBIDOPA AND LEVODOPA 1 TABLET: 25; 100 TABLET ORAL at 12:06

## 2018-03-09 RX ADMIN — DIPHENHYDRAMINE HYDROCHLORIDE 25 MG: 50 INJECTION, SOLUTION INTRAMUSCULAR; INTRAVENOUS at 21:43

## 2018-03-09 RX ADMIN — METAXALONE 800 MG: 800 TABLET ORAL at 05:14

## 2018-03-09 RX ADMIN — CARBIDOPA AND LEVODOPA 1 TABLET: 50; 200 TABLET, EXTENDED RELEASE ORAL at 20:18

## 2018-03-09 RX ADMIN — METAXALONE 800 MG: 800 TABLET ORAL at 12:06

## 2018-03-09 RX ADMIN — POLYETHYLENE GLYCOL 3350 1 PACKET: 17 POWDER, FOR SOLUTION ORAL at 08:31

## 2018-03-09 RX ADMIN — CARBIDOPA AND LEVODOPA 1 TABLET: 25; 100 TABLET ORAL at 18:00

## 2018-03-09 RX ADMIN — STANDARDIZED SENNA CONCENTRATE AND DOCUSATE SODIUM 2 TABLET: 8.6; 5 TABLET, FILM COATED ORAL at 08:32

## 2018-03-09 RX ADMIN — DIPHENHYDRAMINE HYDROCHLORIDE 25 MG: 50 INJECTION, SOLUTION INTRAMUSCULAR; INTRAVENOUS at 12:09

## 2018-03-09 RX ADMIN — METAXALONE 800 MG: 800 TABLET ORAL at 08:33

## 2018-03-09 RX ADMIN — CARBIDOPA AND LEVODOPA 1 TABLET: 25; 100 TABLET ORAL at 08:32

## 2018-03-09 RX ADMIN — CARBIDOPA AND LEVODOPA 1 TABLET: 25; 100 TABLET ORAL at 15:05

## 2018-03-09 ASSESSMENT — ENCOUNTER SYMPTOMS
CHILLS: 0
COUGH: 0
MYALGIAS: 1
WEAKNESS: 1
MEMORY LOSS: 0
NAUSEA: 0
FOCAL WEAKNESS: 0
DOUBLE VISION: 0
NECK PAIN: 0
FLANK PAIN: 0
PALPITATIONS: 0
DEPRESSION: 0
HEADACHES: 0
FEVER: 0
ABDOMINAL PAIN: 0
BLURRED VISION: 0
NERVOUS/ANXIOUS: 0
SHORTNESS OF BREATH: 0

## 2018-03-09 ASSESSMENT — PAIN SCALES - GENERAL
PAINLEVEL_OUTOF10: 5
PAINLEVEL_OUTOF10: 8
PAINLEVEL_OUTOF10: 4
PAINLEVEL_OUTOF10: 0

## 2018-03-09 NOTE — CARE PLAN
Problem: Safety  Goal: Will remain free from falls  Outcome: PROGRESSING AS EXPECTED  Fall precautions in place - bed in lowest position, bed alarm is on, call light and personal belongings within reach.     Problem: Urinary Elimination:  Goal: Ability to reestablish a normal urinary elimination pattern will improve  Outcome: PROGRESSING AS EXPECTED  Implementing scheduled toiletting

## 2018-03-09 NOTE — PROGRESS NOTES
[facility] Hospitalist Progress Note    Date of Service: 3/9/2018    Chief Complaint  67 y.o. female admitted 3/7/2018 with parkinson's disease s/p recent sinemet dosing decrease with Fall and weakness, now requiring Max assistance.    Interval Problem Update  B/l LE weakness  - ongoing      Consultants/Specialty  none    Disposition  Needs snf in 2-3 days with improvement  Referral placed  Palliative care f/u      Review of Systems   Constitutional: Negative for chills, fever and malaise/fatigue.   HENT: Negative for congestion.    Eyes: Negative for blurred vision and double vision.   Respiratory: Negative for cough and shortness of breath.    Cardiovascular: Negative for palpitations and leg swelling.   Gastrointestinal: Negative for abdominal pain and nausea.   Genitourinary: Negative for flank pain and urgency.   Musculoskeletal: Positive for myalgias. Negative for neck pain.   Neurological: Positive for weakness. Negative for focal weakness and headaches.   Psychiatric/Behavioral: Negative for depression and memory loss. The patient is not nervous/anxious.       Physical Exam  Laboratory/Imaging   Hemodynamics  Temp (24hrs), Av.8 °C (98.3 °F), Min:36.1 °C (97 °F), Max:37.3 °C (99.2 °F)   Temperature: 36.1 °C (97 °F)  Pulse  Av.3  Min: 60  Max: 108    Blood Pressure : (!) 94/57 (R.N. notifiefd)      Respiratory      Respiration: 16, Pulse Oximetry: 94 %        RUL Breath Sounds: Diminished, RML Breath Sounds: Diminished, RLL Breath Sounds: Diminished, RUPALI Breath Sounds: Diminished, LLL Breath Sounds: Diminished    Fluids    Intake/Output Summary (Last 24 hours) at 18 1418  Last data filed at 18 0400   Gross per 24 hour   Intake               60 ml   Output                0 ml   Net               60 ml       Nutrition  Orders Placed This Encounter   Procedures   • Diet Order     Standing Status:   Standing     Number of Occurrences:   1     Order Specific Question:   Diet:     Answer:   Regular  [1]     Physical Exam   Constitutional: She is oriented to person, place, and time. She appears well-nourished. No distress.   HENT:   Head: Normocephalic and atraumatic.   Nose: Nose normal.   Eyes: EOM are normal. Pupils are equal, round, and reactive to light.   Neck: Neck supple. No thyromegaly present.   Cardiovascular: Normal rate and intact distal pulses.    No murmur heard.  Pulmonary/Chest: Effort normal and breath sounds normal. No respiratory distress.   Abdominal: Soft. Bowel sounds are normal. She exhibits no distension. There is no tenderness.   Musculoskeletal: She exhibits tenderness. She exhibits no edema.   Neurological: She is alert and oriented to person, place, and time. No cranial nerve deficit. She exhibits abnormal muscle tone. Coordination abnormal.   Skin: Skin is warm and dry. She is not diaphoretic.   Psychiatric: She has a normal mood and affect. Her behavior is normal. Judgment and thought content normal.   Nursing note and vitals reviewed.      Recent Labs      03/07/18   1539  03/08/18   0345   WBC  5.1  4.0*   RBC  3.93*  3.71*   HEMOGLOBIN  12.5  12.0   HEMATOCRIT  39.7  37.1   MCV  101.0*  100.0*   MCH  31.8  32.3   MCHC  31.5*  32.3*   RDW  50.3*  49.2   PLATELETCT  140*  128*   MPV  12.8  12.4     Recent Labs      03/07/18   1539  03/08/18   0345   SODIUM  139  142   POTASSIUM  4.8  3.7   CHLORIDE  109  111   CO2  21  27   GLUCOSE  64*  91   BUN  19  18   CREATININE  0.63  0.78   CALCIUM  8.9  8.7     Recent Labs      03/07/18   1539   INR  1.00                  Assessment/Plan     * Fall   Assessment & Plan    Related to dystonia, now with inability to ambulate, whereas in the past few days able to ambulate some with walker.  Will order PT and OT for evaluation.   Max assist  snf referral  Ankle xray neg        Other secondary parkinsonism (CMS-HCC)- (present on admission)   Assessment & Plan    Being closely followed by neurology, was recently decreasing sinemet dosing, with  result of increased spasticity, and pain from the same.  Will increase medication back to 6 times daily, close outpatient follow-up with neurology.     3/9 ongoing generalized weakness, 1 person assist  Still requiring 24-hour assistance, not safe for discharge to home  Skilled nursing referral, patient is reluctant for transfer   to assist  Palliative care consult regarding goals of care        Thrombocytopenia (CMS-[facility])   Assessment & Plan    Without current bleed.  Monitor.         Dystonia- (present on admission)   Assessment & Plan    Related to parkinsonism.  Some concern for other process being investigated as outpatient by neurology           Quality-Core Measures   Reviewed items::  Labs reviewed, Medications reviewed and Radiology images reviewed  DVT prophylaxis pharmacological::  Enoxaparin (Lovenox)  Ulcer Prophylaxis::  Not indicated  Assessed for rehabilitation services:  Patient was assess for and/or received rehabilitation services during this hospitalization

## 2018-03-09 NOTE — DISCHARGE PLANNING
JOSE met with pt at bedside to discuss SNF choice.  Pt refused to make choice and says she needs to be home and has obligations to attend to. Pt has a caregiver from [facility] [place] for a limited time during the day. JOSE discussed with MD. HH is not a safe option for pt as her admission is related to a fall at home. JOSE met with pt again to discuss placing referrals to SNF and will follow progress with therapy to determine best d/c plan. Pt refused to make choice. JOSE discussed the above in rounds.       Per rounds, MD to place Palliative Consult.

## 2018-03-09 NOTE — PROGRESS NOTES
Pt A&Ox4, c/o pain in BLE and lower back - medicating per MAR, pt stated that PRN Morphine was not helping this time, offered other PO medications and ice/hot packs but pt refused. Pt has tremors in BLE and most notably in RUE. Pt stated that she 'did not like that' staff offered her to assist with Q2 turns, edu pt the importance of turns to prevent skin breakdown, pt continued to refuse. Pt is able to call for assistance with toiletting needs. Call light and personal belongings within reach.

## 2018-03-09 NOTE — CARE PLAN
Problem: Nutritional:  Goal: Achieve adequate nutritional intake  Patient will consume >50% of meals  Outcome: PROGRESSING AS EXPECTED  Please see dietary note. RD following

## 2018-03-10 ENCOUNTER — HOSPITAL ENCOUNTER (INPATIENT)
Facility: REHABILITATION | Age: 68
End: 2018-03-10
Attending: PHYSICAL MEDICINE & REHABILITATION | Admitting: PHYSICAL MEDICINE & REHABILITATION
Payer: MEDICARE

## 2018-03-10 PROCEDURE — A9270 NON-COVERED ITEM OR SERVICE: HCPCS | Mod: JG | Performed by: HOSPITALIST

## 2018-03-10 PROCEDURE — 700102 HCHG RX REV CODE 250 W/ 637 OVERRIDE(OP): Performed by: INTERNAL MEDICINE

## 2018-03-10 PROCEDURE — 700105 HCHG RX REV CODE 258: Performed by: INTERNAL MEDICINE

## 2018-03-10 PROCEDURE — 99226 PR SUBSEQUENT OBSERVATION CARE,LEVEL III: CPT | Performed by: INTERNAL MEDICINE

## 2018-03-10 PROCEDURE — A9270 NON-COVERED ITEM OR SERVICE: HCPCS | Performed by: INTERNAL MEDICINE

## 2018-03-10 PROCEDURE — 96372 THER/PROPH/DIAG INJ SC/IM: CPT

## 2018-03-10 PROCEDURE — 96376 TX/PRO/DX INJ SAME DRUG ADON: CPT

## 2018-03-10 PROCEDURE — 700111 HCHG RX REV CODE 636 W/ 250 OVERRIDE (IP): Performed by: HOSPITALIST

## 2018-03-10 PROCEDURE — G0378 HOSPITAL OBSERVATION PER HR: HCPCS

## 2018-03-10 PROCEDURE — 96361 HYDRATE IV INFUSION ADD-ON: CPT

## 2018-03-10 PROCEDURE — 700102 HCHG RX REV CODE 250 W/ 637 OVERRIDE(OP): Mod: JG | Performed by: HOSPITALIST

## 2018-03-10 RX ORDER — OXYCODONE HYDROCHLORIDE 5 MG/1
2.5 TABLET ORAL
Status: DISCONTINUED | OUTPATIENT
Start: 2018-03-10 | End: 2018-03-14 | Stop reason: HOSPADM

## 2018-03-10 RX ORDER — MORPHINE SULFATE 4 MG/ML
2 INJECTION, SOLUTION INTRAMUSCULAR; INTRAVENOUS EVERY 8 HOURS PRN
Status: DISCONTINUED | OUTPATIENT
Start: 2018-03-10 | End: 2018-03-14 | Stop reason: HOSPADM

## 2018-03-10 RX ORDER — OXYCODONE HYDROCHLORIDE 5 MG/1
5 TABLET ORAL
Status: DISCONTINUED | OUTPATIENT
Start: 2018-03-10 | End: 2018-03-14 | Stop reason: HOSPADM

## 2018-03-10 RX ORDER — SODIUM CHLORIDE 9 MG/ML
INJECTION, SOLUTION INTRAVENOUS CONTINUOUS
Status: DISCONTINUED | OUTPATIENT
Start: 2018-03-10 | End: 2018-03-14 | Stop reason: HOSPADM

## 2018-03-10 RX ORDER — AMANTADINE HYDROCHLORIDE 100 MG/1
100 CAPSULE, GELATIN COATED ORAL DAILY
Status: DISCONTINUED | OUTPATIENT
Start: 2018-03-10 | End: 2018-03-14 | Stop reason: HOSPADM

## 2018-03-10 RX ADMIN — CARISOPRODOL 350 MG: 350 TABLET ORAL at 20:16

## 2018-03-10 RX ADMIN — METAXALONE 800 MG: 800 TABLET ORAL at 08:33

## 2018-03-10 RX ADMIN — METAXALONE 800 MG: 800 TABLET ORAL at 12:01

## 2018-03-10 RX ADMIN — AMANTADINE HYDROCHLORIDE 100 MG: 100 CAPSULE ORAL at 15:19

## 2018-03-10 RX ADMIN — Medication 90 MG: at 08:33

## 2018-03-10 RX ADMIN — METAXALONE 800 MG: 800 TABLET ORAL at 15:19

## 2018-03-10 RX ADMIN — CARBIDOPA AND LEVODOPA 1 TABLET: 25; 100 TABLET ORAL at 08:33

## 2018-03-10 RX ADMIN — CARBIDOPA AND LEVODOPA 1 TABLET: 25; 100 TABLET ORAL at 06:02

## 2018-03-10 RX ADMIN — DIPHENHYDRAMINE HYDROCHLORIDE 25 MG: 50 INJECTION, SOLUTION INTRAMUSCULAR; INTRAVENOUS at 13:51

## 2018-03-10 RX ADMIN — CARBIDOPA AND LEVODOPA 1 TABLET: 25; 100 TABLET ORAL at 21:34

## 2018-03-10 RX ADMIN — GABAPENTIN 2400 MG: 400 CAPSULE ORAL at 21:14

## 2018-03-10 RX ADMIN — ENOXAPARIN SODIUM 40 MG: 100 INJECTION SUBCUTANEOUS at 08:34

## 2018-03-10 RX ADMIN — SODIUM CHLORIDE: 9 INJECTION, SOLUTION INTRAVENOUS at 21:28

## 2018-03-10 RX ADMIN — CARBIDOPA AND LEVODOPA 1 TABLET: 25; 100 TABLET ORAL at 17:52

## 2018-03-10 RX ADMIN — DIAZEPAM 10 MG: 5 TABLET ORAL at 06:02

## 2018-03-10 RX ADMIN — METAXALONE 800 MG: 800 TABLET ORAL at 06:02

## 2018-03-10 RX ADMIN — CARBIDOPA AND LEVODOPA 1 TABLET: 25; 100 TABLET ORAL at 15:19

## 2018-03-10 RX ADMIN — SODIUM CHLORIDE: 9 INJECTION, SOLUTION INTRAVENOUS at 08:35

## 2018-03-10 RX ADMIN — DIAZEPAM 10 MG: 5 TABLET ORAL at 15:19

## 2018-03-10 RX ADMIN — OXYCODONE HYDROCHLORIDE 5 MG: 5 TABLET ORAL at 21:15

## 2018-03-10 RX ADMIN — OXYCODONE HYDROCHLORIDE 5 MG: 5 TABLET ORAL at 17:52

## 2018-03-10 RX ADMIN — STANDARDIZED SENNA CONCENTRATE AND DOCUSATE SODIUM 2 TABLET: 8.6; 5 TABLET, FILM COATED ORAL at 20:16

## 2018-03-10 RX ADMIN — CARBIDOPA AND LEVODOPA 1 TABLET: 25; 100 TABLET ORAL at 12:01

## 2018-03-10 RX ADMIN — DIPHENHYDRAMINE HYDROCHLORIDE 25 MG: 50 INJECTION, SOLUTION INTRAMUSCULAR; INTRAVENOUS at 21:23

## 2018-03-10 RX ADMIN — CARBIDOPA AND LEVODOPA 1 TABLET: 50; 200 TABLET, EXTENDED RELEASE ORAL at 20:16

## 2018-03-10 ASSESSMENT — ENCOUNTER SYMPTOMS
PALPITATIONS: 0
COUGH: 0
MYALGIAS: 1
ABDOMINAL PAIN: 0
BLURRED VISION: 0
MEMORY LOSS: 0
NAUSEA: 0
NERVOUS/ANXIOUS: 0
WEAKNESS: 1
SHORTNESS OF BREATH: 0
DIZZINESS: 0
HEADACHES: 0
CHILLS: 0
DOUBLE VISION: 0
FOCAL WEAKNESS: 0
FEVER: 0

## 2018-03-10 ASSESSMENT — PAIN SCALES - GENERAL
PAINLEVEL_OUTOF10: 0
PAINLEVEL_OUTOF10: 0
PAINLEVEL_OUTOF10: 6

## 2018-03-10 NOTE — CONSULTS
Reason for PC Consult: Advance Care Planning    Consulted by: Dr. Joanne Arias  Assessment:  General: Pt is a very pleasant 67 year old female w/pmh of atypical Parkinson's, scoliosis, dystonia, peripheral neuropathy, HTN and pain; admitted for increasing tremors and worsening lower extremity contractures s/p decrease of her daily Sinemet.     Dyspnea: No    Last BM: 03/10/18  Pain: No   Depression: Mood appropriate for situation    Dementia: No       Spiritual:  Is Latter day or spirituality important for coping with this illness? No    Has a  or spiritual provider visit been requested? No    Palliative Performance Scale: 40%    Advance Directive: Advance Directive    DPOA:      POLST: No      Code Status: Full      Outcome:  Palliative RN met w/pt at bedside. Introduced Palliative role/support and answered all questions. Educated pt regarding FULL code vs DNR and discussed her wishes based on her Advance Directive. Pt wishes to remain a FULL code at this time, but will be discussing this w/her . Further discussion had regarding pt's goals for rehabilitation and ability to return home. Pt is concerned if she should happen to go to a rehab facility that she will lose the caregiver she has at home, who has been with her for a long time. Pt also states she is unsure how much PT she will be able to participate in w/her LE contractures being so severe and reports she is feeling increasing weakness in her face this morning.     Updated: Bedside RN, MD    Plan: Discussed pt's clinical concerns w/physician who states she will follow up w/pt regarding her questions. Discussed pt's caregiver concern w/care coordinator who states if pt is paying out of pocket for her caregiver service, she will be able to place the caregiver on hold until she is able to return home. Will relay this information to pt.     Recommendations: I do not recommend an ethics or hospice consult at this time because as pt is still contuing  w/treatments. .    Thank you for allowing Palliative Care to participate in this patient's care. Please feel free to call [phone] with any questions or concerns.

## 2018-03-10 NOTE — CARE PLAN
Problem: Safety  Goal: Will remain free from injury  Outcome: PROGRESSING AS EXPECTED  Fall precautions in place - bed in lowest position, bed alarm is on, call light and personal belongings within reach.               Problem: Urinary Elimination:  Goal: Ability to reestablish a normal urinary elimination pattern will improve  Outcome: PROGRESSING AS EXPECTED  Implementing scheduled toiletting

## 2018-03-10 NOTE — DISCHARGE PLANNING
PMR order received from Dr. Arias.  SCP is shown for her medical provider. DX: Parkinson's disease s/p recent sinemet dosing decrease with Fall and weakness.  Dr. Alvarado to consult.  I do appreciate the referral.

## 2018-03-10 NOTE — PROGRESS NOTES
Neuroscience Shift Summary: [id number]  Orientation- Alert and oriented x4  Incision/Skin- Scattered bruising   Lines/Drains- PIV  Telemetry- NA  Pain- Denies  Diet-  Regular  Discharge Plan- Home vs rehab?    Concerns-  Patient states that her  can take care of her at home and patient verbalizes understanding that she may be wheelchair bound.  Patient did ambulate with front ww to the BR and did Ok, but was unable to continually do that throughout the day.  Bedpan used.  No acute concerns.

## 2018-03-10 NOTE — PROGRESS NOTES
[facility] Hospitalist Progress Note    Date of Service: 3/10/2018    Chief Complaint  67 y.o. female admitted 3/7/2018 with parkinson's disease s/p recent sinemet dosing decrease with Fall and weakness, now requiring Max assistance.    Interval Problem Update  B/l LE weakness  - d/w palliative care, pt sensation grimacing, ?dystonia, rigidity    Consultants/Specialty  none    Disposition  Needs snf in 2-3 days with improvement  Referral placed  Palliative care ,  to assist  Rehab referral    - consider trial of amantadine      Review of Systems   Constitutional: Negative for chills and fever.   HENT: Negative for congestion.    Eyes: Negative for blurred vision and double vision.   Respiratory: Negative for cough and shortness of breath.    Cardiovascular: Negative for chest pain, palpitations and leg swelling.   Gastrointestinal: Negative for abdominal pain and nausea.   Genitourinary: Negative for urgency.   Musculoskeletal: Positive for myalgias.   Neurological: Positive for weakness. Negative for dizziness, focal weakness and headaches.   Psychiatric/Behavioral: Negative for memory loss. The patient is not nervous/anxious.       Physical Exam  Laboratory/Imaging   Hemodynamics  Temp (24hrs), Av.4 °C (97.6 °F), Min:36.2 °C (97.1 °F), Max:36.8 °C (98.2 °F)   Temperature: 36.3 °C (97.3 °F)  Pulse  Av.9  Min: 60  Max: 108    Blood Pressure : (!) 89/59      Respiratory      Respiration: 16, Pulse Oximetry: 90 %        RUL Breath Sounds: Diminished, RML Breath Sounds: Diminished, RLL Breath Sounds: Diminished, RUPALI Breath Sounds: Diminished, LLL Breath Sounds: Diminished    Fluids    Intake/Output Summary (Last 24 hours) at 03/10/18 1222  Last data filed at 03/10/18 1035   Gross per 24 hour   Intake              300 ml   Output                0 ml   Net              300 ml       Nutrition  Orders Placed This Encounter   Procedures   • Diet Order     Standing Status:   Standing     Number of  Occurrences:   1     Order Specific Question:   Diet:     Answer:   Regular [1]     Physical Exam   Constitutional: She is oriented to person, place, and time. She appears well-nourished. No distress.   HENT:   Head: Normocephalic and atraumatic.   Mouth/Throat: No oropharyngeal exudate.   Eyes: EOM are normal. Pupils are equal, round, and reactive to light. Right eye exhibits no discharge. Left eye exhibits no discharge.   Neck: Neck supple.   Cardiovascular: Normal rate and intact distal pulses.    Pulmonary/Chest: Effort normal and breath sounds normal. No respiratory distress.   Abdominal: Soft. Bowel sounds are normal. She exhibits no distension.   Musculoskeletal: She exhibits tenderness. She exhibits no edema.   Neurological: She is alert and oriented to person, place, and time. No cranial nerve deficit. She exhibits abnormal muscle tone. Coordination abnormal.   Skin: Skin is warm and dry.   Psychiatric: She has a normal mood and affect. Her behavior is normal. Judgment and thought content normal.   Nursing note and vitals reviewed.      Recent Labs      03/07/18   1539  03/08/18   0345   WBC  5.1  4.0*   RBC  3.93*  3.71*   HEMOGLOBIN  12.5  12.0   HEMATOCRIT  39.7  37.1   MCV  101.0*  100.0*   MCH  31.8  32.3   MCHC  31.5*  32.3*   RDW  50.3*  49.2   PLATELETCT  140*  128*   MPV  12.8  12.4     Recent Labs      03/07/18   1539  03/08/18   0345   SODIUM  139  142   POTASSIUM  4.8  3.7   CHLORIDE  109  111   CO2  21  27   GLUCOSE  64*  91   BUN  19  18   CREATININE  0.63  0.78   CALCIUM  8.9  8.7     Recent Labs      03/07/18   1539   INR  1.00                  Assessment/Plan     * Fall   Assessment & Plan    Related to dystonia, now with inability to ambulate, whereas in the past few days able to ambulate some with walker.PT and OT for evaluation.   Max assist/mod assist  Unsafe dc home  Rehab referral, eval pt for intense therapy with goal for dc home  Palliative care referral  snf referral  Ankle xray  neg        Other secondary parkinsonism (CMS-HCC)- (present on admission)   Assessment & Plan    Being closely followed by neurology, was recently decreasing sinemet dosing, with result of increased spasticity, and pain from the same.  Will increase medication back to 6 times daily, close outpatient follow-up with neurology.     3/9 ongoing generalized weakness, 1 person assist  Still requiring 24-hour assistance, not safe for discharge to home  Skilled nursing referral, patient is reluctant for transfer   to assist  Palliative care consult regarding goals of care        Hypotension- (present on admission)   Assessment & Plan    Encourage oral intake  Add nss  Limit morphine use        Thrombocytopenia (CMS-[facility])   Assessment & Plan    Without current bleed.  Monitor.         Dystonia- (present on admission)   Assessment & Plan    Related to parkinsonism.  Some concern for other process being investigated as outpatient by neurology           Quality-Core Measures   Reviewed items::  Labs reviewed, Medications reviewed and Radiology images reviewed  DVT prophylaxis pharmacological::  Enoxaparin (Lovenox)  Ulcer Prophylaxis::  Not indicated  Assessed for rehabilitation services:  Patient was assess for and/or received rehabilitation services during this hospitalization

## 2018-03-11 LAB
ANION GAP SERPL CALC-SCNC: 3 MMOL/L (ref 0–11.9)
BASOPHILS # BLD AUTO: 1.4 % (ref 0–1.8)
BASOPHILS # BLD: 0.06 K/UL (ref 0–0.12)
BUN SERPL-MCNC: 13 MG/DL (ref 8–22)
CALCIUM SERPL-MCNC: 8.9 MG/DL (ref 8.5–10.5)
CHLORIDE SERPL-SCNC: 108 MMOL/L (ref 96–112)
CO2 SERPL-SCNC: 28 MMOL/L (ref 20–33)
CREAT SERPL-MCNC: 0.67 MG/DL (ref 0.5–1.4)
EOSINOPHIL # BLD AUTO: 0.22 K/UL (ref 0–0.51)
EOSINOPHIL NFR BLD: 5.2 % (ref 0–6.9)
ERYTHROCYTE [DISTWIDTH] IN BLOOD BY AUTOMATED COUNT: 49.4 FL (ref 35.9–50)
GLUCOSE SERPL-MCNC: 90 MG/DL (ref 65–99)
HCT VFR BLD AUTO: 38.5 % (ref 37–47)
HGB BLD-MCNC: 12.2 G/DL (ref 12–16)
IMM GRANULOCYTES # BLD AUTO: 0.01 K/UL (ref 0–0.11)
IMM GRANULOCYTES NFR BLD AUTO: 0.2 % (ref 0–0.9)
LYMPHOCYTES # BLD AUTO: 1.62 K/UL (ref 1–4.8)
LYMPHOCYTES NFR BLD: 37.9 % (ref 22–41)
MCH RBC QN AUTO: 31.7 PG (ref 27–33)
MCHC RBC AUTO-ENTMCNC: 31.7 G/DL (ref 33.6–35)
MCV RBC AUTO: 100 FL (ref 81.4–97.8)
MONOCYTES # BLD AUTO: 0.35 K/UL (ref 0–0.85)
MONOCYTES NFR BLD AUTO: 8.2 % (ref 0–13.4)
NEUTROPHILS # BLD AUTO: 2.01 K/UL (ref 2–7.15)
NEUTROPHILS NFR BLD: 47.1 % (ref 44–72)
NRBC # BLD AUTO: 0 K/UL
NRBC BLD-RTO: 0 /100 WBC
PLATELET # BLD AUTO: 144 K/UL (ref 164–446)
PMV BLD AUTO: 12 FL (ref 9–12.9)
POTASSIUM SERPL-SCNC: 4.3 MMOL/L (ref 3.6–5.5)
RBC # BLD AUTO: 3.85 M/UL (ref 4.2–5.4)
SODIUM SERPL-SCNC: 139 MMOL/L (ref 135–145)
WBC # BLD AUTO: 4.3 K/UL (ref 4.8–10.8)

## 2018-03-11 PROCEDURE — 700102 HCHG RX REV CODE 250 W/ 637 OVERRIDE(OP): Mod: JG | Performed by: HOSPITALIST

## 2018-03-11 PROCEDURE — 36415 COLL VENOUS BLD VENIPUNCTURE: CPT

## 2018-03-11 PROCEDURE — 96376 TX/PRO/DX INJ SAME DRUG ADON: CPT

## 2018-03-11 PROCEDURE — A9270 NON-COVERED ITEM OR SERVICE: HCPCS | Performed by: INTERNAL MEDICINE

## 2018-03-11 PROCEDURE — 85025 COMPLETE CBC W/AUTO DIFF WBC: CPT

## 2018-03-11 PROCEDURE — 99226 PR SUBSEQUENT OBSERVATION CARE,LEVEL III: CPT | Performed by: INTERNAL MEDICINE

## 2018-03-11 PROCEDURE — 700111 HCHG RX REV CODE 636 W/ 250 OVERRIDE (IP): Performed by: HOSPITALIST

## 2018-03-11 PROCEDURE — G0378 HOSPITAL OBSERVATION PER HR: HCPCS

## 2018-03-11 PROCEDURE — 96361 HYDRATE IV INFUSION ADD-ON: CPT

## 2018-03-11 PROCEDURE — 700102 HCHG RX REV CODE 250 W/ 637 OVERRIDE(OP): Performed by: INTERNAL MEDICINE

## 2018-03-11 PROCEDURE — A9270 NON-COVERED ITEM OR SERVICE: HCPCS | Mod: JG | Performed by: HOSPITALIST

## 2018-03-11 PROCEDURE — 80048 BASIC METABOLIC PNL TOTAL CA: CPT

## 2018-03-11 PROCEDURE — 700111 HCHG RX REV CODE 636 W/ 250 OVERRIDE (IP): Performed by: INTERNAL MEDICINE

## 2018-03-11 PROCEDURE — 96372 THER/PROPH/DIAG INJ SC/IM: CPT

## 2018-03-11 PROCEDURE — A9270 NON-COVERED ITEM OR SERVICE: HCPCS | Performed by: HOSPITALIST

## 2018-03-11 PROCEDURE — 700102 HCHG RX REV CODE 250 W/ 637 OVERRIDE(OP): Performed by: HOSPITALIST

## 2018-03-11 RX ORDER — CALCIUM CARBONATE 500 MG/1
500 TABLET, CHEWABLE ORAL EVERY 6 HOURS PRN
Status: DISCONTINUED | OUTPATIENT
Start: 2018-03-11 | End: 2018-03-14 | Stop reason: HOSPADM

## 2018-03-11 RX ORDER — CALCIUM CARBONATE 500 MG/1
500-1000 TABLET, CHEWABLE ORAL EVERY 6 HOURS PRN
Status: DISCONTINUED | OUTPATIENT
Start: 2018-03-11 | End: 2018-03-11

## 2018-03-11 RX ORDER — CALCIUM CARBONATE 500 MG/1
1000 TABLET, CHEWABLE ORAL EVERY 6 HOURS PRN
Status: DISCONTINUED | OUTPATIENT
Start: 2018-03-11 | End: 2018-03-14 | Stop reason: HOSPADM

## 2018-03-11 RX ADMIN — DIAZEPAM 10 MG: 5 TABLET ORAL at 15:29

## 2018-03-11 RX ADMIN — CARBIDOPA AND LEVODOPA 1 TABLET: 25; 100 TABLET ORAL at 20:10

## 2018-03-11 RX ADMIN — METAXALONE 800 MG: 800 TABLET ORAL at 15:28

## 2018-03-11 RX ADMIN — METAXALONE 800 MG: 800 TABLET ORAL at 09:13

## 2018-03-11 RX ADMIN — DIPHENHYDRAMINE HYDROCHLORIDE 25 MG: 50 INJECTION, SOLUTION INTRAMUSCULAR; INTRAVENOUS at 15:30

## 2018-03-11 RX ADMIN — OXYCODONE HYDROCHLORIDE 5 MG: 5 TABLET ORAL at 11:33

## 2018-03-11 RX ADMIN — ENOXAPARIN SODIUM 40 MG: 100 INJECTION SUBCUTANEOUS at 09:13

## 2018-03-11 RX ADMIN — STANDARDIZED SENNA CONCENTRATE AND DOCUSATE SODIUM 2 TABLET: 8.6; 5 TABLET, FILM COATED ORAL at 09:00

## 2018-03-11 RX ADMIN — MORPHINE SULFATE 2 MG: 4 INJECTION INTRAVENOUS at 18:57

## 2018-03-11 RX ADMIN — CARBIDOPA AND LEVODOPA 1 TABLET: 25; 100 TABLET ORAL at 21:50

## 2018-03-11 RX ADMIN — METAXALONE 800 MG: 800 TABLET ORAL at 12:26

## 2018-03-11 RX ADMIN — CARBIDOPA AND LEVODOPA 1 TABLET: 25; 100 TABLET ORAL at 15:29

## 2018-03-11 RX ADMIN — CARISOPRODOL 350 MG: 350 TABLET ORAL at 20:11

## 2018-03-11 RX ADMIN — DIAZEPAM 10 MG: 5 TABLET ORAL at 06:23

## 2018-03-11 RX ADMIN — METAXALONE 800 MG: 800 TABLET ORAL at 06:23

## 2018-03-11 RX ADMIN — Medication 90 MG: at 09:13

## 2018-03-11 RX ADMIN — ANTACID TABLETS 1000 MG: 500 TABLET, CHEWABLE ORAL at 21:16

## 2018-03-11 RX ADMIN — CARBIDOPA AND LEVODOPA 1 TABLET: 50; 200 TABLET, EXTENDED RELEASE ORAL at 20:11

## 2018-03-11 RX ADMIN — AMANTADINE HYDROCHLORIDE 100 MG: 100 CAPSULE ORAL at 09:13

## 2018-03-11 RX ADMIN — CARBIDOPA AND LEVODOPA 1 TABLET: 25; 100 TABLET ORAL at 12:26

## 2018-03-11 RX ADMIN — GABAPENTIN 2400 MG: 400 CAPSULE ORAL at 20:09

## 2018-03-11 RX ADMIN — DIPHENHYDRAMINE HYDROCHLORIDE 25 MG: 50 INJECTION, SOLUTION INTRAMUSCULAR; INTRAVENOUS at 20:16

## 2018-03-11 RX ADMIN — CARBIDOPA AND LEVODOPA 1 TABLET: 25; 100 TABLET ORAL at 06:23

## 2018-03-11 RX ADMIN — CARBIDOPA AND LEVODOPA 1 TABLET: 25; 100 TABLET ORAL at 09:13

## 2018-03-11 ASSESSMENT — PATIENT HEALTH QUESTIONNAIRE - PHQ9
SUM OF ALL RESPONSES TO PHQ QUESTIONS 1-9: 0
2. FEELING DOWN, DEPRESSED, IRRITABLE, OR HOPELESS: NOT AT ALL
SUM OF ALL RESPONSES TO PHQ9 QUESTIONS 1 AND 2: 0
1. LITTLE INTEREST OR PLEASURE IN DOING THINGS: NOT AT ALL

## 2018-03-11 ASSESSMENT — ENCOUNTER SYMPTOMS
BLURRED VISION: 0
CHILLS: 0
COUGH: 0
DIZZINESS: 0
SHORTNESS OF BREATH: 0
MEMORY LOSS: 0
HEADACHES: 0
DOUBLE VISION: 0
ABDOMINAL PAIN: 0
FOCAL WEAKNESS: 0
PALPITATIONS: 0
NERVOUS/ANXIOUS: 0
FEVER: 0
MYALGIAS: 1
NAUSEA: 0
WEAKNESS: 1

## 2018-03-11 ASSESSMENT — PAIN SCALES - GENERAL
PAINLEVEL_OUTOF10: 0
PAINLEVEL_OUTOF10: 0
PAINLEVEL_OUTOF10: 5

## 2018-03-11 NOTE — PROGRESS NOTES
[facility] Hospitalist Progress Note    Date of Service: 3/11/2018    Chief Complaint  67 y.o. female admitted 3/7/2018 with parkinson's disease s/p recent sinemet dosing decrease with Fall and weakness, now requiring Max assistance.    Interval Problem Update  Improving blood pressure today  Placed on IV fluid  Encourage oral intake  Encourage activity      Consultants/Specialty  none    Disposition  Needs snf in 2-3 days with improvement  Referral placed  Palliative care ,  to assist      - consider trial of amantadine      Review of Systems   Constitutional: Negative for chills and fever.   HENT: Negative for congestion.    Eyes: Negative for blurred vision and double vision.   Respiratory: Negative for cough and shortness of breath.    Cardiovascular: Negative for chest pain, palpitations and leg swelling.   Gastrointestinal: Negative for abdominal pain and nausea.   Genitourinary: Negative for urgency.   Musculoskeletal: Positive for myalgias.   Neurological: Positive for weakness. Negative for dizziness, focal weakness and headaches.   Psychiatric/Behavioral: Negative for memory loss. The patient is not nervous/anxious.       Physical Exam  Laboratory/Imaging   Hemodynamics  Temp (24hrs), Av.5 °C (97.7 °F), Min:36.2 °C (97.1 °F), Max:37.1 °C (98.8 °F)   Temperature: 36.7 °C (98.1 °F)  Pulse  Av.8  Min: 60  Max: 108    Blood Pressure : 121/73      Respiratory      Respiration: 15, Pulse Oximetry: 94 %             Fluids    Intake/Output Summary (Last 24 hours) at 18 1030  Last data filed at 18 0600   Gross per 24 hour   Intake             1875 ml   Output                0 ml   Net             1875 ml       Nutrition  Orders Placed This Encounter   Procedures   • Diet Order     Standing Status:   Standing     Number of Occurrences:   1     Order Specific Question:   Diet:     Answer:   Regular [1]     Physical Exam   Constitutional: She is oriented to person, place, and time. She  appears well-nourished. No distress.   HENT:   Head: Normocephalic and atraumatic.   Mouth/Throat: No oropharyngeal exudate.   Eyes: EOM are normal. Pupils are equal, round, and reactive to light. Right eye exhibits no discharge. Left eye exhibits no discharge.   Neck: Neck supple.   Cardiovascular: Normal rate and intact distal pulses.    Pulmonary/Chest: Effort normal and breath sounds normal. No respiratory distress.   Abdominal: Soft. Bowel sounds are normal. She exhibits no distension.   Musculoskeletal: She exhibits tenderness. She exhibits no edema.   Neurological: She is alert and oriented to person, place, and time. No cranial nerve deficit. She exhibits abnormal muscle tone. Coordination abnormal.   Skin: Skin is warm and dry.   Psychiatric: She has a normal mood and affect. Her behavior is normal. Judgment and thought content normal.   Nursing note and vitals reviewed.      Recent Labs      03/11/18   0411   WBC  4.3*   RBC  3.85*   HEMOGLOBIN  12.2   HEMATOCRIT  38.5   MCV  100.0*   MCH  31.7   MCHC  31.7*   RDW  49.4   PLATELETCT  144*   MPV  12.0     Recent Labs      03/11/18   0411   SODIUM  139   POTASSIUM  4.3   CHLORIDE  108   CO2  28   GLUCOSE  90   BUN  13   CREATININE  0.67   CALCIUM  8.9                      Assessment/Plan     * Fall   Assessment & Plan    Related to dystonia, now with inability to ambulate, whereas in the past few days able to ambulate some with walker.PT and OT for evaluation; moderate assist    Unsafe dc home  Rehab referral: Not a candidate per Dr. 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  Will increase medication back to 6 times daily, close outpatient follow-up with neurology.     3/9  ongoing generalized weakness, 1 person assist  Still requiring 24-hour assistance, not safe for discharge to home  Skilled nursing referral, patient is reluctant for transfer   to assist  Palliative care consult regarding goals of care        Hypotension- (present on admission)   Assessment & Plan    Encourage oral intake  Add nss  Limit morphine use        Thrombocytopenia (CMS-[facility])   Assessment & Plan    Without current bleed.  Monitor.         Dystonia- (present on admission)   Assessment & Plan    Related to parkinsonism.  Some concern for other process being investigated as outpatient by neurology           Quality-Core Measures   Reviewed items::  Labs reviewed, Medications reviewed and Radiology images reviewed  DVT prophylaxis pharmacological::  Enoxaparin (Lovenox)  Ulcer Prophylaxis::  Not indicated  Assessed for rehabilitation services:  Patient was assess for and/or received rehabilitation services during this hospitalization

## 2018-03-11 NOTE — CARE PLAN
Problem: Pain Management  Goal: Pain level will decrease to patient's comfort goal  Outcome: PROGRESSING AS EXPECTED  roxicodone 5 mg given for generalized soreness with relief. Patient also takes muscle relaxer and high dose of gabapentin , patient slept good during the night.

## 2018-03-11 NOTE — PROGRESS NOTES
Neuroscience Shift Summary: [id number]  Orientation- Alert and oriented x4  Incision/Skin- Scattered bruising   Lines/Drains- PIV  Telemetry- NA  Pain- Roxicodone 5mg PO given around 1800 for ankle pain.  Diet-  Regular  Discharge Plan- Home vs rehab?     Concerns-  Patient c/o facial twitch and difficulty speaking for long periods of time.  No facial droop observed, no difficulty swallowing.  MD aware, added symmetrel.

## 2018-03-11 NOTE — CONSULTS
"Medical chart review completed. Patient is a 67 y.o. year-old female  with  a past medical history significant for  Cellulitis, osteoporoses of fecal limpaction, GERD and Parkinson's disease , hypertension, spinal stenosis admitted to [facility]  On 3/7/2018  3:31 PM after GLF with increasing contractures and pain after her Parkinson's medications were lowered with increasing rigidity and also by report dystonia of unclear etiology.  Unclear to me at this time the reason for medication lowering medication and hospitalist is considering adding the dopamine agonist amantadine. Per report of patient's  she was  Requiring assistance for almost all activities  Except for grooming   Premorbidly. Patient has limited activity tolerance, debility and poor balance for both sitting and standing    PMH:  Past Medical History:   Diagnosis Date   • Abnormal finding on mammography, microcalcification    • Anesthesia     hypotensive in the past   • Bowel obstruction     fecal impaction   • Bursitis    • Cataract    • Cellulitis 7/19/2012    right low extremity   • Dilated bile duct    • Disc disorder    • Dystonia    • Dystonia    • Dysuria    • GERD (gastroesophageal reflux disease)    • Heart burn    • Hypertension     pt can run very low   • Hypotension    • Insulin resistance    • Leukopenia    • Low blood pressure reading    • Malaise and fatigue    • OSTEOPOROSIS 3/22/2010   • Other specified disorder of intestines     constipation   • Pain     right hip, right knee   • Parkinson disease (CMS-HCC)     \"atypical\"   • Peripheral neuropathy (CMS-HCC)    • Rash, skin    • Scoliosis    • Spinal stenosis, lumbar    • Thyrotoxicosis remote    S/P I-131 Rx / subtotal thyroidectomy   • Unspecified disorder of thyroid     had partial thyroidectomy   • vitamin D deficiency        PSH:  Past Surgical History:   Procedure Laterality Date   • BREAST BIOPSY Left 5/5/2015    Procedure: BREAST BIOPSY;  Surgeon: Deedee MOORE" WILLIAMS Bautista;  Location: SURGERY SAME DAY [facility] ORS;  Service:    • CATARACT PHACO WITH IOL  5/12/2014    Performed by Roland Becerra M.D. at SURGERY [facility] ORS   • CATARACT PHACO WITH IOL  4/14/2014    Performed by Roland Becerra M.D. at [facility] ORS   • GASTROSCOPY WITH BIOPSY  5/9/2012    Performed by CIERRA SUAZO at SURGERY [facility] ORS   • EGD W/ENDOSCOPIC ULTRASOUND  5/9/2012    Performed by CIERRA SUAZO at [facility] ORS   • THYROIDECTOMY  1967    partial   • GYN SURGERY  1985, 1988    c sec x 2   • KNEE ARTHROTOMY  1964, 1968    right   • OTHER      left fifth digit       FAMILY HISTORY:  Non-contributory    MEDICATIONS:  Current Facility-Administered Medications   Medication Dose   • Pharmacy Consult Request ...Pain Management Review      And   • oxyCODONE immediate-release (ROXICODONE) tablet 2.5 mg  2.5 mg    And   • oxyCODONE immediate-release (ROXICODONE) tablet 5 mg  5 mg    And   • morphine (pf) 4 mg/ml injection 2 mg  2 mg   • NS infusion     • amantadine (SYMMETREL) capsule 100 mg  100 mg   • carbidopa-levodopa (SINEMET)  MG tablet 1 Tab  1 Tab   • carbidopa-levodopa SR (SINEMET CR)  MG tablet 1 Tab  1 Tab   • carisoprodol (SOMA) tablet 350 mg  350 mg   • diazePAM (VALIUM) tablet 10 mg  10 mg   • gabapentin (NEURONTIN) capsule 2,400 mg  2,400 mg   • metaxalone (SKELAXIN) tablet 800 mg  800 mg   • enoxaparin (LOVENOX) inj 40 mg  40 mg   • ondansetron (ZOFRAN) syringe/vial injection 4 mg  4 mg   • ondansetron (ZOFRAN ODT) dispertab 4 mg  4 mg   • haloperidol lactate (HALDOL) injection 5 mg  5 mg   • senna-docusate (PERICOLACE or SENOKOT S) 8.6-50 MG per tablet 2 Tab  2 Tab    And   • polyethylene glycol/lytes (MIRALAX) PACKET 1 Packet  1 Packet    And   • magnesium hydroxide (MILK OF MAGNESIA) suspension 30 mL  30 mL    And   • bisacodyl (DULCOLAX) suppository 10 mg  10 mg   • acetaminophen (TYLENOL) tablet 650 mg  650 mg   • coenzyme Q-10  capsule 90 mg  90 mg   • diphenhydrAMINE (BENADRYL) injection 25 mg  25 mg       ALLERGIES:  Bactrim [sulfamethoxazole w-trimethoprim]; Actonel [risedronate sodium]; Latex; Sulfa drugs; and Tape    PSYCHOSOCIAL HISTORY:  Living Site:  Home  Living With:  spouse  Caregiver's availability: Aides avialblet during the day then the  after work  Number of stairs: Full flight  Substance use history:  Denies      The patient presents  with cognitive deficits and functional deficits in mobility/self-cares/balance, and Severe  de-conditioning. Pre-morbidly, this patient lived in a two level home with Two steps to enter,with spouse  The patient was evaluated by acute care Physical Therapy and Occupational Therapy; currently requiring maximal assistance for mobility and moderate assistance for ADLs,  The patient is   a Poor candidate for an acute inpatient rehabilitation program   The patient's current functional level, impaired balance, decreased ability to participate, impaired premorbid functional level leans heavily to placement to a Skilled nursing facility     Rehab potential:Poor. jesse you for allowing us to participate in her care. Please call with any questions regarding this recommendation.    Mac Alvarado M.D.

## 2018-03-11 NOTE — PROGRESS NOTES
This RN assumed care of patient. Patient denies any pain or N&V, POC discussed with patient, assessment done, medications given and fall precautions in place with bed alarm on and call light within reach. Hourly rounding in place.

## 2018-03-12 ENCOUNTER — HOME HEALTH ADMISSION (OUTPATIENT)
Dept: HOME HEALTH SERVICES | Facility: HOME HEALTHCARE | Age: 68
End: 2018-03-12
Payer: MEDICARE

## 2018-03-12 PROCEDURE — 700111 HCHG RX REV CODE 636 W/ 250 OVERRIDE (IP): Performed by: HOSPITALIST

## 2018-03-12 PROCEDURE — 96372 THER/PROPH/DIAG INJ SC/IM: CPT

## 2018-03-12 PROCEDURE — A9270 NON-COVERED ITEM OR SERVICE: HCPCS | Mod: JG | Performed by: HOSPITALIST

## 2018-03-12 PROCEDURE — 700102 HCHG RX REV CODE 250 W/ 637 OVERRIDE(OP): Performed by: INTERNAL MEDICINE

## 2018-03-12 PROCEDURE — 97110 THERAPEUTIC EXERCISES: CPT

## 2018-03-12 PROCEDURE — G0378 HOSPITAL OBSERVATION PER HR: HCPCS

## 2018-03-12 PROCEDURE — 97530 THERAPEUTIC ACTIVITIES: CPT

## 2018-03-12 PROCEDURE — 96361 HYDRATE IV INFUSION ADD-ON: CPT

## 2018-03-12 PROCEDURE — 700102 HCHG RX REV CODE 250 W/ 637 OVERRIDE(OP): Mod: JG | Performed by: HOSPITALIST

## 2018-03-12 PROCEDURE — 96376 TX/PRO/DX INJ SAME DRUG ADON: CPT

## 2018-03-12 PROCEDURE — 700111 HCHG RX REV CODE 636 W/ 250 OVERRIDE (IP): Performed by: INTERNAL MEDICINE

## 2018-03-12 PROCEDURE — 99226 PR SUBSEQUENT OBSERVATION CARE,LEVEL III: CPT | Performed by: INTERNAL MEDICINE

## 2018-03-12 PROCEDURE — A9270 NON-COVERED ITEM OR SERVICE: HCPCS | Performed by: INTERNAL MEDICINE

## 2018-03-12 PROCEDURE — 97112 NEUROMUSCULAR REEDUCATION: CPT

## 2018-03-12 RX ADMIN — METAXALONE 800 MG: 800 TABLET ORAL at 13:03

## 2018-03-12 RX ADMIN — CARBIDOPA AND LEVODOPA 1 TABLET: 25; 100 TABLET ORAL at 09:18

## 2018-03-12 RX ADMIN — METAXALONE 800 MG: 800 TABLET ORAL at 16:44

## 2018-03-12 RX ADMIN — CARBIDOPA AND LEVODOPA 1 TABLET: 25; 100 TABLET ORAL at 18:28

## 2018-03-12 RX ADMIN — CARISOPRODOL 350 MG: 350 TABLET ORAL at 20:32

## 2018-03-12 RX ADMIN — Medication 90 MG: at 09:18

## 2018-03-12 RX ADMIN — CARBIDOPA AND LEVODOPA 1 TABLET: 25; 100 TABLET ORAL at 06:29

## 2018-03-12 RX ADMIN — METAXALONE 800 MG: 800 TABLET ORAL at 09:18

## 2018-03-12 RX ADMIN — DIPHENHYDRAMINE HYDROCHLORIDE 25 MG: 50 INJECTION, SOLUTION INTRAMUSCULAR; INTRAVENOUS at 20:40

## 2018-03-12 RX ADMIN — METAXALONE 800 MG: 800 TABLET ORAL at 06:30

## 2018-03-12 RX ADMIN — CARBIDOPA AND LEVODOPA 1 TABLET: 25; 100 TABLET ORAL at 13:03

## 2018-03-12 RX ADMIN — DIAZEPAM 10 MG: 5 TABLET ORAL at 16:48

## 2018-03-12 RX ADMIN — DIAZEPAM 10 MG: 5 TABLET ORAL at 06:30

## 2018-03-12 RX ADMIN — MORPHINE SULFATE 2 MG: 4 INJECTION INTRAVENOUS at 10:53

## 2018-03-12 RX ADMIN — CARBIDOPA AND LEVODOPA 1 TABLET: 50; 200 TABLET, EXTENDED RELEASE ORAL at 20:26

## 2018-03-12 RX ADMIN — AMANTADINE HYDROCHLORIDE 100 MG: 100 CAPSULE ORAL at 09:18

## 2018-03-12 RX ADMIN — OXYCODONE HYDROCHLORIDE 5 MG: 5 TABLET ORAL at 09:18

## 2018-03-12 RX ADMIN — CARBIDOPA AND LEVODOPA 1 TABLET: 25; 100 TABLET ORAL at 16:44

## 2018-03-12 RX ADMIN — ENOXAPARIN SODIUM 40 MG: 100 INJECTION SUBCUTANEOUS at 09:18

## 2018-03-12 RX ADMIN — HALOPERIDOL LACTATE 5 MG: 5 INJECTION, SOLUTION INTRAMUSCULAR at 13:06

## 2018-03-12 RX ADMIN — GABAPENTIN 2400 MG: 400 CAPSULE ORAL at 20:32

## 2018-03-12 RX ADMIN — CARBIDOPA AND LEVODOPA 1 TABLET: 25; 100 TABLET ORAL at 20:25

## 2018-03-12 ASSESSMENT — ENCOUNTER SYMPTOMS
FEVER: 0
SHORTNESS OF BREATH: 0
DOUBLE VISION: 0
DIZZINESS: 0
HEADACHES: 0
DEPRESSION: 0
NAUSEA: 0
NERVOUS/ANXIOUS: 0
WEAKNESS: 1
ABDOMINAL PAIN: 0
FOCAL WEAKNESS: 0
PALPITATIONS: 0
HEARTBURN: 0
MYALGIAS: 1

## 2018-03-12 ASSESSMENT — LIFESTYLE VARIABLES: DO YOU DRINK ALCOHOL: NO

## 2018-03-12 ASSESSMENT — PAIN SCALES - GENERAL
PAINLEVEL_OUTOF10: 3
PAINLEVEL_OUTOF10: 8

## 2018-03-12 ASSESSMENT — COGNITIVE AND FUNCTIONAL STATUS - GENERAL
MOBILITY SCORE: 10
CLIMB 3 TO 5 STEPS WITH RAILING: TOTAL
TURNING FROM BACK TO SIDE WHILE IN FLAT BAD: A LOT
STANDING UP FROM CHAIR USING ARMS: A LOT
WALKING IN HOSPITAL ROOM: TOTAL
MOVING FROM LYING ON BACK TO SITTING ON SIDE OF FLAT BED: A LOT
MOVING TO AND FROM BED TO CHAIR: A LOT
SUGGESTED CMS G CODE MODIFIER MOBILITY: CL

## 2018-03-12 ASSESSMENT — GAIT ASSESSMENTS: GAIT LEVEL OF ASSIST: UNABLE TO PARTICIPATE

## 2018-03-12 NOTE — THERAPY
"Pt w/impaired balance, bed mobility, transfers, strength, and activity tolerance. Pt limited by resting tremors. Pt required extra time for all activities and sequencing. Pt w/improved trunk control. Pt would benefit from further acute PT txs to progress towards goals and independence. Would recommend post acute placement to address deficits.    Physical Therapy Treatment completed.   Bed Mobility:  Supine to Sit: Moderate Assist  Transfers: Sit to Stand: Moderate Assist  Gait: Level Of Assist: Unable to Participate        Plan of Care: Will benefit from Physical Therapy 3 times per week    See \"Rehab Therapy-Acute\" Patient Summary Report for complete documentation.       "

## 2018-03-12 NOTE — FACE TO FACE
Face to Face Supporting Documentation - Home Health    The encounter with this patient was in whole or in part the primary reason for home health admission.    Date of encounter:   Patient:                    MRN:                       YOB: 2017  Delmis Draper  [id number]  1950     Home health to see patient for:  Skilled Nursing care for assessment, interventions & education, Physical Therapy evaluation and treatment and Occupational therapy evaluation and treatment    Skilled need for:  Exacerbation of Chronic Disease State parkinson's disease    Skilled nursing interventions to include:  Comment: pt/ot    Homebound status evidenced by:  Need the aid of supportive devices such as crutches, canes, wheelchairs or walkers or Needs the assistance of another person in order to leave the home. Leaving home requires a considerable and taxing effort. There is a normal inability to leave the home.    Community Physician to provide follow up care: Raul Iverson M.D.     Optional Interventions? No      I certify the face to face encounter for this home health care referral meets the CMS requirements and the encounter/clinical assessment with the patient was, in whole, or in part, for the medical condition(s) listed above, which is the primary reason for home health care. Based on my clinical findings: the service(s) are medically necessary, support the need for home health care, and the homebound criteria are met.  I certify that this patient has had a face to face encounter by myself.  Joanne Arias D.O. - NPI: [id number]

## 2018-03-12 NOTE — PROGRESS NOTES
[facility]ist Progress Note    Date of Service: 3/12/2018    Chief Complaint  67 y.o. female admitted 3/7/2018 with parkinson's disease s/p recent sinemet dosing decrease with Fall and weakness, now requiring Max assistance.    Interval Problem Update  blood pressure improved  States that she is near baseline function  Able to transfer with 1 person assist   visiting, Bull, reports he can provide assistance at home      Consultants/Specialty  none    Disposition  Home with home health in 2-3 days with clinical improvement  Encourage activity   to assist  Palliative care ,  to assist          Review of Systems   Constitutional: Negative for fever.   HENT: Negative for congestion and hearing loss.    Eyes: Negative for double vision.   Respiratory: Negative for shortness of breath.    Cardiovascular: Negative for chest pain, palpitations and leg swelling.   Gastrointestinal: Negative for abdominal pain, heartburn and nausea.   Genitourinary: Negative for urgency.   Musculoskeletal: Positive for myalgias. Negative for joint pain.   Neurological: Positive for weakness. Negative for dizziness, focal weakness and headaches.   Psychiatric/Behavioral: Negative for depression. The patient is not nervous/anxious.       Physical Exam  Laboratory/Imaging   Hemodynamics  Temp (24hrs), Av.7 °C (98 °F), Min:36.6 °C (97.8 °F), Max:36.8 °C (98.3 °F)   Temperature: 36.6 °C (97.8 °F)  Pulse  Av.2  Min: 60  Max: 108    Blood Pressure : 101/65      Respiratory      Respiration: 17, Pulse Oximetry: 98 %             Fluids    Intake/Output Summary (Last 24 hours) at 18 1042  Last data filed at 18 0700   Gross per 24 hour   Intake             1200 ml   Output                0 ml   Net             1200 ml       Nutrition  Orders Placed This Encounter   Procedures   • Diet Order     Standing Status:   Standing     Number of Occurrences:   1     Order Specific Question:   Diet:      Answer:   Regular [1]     Physical Exam   Constitutional: She is oriented to person, place, and time. No distress.   HENT:   Head: Normocephalic and atraumatic.   Eyes: EOM are normal. Pupils are equal, round, and reactive to light.   Neck: Neck supple. No JVD present.   Cardiovascular: Normal rate and intact distal pulses.    Pulmonary/Chest: Effort normal and breath sounds normal. No respiratory distress. She has no wheezes.   Abdominal: Soft. Bowel sounds are normal. She exhibits no distension.   Musculoskeletal: She exhibits tenderness. She exhibits no edema.   Neurological: She is alert and oriented to person, place, and time. No cranial nerve deficit. She exhibits abnormal muscle tone. Coordination abnormal.   Skin: Skin is warm and dry. She is not diaphoretic.   Psychiatric: She has a normal mood and affect. Her behavior is normal. Judgment and thought content normal.   Nursing note and vitals reviewed.      Recent Labs      03/11/18   0411   WBC  4.3*   RBC  3.85*   HEMOGLOBIN  12.2   HEMATOCRIT  38.5   MCV  100.0*   MCH  31.7   MCHC  31.7*   RDW  49.4   PLATELETCT  144*   MPV  12.0     Recent Labs      03/11/18   0411   SODIUM  139   POTASSIUM  4.3   CHLORIDE  108   CO2  28   GLUCOSE  90   BUN  13   CREATININE  0.67   CALCIUM  8.9                      Assessment/Plan     * Fall   Assessment & Plan    Related to dystonia, now with inability to ambulate, whereas in the past few days able to ambulate some with walker.PT and OT for evaluation; moderate assist    Unsafe dc home  Rehab referral: Not a candidate per Dr. 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  Will increase medication back to 6 times daily, close outpatient follow-up with neurology.     3/9 ongoing generalized weakness, 1 person assist  Still requiring 24-hour assistance, not safe for discharge to home  Skilled nursing referral, patient is reluctant for transfer   to assist  Palliative care consult regarding goals of care        Hypotension- (present on admission)   Assessment & Plan    Encourage oral intake  Add nss  Limit morphine use        Thrombocytopenia (CMS-[facility])   Assessment & Plan    Without current bleed.  Monitor.         Dystonia- (present on admission)   Assessment & Plan    Related to parkinsonism.  Some concern for other process being investigated as outpatient by neurology           Quality-Core Measures   Reviewed items::  Labs reviewed, Medications reviewed and Radiology images reviewed  DVT prophylaxis pharmacological::  Enoxaparin (Lovenox)  Ulcer Prophylaxis::  Not indicated  Assessed for rehabilitation services:  Patient was assess for and/or received rehabilitation services during this hospitalization

## 2018-03-12 NOTE — DISCHARGE PLANNING
ATTN: Case Management  RE: Referral for Home Health                We would like to take this opportunity to thank you for submitting a referral for your patient to continue care with [facility]. Our skilled team is dedicated to helping all patients recover and gain independence in the home setting.            As of 3/12/2018, we have accepted the above patient into our service. A [facility] clinician will be out to see the patient within 48 hours to conduct our initial visit. If you have any questions or concerns regarding the patient’s transition to Home Health, please do not hesitate to contact us. We are open for referrals 7 days a week from 8AM to 5PM at [phone].      We look forward to collaborating with you,  [facility] Team

## 2018-03-12 NOTE — DISCHARGE PLANNING
Transitional Care Navigator:    TCN met with patient to discuss transitional care services for discharge planning.  Pt lives at home with spouse and receives 5hrs/day in-home CG through [facility] when spouse is at work. Pt does have a LTC insurance policy and should be receiving 24/7 in-home CG support within the next 2-3 weeks. Pt would prefer home health vs SNF at this time.  Pt has used [facility] in the past.  TCN explained that if patient/spouse determine SNF is needed after d/c home, [facility] can facilitate SNF admission.  Pt discussed [facility] as an option if needed.  Discussed with MD who will enter home health order/F2F.   Choice form completed and faxed to [facility].   SW aware.  TCN will follow-up as needed.

## 2018-03-12 NOTE — DISCHARGE PLANNING
Receive notice from [facility], patient will need a PCP or schedule appointment at D/C center before patient can be accepted for HH services. Patients PCP states patient is not of file with their office.   Patient HH referral is on hold at this time.    JOSE Schreiber has been notified.

## 2018-03-12 NOTE — CARE PLAN
Problem: Psychosocial Needs:  Goal: Level of anxiety will decrease  Outcome: PROGRESSING AS EXPECTED  Anixety medicated per mar     Problem: Urinary Elimination:  Goal: Ability to reestablish a normal urinary elimination pattern will improve  Outcome: PROGRESSING AS EXPECTED  Pt voiding per bed pan

## 2018-03-12 NOTE — DISCHARGE PLANNING
[facility] received your referral. At this time acceptance is pending PCP information. Patient PCP listed as Raul Iverson. Called his office to confirmed and she has not been seen in their office. Dr Iverson is also not with this office. Please verify patients pcp information and provide it to . If not please get her an appointment with the DC clinic within 48 hours of DC. At this timet his referral is placed on hold.

## 2018-03-12 NOTE — DISCHARGE PLANNING
Medical Social Worker    JOSE met with pt to discuss PCP. She stated she saw Dr. Raul Iverson about one month ago. She provided a phone number for him: [phone].     JOSE called [facility] and provided them with the above information.

## 2018-03-12 NOTE — CARE PLAN
Problem: Nutritional:  Goal: Achieve adequate nutritional intake  Patient will consume >50% of meals   Outcome: MET Date Met: 03/12/18  Pt reports her appetite is pretty good. Pt states she typically doesn't eat a big lunch and she is eating at least 50% of her meals. Adjusted snacks per pt preferences. Per chart pt PO % most meals. RD available prn

## 2018-03-12 NOTE — PROGRESS NOTES
Pt AAOX4, generalized weakness present. Tremors and dystonia in LE noted. Pain in LE medicated per MAR. x1 assist to BSC. W/C bound at baseline. Call light in reach.

## 2018-03-12 NOTE — DISCHARGE PLANNING
Received choice form from RACHEL Hernández for patients home health services, referral has been sent to [facility]  per patient request.

## 2018-03-12 NOTE — DISCHARGE PLANNING
Medical Social Worker    Pt discussed in rounds. Pt has been accepted by HH. SW requested a [facility] order and FTF from attending MD.
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
## 2018-03-13 PROBLEM — D69.6 THROMBOCYTOPENIA (HCC): Status: RESOLVED | Noted: 2018-03-07 | Resolved: 2018-03-13

## 2018-03-13 PROCEDURE — 700102 HCHG RX REV CODE 250 W/ 637 OVERRIDE(OP): Performed by: HOSPITALIST

## 2018-03-13 PROCEDURE — 700102 HCHG RX REV CODE 250 W/ 637 OVERRIDE(OP): Performed by: INTERNAL MEDICINE

## 2018-03-13 PROCEDURE — 99224 PR SUBSEQUENT OBSERVATION CARE,LEVEL I: CPT | Performed by: HOSPITALIST

## 2018-03-13 PROCEDURE — A9270 NON-COVERED ITEM OR SERVICE: HCPCS | Mod: JG | Performed by: HOSPITALIST

## 2018-03-13 PROCEDURE — 96376 TX/PRO/DX INJ SAME DRUG ADON: CPT

## 2018-03-13 PROCEDURE — 96372 THER/PROPH/DIAG INJ SC/IM: CPT

## 2018-03-13 PROCEDURE — G0378 HOSPITAL OBSERVATION PER HR: HCPCS

## 2018-03-13 PROCEDURE — 700111 HCHG RX REV CODE 636 W/ 250 OVERRIDE (IP): Performed by: HOSPITALIST

## 2018-03-13 PROCEDURE — A9270 NON-COVERED ITEM OR SERVICE: HCPCS | Performed by: INTERNAL MEDICINE

## 2018-03-13 PROCEDURE — 97535 SELF CARE MNGMENT TRAINING: CPT

## 2018-03-13 PROCEDURE — 700111 HCHG RX REV CODE 636 W/ 250 OVERRIDE (IP): Performed by: INTERNAL MEDICINE

## 2018-03-13 RX ADMIN — CARBIDOPA AND LEVODOPA 1 TABLET: 50; 200 TABLET, EXTENDED RELEASE ORAL at 20:58

## 2018-03-13 RX ADMIN — GABAPENTIN 2400 MG: 400 CAPSULE ORAL at 20:58

## 2018-03-13 RX ADMIN — DIPHENHYDRAMINE HYDROCHLORIDE 25 MG: 50 INJECTION, SOLUTION INTRAMUSCULAR; INTRAVENOUS at 20:58

## 2018-03-13 RX ADMIN — DIAZEPAM 10 MG: 5 TABLET ORAL at 16:29

## 2018-03-13 RX ADMIN — CARBIDOPA AND LEVODOPA 1 TABLET: 25; 100 TABLET ORAL at 06:03

## 2018-03-13 RX ADMIN — CARBIDOPA AND LEVODOPA 1 TABLET: 25; 100 TABLET ORAL at 09:35

## 2018-03-13 RX ADMIN — METAXALONE 800 MG: 800 TABLET ORAL at 06:03

## 2018-03-13 RX ADMIN — Medication 90 MG: at 09:35

## 2018-03-13 RX ADMIN — ENOXAPARIN SODIUM 40 MG: 100 INJECTION SUBCUTANEOUS at 09:36

## 2018-03-13 RX ADMIN — CARBIDOPA AND LEVODOPA 1 TABLET: 25; 100 TABLET ORAL at 20:58

## 2018-03-13 RX ADMIN — STANDARDIZED SENNA CONCENTRATE AND DOCUSATE SODIUM 2 TABLET: 8.6; 5 TABLET, FILM COATED ORAL at 20:58

## 2018-03-13 RX ADMIN — OXYCODONE HYDROCHLORIDE 5 MG: 5 TABLET ORAL at 21:01

## 2018-03-13 RX ADMIN — OXYCODONE HYDROCHLORIDE 5 MG: 5 TABLET ORAL at 16:29

## 2018-03-13 RX ADMIN — OXYCODONE HYDROCHLORIDE 5 MG: 5 TABLET ORAL at 09:34

## 2018-03-13 RX ADMIN — METAXALONE 800 MG: 800 TABLET ORAL at 09:35

## 2018-03-13 RX ADMIN — METAXALONE 800 MG: 800 TABLET ORAL at 16:35

## 2018-03-13 RX ADMIN — CARBIDOPA AND LEVODOPA 1 TABLET: 25; 100 TABLET ORAL at 16:35

## 2018-03-13 RX ADMIN — AMANTADINE HYDROCHLORIDE 100 MG: 100 CAPSULE ORAL at 09:35

## 2018-03-13 RX ADMIN — CARBIDOPA AND LEVODOPA 1 TABLET: 25; 100 TABLET ORAL at 18:25

## 2018-03-13 RX ADMIN — CARISOPRODOL 350 MG: 350 TABLET ORAL at 20:58

## 2018-03-13 RX ADMIN — CARBIDOPA AND LEVODOPA 1 TABLET: 25; 100 TABLET ORAL at 13:20

## 2018-03-13 RX ADMIN — DIAZEPAM 10 MG: 5 TABLET ORAL at 06:04

## 2018-03-13 RX ADMIN — MORPHINE SULFATE 2 MG: 4 INJECTION INTRAVENOUS at 18:43

## 2018-03-13 RX ADMIN — METAXALONE 800 MG: 800 TABLET ORAL at 13:20

## 2018-03-13 RX ADMIN — STANDARDIZED SENNA CONCENTRATE AND DOCUSATE SODIUM 2 TABLET: 8.6; 5 TABLET, FILM COATED ORAL at 09:34

## 2018-03-13 ASSESSMENT — COGNITIVE AND FUNCTIONAL STATUS - GENERAL
SUGGESTED CMS G CODE MODIFIER DAILY ACTIVITY: CK
DRESSING REGULAR UPPER BODY CLOTHING: A LITTLE
DAILY ACTIVITIY SCORE: 18
PERSONAL GROOMING: A LITTLE
DRESSING REGULAR LOWER BODY CLOTHING: A LOT
HELP NEEDED FOR BATHING: A LITTLE
TOILETING: A LITTLE

## 2018-03-13 ASSESSMENT — PAIN SCALES - GENERAL
PAINLEVEL_OUTOF10: 7
PAINLEVEL_OUTOF10: 8
PAINLEVEL_OUTOF10: 4
PAINLEVEL_OUTOF10: 4

## 2018-03-13 ASSESSMENT — PATIENT HEALTH QUESTIONNAIRE - PHQ9
1. LITTLE INTEREST OR PLEASURE IN DOING THINGS: NOT AT ALL
2. FEELING DOWN, DEPRESSED, IRRITABLE, OR HOPELESS: NOT AT ALL
SUM OF ALL RESPONSES TO PHQ QUESTIONS 1-9: 0
SUM OF ALL RESPONSES TO PHQ9 QUESTIONS 1 AND 2: 0

## 2018-03-13 NOTE — DISCHARGE PLANNING
Palliative Social Work    Discussed code status with Delmis bedside.  This conversation made her very anxious and we discontinued.   I provided Palliative Office number for continued discussion or questions.

## 2018-03-13 NOTE — FACE TO FACE
Face to Face Note  -  Durable Medical Equipment    Joanne Arias D.O. - NPI: [id number]  I certify that this patient is under my care and that they had a durable medical equipment(DME)face to face encounter by myself that meets the physician DME face-to-face encounter requirements with this patient on:    Date of encounter:   Patient:                    MRN:                       YOB: 2017  Delmis Draper  [id number]  1950     The encounter with the patient was in whole, or in part, for the following medical condition, which is the primary reason for durable medical equipment:  Other - debility, parkinson's disease    I certify that, based on my findings, the following durable medical equipment is medically necessary:  Wheel Chair.    HOME O2 Saturation Measurements:(Values must be present for Home Oxygen orders)         ,     ,         My Clinical findings support the need for the above equipment due to:  Abnormal Gait    Supporting Symptoms: weak

## 2018-03-13 NOTE — CARE PLAN
Problem: Communication  Goal: The ability to communicate needs accurately and effectively will improve  Outcome: PROGRESSING AS EXPECTED  Pt able to make needs and wants know, uses call light appropriately.  Pt orientated to call light and bed controls.  Education provided on all cares and medications, pt verbalizes understanding and agreement.  Asks appropriate questions regarding care and medications.     Problem: Mobility  Goal: Risk for activity intolerance will decrease  Outcome: PROGRESSING SLOWER THAN EXPECTED  Pt wc bound.  Joel feet contracted at ankles, pt unable to walk.  Encourage pt to work with PT/OT to increase strength and mobility.  Pt able to pivot transfer to [facility] with assist of 1-2.

## 2018-03-13 NOTE — DISCHARGE PLANNING
Received choice from JOSE Andre for patient DME services, referral has been [facility] to [facility] per patient request.

## 2018-03-13 NOTE — DISCHARGE INSTRUCTIONS
Discharge Instructions    Discharged to home by car with relative. Discharged via wheelchair, hospital escort: Yes.  Special equipment needed: Wheelchair    Be sure to schedule a follow-up appointment with your primary care doctor or any specialists as instructed.     Discharge Plan:   Diet Plan: Discussed  Activity Level: Discussed  Confirmed Follow up Appointment: Appointment Scheduled  Confirmed Symptoms Management: Discussed  Medication Reconciliation Updated: Yes  Influenza Vaccine Indication: Patient Refuses    I understand that a diet low in cholesterol, fat, and sodium is recommended for good health. Unless I have been given specific instructions below for another diet, I accept this instruction as my diet prescription.   Other diet: Regular     Special Instructions: None    · Is patient discharged on Warfarin / Coumadin?   No     Depression / Suicide Risk    As you are [facility], it is important to learn how to keep safe from harming yourself.    Recognize the warning signs:  · Abrupt changes in personality, positive or negative- including increase in energy   · Giving away possessions  · Change in eating patterns- significant weight changes-  positive or negative  · Change in sleeping patterns- unable to sleep or sleeping all the time   · Unwillingness or inability to communicate  · Depression  · Unusual sadness, discouragement and loneliness  · Talk of wanting to die  · Neglect of personal appearance   · Rebelliousness- reckless behavior  · Withdrawal from people/activities they love  · Confusion- inability to concentrate     If you or a loved one observes any of these behaviors or has concerns about self-harm, here's what you can do:  · Talk about it- your feelings and reasons for harming yourself  · Remove any means that you might use to hurt yourself (examples: pills, rope, extension cords, firearm)  · Get professional help from the community (Mental Health, Substance  Abuse, psychological counseling)  · Do not be alone:Call your Safe Contact- someone whom you trust who will be there for you.  · Call your local CRISIS HOTLINE 283-0240 or 619-587-9774  · Call your local Children's Mobile Crisis Response Team [place] (983) 214-7388 or www.MetaMed  · Call the toll free National Suicide Prevention Hotlines   · National Suicide Prevention Lifeline 182-239-OPQP (3476)  · National Hope Line Network 800-SUICIDE (749-1427)

## 2018-03-13 NOTE — PROGRESS NOTES
Care assumed at 1915, pt resting comfortably in bed with  at bedside, offers no complaints at this time. Pt A7O x4, on RA with resp even and unlabored.  IV patent and saline locked.  Pt up with assist of 2 to BSC, pt able to stand and pivot, tolerates poorly.  Education provided on all medications and POC, pt verbalizes understanding and agreement.  Fall precautions in place with hourly rounding, treaded socks on, pt uses call light before getting up.  Call light within reach, bed in low and locked position, side rails up x2.  Encouraged to ring for any needs or concerns.

## 2018-03-13 NOTE — PROGRESS NOTES
This RN assumed care of patient. Patient denies any N&V, bu t requested PRN pain medication. POC discussed with patient, assessment done, medications given and fall precautions in place with bed alarm on and call light within reach. Hourly rounding in place.

## 2018-03-13 NOTE — THERAPY
"Occupational Therapy Treatment completed with focus on ADLs, ADL transfers and upper extremity function.  Functional Status:  Pt pleasant, reported feeling increased pain this pm.  Pt requires extra time to perform basic ADL's & bed mobility.  Pt does have resting tremors that interfere with ADL's.  Pt was Mod A for supine to sit EOB.  Pt transferred to [facility] with Mod A.  Pt returned to supine in bed due to pain.  Plan of Care: Will benefit from Occupational Therapy 2 times per week  Discharge Recommendations:  Equipment Will Continue to Assess for Equipment Needs. Post-acute therapy Discharge to home with outpatient or home health for additional skilled therapy services.    See \"Rehab Therapy-Acute\" Patient Summary Report for complete documentation.   "

## 2018-03-13 NOTE — DISCHARGE PLANNING
"Pt discussed during afternoon rounds. Per MD, pt is in need of a wheelchair prior to d/c. JOSE met with pt at bedside who provided verbal consent for a referral to be placed with [facility]. Pt requested that \"verbal consent\" be placed on the choice form as she is not able to sign it at this time. JOSE faxed choice form to [facility]. SW to follow up with DME response after a referral is sent.   "

## 2018-03-14 ENCOUNTER — PATIENT OUTREACH (OUTPATIENT)
Dept: HEALTH INFORMATION MANAGEMENT | Facility: OTHER | Age: 68
End: 2018-03-14

## 2018-03-14 VITALS
OXYGEN SATURATION: 91 % | TEMPERATURE: 97.6 F | HEART RATE: 98 BPM | RESPIRATION RATE: 14 BRPM | BODY MASS INDEX: 13.98 KG/M2 | SYSTOLIC BLOOD PRESSURE: 105 MMHG | DIASTOLIC BLOOD PRESSURE: 58 MMHG | WEIGHT: 78.92 LBS | HEIGHT: 63 IN

## 2018-03-14 PROCEDURE — A9270 NON-COVERED ITEM OR SERVICE: HCPCS | Mod: JG | Performed by: HOSPITALIST

## 2018-03-14 PROCEDURE — 700102 HCHG RX REV CODE 250 W/ 637 OVERRIDE(OP): Mod: JG | Performed by: HOSPITALIST

## 2018-03-14 PROCEDURE — 96372 THER/PROPH/DIAG INJ SC/IM: CPT

## 2018-03-14 PROCEDURE — 96376 TX/PRO/DX INJ SAME DRUG ADON: CPT

## 2018-03-14 PROCEDURE — 700111 HCHG RX REV CODE 636 W/ 250 OVERRIDE (IP): Performed by: HOSPITALIST

## 2018-03-14 PROCEDURE — A9270 NON-COVERED ITEM OR SERVICE: HCPCS | Performed by: INTERNAL MEDICINE

## 2018-03-14 PROCEDURE — 700111 HCHG RX REV CODE 636 W/ 250 OVERRIDE (IP): Performed by: INTERNAL MEDICINE

## 2018-03-14 PROCEDURE — 700102 HCHG RX REV CODE 250 W/ 637 OVERRIDE(OP): Performed by: INTERNAL MEDICINE

## 2018-03-14 PROCEDURE — G0378 HOSPITAL OBSERVATION PER HR: HCPCS

## 2018-03-14 PROCEDURE — 99217 PR OBSERVATION CARE DISCHARGE: CPT | Performed by: HOSPITALIST

## 2018-03-14 RX ADMIN — CARBIDOPA AND LEVODOPA 1 TABLET: 25; 100 TABLET ORAL at 08:09

## 2018-03-14 RX ADMIN — OXYCODONE HYDROCHLORIDE 5 MG: 5 TABLET ORAL at 05:41

## 2018-03-14 RX ADMIN — METAXALONE 800 MG: 800 TABLET ORAL at 05:41

## 2018-03-14 RX ADMIN — STANDARDIZED SENNA CONCENTRATE AND DOCUSATE SODIUM 2 TABLET: 8.6; 5 TABLET, FILM COATED ORAL at 08:09

## 2018-03-14 RX ADMIN — Medication 90 MG: at 08:09

## 2018-03-14 RX ADMIN — ENOXAPARIN SODIUM 40 MG: 100 INJECTION SUBCUTANEOUS at 08:09

## 2018-03-14 RX ADMIN — DIAZEPAM 10 MG: 5 TABLET ORAL at 05:41

## 2018-03-14 RX ADMIN — AMANTADINE HYDROCHLORIDE 100 MG: 100 CAPSULE ORAL at 08:09

## 2018-03-14 RX ADMIN — DIPHENHYDRAMINE HYDROCHLORIDE 25 MG: 50 INJECTION, SOLUTION INTRAMUSCULAR; INTRAVENOUS at 09:28

## 2018-03-14 RX ADMIN — MORPHINE SULFATE 2 MG: 4 INJECTION INTRAVENOUS at 09:22

## 2018-03-14 RX ADMIN — METAXALONE 800 MG: 800 TABLET ORAL at 09:24

## 2018-03-14 RX ADMIN — CARBIDOPA AND LEVODOPA 1 TABLET: 25; 100 TABLET ORAL at 05:41

## 2018-03-14 ASSESSMENT — ENCOUNTER SYMPTOMS
DIZZINESS: 0
DIARRHEA: 0
NAUSEA: 0
CONSTIPATION: 0
FOCAL WEAKNESS: 0
COUGH: 0
TREMORS: 1
MYALGIAS: 0
VOMITING: 0
ABDOMINAL PAIN: 0
SHORTNESS OF BREATH: 0
PALPITATIONS: 0
HEADACHES: 0
FEVER: 0
WEAKNESS: 1
CHILLS: 0

## 2018-03-14 NOTE — PROGRESS NOTES
[facility]ist Progress Note    Date of Service: 3/13/2018    Chief Complaint  67 y.o. female admitted 3/7/2018 with parkinson's disease s/p recent sinemet dosing decrease with Fall and weakness, now requiring Max assistance.    Interval Problem Update  3/13 - late entry note - was planned for discharge, wheel chair not arranged, will hold    Consultants/Specialty  none    Disposition  Home with wheel chair 3/14      Review of Systems   Constitutional: Negative for chills and fever.   Respiratory: Negative for cough and shortness of breath.    Cardiovascular: Negative for chest pain and palpitations.   Gastrointestinal: Negative for abdominal pain, constipation, diarrhea, nausea and vomiting.   Genitourinary: Negative for dysuria.   Musculoskeletal: Negative for myalgias.   Skin: Negative for itching.   Neurological: Positive for tremors and weakness. Negative for dizziness, focal weakness and headaches.   All other systems reviewed and are negative.     Physical Exam  Laboratory/Imaging   Hemodynamics  Temp (24hrs), Av.3 °C (97.3 °F), Min:36.1 °C (97 °F), Max:36.4 °C (97.5 °F)   Temperature: 36.4 °C (97.5 °F)  Pulse  Av.5  Min: 60  Max: 108    Blood Pressure : 107/52      Respiratory      Respiration: 16, Pulse Oximetry: 95 %        RUL Breath Sounds: Diminished, RML Breath Sounds: Diminished, RLL Breath Sounds: Diminished, RUPALI Breath Sounds: Diminished, LLL Breath Sounds: Diminished    Fluids  No intake or output data in the 24 hours ending 18 0809    Nutrition  Orders Placed This Encounter   Procedures   • Diet Order     Standing Status:   Standing     Number of Occurrences:   1     Order Specific Question:   Diet:     Answer:   Regular [1]     Physical Exam   Constitutional: She appears well-developed.   Frail   HENT:   Head: Normocephalic and atraumatic.   Mouth/Throat: Oropharynx is clear and moist.   Eyes: Conjunctivae are normal. Pupils are equal, round, and reactive to light. No scleral  icterus.   Cardiovascular: Normal rate, regular rhythm, normal heart sounds and intact distal pulses.    No murmur heard.  Pulmonary/Chest: Effort normal and breath sounds normal. No respiratory distress. She has no wheezes.   Abdominal: Soft. Bowel sounds are normal. She exhibits no distension. There is no tenderness.   Musculoskeletal: Normal range of motion. She exhibits no edema or tenderness.   Neurological: She is alert. Coordination abnormal.   Tremor   Vitals reviewed.                               Assessment/Plan     Other secondary parkinsonism (CMS-HCC)- (present on admission)   Assessment & Plan    Increased sinemet  Discharge planning        Dystonia- (present on admission)   Assessment & Plan    Related to parkinsonism.  Some concern for other process being investigated as outpatient by neurology           Quality-Core Measures   Reviewed items::  Labs reviewed, Medications reviewed and Radiology images reviewed  DVT prophylaxis pharmacological::  Enoxaparin (Lovenox)  Ulcer Prophylaxis::  Not indicated  Assessed for rehabilitation services:  Patient was assess for and/or received rehabilitation services during this hospitalization

## 2018-03-14 NOTE — DISCHARGE SUMMARY
"CHIEF COMPLAINT ON ADMISSION  Chief Complaint   Patient presents with   • Shaking   • Tremors       CODE STATUS  Full Code    HPI & HOSPITAL COURSE  This is a 67 y.o. female here with history of parkinsonism was apparently in her usual state of health until 2-3 weeks prior to admission, when it was felt that her dosing of sinemet should be decreased.  This was occurring, and patient noted worsening tremors in her upper extremities, and contractures in her lower extremities, with associated severe pain, which she describes as \"tearing of her tendons\".  She states that these symptoms are somewhat relieved by benadryl as well as muscle relaxants, otherwise are mostly constant in nature.  She has noted worsening and increased frequency of pain with decreased sinemet.  She currently denies chest pain or shortness of breath.  She reports that on the day of admission, while attempting to ambulate she fell to the ground, without hitting her head but with injuring her lower extremities.    She was observed. Her sinemet was increased to her prior dose. She was evaluated by therapy and physiatry. She was felt to be not a good candidate. She was arranged for home.     Therefore, she is discharged in fair and stable condition with close outpatient follow-up.      DISCHARGE PROBLEM LIST  Principal Problem (Resolved):    Fall POA: Yes  Active Problems:    Other secondary parkinsonism (CMS-HCC) POA: Yes    Dystonia POA: Yes      Overview: corticobasilar syndrome, right foot, right leg, right arm symtoms      Dr. Dorsey  Resolved Problems:    Hypotension POA: Yes    Thrombocytopenia (CMS-HCC) POA: Yes      FOLLOW UP  Future Appointments  Date Time Provider Department Center   3/26/2018 2:45 PM [address]   3/26/2018 3:15 PM [address]   4/2/2018 1:40 PM Kevon Patel M.D. [facility] None     Raul Iverson M.D.  [address] [address]  [phone]    Go on 3/28/2018  Please " arrive at 10:40am for your appointment. Thank you    [facility]  [place] [address]  [phone]          MEDICATIONS ON DISCHARGE   Delmis Draper   Home Medication Instructions ROBBY:[id number]    Printed on:03/14/18 1054   Medication Information                      carbidopa-levodopa (SINEMET)  MG Tab  Take 1 Tab by mouth 6 Times a Day.             carbidopa-levodopa SR (SINEMET CR)  MG per tablet  Take 1 Tab by mouth every evening. By 2000 each night             carisoprodol (SOMA) 350 MG Tab  Take 350 mg by mouth every evening.             diazepam (VALIUM) 5 MG Tab  Take 10 mg by mouth 2 Times a Day. 0700 and 1600             docusate sodium (COLACE) 100 MG Cap  Take 300 mg by mouth every day.             gabapentin (NEURONTIN) 600 MG tablet  Take 2,400 mg by mouth every evening.             Magnesium Citrate Powder  Take 2 Doses by mouth every 48 hours. 2 teaspoons every other days             metaxalone (SKELAXIN) 800 MG Tab  Take 800 mg by mouth 4 times a day. 0700, 1000, 1300, 1600             Multiple Vitamin (MULTI VITAMIN DAILY PO)  Take 1 Tab by mouth every day.             oxyCODONE-acetaminophen (ROXICET) 5-325 MG/5ML Solution  Take 5 mL by mouth every four hours as needed.             vitamin D, Ergocalciferol, (DRISDOL) 07487 units Cap capsule  Take 50,000 Units by mouth every 48 hours.                 DIET  Orders Placed This Encounter   Procedures   • Diet Order     Standing Status:   Standing     Number of Occurrences:   1     Order Specific Question:   Diet:     Answer:   Regular [1]       ACTIVITY  As tolerated.  Weight bearing as tolerated      CONSULTATIONS  Mac Alvarado MD - physiatry    PROCEDURES  None    LABORATORY  Lab Results   Component Value Date/Time    SODIUM 139 03/11/2018 04:11 AM    POTASSIUM 4.3 03/11/2018 04:11 AM    CHLORIDE 108 03/11/2018 04:11 AM    CO2 28 03/11/2018 04:11 AM    GLUCOSE 90 03/11/2018 04:11 AM    BUN 13 03/11/2018  04:11 AM    CREATININE 0.67 03/11/2018 04:11 AM    CREATININE 0.8 12/22/2008 09:30 AM        Lab Results   Component Value Date/Time    WBC 4.3 (L) 03/11/2018 04:11 AM    HEMOGLOBIN 12.2 03/11/2018 04:11 AM    HEMATOCRIT 38.5 03/11/2018 04:11 AM    PLATELETCT 144 (L) 03/11/2018 04:11 AM        Total time of the discharge process exceeds 32 minutes

## 2018-03-14 NOTE — DISCHARGE PLANNING
Call placed to [place] with [facility], patient DME equipment was received and will be delivered before this afternoon.  Call placed to [place] with [facility], patient has been accepted and ready to services patient will HH.     JOSE Bhagat has been notified.

## 2018-03-15 ENCOUNTER — HOME CARE VISIT (OUTPATIENT)
Dept: HOME HEALTH SERVICES | Facility: HOME HEALTHCARE | Age: 68
End: 2018-03-15
Payer: MEDICARE

## 2018-03-15 VITALS
HEART RATE: 92 BPM | SYSTOLIC BLOOD PRESSURE: 110 MMHG | TEMPERATURE: 99.4 F | OXYGEN SATURATION: 95 % | RESPIRATION RATE: 16 BRPM | HEIGHT: 63 IN | BODY MASS INDEX: 18.61 KG/M2 | WEIGHT: 105 LBS | DIASTOLIC BLOOD PRESSURE: 78 MMHG

## 2018-03-15 PROCEDURE — G0493 RN CARE EA 15 MIN HH/HOSPICE: HCPCS

## 2018-03-15 PROCEDURE — 665001 SOC-HOME HEALTH

## 2018-03-15 SDOH — ECONOMIC STABILITY: HOUSING INSECURITY
HOME SAFETY: PATIENT LIVES IN A WELL KEPT 2 STORY HOME WITH HUSBAND AND 1 ADULT SON. THEY HAVE 2 LARGE DOGS IN HOME. THEY HAVE A PAID CAREGIVER THAT COMES IN MONDAY-FRIDAY TO ASSIST.

## 2018-03-15 SDOH — ECONOMIC STABILITY: HOUSING INSECURITY: UNSAFE COOKING RANGE AREA: 0

## 2018-03-15 SDOH — ECONOMIC STABILITY: HOUSING INSECURITY: UNSAFE APPLIANCES: 0

## 2018-03-15 ASSESSMENT — ENCOUNTER SYMPTOMS
NAUSEA: DENIES
VOMITING: DENIES

## 2018-03-15 ASSESSMENT — PATIENT HEALTH QUESTIONNAIRE - PHQ9
2. FEELING DOWN, DEPRESSED, IRRITABLE, OR HOPELESS: 00
1. LITTLE INTEREST OR PLEASURE IN DOING THINGS: 02

## 2018-03-15 ASSESSMENT — ACTIVITIES OF DAILY LIVING (ADL)
HOME_HEALTH_OASIS: 02
OASIS_M1830: 06

## 2018-03-15 NOTE — PROGRESS NOTES
PT d/c'd home with home care per md order.  Wheel chair at bedside. IV removed.  Discussed d/c instructions with pt and .  PT wheeled out to car by .

## 2018-03-18 ENCOUNTER — RESOLUTE PROFESSIONAL BILLING HOSPITAL PROF FEE (OUTPATIENT)
Dept: HOSPITALIST | Facility: MEDICAL CENTER | Age: 68
End: 2018-03-18
Payer: MEDICARE

## 2018-03-18 ENCOUNTER — HOSPITAL ENCOUNTER (INPATIENT)
Facility: MEDICAL CENTER | Age: 68
LOS: 2 days | DRG: 056 | End: 2018-03-20
Attending: EMERGENCY MEDICINE | Admitting: INTERNAL MEDICINE
Payer: MEDICARE

## 2018-03-18 DIAGNOSIS — M24.576: ICD-10-CM

## 2018-03-18 DIAGNOSIS — G24.9 DYSTONIA: ICD-10-CM

## 2018-03-18 DIAGNOSIS — G21.8 OTHER SECONDARY PARKINSONISM (HCC): ICD-10-CM

## 2018-03-18 DIAGNOSIS — E43 SEVERE PROTEIN-CALORIE MALNUTRITION (HCC): ICD-10-CM

## 2018-03-18 PROBLEM — R62.7 ADULT FAILURE TO THRIVE: Status: ACTIVE | Noted: 2018-03-18

## 2018-03-18 PROBLEM — R26.2 INABILITY TO WALK: Status: ACTIVE | Noted: 2018-03-18

## 2018-03-18 PROBLEM — L03.119 CELLULITIS OF FOOT: Status: ACTIVE | Noted: 2018-03-18

## 2018-03-18 LAB
ANION GAP SERPL CALC-SCNC: 9 MMOL/L (ref 0–11.9)
BASOPHILS # BLD AUTO: 0.7 % (ref 0–1.8)
BASOPHILS # BLD: 0.04 K/UL (ref 0–0.12)
BUN SERPL-MCNC: 16 MG/DL (ref 8–22)
CALCIUM SERPL-MCNC: 8.9 MG/DL (ref 8.5–10.5)
CHLORIDE SERPL-SCNC: 107 MMOL/L (ref 96–112)
CO2 SERPL-SCNC: 25 MMOL/L (ref 20–33)
CREAT SERPL-MCNC: 0.63 MG/DL (ref 0.5–1.4)
CRP SERPL HS-MCNC: 2.93 MG/DL (ref 0–0.75)
EOSINOPHIL # BLD AUTO: 0.14 K/UL (ref 0–0.51)
EOSINOPHIL NFR BLD: 2.6 % (ref 0–6.9)
ERYTHROCYTE [DISTWIDTH] IN BLOOD BY AUTOMATED COUNT: 46.1 FL (ref 35.9–50)
ERYTHROCYTE [SEDIMENTATION RATE] IN BLOOD BY WESTERGREN METHOD: 22 MM/HOUR (ref 0–30)
GLUCOSE SERPL-MCNC: 119 MG/DL (ref 65–99)
HCT VFR BLD AUTO: 38.3 % (ref 37–47)
HGB BLD-MCNC: 12.6 G/DL (ref 12–16)
IMM GRANULOCYTES # BLD AUTO: 0.01 K/UL (ref 0–0.11)
IMM GRANULOCYTES NFR BLD AUTO: 0.2 % (ref 0–0.9)
LYMPHOCYTES # BLD AUTO: 1.24 K/UL (ref 1–4.8)
LYMPHOCYTES NFR BLD: 23 % (ref 22–41)
MCH RBC QN AUTO: 32.2 PG (ref 27–33)
MCHC RBC AUTO-ENTMCNC: 32.9 G/DL (ref 33.6–35)
MCV RBC AUTO: 98 FL (ref 81.4–97.8)
MONOCYTES # BLD AUTO: 0.42 K/UL (ref 0–0.85)
MONOCYTES NFR BLD AUTO: 7.8 % (ref 0–13.4)
NEUTROPHILS # BLD AUTO: 3.53 K/UL (ref 2–7.15)
NEUTROPHILS NFR BLD: 65.7 % (ref 44–72)
NRBC # BLD AUTO: 0 K/UL
NRBC BLD-RTO: 0 /100 WBC
PLATELET # BLD AUTO: 200 K/UL (ref 164–446)
PMV BLD AUTO: 11.8 FL (ref 9–12.9)
POTASSIUM SERPL-SCNC: 3.7 MMOL/L (ref 3.6–5.5)
RBC # BLD AUTO: 3.91 M/UL (ref 4.2–5.4)
SODIUM SERPL-SCNC: 141 MMOL/L (ref 135–145)
URATE SERPL-MCNC: 2.2 MG/DL (ref 1.9–8.2)
WBC # BLD AUTO: 5.4 K/UL (ref 4.8–10.8)

## 2018-03-18 PROCEDURE — 700105 HCHG RX REV CODE 258: Performed by: INTERNAL MEDICINE

## 2018-03-18 PROCEDURE — 86140 C-REACTIVE PROTEIN: CPT

## 2018-03-18 PROCEDURE — 85652 RBC SED RATE AUTOMATED: CPT

## 2018-03-18 PROCEDURE — 770006 HCHG ROOM/CARE - MED/SURG/GYN SEMI*

## 2018-03-18 PROCEDURE — 303105 HCHG CATHETER EXTRA

## 2018-03-18 PROCEDURE — 84550 ASSAY OF BLOOD/URIC ACID: CPT

## 2018-03-18 PROCEDURE — 51702 INSERT TEMP BLADDER CATH: CPT

## 2018-03-18 PROCEDURE — 99223 1ST HOSP IP/OBS HIGH 75: CPT | Performed by: INTERNAL MEDICINE

## 2018-03-18 PROCEDURE — 700102 HCHG RX REV CODE 250 W/ 637 OVERRIDE(OP): Performed by: INTERNAL MEDICINE

## 2018-03-18 PROCEDURE — 96375 TX/PRO/DX INJ NEW DRUG ADDON: CPT

## 2018-03-18 PROCEDURE — 85025 COMPLETE CBC W/AUTO DIFF WBC: CPT

## 2018-03-18 PROCEDURE — 700111 HCHG RX REV CODE 636 W/ 250 OVERRIDE (IP): Performed by: EMERGENCY MEDICINE

## 2018-03-18 PROCEDURE — 99285 EMERGENCY DEPT VISIT HI MDM: CPT

## 2018-03-18 PROCEDURE — 304561 HCHG STAT O2

## 2018-03-18 PROCEDURE — A9270 NON-COVERED ITEM OR SERVICE: HCPCS | Performed by: INTERNAL MEDICINE

## 2018-03-18 PROCEDURE — 96374 THER/PROPH/DIAG INJ IV PUSH: CPT

## 2018-03-18 PROCEDURE — 36415 COLL VENOUS BLD VENIPUNCTURE: CPT

## 2018-03-18 PROCEDURE — 80048 BASIC METABOLIC PNL TOTAL CA: CPT

## 2018-03-18 RX ORDER — MAGNESIUM CITRATE
2 POWDER (GRAM) MISCELLANEOUS PRN
Status: DISCONTINUED | OUTPATIENT
Start: 2018-03-18 | End: 2018-03-18

## 2018-03-18 RX ORDER — DIPHENHYDRAMINE HYDROCHLORIDE 50 MG/ML
25 INJECTION INTRAMUSCULAR; INTRAVENOUS ONCE
Status: COMPLETED | OUTPATIENT
Start: 2018-03-18 | End: 2018-03-18

## 2018-03-18 RX ORDER — SODIUM CHLORIDE 9 MG/ML
INJECTION, SOLUTION INTRAVENOUS CONTINUOUS
Status: DISCONTINUED | OUTPATIENT
Start: 2018-03-18 | End: 2018-03-20 | Stop reason: HOSPADM

## 2018-03-18 RX ORDER — GABAPENTIN 400 MG/1
2400 CAPSULE ORAL EVERY EVENING
Status: DISCONTINUED | OUTPATIENT
Start: 2018-03-18 | End: 2018-03-20 | Stop reason: HOSPADM

## 2018-03-18 RX ORDER — GABAPENTIN 600 MG/1
TABLET ORAL 3 TIMES DAILY
Status: ON HOLD | COMMUNITY
End: 2018-05-08

## 2018-03-18 RX ORDER — DOXYCYCLINE 100 MG/1
100 TABLET ORAL EVERY 12 HOURS
Status: DISCONTINUED | OUTPATIENT
Start: 2018-03-18 | End: 2018-03-20 | Stop reason: HOSPADM

## 2018-03-18 RX ORDER — GABAPENTIN 600 MG/1
TABLET ORAL 2 TIMES DAILY
Status: DISCONTINUED | OUTPATIENT
Start: 2018-03-18 | End: 2018-03-18

## 2018-03-18 RX ORDER — OXYCODONE HYDROCHLORIDE 5 MG/1
5 TABLET ORAL EVERY 4 HOURS PRN
Status: DISCONTINUED | OUTPATIENT
Start: 2018-03-18 | End: 2018-03-20 | Stop reason: HOSPADM

## 2018-03-18 RX ORDER — METAXALONE 800 MG/1
800 TABLET ORAL 4 TIMES DAILY
Status: DISCONTINUED | OUTPATIENT
Start: 2018-03-18 | End: 2018-03-20 | Stop reason: HOSPADM

## 2018-03-18 RX ORDER — MORPHINE SULFATE 4 MG/ML
4 INJECTION, SOLUTION INTRAMUSCULAR; INTRAVENOUS ONCE
Status: COMPLETED | OUTPATIENT
Start: 2018-03-18 | End: 2018-03-18

## 2018-03-18 RX ORDER — OXYCODONE HYDROCHLORIDE AND ACETAMINOPHEN 5; 325 MG/1; MG/1
1 TABLET ORAL EVERY 4 HOURS PRN
Status: DISCONTINUED | OUTPATIENT
Start: 2018-03-18 | End: 2018-03-20 | Stop reason: HOSPADM

## 2018-03-18 RX ORDER — UREA 10 %
1 LOTION (ML) TOPICAL
Status: DISCONTINUED | OUTPATIENT
Start: 2018-03-18 | End: 2018-03-18

## 2018-03-18 RX ORDER — CARBIDOPA AND LEVODOPA 50; 200 MG/1; MG/1
1 TABLET, EXTENDED RELEASE ORAL EVERY EVENING
Status: DISCONTINUED | OUTPATIENT
Start: 2018-03-18 | End: 2018-03-20 | Stop reason: HOSPADM

## 2018-03-18 RX ORDER — DIPHENHYDRAMINE HCL 25 MG
25 TABLET ORAL EVERY 6 HOURS PRN
COMMUNITY
End: 2018-05-05

## 2018-03-18 RX ORDER — POLYETHYLENE GLYCOL 3350 17 G/17G
1 POWDER, FOR SOLUTION ORAL
Status: DISCONTINUED | OUTPATIENT
Start: 2018-03-18 | End: 2018-03-20 | Stop reason: HOSPADM

## 2018-03-18 RX ORDER — DOCUSATE SODIUM 100 MG/1
300 CAPSULE, LIQUID FILLED ORAL DAILY
Status: DISCONTINUED | OUTPATIENT
Start: 2018-03-19 | End: 2018-03-20 | Stop reason: HOSPADM

## 2018-03-18 RX ORDER — DIAPER,BRIEF,ADULT, DISPOSABLE
500 EACH MISCELLANEOUS DAILY
Status: DISCONTINUED | OUTPATIENT
Start: 2018-03-18 | End: 2018-03-18

## 2018-03-18 RX ORDER — DIAZEPAM 5 MG/1
10 TABLET ORAL 2 TIMES DAILY
Status: DISCONTINUED | OUTPATIENT
Start: 2018-03-19 | End: 2018-03-20 | Stop reason: HOSPADM

## 2018-03-18 RX ORDER — DIPHENHYDRAMINE HCL 25 MG
25 TABLET ORAL EVERY 6 HOURS PRN
Status: DISCONTINUED | OUTPATIENT
Start: 2018-03-18 | End: 2018-03-19

## 2018-03-18 RX ORDER — L. ACIDOPHILUS/L.BULGARICUS 100MM CELL
1 GRANULES IN PACKET (EA) ORAL
Status: DISCONTINUED | OUTPATIENT
Start: 2018-03-18 | End: 2018-03-20 | Stop reason: HOSPADM

## 2018-03-18 RX ORDER — AMOXICILLIN 250 MG
2 CAPSULE ORAL 2 TIMES DAILY
Status: DISCONTINUED | OUTPATIENT
Start: 2018-03-18 | End: 2018-03-20 | Stop reason: HOSPADM

## 2018-03-18 RX ORDER — UREA 10 %
1 LOTION (ML) TOPICAL NIGHTLY PRN
COMMUNITY
End: 2018-06-13

## 2018-03-18 RX ORDER — BISACODYL 10 MG
10 SUPPOSITORY, RECTAL RECTAL
Status: DISCONTINUED | OUTPATIENT
Start: 2018-03-18 | End: 2018-03-20 | Stop reason: HOSPADM

## 2018-03-18 RX ORDER — CARISOPRODOL 350 MG/1
350 TABLET ORAL EVERY EVENING
Status: DISCONTINUED | OUTPATIENT
Start: 2018-03-18 | End: 2018-03-20 | Stop reason: HOSPADM

## 2018-03-18 RX ORDER — ACETAMINOPHEN 325 MG/1
650 TABLET ORAL EVERY 6 HOURS PRN
Status: DISCONTINUED | OUTPATIENT
Start: 2018-03-18 | End: 2018-03-20 | Stop reason: HOSPADM

## 2018-03-18 RX ORDER — OXYCODONE HYDROCHLORIDE AND ACETAMINOPHEN 5; 325 MG/1; MG/1
1 TABLET ORAL EVERY 4 HOURS PRN
Status: ON HOLD | COMMUNITY
End: 2018-03-19

## 2018-03-18 RX ADMIN — SODIUM CHLORIDE: 9 INJECTION, SOLUTION INTRAVENOUS at 22:32

## 2018-03-18 RX ADMIN — DIPHENHYDRAMINE HYDROCHLORIDE 25 MG: 50 INJECTION, SOLUTION INTRAMUSCULAR; INTRAVENOUS at 18:23

## 2018-03-18 RX ADMIN — CARBIDOPA AND LEVODOPA 1 TABLET: 50; 200 TABLET, EXTENDED RELEASE ORAL at 22:16

## 2018-03-18 RX ADMIN — LACTOBACILLUS ACIDOPHILUS / LACTOBACILLUS BULGARICUS 1 PACKET: 100 MILLION CFU STRENGTH GRANULES at 22:24

## 2018-03-18 RX ADMIN — MORPHINE SULFATE 4 MG: 4 INJECTION INTRAVENOUS at 18:24

## 2018-03-18 RX ADMIN — DIPHENHYDRAMINE HCL 25 MG: 25 TABLET ORAL at 22:33

## 2018-03-18 RX ADMIN — CARISOPRODOL 350 MG: 350 TABLET ORAL at 22:15

## 2018-03-18 RX ADMIN — GABAPENTIN 2400 MG: 400 CAPSULE ORAL at 22:18

## 2018-03-18 RX ADMIN — OXYCODONE HYDROCHLORIDE 5 MG: 5 TABLET ORAL at 22:31

## 2018-03-18 RX ADMIN — STANDARDIZED SENNA CONCENTRATE AND DOCUSATE SODIUM 2 TABLET: 8.6; 5 TABLET, FILM COATED ORAL at 22:22

## 2018-03-18 RX ADMIN — DOXYCYCLINE 100 MG: 100 TABLET ORAL at 22:17

## 2018-03-18 ASSESSMENT — ENCOUNTER SYMPTOMS
LOSS OF CONSCIOUSNESS: 0
FEVER: 0
MYALGIAS: 1
NERVOUS/ANXIOUS: 0
FALLS: 1
WHEEZING: 0
COUGH: 0
TREMORS: 1
ABDOMINAL PAIN: 0
INSOMNIA: 0
HEMOPTYSIS: 0
CONSTIPATION: 0
VOMITING: 0
PALPITATIONS: 0
FOCAL WEAKNESS: 0
SEIZURES: 0
WEAKNESS: 1
EYE REDNESS: 0
HEADACHES: 0
BLOOD IN STOOL: 0
DIZZINESS: 0
CHILLS: 0
DIARRHEA: 0
SHORTNESS OF BREATH: 0
NAUSEA: 0
EYE PAIN: 0

## 2018-03-18 ASSESSMENT — LIFESTYLE VARIABLES
ALCOHOL_USE: NO
EVER_SMOKED: YES
DO YOU DRINK ALCOHOL: NO

## 2018-03-18 ASSESSMENT — COPD QUESTIONNAIRES
DO YOU EVER COUGH UP ANY MUCUS OR PHLEGM?: YES, A FEW DAYS A WEEK OR MONTH
HAVE YOU SMOKED AT LEAST 100 CIGARETTES IN YOUR ENTIRE LIFE: YES
DURING THE PAST 4 WEEKS HOW MUCH DID YOU FEEL SHORT OF BREATH: NONE/LITTLE OF THE TIME
COPD SCREENING SCORE: 5

## 2018-03-18 ASSESSMENT — COGNITIVE AND FUNCTIONAL STATUS - GENERAL
CLIMB 3 TO 5 STEPS WITH RAILING: TOTAL
SUGGESTED CMS G CODE MODIFIER MOBILITY: CL
PERSONAL GROOMING: A LITTLE
DAILY ACTIVITIY SCORE: 18
WALKING IN HOSPITAL ROOM: TOTAL
MOBILITY SCORE: 10
STANDING UP FROM CHAIR USING ARMS: A LOT
HELP NEEDED FOR BATHING: A LITTLE
MOVING FROM LYING ON BACK TO SITTING ON SIDE OF FLAT BED: A LOT
DRESSING REGULAR UPPER BODY CLOTHING: A LITTLE
TURNING FROM BACK TO SIDE WHILE IN FLAT BAD: A LOT
TOILETING: A LITTLE
SUGGESTED CMS G CODE MODIFIER DAILY ACTIVITY: CK
MOVING TO AND FROM BED TO CHAIR: A LOT
DRESSING REGULAR LOWER BODY CLOTHING: A LOT

## 2018-03-18 ASSESSMENT — PAIN SCALES - GENERAL
PAINLEVEL_OUTOF10: 8
PAINLEVEL_OUTOF10: 10
PAINLEVEL_OUTOF10: 10

## 2018-03-18 ASSESSMENT — PATIENT HEALTH QUESTIONNAIRE - PHQ9
1. LITTLE INTEREST OR PLEASURE IN DOING THINGS: NOT AT ALL
SUM OF ALL RESPONSES TO PHQ9 QUESTIONS 1 AND 2: 0
SUM OF ALL RESPONSES TO PHQ QUESTIONS 1-9: 0
2. FEELING DOWN, DEPRESSED, IRRITABLE, OR HOPELESS: NOT AT ALL

## 2018-03-19 ENCOUNTER — HOME CARE VISIT (OUTPATIENT)
Dept: HOME HEALTH SERVICES | Facility: HOME HEALTHCARE | Age: 68
End: 2018-03-19
Payer: MEDICARE

## 2018-03-19 LAB
ANION GAP SERPL CALC-SCNC: 6 MMOL/L (ref 0–11.9)
BUN SERPL-MCNC: 12 MG/DL (ref 8–22)
CALCIUM SERPL-MCNC: 9.1 MG/DL (ref 8.5–10.5)
CHLORIDE SERPL-SCNC: 105 MMOL/L (ref 96–112)
CO2 SERPL-SCNC: 28 MMOL/L (ref 20–33)
CREAT SERPL-MCNC: 0.57 MG/DL (ref 0.5–1.4)
ERYTHROCYTE [DISTWIDTH] IN BLOOD BY AUTOMATED COUNT: 47.2 FL (ref 35.9–50)
GLUCOSE SERPL-MCNC: 89 MG/DL (ref 65–99)
HCT VFR BLD AUTO: 37.7 % (ref 37–47)
HGB BLD-MCNC: 12.1 G/DL (ref 12–16)
MCH RBC QN AUTO: 31.8 PG (ref 27–33)
MCHC RBC AUTO-ENTMCNC: 32.1 G/DL (ref 33.6–35)
MCV RBC AUTO: 99.2 FL (ref 81.4–97.8)
PLATELET # BLD AUTO: 202 K/UL (ref 164–446)
PMV BLD AUTO: 11.6 FL (ref 9–12.9)
POTASSIUM SERPL-SCNC: 3.6 MMOL/L (ref 3.6–5.5)
RBC # BLD AUTO: 3.8 M/UL (ref 4.2–5.4)
SODIUM SERPL-SCNC: 139 MMOL/L (ref 135–145)
WBC # BLD AUTO: 4.5 K/UL (ref 4.8–10.8)

## 2018-03-19 PROCEDURE — G8987 SELF CARE CURRENT STATUS: HCPCS | Mod: CL

## 2018-03-19 PROCEDURE — A9270 NON-COVERED ITEM OR SERVICE: HCPCS | Performed by: INTERNAL MEDICINE

## 2018-03-19 PROCEDURE — 700102 HCHG RX REV CODE 250 W/ 637 OVERRIDE(OP): Performed by: INTERNAL MEDICINE

## 2018-03-19 PROCEDURE — 700105 HCHG RX REV CODE 258: Performed by: INTERNAL MEDICINE

## 2018-03-19 PROCEDURE — 770006 HCHG ROOM/CARE - MED/SURG/GYN SEMI*

## 2018-03-19 PROCEDURE — 80048 BASIC METABOLIC PNL TOTAL CA: CPT

## 2018-03-19 PROCEDURE — 36415 COLL VENOUS BLD VENIPUNCTURE: CPT

## 2018-03-19 PROCEDURE — G8988 SELF CARE GOAL STATUS: HCPCS | Mod: CJ

## 2018-03-19 PROCEDURE — 97166 OT EVAL MOD COMPLEX 45 MIN: CPT

## 2018-03-19 PROCEDURE — 700111 HCHG RX REV CODE 636 W/ 250 OVERRIDE (IP): Performed by: INTERNAL MEDICINE

## 2018-03-19 PROCEDURE — 99232 SBSQ HOSP IP/OBS MODERATE 35: CPT | Performed by: HOSPITALIST

## 2018-03-19 PROCEDURE — 700112 HCHG RX REV CODE 229: Performed by: INTERNAL MEDICINE

## 2018-03-19 PROCEDURE — 700111 HCHG RX REV CODE 636 W/ 250 OVERRIDE (IP): Performed by: HOSPITALIST

## 2018-03-19 PROCEDURE — 85027 COMPLETE CBC AUTOMATED: CPT

## 2018-03-19 RX ORDER — OXYCODONE HYDROCHLORIDE AND ACETAMINOPHEN 5; 325 MG/1; MG/1
1 TABLET ORAL EVERY 4 HOURS PRN
Qty: 15 TAB | Refills: 0 | Status: SHIPPED | OUTPATIENT
Start: 2018-03-19 | End: 2018-03-24

## 2018-03-19 RX ORDER — DIAZEPAM 5 MG/1
10 TABLET ORAL 2 TIMES DAILY
Qty: 20 TAB | Refills: 0 | Status: SHIPPED | OUTPATIENT
Start: 2018-03-19 | End: 2018-03-24

## 2018-03-19 RX ORDER — DIPHENHYDRAMINE HYDROCHLORIDE 50 MG/ML
50 INJECTION INTRAMUSCULAR; INTRAVENOUS EVERY 6 HOURS PRN
Status: DISCONTINUED | OUTPATIENT
Start: 2018-03-19 | End: 2018-03-20 | Stop reason: HOSPADM

## 2018-03-19 RX ORDER — DOXYCYCLINE 100 MG/1
100 TABLET ORAL EVERY 12 HOURS
Qty: 18 TAB | Refills: 0 | Status: SHIPPED | OUTPATIENT
Start: 2018-03-19 | End: 2018-03-20

## 2018-03-19 RX ORDER — CARISOPRODOL 350 MG/1
350 TABLET ORAL EVERY EVENING
Qty: 5 TAB | Refills: 0 | Status: SHIPPED | OUTPATIENT
Start: 2018-03-19 | End: 2018-03-24

## 2018-03-19 RX ORDER — CYCLOBENZAPRINE HCL 10 MG
5 TABLET ORAL ONCE
Status: COMPLETED | OUTPATIENT
Start: 2018-03-19 | End: 2018-03-19

## 2018-03-19 RX ADMIN — DIPHENHYDRAMINE HYDROCHLORIDE 50 MG: 50 INJECTION, SOLUTION INTRAMUSCULAR; INTRAVENOUS at 16:29

## 2018-03-19 RX ADMIN — ENOXAPARIN SODIUM 40 MG: 100 INJECTION SUBCUTANEOUS at 07:49

## 2018-03-19 RX ADMIN — STANDARDIZED SENNA CONCENTRATE AND DOCUSATE SODIUM 2 TABLET: 8.6; 5 TABLET, FILM COATED ORAL at 20:46

## 2018-03-19 RX ADMIN — DIPHENHYDRAMINE HCL 25 MG: 25 TABLET ORAL at 04:08

## 2018-03-19 RX ADMIN — DIAZEPAM 10 MG: 5 TABLET ORAL at 15:31

## 2018-03-19 RX ADMIN — CARBIDOPA AND LEVODOPA 1 TABLET: 25; 100 TABLET ORAL at 15:31

## 2018-03-19 RX ADMIN — DIPHENHYDRAMINE HYDROCHLORIDE 50 MG: 50 INJECTION, SOLUTION INTRAMUSCULAR; INTRAVENOUS at 23:21

## 2018-03-19 RX ADMIN — CYCLOBENZAPRINE 5 MG: 10 TABLET, FILM COATED ORAL at 05:10

## 2018-03-19 RX ADMIN — MAGNESIUM HYDROXIDE 30 ML: 400 SUSPENSION ORAL at 21:10

## 2018-03-19 RX ADMIN — SODIUM CHLORIDE: 9 INJECTION, SOLUTION INTRAVENOUS at 05:17

## 2018-03-19 RX ADMIN — GABAPENTIN 1800 MG: 300 CAPSULE ORAL at 07:49

## 2018-03-19 RX ADMIN — METAXALONE 800 MG: 800 TABLET ORAL at 13:12

## 2018-03-19 RX ADMIN — DOCUSATE SODIUM 300 MG: 100 CAPSULE ORAL at 07:49

## 2018-03-19 RX ADMIN — DIPHENHYDRAMINE HCL 25 MG: 25 TABLET ORAL at 10:57

## 2018-03-19 RX ADMIN — LACTOBACILLUS ACIDOPHILUS / LACTOBACILLUS BULGARICUS 1 PACKET: 100 MILLION CFU STRENGTH GRANULES at 16:29

## 2018-03-19 RX ADMIN — GABAPENTIN 2400 MG: 400 CAPSULE ORAL at 20:47

## 2018-03-19 RX ADMIN — CARISOPRODOL 350 MG: 350 TABLET ORAL at 20:51

## 2018-03-19 RX ADMIN — DIAZEPAM 10 MG: 5 TABLET ORAL at 07:49

## 2018-03-19 RX ADMIN — OXYCODONE HYDROCHLORIDE AND ACETAMINOPHEN 1 TABLET: 5; 325 TABLET ORAL at 10:57

## 2018-03-19 RX ADMIN — CARBIDOPA AND LEVODOPA 1 TABLET: 25; 100 TABLET ORAL at 18:43

## 2018-03-19 RX ADMIN — THERA TABS 1 TABLET: TAB at 07:49

## 2018-03-19 RX ADMIN — CARBIDOPA AND LEVODOPA 1 TABLET: 25; 100 TABLET ORAL at 20:46

## 2018-03-19 RX ADMIN — METAXALONE 800 MG: 800 TABLET ORAL at 07:49

## 2018-03-19 RX ADMIN — SODIUM CHLORIDE: 9 INJECTION, SOLUTION INTRAVENOUS at 21:06

## 2018-03-19 RX ADMIN — CARBIDOPA AND LEVODOPA 1 TABLET: 25; 100 TABLET ORAL at 13:12

## 2018-03-19 RX ADMIN — DOXYCYCLINE 100 MG: 100 TABLET ORAL at 20:51

## 2018-03-19 RX ADMIN — STANDARDIZED SENNA CONCENTRATE AND DOCUSATE SODIUM 2 TABLET: 8.6; 5 TABLET, FILM COATED ORAL at 07:49

## 2018-03-19 RX ADMIN — METAXALONE 800 MG: 800 TABLET ORAL at 16:29

## 2018-03-19 RX ADMIN — LACTOBACILLUS ACIDOPHILUS / LACTOBACILLUS BULGARICUS 1 PACKET: 100 MILLION CFU STRENGTH GRANULES at 13:12

## 2018-03-19 RX ADMIN — CARBIDOPA AND LEVODOPA 1 TABLET: 50; 200 TABLET, EXTENDED RELEASE ORAL at 20:46

## 2018-03-19 RX ADMIN — CARBIDOPA AND LEVODOPA 1 TABLET: 25; 100 TABLET ORAL at 10:27

## 2018-03-19 RX ADMIN — LACTOBACILLUS ACIDOPHILUS / LACTOBACILLUS BULGARICUS 1 PACKET: 100 MILLION CFU STRENGTH GRANULES at 07:49

## 2018-03-19 RX ADMIN — CARBIDOPA AND LEVODOPA 1 TABLET: 25; 100 TABLET ORAL at 05:10

## 2018-03-19 RX ADMIN — OXYCODONE HYDROCHLORIDE 5 MG: 5 TABLET ORAL at 03:43

## 2018-03-19 RX ADMIN — OXYCODONE HYDROCHLORIDE AND ACETAMINOPHEN 1 TABLET: 5; 325 TABLET ORAL at 15:31

## 2018-03-19 RX ADMIN — VITAMIN D, TAB 1000IU (100/BT) 1000 UNITS: 25 TAB at 07:49

## 2018-03-19 RX ADMIN — ACETAMINOPHEN 650 MG: 325 TABLET, FILM COATED ORAL at 05:10

## 2018-03-19 RX ADMIN — OXYCODONE HYDROCHLORIDE AND ACETAMINOPHEN 1 TABLET: 5; 325 TABLET ORAL at 20:45

## 2018-03-19 RX ADMIN — DOXYCYCLINE 100 MG: 100 TABLET ORAL at 07:49

## 2018-03-19 RX ADMIN — METAXALONE 800 MG: 800 TABLET ORAL at 20:52

## 2018-03-19 ASSESSMENT — COGNITIVE AND FUNCTIONAL STATUS - GENERAL
PERSONAL GROOMING: A LITTLE
HELP NEEDED FOR BATHING: A LOT
EATING MEALS: A LITTLE
TOILETING: A LOT
DAILY ACTIVITIY SCORE: 14
DRESSING REGULAR LOWER BODY CLOTHING: TOTAL
SUGGESTED CMS G CODE MODIFIER DAILY ACTIVITY: CK
DRESSING REGULAR UPPER BODY CLOTHING: A LITTLE

## 2018-03-19 ASSESSMENT — PAIN SCALES - GENERAL
PAINLEVEL_OUTOF10: 10
PAINLEVEL_OUTOF10: 8
PAINLEVEL_OUTOF10: 0
PAINLEVEL_OUTOF10: 2
PAINLEVEL_OUTOF10: 2
PAINLEVEL_OUTOF10: 10
PAINLEVEL_OUTOF10: 10
PAINLEVEL_OUTOF10: 5
PAINLEVEL_OUTOF10: 2
PAINLEVEL_OUTOF10: 10
PAINLEVEL_OUTOF10: 2

## 2018-03-19 ASSESSMENT — ENCOUNTER SYMPTOMS
ABDOMINAL PAIN: 0
HEADACHES: 0
DIARRHEA: 0
CONSTIPATION: 0
TREMORS: 1
PALPITATIONS: 0
COUGH: 0
NAUSEA: 0
SHORTNESS OF BREATH: 0
FOCAL WEAKNESS: 0
DIZZINESS: 0
FEVER: 0
VOMITING: 0
MYALGIAS: 0
CHILLS: 0
WEAKNESS: 1

## 2018-03-19 ASSESSMENT — ACTIVITIES OF DAILY LIVING (ADL): TOILETING: REQUIRES ASSIST

## 2018-03-19 NOTE — CARE PLAN
Problem: Communication  Goal: The ability to communicate needs accurately and effectively will improve    Intervention: [place] patient and significant other/support system to call light to alert staff of needs  A/Ox4, able to make needs known       Problem: Pain Management  Goal: Pain level will decrease to patient's comfort goal    Intervention: Educate and implement non-pharmacologic comfort measures. Examples: relaxation, distration, play therapy, activity therapy, massage, etc.  No current complaints of pain, will continue to monitor

## 2018-03-19 NOTE — ED TRIAGE NOTES
Recent hospitalization, discharged from [facility] on Thursday afternoon, for flare up of Parkinsons.  Notable B ankle contraction today, and skin over ankle bones is warm to touch and red.  Sacrum is also very red , but blanchable.  One small scabbed area on sacral.  Pt also states she has an abscess in the roof of her mouth, she states it has been occurring for 10-12days.

## 2018-03-19 NOTE — H&P
Hospital Medicine History and Physical    Date of Service  3/18/2018    Chief Complaint  Chief Complaint   Patient presents with   • Foot Swelling     pt states her feet have redness and swelling due to inability to straighten then secondary to dystonia and muscle contractions.       History of Presenting Illness  67 y.o. female who presented 3/18/2018 with inability to walk because of dystonia. She is seeking advanced level of care, primarily [facility].  She was just discharged yesterday to home health care for advanced parkinsonism, dystonia and muscle spasms. She tried home health care, but she is requiring more assistance round the clock than [facility] can provide. She felt she cannot walk on her own. She still has pain on her feet. New changes include worsening erythema dorsum of her L foot greater than R which is more tender and swollen than usual. She just had X-rays of her feet which showed no deep tissue or bony involvement. She declined further imaging as she does not want extra contrast from CT or MRI. She is on pain medications and muscle relaxants along with benadryl.   At the ED, afebrile, hemodynamically stable.   When I saw her at the ED, she is in no acute distress other than mild foot pain. Auscultation is clear. Resting and intentional tremors, R hand. Generalized weakness. Thin and frail. Not agitated. Swollen feet with erythema L>R. Feet in an inward rotated deformity.   Primary Care Physician  Raul Iverson M.D.    Consultants      Code Status  full    Review of Systems  Review of Systems   Constitutional: Positive for malaise/fatigue. Negative for chills and fever.   HENT: Negative for congestion, hearing loss and nosebleeds.    Eyes: Negative for pain and redness.   Respiratory: Negative for cough, hemoptysis, shortness of breath and wheezing.    Cardiovascular: Negative for chest pain and palpitations.   Gastrointestinal: Negative for abdominal pain, blood in stool, constipation, diarrhea, nausea  "and vomiting.   Genitourinary: Negative for dysuria, frequency and hematuria.   Musculoskeletal: Positive for falls, joint pain and myalgias.   Skin: Positive for rash (erythema dorsum of feet).   Neurological: Positive for tremors and weakness. Negative for dizziness, focal weakness, seizures, loss of consciousness and headaches.   Psychiatric/Behavioral: The patient is not nervous/anxious and does not have insomnia.    All other systems reviewed and are negative.       Past Medical History  Past Medical History:   Diagnosis Date   • Cellulitis 7/19/2012    right low extremity   • OSTEOPOROSIS 3/22/2010   • Abnormal finding on mammography, microcalcification    • Anesthesia     hypotensive in the past   • Bowel obstruction     fecal impaction   • Bursitis    • Cataract    • Dilated bile duct    • Disc disorder    • Dystonia    • Dystonia    • Dysuria    • GERD (gastroesophageal reflux disease)    • Heart burn    • Hypertension     pt can run very low   • Hypotension    • Insulin resistance    • Leukopenia    • Low blood pressure reading    • Malaise and fatigue    • Other specified disorder of intestines     constipation   • Pain     right hip, right knee   • Parkinson disease (CMS-HCC)     \"atypical\"   • Peripheral neuropathy (CMS-HCC)    • Rash, skin    • Scoliosis    • Spinal stenosis, lumbar    • Thyrotoxicosis remote    S/P I-131 Rx / subtotal thyroidectomy   • Unspecified disorder of thyroid     had partial thyroidectomy   • vitamin D deficiency        Surgical History  Past Surgical History:   Procedure Laterality Date   • BREAST BIOPSY Left 5/5/2015    Procedure: BREAST BIOPSY;  Surgeon: Deedee Bautista M.D.;  Location: SURGERY SAME DAY [facility];  Service:    • CATARACT PHACO WITH IOL  5/12/2014    Performed by Roland Becerra M.D. at [facility] ORS   • CATARACT PHACO WITH IOL  4/14/2014    Performed by Roland Becerra M.D. at [facility] ORS   • GASTROSCOPY WITH BIOPSY  5/9/2012 "    Performed by CIERRA SUAZO at SURGERY [facility]   • EGD W/ENDOSCOPIC ULTRASOUND  5/9/2012    Performed by CIERRA SUAZO at SURGERY [facility]   • THYROIDECTOMY  1967    partial   • GYN SURGERY  1985, 1988    c sec x 2   • KNEE ARTHROTOMY  1964, 1968    right   • OTHER      left fifth digit       Medications  No current facility-administered medications on file prior to encounter.      Current Outpatient Prescriptions on File Prior to Encounter   Medication Sig Dispense Refill   • LevOCARNitine L-Tartrate (L-CARNITINE) 500 MG Cap Take 500 mg by mouth every day.     • carbidopa-levodopa (SINEMET)  MG Tab Take 1 Tab by mouth 6 Times a Day. 240 Tab 2   • carisoprodol (SOMA) 350 MG Tab Take 350 mg by mouth every evening.     • carbidopa-levodopa SR (SINEMET CR)  MG per tablet Take 1 Tab by mouth every evening. By 2000 each night     • metaxalone (SKELAXIN) 800 MG Tab Take 800 mg by mouth 4 times a day. 0700, 1000, 1300, 1600     • vitamin D, Ergocalciferol, (DRISDOL) 79217 units Cap capsule Take 50,000 Units by mouth every 48 hours.     • docusate sodium (COLACE) 100 MG Cap Take 300 mg by mouth every day.     • Magnesium Citrate Powder Take 2 Doses by mouth as needed. 2 teaspoons every other days     • diazepam (VALIUM) 5 MG Tab Take 10 mg by mouth 2 Times a Day. 0700 and 1600     • Multiple Vitamin (MULTI VITAMIN DAILY PO) Take 1 Tab by mouth every day.         Family History  Family History   Problem Relation Age of Onset   • Arthritis Mother      Giant Cell Arteritis   • Lung Disease Father      COPD/Emphysema   • Hyperlipidemia Sister    • Heart Disease Maternal Aunt    • Heart Disease Maternal Uncle    • Heart Disease Paternal Aunt    • Heart Disease Paternal Uncle    • Heart Disease Maternal Grandmother    • Hypertension Maternal Grandmother    • Hyperlipidemia Maternal Grandmother    • Heart Disease Maternal Grandfather    • Hypertension Maternal Grandfather    • Hyperlipidemia  Maternal Grandfather    • Heart Disease Paternal Grandfather    • Hypertension Paternal Grandfather    • Hyperlipidemia Paternal Grandfather    • Cancer Paternal Grandfather      Bone       Social History  Social History   Substance Use Topics   • Smoking status: Former Smoker     Packs/day: 0.50     Years: 10.00     Quit date: 1985   • Smokeless tobacco: Never Used   • Alcohol use No      Comment: none since age 31       Allergies  Allergies   Allergen Reactions   • Bactrim [Sulfamethoxazole W-Trimethoprim] Hives   • Actonel [Risedronate Sodium]    • Latex Rash     Irritation of skin    • Sulfa Drugs    • Tape Rash     Paper tape is ok        Physical Exam  Laboratory   Hemodynamics  Temp (24hrs), Av.7 °C (98.1 °F), Min:36.7 °C (98 °F), Max:36.8 °C (98.2 °F)   Temperature: 36.7 °C (98 °F)  Pulse  Av.2  Min: 81  Max: 90 Heart Rate (Monitored): 81  Blood Pressure : 102/65, NIBP: 103/65      Respiratory      Respiration: 16, Pulse Oximetry: 93 %        RUL Breath Sounds: Diminished, RML Breath Sounds: Diminished, RLL Breath Sounds: Diminished, RUPALI Breath Sounds: Diminished, LLL Breath Sounds: Diminished    Physical Exam   Constitutional: She appears well-developed and well-nourished.   Thin frail   HENT:   Head: Normocephalic and atraumatic.   Eyes: Conjunctivae and EOM are normal. No scleral icterus.   Neck: Normal range of motion. Neck supple.   Cardiovascular: Normal rate and regular rhythm.  Exam reveals no gallop and no friction rub.    No murmur heard.  Pulmonary/Chest: Effort normal and breath sounds normal. No respiratory distress. She has no wheezes. She has no rales.   Abdominal: Soft. Bowel sounds are normal. She exhibits no distension. There is no tenderness. There is no rebound and no guarding.   Musculoskeletal: She exhibits edema, tenderness and deformity (eversion/inward rotation of both feet).   Both feet   Neurological: She is alert. Coordination abnormal.   Tremors   Skin: Skin is warm.  Rash (both feet dorsum erythema) noted.   Psychiatric: She has a normal mood and affect. Her behavior is normal.       Recent Labs      03/18/18   1830   WBC  5.4   RBC  3.91*   HEMOGLOBIN  12.6   HEMATOCRIT  38.3   MCV  98.0*   MCH  32.2   MCHC  32.9*   RDW  46.1   PLATELETCT  200   MPV  11.8     Recent Labs      03/18/18   1830   SODIUM  141   POTASSIUM  3.7   CHLORIDE  107   CO2  25   GLUCOSE  119*   BUN  16   CREATININE  0.63   CALCIUM  8.9     Recent Labs      03/18/18   1830   GLUCOSE  119*                 Lab Results   Component Value Date    TROPONINI <0.01 03/07/2018     Urinalysis:    Lab Results  Component Value Date/Time   SPECGRAVITY 1.010 01/15/2018 2300   GLUCOSEUR Negative 01/15/2018 2300   KETONES Negative 01/15/2018 2300   NITRITE Negative 01/15/2018 2300   WBCURINE 0-2 11/15/2017 1359   RBCURINE 2-5 (A) 11/15/2017 1359   BACTERIA Negative 11/15/2017 1359   EPITHELCELL Negative 11/15/2017 1359        Imaging  Dx-ankle 3+ Views Right    Result Date: 3/8/2018  3/7/2018 9:37 PM HISTORY/REASON FOR EXAM: Pain/Deformity Following Trauma TECHNIQUE/EXAM DESCRIPTION:  AP, lateral, and oblique views of the 3 ankle. COMPARISON:  None. FINDINGS: The bony structures and articulations appear within normal limits without visualized fracture, subluxation, or dislocation.     1.  No acute traumatic bony injury.     Dx-ankle 3+ Views Left    Result Date: 3/8/2018  3/7/2018 9:36 PM HISTORY/REASON FOR EXAM: Pain/Deformity Following Trauma TECHNIQUE/EXAM DESCRIPTION:  AP, lateral, and oblique views of the 3 ankle. COMPARISON:  None. FINDINGS: The bony structures and articulations appear within normal limits without visualized fracture, subluxation, or dislocation.     1.  No acute traumatic bony injury.      Assessment/Plan     I anticipate this patient will require at least two midnights for appropriate medical management, necessitating inpatient admission.    * Dystonia- (present on admission)   Assessment & Plan     Also parkinsonism  Corticobasilar syndrome  FOllows Neurology in the outpatient  Unchanged but impairing ability to walk by herself  Seeking advanced level of care she prefers [facility]  SW consult, PT/OT        Cellulitis of foot   Assessment & Plan    Mild at dorsum of both feet  Give PO doxycycline for now.          Other secondary parkinsonism (CMS-HCC)- (present on admission)   Assessment & Plan    Sinemet has been increased        Severe protein-calorie malnutrition (CMS-HCC)   Assessment & Plan    Encourage PO.   Boost        Constipation- (present on admission)   Assessment & Plan    Bowel regimen        vitamin D deficiency- (present on admission)   Assessment & Plan    Continue Vitamin D            VTE prophylaxis: pharmacologic.    I spent 73 minutes, reviewing the chart, notes, vitals, labs, imaging, ordering labs, evaluating Delmis Draper for assessment, enacting the plan above. 50% of the time was spent in counseling Delmis Draper andanswering questions. Discussed with ED physician. Medical decision making is therefore complex. Time was devoted to counseling and coordinating care including review of records, pertinent lab data and studies, as well as discussing diagnostic evaluation and work up, planned therapeutic interventions and future disposition of care. Where indicated, the assessment and plan reflect discussion of patient with consultants, other healthcare providers, family members, and additional research needed to obtain further information in formulating the plan of care for Delmis Draper.

## 2018-03-19 NOTE — PROGRESS NOTES
Skin tear noted to right posterior forearm. Mepilex applied. Skin to sacrum intact. Preventative mepilex applied. Old scars noted to RLE. Patient reports Hx knee surgery. Skin otherwise intact.

## 2018-03-19 NOTE — PROGRESS NOTES
Jessika Hayes MD Re: patient c/o cramping to feet unresponsive to massage and oxycodone. Flexeril 5mg PO x1 received.

## 2018-03-19 NOTE — ASSESSMENT & PLAN NOTE
Also parkinsonism  Corticobasilar syndrome  Outpatient neurology  Inability to care for herself at home.  PT/OT/SNF

## 2018-03-19 NOTE — THERAPY
"Occupational Therapy Evaluation completed.   Functional Status:  Max A LB dressing, Max A supine>sit Mod A sit>supine, Min/Mod A to maintain sitting balance at EOB. Grooming performed in long sitting w/ CGA  Plan of Care: Will benefit from Occupational Therapy 3 times per week  Discharge Recommendations:  Equipment: Will Continue to Assess for Equipment Needs. Post-acute therapy Discharge to a transitional care facility for continued skilled therapy services.    See \"Rehab Therapy-Acute\" Patient Summary Report for complete documentation.      Pt is a 66 y/o female who presents to acute 2' inability to walk or transfer due to dystonia. PMH includes parkinsonism, dystonia,  muscle spasms, hypotension, leukopenia, HTN, dysuria, malasie/fatigue, pain, peripheral neuropathy and scoliosis . Pt. Returned home from hospital stay last week w/ home health and felt as though the burden of care was too great for her spouse and returned to the hospital. Per pt. She used to be able to assist more with stand pivot transfers however recently it hurts to WB at all on feet. Pt had caregiver for 5 hours 5x a wk, reports a need for increased level of care. Compared evaluation from today to last week and pt has significantly decreased balance, activity tolerance, functional independence, functional mobility and strength/coordination in B UE's (right side weaker than left, R hand dominant). Recommend DC to transitional care facility for therapy 5x+/wk.   "

## 2018-03-19 NOTE — DISCHARGE SUMMARY
CHIEF COMPLAINT ON ADMISSION  Chief Complaint   Patient presents with   • Foot Swelling     pt states her feet have redness and swelling due to inability to straighten then secondary to dystonia and muscle contractions.       CODE STATUS  Full Code    HPI & HOSPITAL COURSE  This is a 67 y.o. year old female here with secondary Parkinsonism, who was recently discharged after being evaluated for dystonia. She was recommended for home health but went home and did not do well there and came back to the hospital for SNF placement. She was started on doxycycline for ? Lower extremity cellulitis but this was felt to be more due to her attempts to ambulate on her contracted feet. Placement was sought and arranged.     Therefore, she is discharged in fair and stable condition for further post-acute management.       DISCHARGE PROBLEM LIST  Principal Problem:    Dystonia POA: Yes      Overview: corticobasilar syndrome, right foot, right leg, right arm symtoms      Dr. Dorsey  Active Problems:    Other secondary parkinsonism (CMS-HCC) POA: Yes    vitamin D deficiency POA: Yes    Severe protein-calorie malnutrition (CMS-HCC) POA: Yes  Resolved Problems:    Constipation POA: Yes      FOLLOW UP  Future Appointments  Date Time Provider Department Center   3/22/2018 To Be Determined Reny Meyers OT [facility] None   3/26/2018 2:45 PM [address]   3/26/2018 3:15 PM [address]   4/2/2018 1:40 PM Kevon Patel M.D. [facility] None   4/18/2018 2:20 PM [facility]     No follow-up provider specified.    MEDICATIONS ON DISCHARGE   Delmis Draper   Home Medication Instructions ROBBY:[id number]    Printed on:03/19/18 1410   Medication Information                      carbidopa-levodopa (SINEMET)  MG Tab  Take 1 Tab by mouth 6 Times a Day.             carbidopa-levodopa SR (SINEMET CR)  MG per tablet  Take 1 Tab by mouth every evening. By 2000 each night              carisoprodol (SOMA) 350 MG Tab  Take 1 Tab by mouth every evening for 5 days.             diazePAM (VALIUM) 5 MG Tab  Take 2 Tabs by mouth 2 Times a Day for 5 days. 0700 and 1600             diphenhydrAMINE (BENADRYL) 25 MG Tab  Take 25 mg by mouth every 6 hours as needed.             docusate sodium (COLACE) 100 MG Cap  Take 300 mg by mouth every day.             doxycycline monohydrate (ADOXA) 100 MG tablet  Take 1 Tab by mouth every 12 hours.             gabapentin (NEURONTIN) 600 MG tablet  Take 1,800-2,400 mg by mouth 2 times a day. 1800 mg every morning and 2400 mg every evening.             LevOCARNitine L-Tartrate (L-CARNITINE) 500 MG Cap  Take 500 mg by mouth every day.             Magnesium Citrate Powder  Take 2 Doses by mouth as needed. 2 teaspoons every other days             Melatonin 1 MG Tab  Take 1 mg by mouth every bedtime.             metaxalone (SKELAXIN) 800 MG Tab  Take 800 mg by mouth 4 times a day. 0700, 1000, 1300, 1600             Multiple Vitamin (MULTI VITAMIN DAILY PO)  Take 1 Tab by mouth every day.             oxyCODONE-acetaminophen (PERCOCET) 5-325 MG Tab  Take 1 Tab by mouth every four hours as needed for Moderate Pain for up to 5 days.             vitamin D, Ergocalciferol, (DRISDOL) 85792 units Cap capsule  Take 50,000 Units by mouth every 48 hours.                 DIET  Orders Placed This Encounter   Procedures   • Diet Order     Standing Status:   Standing     Number of Occurrences:   1     Order Specific Question:   Diet:     Answer:   Regular [1]       ACTIVITY  As tolerated and directed by skilled nursing.      LINES, DRAINS, AND WOUNDS  This is an automated list. Peripheral IVs will be removed prior to discharge.  PIV Group Left Forearm 18g (Active)   Line Secured Taped;Transparent 3/19/2018  8:00 AM   Site Condition / Description Assessed;Patent 3/19/2018  8:00 AM   Dressing Type / Description Transparent;Clean;Dry;Intact 3/19/2018  8:00 AM   Dressing Status  Observed 3/19/2018  8:00 AM   Saline Locked Yes 3/18/2018  6:38 PM   Infiltration Grading [place] [facility] [place] 3/19/2018  8:00 AM   Phlebitis Scale (Used by [facility]) 0 3/19/2018  8:00 AM   Date Primary Tubing Changed 03/18/18 3/19/2018  8:00 AM   NEXT Primary Tubing Change  03/20/18 3/19/2018  8:00 AM     Urinary Catheter Ureteral Catheter 16 (Active)   Catheter State [place] 3/19/2018  8:00 AM   Skin Integrity Intact 3/19/2018  8:00 AM   Catheter Secured Yes 3/19/2018  8:00 AM   Collection Device Changed No 3/19/2018  8:00 AM                Urinary Catheter Ureteral Catheter 16 (Active)   Catheter State [place] 3/19/2018  8:00 AM   Skin Integrity Intact 3/19/2018  8:00 AM   Catheter Secured Yes 3/19/2018  8:00 AM   Collection Device Changed No 3/19/2018  8:00 AM        MENTAL STATUS ON TRANSFER  Level of Consciousness: Alert  Orientation : Oriented x 4  Speech: Speech Clear    CONSULTATIONS  None    PROCEDURES  None    LABORATORY  Lab Results   Component Value Date/Time    SODIUM 139 03/19/2018 03:43 AM    POTASSIUM 3.6 03/19/2018 03:43 AM    CHLORIDE 105 03/19/2018 03:43 AM    CO2 28 03/19/2018 03:43 AM    GLUCOSE 89 03/19/2018 03:43 AM    BUN 12 03/19/2018 03:43 AM    CREATININE 0.57 03/19/2018 03:43 AM    CREATININE 0.8 12/22/2008 09:30 AM        Lab Results   Component Value Date/Time    WBC 4.5 (L) 03/19/2018 03:43 AM    HEMOGLOBIN 12.1 03/19/2018 03:43 AM    HEMATOCRIT 37.7 03/19/2018 03:43 AM    PLATELETCT 202 03/19/2018 03:43 AM        Total time of the discharge process exceeds 36 minutes.

## 2018-03-19 NOTE — DISCHARGE PLANNING
Spoke to Aniya at [facility]. Per Aniya, they will need updated PT notes before authorizing SNF. Zoe([facility]) and Abdoul() notified.

## 2018-03-19 NOTE — ED NOTES
Med rec complete per patient.  Allergies reviewed.  Pt completed a course of antibiotics for a tooth abscess yesterday, but she can't remember which antibiotic.

## 2018-03-19 NOTE — PROGRESS NOTES
[facility] Hospitalist Progress Note    Date of Service: 3/19/2018    Chief Complaint  67 y.o. female admitted 3/18/2018 with dystonia and inability to care for herself    Interval Problem Update  3/19 - discussed with patient at length. Planning for discharge to SNF. PT/OT pending. Questions answered. She is weak. Her feet are minimally erythematous.     Consultants/Specialty  None    Disposition  SNF pending    I spent a total of 36 minutes during this clinical encounter of which > 50% was devoted to counseling and coordinating care including review of records, pertinent lab data and studies, as well as discussing diagnostic evaluation and work up, planned therapeutic interventions and future disposition of care. Where indicated, the assessment and plan reflect discussion of patient with consultants, other healthcare providers, family members, and additional research needed to obtain further information in formulating the plan of care of this patient.         Review of Systems   Constitutional: Negative for chills and fever.   Respiratory: Negative for cough and shortness of breath.    Cardiovascular: Negative for chest pain and palpitations.   Gastrointestinal: Negative for abdominal pain, constipation, diarrhea, nausea and vomiting.   Genitourinary: Negative for dysuria.   Musculoskeletal: Negative for myalgias.   Skin: Negative for itching.   Neurological: Positive for tremors and weakness. Negative for dizziness, focal weakness and headaches.   All other systems reviewed and are negative.     Physical Exam  Laboratory/Imaging   Hemodynamics  Temp (24hrs), Av.8 °C (98.2 °F), Min:36.4 °C (97.5 °F), Max:37.3 °C (99.1 °F)   Temperature: 37.3 °C (99.1 °F)  Pulse  Av.6  Min: 70  Max: 95 Heart Rate (Monitored): 81  Blood Pressure : 139/72, NIBP: 103/65      Respiratory      Respiration: 14, Pulse Oximetry: 92 %        RUL Breath Sounds: Diminished, RML Breath Sounds: Diminished, RLL Breath Sounds: Diminished,  RUPALI Breath Sounds: Diminished, LLL Breath Sounds: Diminished    Fluids    Intake/Output Summary (Last 24 hours) at 03/19/18 1416  Last data filed at 03/19/18 0627   Gross per 24 hour   Intake             1260 ml   Output             1850 ml   Net             -590 ml       Nutrition  Orders Placed This Encounter   Procedures   • Diet Order     Standing Status:   Standing     Number of Occurrences:   1     Order Specific Question:   Diet:     Answer:   Regular [1]     Physical Exam   Constitutional: She appears well-developed.   Cachectic   HENT:   Head: Normocephalic and atraumatic.   Mouth/Throat: Oropharynx is clear and moist.   Eyes: Conjunctivae are normal. Pupils are equal, round, and reactive to light. No scleral icterus.   Cardiovascular: Normal rate, regular rhythm, normal heart sounds and intact distal pulses.    No murmur heard.  Pulmonary/Chest: Effort normal and breath sounds normal. No respiratory distress. She has no wheezes.   Abdominal: Soft. Bowel sounds are normal. She exhibits no distension. There is no tenderness.   Musculoskeletal: Normal range of motion. She exhibits deformity. She exhibits no edema or tenderness.   contractures   Neurological: She is alert.   Vitals reviewed.      Recent Labs      03/18/18   1830  03/19/18   0343   WBC  5.4  4.5*   RBC  3.91*  3.80*   HEMOGLOBIN  12.6  12.1   HEMATOCRIT  38.3  37.7   MCV  98.0*  99.2*   MCH  32.2  31.8   MCHC  32.9*  32.1*   RDW  46.1  47.2   PLATELETCT  200  202   MPV  11.8  11.6     Recent Labs      03/18/18   1830  03/19/18   0343   SODIUM  141  139   POTASSIUM  3.7  3.6   CHLORIDE  107  105   CO2  25  28   GLUCOSE  119*  89   BUN  16  12   CREATININE  0.63  0.57   CALCIUM  8.9  9.1                      Assessment/Plan     * Dystonia- (present on admission)   Assessment & Plan    Also parkinsonism  Corticobasilar syndrome  Outpatient neurology  Inability to care for herself at home.  PT/OT/SNF        Other secondary parkinsonism (CMS-HCC)-  (present on admission)   Assessment & Plan    Sinemet at home dose        Severe protein-calorie malnutrition (CMS-HCC)- (present on admission)   Assessment & Plan    Nutrition as tolerated        vitamin D deficiency- (present on admission)   Assessment & Plan    Vitamin D          Quality-Core Measures   Reviewed items::  Medications reviewed and Labs reviewed  DVT prophylaxis pharmacological::  Enoxaparin (Lovenox)  Ulcer Prophylaxis::  Not indicated

## 2018-03-19 NOTE — ED TRIAGE NOTES
Chief Complaint   Patient presents with   • Foot Swelling     pt states her feet have redness and swelling due to inability to straighten then secondary to dystonia and muscle contractions.   Brought by EMS to room.  Pt unable to transfer from [facility] secondary to muscle contraction in B ankles.  Agree with triage assessment.  Changing into gown.  Chart placed for ERP eval.

## 2018-03-19 NOTE — CONSULTS
DATE OF SERVICE:  03/18/2018    I was asked to see the patient by the emergency room physician.  She has   bilateral ankle pain and marked deformity, which has really gotten quite worse   over the last 3 weeks.  The patient was reportedly able to walk over the last   3 weeks.  She has a dystonic form of Parkinson's, which advanced to the point   where her ankles are deformed, and she is unable to walk on them.    On physical exam, there is some redness.  I do not think it is consistent with   anything infectious.  Her ankle range of motion is painful.  She is grossly   neurologically very limited on evaluation here.  I can only get flickers of   motion in either leg and feel this is due to her underlying neurological   disorder.  Her pulses are good.  She is warm and well perfused.  There is   again some redness, but not consistent with infection as it is blanchable.    IMAGING:  X-rays reviewed include previous x-rays.  The patient was seen a   couple of weeks ago.  These images do not demonstrate any sort of fracture.    IMPRESSION:  Bilateral ankle deformities, progression of neurological   disorder.    ASSESSMENT AND PLAN:  I think the patient is best treated as an admission to   the hospital.  I have discussed this with the physician here in the emergency   room as well.  I think she is more than likely best treated with an admission   to a rehab center with regard to AFOs or any sort of deformity correction,   physical therapy, etc.  They are going to be best able to provide her with   that.       ____________________________________     MD JESS Claire / WESLEY    DD:  03/18/2018 20:12:59  DT:  03/19/2018 00:43:51    D#:  [id number]  Job#:  [id number]

## 2018-03-19 NOTE — DISCHARGE PLANNING
Saw pt and  @ bedside and provided with choice form as they are anticipation rehab stay post hospitalization.  Choice form is not signed, for information only at this point.

## 2018-03-19 NOTE — DISCHARGE PLANNING
Transitional Care Navigator:    TCN met with patient to discuss transitional care services for discharge planning.  Pt failed at home with home health services. SNF level of care has been recommended.  TCN explained SNF level of care in detail.  Pt in agreement.  Choice is [facility].  Choice form completed and faxed to [facility]. SW aware.  TCN will follow-up as needed.

## 2018-03-19 NOTE — CARE PLAN
Problem: Venous Thromboembolism (VTW)/Deep Vein Thrombosis (DVT) Prevention:  Goal: Patient will participate in Venous Thrombosis (VTE)/Deep Vein Thrombosis (DVT)Prevention Measures    Intervention: Ensure patient wears graduated elastic stockings (LOBO hose) and/or SCDs, if ordered, when in bed or chair (Remove at least once per shift for skin check)  SCDs applied.

## 2018-03-19 NOTE — PROGRESS NOTES
Assumed care of pt. Able to make needs known. A/Ox4. Ta in place. Regular diet. PIV in left forearm 18g, with NS at 75cc/hr. POC SNF. Bed alarm on. Lower extremities with contractures. Call light in reach, bed in low position, will continue hourly rounding.

## 2018-03-19 NOTE — ED PROVIDER NOTES
"ED Provider Note    Scribed for Delmis Huerta M.D. by Rose Hu. 3/18/2018, 6:04 PM.    Primary care provider: Raul Iverson M.D.  Means of arrival: ambulance   History obtained from: patient   History limited by: none     CHIEF COMPLAINT  Chief Complaint   Patient presents with   • Foot Swelling     pt states her feet have redness and swelling due to inability to straighten then secondary to dystonia and muscle contractions.       HPI  Delmis Draper is a 67 y.o. female who presents to the Emergency Department for evaluation of constant bilateral lower extremity pain located to her \"toes to her hips\" onset today. She has associated redness to her bilateral ankles. Patient denies fevers but reports associated subjective fever and chills. Patient was recently diagnosed with dystonia secondary to atypical Parkinson's. Patient is no longer able to walk.  reports the patient was able to walk three months ago. She was recently admitted to the ED on 3/7 for worsening cramping, dystonia and contractures. She was treated with 25 mg of Benadryl prior to arrival.       REVIEW OF SYSTEMS  Pertinent positives include bilateral lower extremity pain and redness, chills.   Pertinent negatives include no fevers.   All other systems reviewed and negative. C.       PAST MEDICAL HISTORY   has a past medical history of Abnormal finding on mammography, microcalcification; Anesthesia; Bowel obstruction; Bursitis; Cataract; Cellulitis (7/19/2012); Dilated bile duct; Disc disorder; Dystonia; Dystonia; Dysuria; GERD (gastroesophageal reflux disease); Heart burn; Hypertension; Hypotension; Insulin resistance; Leukopenia; Low blood pressure reading; Malaise and fatigue; OSTEOPOROSIS (3/22/2010); Other specified disorder of intestines; Pain; Parkinson disease (CMS-[facility]); Peripheral neuropathy (CMS-[facility]); Rash, skin; Scoliosis; Spinal stenosis, lumbar; Thyrotoxicosis (remote); Unspecified disorder of thyroid; and vitamin D " deficiency.      SURGICAL HISTORY   has a past surgical history that includes thyroidectomy (1967); gastroscopy with biopsy (5/9/2012); egd w/endoscopic ultrasound (5/9/2012); cataract phaco with iol (4/14/2014); cataract phaco with iol (5/12/2014); breast biopsy (Left, 5/5/2015); gyn surgery (1985, 1988); knee arthrotomy (1964, 1968); and other.      SOCIAL HISTORY  Social History   Substance Use Topics   • Smoking status: Former Smoker     Packs/day: 0.50     Years: 10.00     Quit date: 1/1/1985   • Smokeless tobacco: Never Used   • Alcohol use No      Comment: none since age 31      History   Drug Use No       FAMILY HISTORY  Family History   Problem Relation Age of Onset   • Arthritis Mother      Giant Cell Arteritis   • Lung Disease Father      COPD/Emphysema   • Hyperlipidemia Sister    • Heart Disease Maternal Aunt    • Heart Disease Maternal Uncle    • Heart Disease Paternal Aunt    • Heart Disease Paternal Uncle    • Heart Disease Maternal Grandmother    • Hypertension Maternal Grandmother    • Hyperlipidemia Maternal Grandmother    • Heart Disease Maternal Grandfather    • Hypertension Maternal Grandfather    • Hyperlipidemia Maternal Grandfather    • Heart Disease Paternal Grandfather    • Hypertension Paternal Grandfather    • Hyperlipidemia Paternal Grandfather    • Cancer Paternal Grandfather      Bone       CURRENT MEDICATIONS  Home Medications     Reviewed by Cassia Finley, Pharmacy Intern (Pharmacy Intern) on 03/18/18 at 2003  Med List Status: Complete   Medication Last Dose Status   carbidopa-levodopa (SINEMET)  MG Tab 3/18/2018 Active   carbidopa-levodopa SR (SINEMET CR)  MG per tablet 3/17/2018 Active   carisoprodol (SOMA) 350 MG Tab 3/17/2018 Active   diazepam (VALIUM) 5 MG Tab 3/18/2018 Active   diphenhydrAMINE (BENADRYL) 25 MG Tab 3/18/2018 Active   docusate sodium (COLACE) 100 MG Cap 3/18/2018 Active   gabapentin (NEURONTIN) 600 MG tablet 3/18/2018 Active   LevOCARNitine  "L-Tartrate (L-CARNITINE) 500 MG Cap 3/18/2018 Active   Magnesium Citrate Powder 3/18/2018 Active   Melatonin 1 MG Tab 3/17/2018 Active   metaxalone (SKELAXIN) 800 MG Tab 3/18/2018 Active   Multiple Vitamin (MULTI VITAMIN DAILY PO) 3/18/2018 Active   oxyCODONE-acetaminophen (PERCOCET) 5-325 MG Tab 3/18/2018 Active   vitamin D, Ergocalciferol, (DRISDOL) 65926 units Cap capsule 3/18/2018 Active                ALLERGIES  Allergies   Allergen Reactions   • Bactrim [Sulfamethoxazole W-Trimethoprim] Hives   • Actonel [Risedronate Sodium]    • Latex Rash     Irritation of skin    • Sulfa Drugs    • Tape Rash     Paper tape is ok       PHYSICAL EXAM  VITAL SIGNS: /66   Pulse 87   Temp 36.8 °C (98.2 °F)   Resp (!) 24   Ht 1.6 m (5' 3\")   Wt 47.6 kg (104 lb 15 oz)   LMP 01/01/1990   SpO2 97%   BMI 18.59 kg/m²   Constitutional:  Laying on gurney, able to answer questions  HENT: Nose is normal in appearance without rhinorrhea, external ears are normal,  moist mucous membranes  Eyes: Anicteric,  pupils are equal round and reactive, there is no conjunctival drainage or pallor   Neck: The trachea is midline, there is no obvious mass or meningeal signs  Cardiovascular: Equal radial pulsation, regular rate and rhythm without murmurs gallops or rubs  Thorax & Lungs: Respiratory rate and effort are normal. There is normal chest excursion with respiration.  No wheezes rhonchi or rales noted.  Abdomen: Abdomen is normal in appearance  :  No CVA tenderness to palpation  Musculoskeletal: Contractures medially to bilateral lower extremities.   Skin: Visualized skin is warm, no rash. Skin tear to the right arm. Erythema to bilateral ankles, left greater than right.   Neurologic:  Resting tremor. Bilateral lower extremities with inverted feet bilaterally medially. Cranial nerves II through XII are intact there is no focal abnormality noted.  Psychiatric: Normal mood and mentation        DIAGNOSTIC STUDIES / " PROCEDURES  LABS  Results for orders placed or performed during the hospital encounter of 03/18/18   CBC WITH DIFFERENTIAL   Result Value Ref Range    WBC 5.4 4.8 - 10.8 K/uL    RBC 3.91 (L) 4.20 - 5.40 M/uL    Hemoglobin 12.6 12.0 - 16.0 g/dL    Hematocrit 38.3 37.0 - 47.0 %    MCV 98.0 (H) 81.4 - 97.8 fL    MCH 32.2 27.0 - 33.0 pg    MCHC 32.9 (L) 33.6 - 35.0 g/dL    RDW 46.1 35.9 - 50.0 fL    Platelet Count 200 164 - 446 K/uL    MPV 11.8 9.0 - 12.9 fL    Neutrophils-Polys 65.70 44.00 - 72.00 %    Lymphocytes 23.00 22.00 - 41.00 %    Monocytes 7.80 0.00 - 13.40 %    Eosinophils 2.60 0.00 - 6.90 %    Basophils 0.70 0.00 - 1.80 %    Immature Granulocytes 0.20 0.00 - 0.90 %    Nucleated RBC 0.00 /100 WBC    Neutrophils (Absolute) 3.53 2.00 - 7.15 K/uL    Lymphs (Absolute) 1.24 1.00 - 4.80 K/uL    Monos (Absolute) 0.42 0.00 - 0.85 K/uL    Eos (Absolute) 0.14 0.00 - 0.51 K/uL    Baso (Absolute) 0.04 0.00 - 0.12 K/uL    Immature Granulocytes (abs) 0.01 0.00 - 0.11 K/uL    NRBC (Absolute) 0.00 K/uL   BMP   Result Value Ref Range    Sodium 141 135 - 145 mmol/L    Potassium 3.7 3.6 - 5.5 mmol/L    Chloride 107 96 - 112 mmol/L    Co2 25 20 - 33 mmol/L    Glucose 119 (H) 65 - 99 mg/dL    Bun 16 8 - 22 mg/dL    Creatinine 0.63 0.50 - 1.40 mg/dL    Calcium 8.9 8.5 - 10.5 mg/dL    Anion Gap 9.0 0.0 - 11.9   WESTERGREN SED RATE   Result Value Ref Range    Sed Rate Westergren 22 0 - 30 mm/hour   CRP QUANTITIVE (NON-CARDIAC)   Result Value Ref Range    Stat C-Reactive Protein 2.93 (H) 0.00 - 0.75 mg/dL   ESTIMATED GFR   Result Value Ref Range    GFR If African American >60 >60 mL/min/1.73 m 2    GFR If Non African American >60 >60 mL/min/1.73 m 2   URIC ACID   Result Value Ref Range    Uric Acid 2.2 1.9 - 8.2 mg/dL   All labs reviewed by me.        COURSE & MEDICAL DECISION MAKING  Nursing notes and vital signs were reviewed. (See chart for details)  The patient's  records were reviewed, history was obtained from the patient and  her .     Patient presents after recent hospitalization with worsening of her neurologic condition. She has severe posturing of her feet and inability to ambulate secondary to their positioning. She attempted to kill home with home health but is unable to manage at home. Her skin is reddened on the left lower extremity and there is a question of this being secondary to the skin being pulled taut versus early cellulitis    6:04 PM Discussed plan of care with the patient and her . They agreed to admission. Initial orders in the Emergency Department included CBC, BMP, westergren sed rate and CRP quantitative and initial treatment in the Emergency Department included Benadryl 25 mg and Morphine 4 mg.     6:12 PM Obtained and reviewed past medical records which indicated the patient was seen on 3/7/18 for shaking and dystonia.     6:27 PM Paged orthopedics.     6:50 PM Consult with Dr. Luna, orthopedics, who agreed to see the patient. He feels the patient would benefit from rehab and that at this time no acute orthopedic intervention is needed    6:58 PM Consult with orthopedics, Dr. Luna, who evaluated the patient and agreed with plan of care.     7:02 PM Spoke with Dr. Dennis, hospitalist, who agreed to admit the patient.       DISPOSITION:  Patient will be admitted to Dr. Dennis in guarded condition.      FINAL IMPRESSION  1. Foot contracture, unspecified laterality     2..    Inability to ambulate    Rose PANDYA (Hannah), am scribing for, and in the presence of, Delmis Huerta M.D..  Electronically signed by: Rose Hu (Hannah), 3/18/2018  Delmis PANDYA M.D. personally performed the services described in this documentation, as scribed by Rose Hu in my presence, and it is both accurate and complete.    The note accurately reflects work and decisions made by me.  Delmis Huerta  3/18/2018  10:38 PM

## 2018-03-19 NOTE — DISCHARGE PLANNING
Medical Social Worker    SW received a call from  stating that pt must have PT/OT evals in before insurance can give auth to [facility].

## 2018-03-20 VITALS
OXYGEN SATURATION: 96 % | HEART RATE: 76 BPM | BODY MASS INDEX: 19.6 KG/M2 | TEMPERATURE: 98 F | SYSTOLIC BLOOD PRESSURE: 110 MMHG | HEIGHT: 63 IN | DIASTOLIC BLOOD PRESSURE: 68 MMHG | WEIGHT: 110.6 LBS | RESPIRATION RATE: 16 BRPM

## 2018-03-20 PROCEDURE — 700102 HCHG RX REV CODE 250 W/ 637 OVERRIDE(OP): Performed by: INTERNAL MEDICINE

## 2018-03-20 PROCEDURE — G8979 MOBILITY GOAL STATUS: HCPCS | Mod: CI

## 2018-03-20 PROCEDURE — 99239 HOSP IP/OBS DSCHRG MGMT >30: CPT | Performed by: INTERNAL MEDICINE

## 2018-03-20 PROCEDURE — 97162 PT EVAL MOD COMPLEX 30 MIN: CPT

## 2018-03-20 PROCEDURE — A9270 NON-COVERED ITEM OR SERVICE: HCPCS | Performed by: INTERNAL MEDICINE

## 2018-03-20 PROCEDURE — 700111 HCHG RX REV CODE 636 W/ 250 OVERRIDE (IP): Performed by: INTERNAL MEDICINE

## 2018-03-20 PROCEDURE — G8978 MOBILITY CURRENT STATUS: HCPCS | Mod: CK

## 2018-03-20 PROCEDURE — 700112 HCHG RX REV CODE 229: Performed by: INTERNAL MEDICINE

## 2018-03-20 RX ADMIN — CARBIDOPA AND LEVODOPA 1 TABLET: 25; 100 TABLET ORAL at 12:53

## 2018-03-20 RX ADMIN — GABAPENTIN 1800 MG: 300 CAPSULE ORAL at 08:44

## 2018-03-20 RX ADMIN — OXYCODONE HYDROCHLORIDE AND ACETAMINOPHEN 1 TABLET: 5; 325 TABLET ORAL at 10:29

## 2018-03-20 RX ADMIN — CARBIDOPA AND LEVODOPA 1 TABLET: 25; 100 TABLET ORAL at 05:02

## 2018-03-20 RX ADMIN — CARBIDOPA AND LEVODOPA 1 TABLET: 25; 100 TABLET ORAL at 10:28

## 2018-03-20 RX ADMIN — LACTOBACILLUS ACIDOPHILUS / LACTOBACILLUS BULGARICUS 1 PACKET: 100 MILLION CFU STRENGTH GRANULES at 12:53

## 2018-03-20 RX ADMIN — ENOXAPARIN SODIUM 40 MG: 100 INJECTION SUBCUTANEOUS at 08:42

## 2018-03-20 RX ADMIN — METAXALONE 800 MG: 800 TABLET ORAL at 08:43

## 2018-03-20 RX ADMIN — METAXALONE 800 MG: 800 TABLET ORAL at 12:53

## 2018-03-20 RX ADMIN — STANDARDIZED SENNA CONCENTRATE AND DOCUSATE SODIUM 2 TABLET: 8.6; 5 TABLET, FILM COATED ORAL at 08:43

## 2018-03-20 RX ADMIN — MAGNESIUM HYDROXIDE 30 ML: 400 SUSPENSION ORAL at 08:42

## 2018-03-20 RX ADMIN — LACTOBACILLUS ACIDOPHILUS / LACTOBACILLUS BULGARICUS 1 PACKET: 100 MILLION CFU STRENGTH GRANULES at 08:44

## 2018-03-20 RX ADMIN — DIAZEPAM 10 MG: 5 TABLET ORAL at 08:43

## 2018-03-20 RX ADMIN — DOXYCYCLINE 100 MG: 100 TABLET ORAL at 08:43

## 2018-03-20 RX ADMIN — VITAMIN D, TAB 1000IU (100/BT) 1000 UNITS: 25 TAB at 08:43

## 2018-03-20 RX ADMIN — DOCUSATE SODIUM 300 MG: 100 CAPSULE ORAL at 08:43

## 2018-03-20 RX ADMIN — THERA TABS 1 TABLET: TAB at 08:43

## 2018-03-20 ASSESSMENT — COGNITIVE AND FUNCTIONAL STATUS - GENERAL
WALKING IN HOSPITAL ROOM: A LOT
MOVING TO AND FROM BED TO CHAIR: A LOT
SUGGESTED CMS G CODE MODIFIER MOBILITY: CK
MOVING FROM LYING ON BACK TO SITTING ON SIDE OF FLAT BED: A LITTLE
MOBILITY SCORE: 15
CLIMB 3 TO 5 STEPS WITH RAILING: TOTAL
STANDING UP FROM CHAIR USING ARMS: A LITTLE

## 2018-03-20 ASSESSMENT — PAIN SCALES - GENERAL: PAINLEVEL_OUTOF10: 2

## 2018-03-20 ASSESSMENT — GAIT ASSESSMENTS: GAIT LEVEL OF ASSIST: UNABLE TO PARTICIPATE

## 2018-03-20 NOTE — DISCHARGE INSTRUCTIONS
Discharge Instructions    Discharged to other by medical transportation with escort. Discharged via ambulance, hospital escort: Yes.  Special equipment needed: Not Applicable    Be sure to schedule a follow-up appointment with your primary care doctor or any specialists as instructed.     Discharge Plan:   Influenza Vaccine Indication: Patient Refuses    I understand that a diet low in cholesterol, fat, and sodium is recommended for good health. Unless I have been given specific instructions below for another diet, I accept this instruction as my diet prescription.   Other diet: Regular    Special Instructions: None    · Is patient discharged on Warfarin / Coumadin?   No     Depression / Suicide Risk    As you are discharged from this Critical access hospital facility, it is important to learn how to keep safe from harming yourself.    Recognize the warning signs:  · Abrupt changes in personality, positive or negative- including increase in energy   · Giving away possessions  · Change in eating patterns- significant weight changes-  positive or negative  · Change in sleeping patterns- unable to sleep or sleeping all the time   · Unwillingness or inability to communicate  · Depression  · Unusual sadness, discouragement and loneliness  · Talk of wanting to die  · Neglect of personal appearance   · Rebelliousness- reckless behavior  · Withdrawal from people/activities they love  · Confusion- inability to concentrate     If you or a loved one observes any of these behaviors or has concerns about self-harm, here's what you can do:  · Talk about it- your feelings and reasons for harming yourself  · Remove any means that you might use to hurt yourself (examples: pills, rope, extension cords, firearm)  · Get professional help from the community (Mental Health, Substance Abuse, psychological counseling)  · Do not be alone:Call your Safe Contact- someone whom you trust who will be there for you.  · Call your local CRISIS HOTLINE  541-4333 or 766-653-9684  · Call your local Children's Mobile Crisis Response Team [place] (989) 602-8738 or www.[employer] ([employer])  · Call the toll free National Suicide Prevention Hotlines   · National Suicide Prevention Lifeline 219-150-SWCP (4259)  · National [employer] Line Network 800-SUICIDE (047-2263)

## 2018-03-20 NOTE — THERAPY
"Pt is a 67 y/o female admitted for pain and swelling in B feet. Pt was recently admitted in the beginning of March 2018 at that time seen by PT who recommended placement prior to D/C home. Pt reports since return home on 3/14/18, she has experienced increased difficulty with mobility, requiring greater assist for safety and increased fear of falling. Pt does endorse support of caregiving services (x5 hrs/day), spouse and home health. Pt demonstrated slight functional improvement compared to last therapy notes from previous admission. Currently required SBA to perform bed mobility with railing, and CGA -> Min A for sit to stands and transfer to chair. Pt reports her goal is to become more functionally independent and be able to ambulate household to limited community distances if possible. Pt may benefit from custom orthotics to improve B foot/ankle stability and positioning.    Physical Therapy Evaluation completed.   Bed Mobility:  Supine to Sit: Stand by Assist (HOB flat, railing used)  Transfers: Sit to Stand: Contact Guard Assist  Gait: Level Of Assist: Unable to Participate       Plan of Care: Will benefit from Physical Therapy 3 times per week  Discharge Recommendations: Equipment: Will Continue to Assess for Equipment Needs.   Post-acute therapy: Based on pt's CLOF and recent difficulties, PT recommends post acute placement to optimize functional mobility independence, reduce risk of falls and potential for readmission.         See \"Rehab Therapy-Acute\" Patient Summary Report for complete documentation.     "

## 2018-03-20 NOTE — DISCHARGE PLANNING
Medical Social Worker    JOSE informed pt and charge/bedside RNs that pt is to DC to [facility] via [facility] today at 1400. JOSE placed completed DC Pack on pt's chart.

## 2018-03-20 NOTE — DISCHARGE SUMMARY
CHIEF COMPLAINT ON ADMISSION  Chief Complaint   Patient presents with   • Foot Swelling     pt states her feet have redness and swelling due to inability to straighten then secondary to dystonia and muscle contractions.       CODE STATUS  Full Code    HPI & HOSPITAL COURSE  This is a 67 y.o. year old female here with secondary Parkinsonism, who was recently discharged after being evaluated for dystonia. Detailed evaluation please see H&P. Apparently patient currently condition is declining and she was recommended for SNF placement. She was started on doxycycline for ? Lower extremity cellulitis but this was felt to be more due to her attempts to ambulate on her contracted feet. Patient currently no extremity erythema resolved. Patient will not be discharged with antibiotics.     Therefore, she is discharged in fair and stable condition for further post-acute management.     Patient recovered sooner and currently very stable to be discharge home. Patient's hospital stay is less than initially expected due to quicker recovery. Patient has an unexpected recovery in the hospital.      DISCHARGE PROBLEM LIST  Principal Problem:    Dystonia POA: Yes      Overview: corticobasilar syndrome, right foot, right leg, right arm symtoms      Dr. Dorsey  Active Problems:    Other secondary parkinsonism (CMS-HCC) POA: Yes    vitamin D deficiency POA: Yes    Severe protein-calorie malnutrition (CMS-HCC) POA: Yes  Resolved Problems:    Constipation POA: Yes      FOLLOW UP  Future Appointments  Date Time Provider Department Center   3/22/2018 To Be Determined Reny Meyers OT [facility] None   3/26/2018 2:45 PM [address] [facility] [address]   3/26/2018 3:15 PM [address] [facility] [address]   4/2/2018 1:40 PM Kevon Patel M.D. [facility] None   4/18/2018 2:20 PM Nilton Figueroa M.D. [facility]     No follow-up provider specified.       Medication List      CONTINUE taking these medications      Instructions    carisoprodol 350 MG Tabs  Commonly known as:  SOMA   Take 1 Tab by mouth every evening for 5 days.  Dose:  350 mg     diazePAM 5 MG Tabs  Commonly known as:  VALIUM   Take 2 Tabs by mouth 2 Times a Day for 5 days. 0700 and 1600  Dose:  10 mg     diphenhydrAMINE 25 MG Tabs  Commonly known as:  BENADRYL   Take 25 mg by mouth every 6 hours as needed.  Dose:  25 mg     docusate sodium 100 MG Caps  Commonly known as:  COLACE   Take 300 mg by mouth every day.  Dose:  300 mg     gabapentin 600 MG tablet  Commonly known as:  NEURONTIN   Take 1,800-2,400 mg by mouth 2 times a day. 1800 mg every morning and 2400 mg every evening.  Dose:  2306-6028 mg     L-Carnitine 500 MG Caps   Take 500 mg by mouth every day.  Dose:  500 mg     Magnesium Citrate Powd   Take 2 Doses by mouth as needed. 2 teaspoons every other days  Dose:  2 Dose     Melatonin 1 MG Tabs   Take 1 mg by mouth every bedtime.  Dose:  1 mg     metaxalone 800 MG Tabs  Commonly known as:  SKELAXIN   Take 800 mg by mouth 4 times a day. 0700, 1000, 1300, 1600  Dose:  800 mg     MULTI VITAMIN DAILY PO   Take 1 Tab by mouth every day.  Dose:  1 Tab     oxyCODONE-acetaminophen 5-325 MG Tabs  Commonly known as:  PERCOCET   Take 1 Tab by mouth every four hours as needed for Moderate Pain for up to 5 days.  Dose:  1 Tab     * SINEMET CR  MG per tablet  Generic drug:  carbidopa-levodopa SR   Take 1 Tab by mouth every evening. By 2000 each night  Dose:  1 Tab     * carbidopa-levodopa  MG Tabs  Commonly known as:  SINEMET   Take 1 Tab by mouth 6 Times a Day.  Dose:  1 Tab     vitamin D (Ergocalciferol) 72804 units Caps capsule  Commonly known as:  DRISDOL   Take 50,000 Units by mouth every 48 hours.  Dose:  85950 Units        * This list has 2 medication(s) that are the same as other medications prescribed for you. Read the directions carefully, and ask your doctor or other care provider to review them with you.                    DIET  Orders Placed This  Encounter   Procedures   • Diet Order     Standing Status:   Standing     Number of Occurrences:   1     Order Specific Question:   Diet:     Answer:   Regular [1]       ACTIVITY  As tolerated and directed by skilled nursing.      LINES, DRAINS, AND WOUNDS  This is an automated list. Peripheral IVs will be removed prior to discharge.  PIV Group Left Forearm 18g (Active)   Line Secured Taped;Transparent 3/19/2018  8:00 AM   Site Condition / Description Assessed;Patent 3/19/2018  8:00 AM   Dressing Type / Description Transparent;Clean;Dry;Intact 3/19/2018  8:00 AM   Dressing Status Observed 3/19/2018  8:00 AM   Saline Locked Yes 3/18/2018  6:38 PM   Infiltration Grading Used by [facility] 0 3/19/2018  8:00 AM   Phlebitis Scale (Used by [facility]) 0 3/19/2018  8:00 AM   Date Primary Tubing Changed 03/18/18 3/19/2018  8:00 AM   NEXT Primary Tubing Change  03/20/18 3/19/2018  8:00 AM     Urinary Catheter Ureteral Catheter 16 (Active)   Catheter State [place] 3/19/2018  8:00 AM   Skin Integrity Intact 3/19/2018  8:00 AM   Catheter Secured Yes 3/19/2018  8:00 AM   Collection Device Changed No 3/19/2018  8:00 AM                Urinary Catheter Ureteral Catheter 16 (Active)   Catheter State [place] 3/19/2018  8:00 AM   Skin Integrity Intact 3/19/2018  8:00 AM   Catheter Secured Yes 3/19/2018  8:00 AM   Collection Device Changed No 3/19/2018  8:00 AM        MENTAL STATUS ON TRANSFER  Level of Consciousness: Alert  Orientation : Oriented x 4  Speech: Speech Clear    CONSULTATIONS  None    PROCEDURES  None    LABORATORY  Lab Results   Component Value Date/Time    SODIUM 139 03/19/2018 03:43 AM    POTASSIUM 3.6 03/19/2018 03:43 AM    CHLORIDE 105 03/19/2018 03:43 AM    CO2 28 03/19/2018 03:43 AM    GLUCOSE 89 03/19/2018 03:43 AM    BUN 12 03/19/2018 03:43 AM    CREATININE 0.57 03/19/2018 03:43 AM    CREATININE 0.8 12/22/2008 09:30 AM        Lab Results   Component Value Date/Time    WBC 4.5 (L) 03/19/2018 03:43 AM     HEMOGLOBIN 12.1 03/19/2018 03:43 AM    HEMATOCRIT 37.7 03/19/2018 03:43 AM    PLATELETCT 202 03/19/2018 03:43 AM        Total time of the discharge process exceeds 31 minutes.

## 2018-03-20 NOTE — RESPIRATORY CARE
COPD EDUCATION by COPD CLINICAL EDUCATOR  3/20/2018 at 6:43 AM by Blanca Pace     Patient reviewed by COPD education team. Patient does not qualify for COPD program.

## 2018-03-20 NOTE — CARE PLAN
Problem: Safety  Goal: Will remain free from injury  Outcome: PROGRESSING AS EXPECTED  Calls appropriately for assistance, treaded socks in place, pt turned q2 hour    Problem: Venous Thromboembolism (VTW)/Deep Vein Thrombosis (DVT) Prevention:  Goal: Patient will participate in Venous Thrombosis (VTE)/Deep Vein Thrombosis (DVT)Prevention Measures  Outcome: PROGRESSING AS EXPECTED      Problem: Pain Management  Goal: Pain level will decrease to patient's comfort goal  Outcome: PROGRESSING AS EXPECTED

## 2018-03-20 NOTE — PROGRESS NOTES
"Gave pt discharge packet, gave education regarding narcotic Rx with consent, med summary, and follow-up visits. Pt voices understanding and states \"i cant sign, my tremors are too bad.\"   "

## 2018-03-20 NOTE — CARE PLAN
Problem: Skin Integrity  Goal: Risk for impaired skin integrity will decrease    Intervention: Implement precautions to protect skin integrity in collaboration with the interdisciplinary team  Assisted patient OOB to wheelchair. Patient require max assist. Patient returned to bed after attempting to have BM.

## 2018-03-20 NOTE — DISCHARGE PLANNING
Medical Social Worker    JOSE met with pt at bedside. SW explained that [facility] is ready to accept pt. Pt stated she feels that [facility] is the safest method of transport for her. Pt is agreeable to DCing to [facility].     JOSE faxed transport and PCS form to [facility].

## 2018-03-20 NOTE — DISCHARGE PLANNING
Received PCS form from Abdoul(JOSE). Arranged patient's transport to [facility] at 1400 via [facility]. Zoe([facility] and Abdoul(JOSE) notified of transport time.    [facility] Auth# [id number]

## 2018-03-20 NOTE — PROGRESS NOTES
Pt leaves unit via [facility] g[facility]arthak with [facility] escorts x2, as well as pt's own caregiver. Pt is fully oriented and stable, leaving with all personal belongings.

## 2018-03-20 NOTE — CARE PLAN
Problem: Bowel/Gastric:  Goal: Will not experience complications related to bowel motility    Intervention: Implement interventions to promote bowel evacuation if inadequate bowel movements in past 48 hours  Milk of magnesia given to promote BM.

## 2018-03-20 NOTE — PROGRESS NOTES
3/19/18  Circa 2245pm. Assisted patient from bed to wheelchair. Patient wheels self to toilet and attempts to pass stool to no avail. Returned patient back to bed. Patient is total assist with transfers.

## 2018-03-26 ENCOUNTER — APPOINTMENT (OUTPATIENT)
Dept: RADIOLOGY | Facility: MEDICAL CENTER | Age: 68
End: 2018-03-26
Attending: SPECIALIST
Payer: MEDICARE

## 2018-03-26 ENCOUNTER — HOME CARE VISIT (OUTPATIENT)
Dept: HOME HEALTH SERVICES | Facility: HOME HEALTHCARE | Age: 68
End: 2018-03-26
Payer: MEDICARE

## 2018-04-18 ENCOUNTER — APPOINTMENT (OUTPATIENT)
Dept: MEDICAL GROUP | Facility: MEDICAL CENTER | Age: 68
End: 2018-04-18
Payer: MEDICARE

## 2018-04-26 ENCOUNTER — HOSPITAL ENCOUNTER (EMERGENCY)
Facility: MEDICAL CENTER | Age: 68
End: 2018-04-26
Attending: EMERGENCY MEDICINE
Payer: MEDICARE

## 2018-04-26 VITALS
HEIGHT: 64 IN | TEMPERATURE: 97.7 F | WEIGHT: 118.17 LBS | HEART RATE: 99 BPM | BODY MASS INDEX: 20.17 KG/M2 | OXYGEN SATURATION: 93 %

## 2018-04-26 DIAGNOSIS — T83.9XXA PROBLEM WITH FOLEY CATHETER, INITIAL ENCOUNTER (HCC): ICD-10-CM

## 2018-04-26 LAB
APPEARANCE UR: CLEAR
BACTERIA #/AREA URNS HPF: NEGATIVE /HPF
BILIRUB UR QL STRIP.AUTO: NEGATIVE
COLOR UR: YELLOW
EPI CELLS #/AREA URNS HPF: NEGATIVE /HPF
GLUCOSE UR STRIP.AUTO-MCNC: NEGATIVE MG/DL
HYALINE CASTS #/AREA URNS LPF: ABNORMAL /LPF
KETONES UR STRIP.AUTO-MCNC: NEGATIVE MG/DL
LEUKOCYTE ESTERASE UR QL STRIP.AUTO: ABNORMAL
MICRO URNS: ABNORMAL
NITRITE UR QL STRIP.AUTO: NEGATIVE
PH UR STRIP.AUTO: 6 [PH]
PROT UR QL STRIP: NEGATIVE MG/DL
RBC # URNS HPF: ABNORMAL /HPF
RBC UR QL AUTO: ABNORMAL
SP GR UR STRIP.AUTO: 1.02
UROBILINOGEN UR STRIP.AUTO-MCNC: 0.2 MG/DL
WBC #/AREA URNS HPF: ABNORMAL /HPF

## 2018-04-26 PROCEDURE — 81001 URINALYSIS AUTO W/SCOPE: CPT

## 2018-04-26 PROCEDURE — 99284 EMERGENCY DEPT VISIT MOD MDM: CPT

## 2018-04-26 PROCEDURE — 51702 INSERT TEMP BLADDER CATH: CPT

## 2018-04-26 PROCEDURE — 303105 HCHG CATHETER EXTRA

## 2018-04-26 PROCEDURE — 700111 HCHG RX REV CODE 636 W/ 250 OVERRIDE (IP): Performed by: EMERGENCY MEDICINE

## 2018-04-26 PROCEDURE — 96372 THER/PROPH/DIAG INJ SC/IM: CPT

## 2018-04-26 RX ORDER — MORPHINE SULFATE 10 MG/ML
6 INJECTION, SOLUTION INTRAMUSCULAR; INTRAVENOUS ONCE
Status: DISCONTINUED | OUTPATIENT
Start: 2018-04-26 | End: 2018-04-26

## 2018-04-26 RX ORDER — MORPHINE SULFATE 10 MG/ML
6 INJECTION, SOLUTION INTRAMUSCULAR; INTRAVENOUS ONCE
Status: COMPLETED | OUTPATIENT
Start: 2018-04-26 | End: 2018-04-26

## 2018-04-26 RX ADMIN — MORPHINE SULFATE 6 MG: 10 INJECTION INTRAVENOUS at 03:08

## 2018-04-26 ASSESSMENT — PAIN SCALES - GENERAL: PAINLEVEL_OUTOF10: 9

## 2018-04-26 NOTE — ED PROVIDER NOTES
ED Provider Note    Scribed for Jan Hicks M.D. by Martin Becker. 4/26/2018, 2:08 AM.    Primary care provider: Raul Iverson M.D.  Means of arrival: Ambulance  History obtained from: Patient  History limited by: None    CHIEF COMPLAINT  Chief Complaint   Patient presents with   • Urinary Retention     HPI  Delmis Draper is a 68 y.o. female who presents to the Emergency Department complaining of urinary retention onset at 5:00 PM. The patient had her ba replaced by her home health nurse and has not had any output since then. As a result, she has pain to her abdomen around the bladder. The patient has a history of Parkinson's and the tremors in her leg is making the pain worse.    REVIEW OF SYSTEMS  Positive for urinary retention, abdominal pain, tremors. Patient denies fever, vision change, sore throat, chest pain, shortness of breath, nausea, dysuria, rashes.    C. All other systems reviewed and negative.    PAST MEDICAL HISTORY   has a past medical history of Abnormal finding on mammography, microcalcification; Anesthesia; Bowel obstruction; Bursitis; Cataract; Cellulitis (7/19/2012); Dilated bile duct; Disc disorder; Dystonia; Dystonia; Dysuria; GERD (gastroesophageal reflux disease); Heart burn; Hypertension; Hypotension; Insulin resistance; Leukopenia; Low blood pressure reading; Malaise and fatigue; OSTEOPOROSIS (3/22/2010); Other specified disorder of intestines; Pain; Parkinson disease (CMS-[facility]); Peripheral neuropathy (CMS-[facility]); Rash, skin; Scoliosis; Spinal stenosis, lumbar; Thyrotoxicosis (remote); Unspecified disorder of thyroid; and vitamin D deficiency.    SURGICAL HISTORY   has a past surgical history that includes thyroidectomy (1967); gastroscopy with biopsy (5/9/2012); egd w/endoscopic ultrasound (5/9/2012); cataract phaco with iol (4/14/2014); cataract phaco with iol (5/12/2014); breast biopsy (Left, 5/5/2015); gyn surgery (1985, 1988); knee arthrotomy (1964, 1968); and  "other.    SOCIAL HISTORY  Social History   Substance Use Topics   • Smoking status: Former Smoker     Packs/day: 0.50     Years: 10.00     Quit date: 1/1/1985   • Smokeless tobacco: Never Used   • Alcohol use No      Comment: none since age 31      History   Drug Use No     FAMILY HISTORY  Family History   Problem Relation Age of Onset   • Arthritis Mother      Giant Cell Arteritis   • Lung Disease Father      COPD/Emphysema   • Hyperlipidemia Sister    • Heart Disease Maternal Aunt    • Heart Disease Maternal Uncle    • Heart Disease Paternal Aunt    • Heart Disease Paternal Uncle    • Heart Disease Maternal Grandmother    • Hypertension Maternal Grandmother    • Hyperlipidemia Maternal Grandmother    • Heart Disease Maternal Grandfather    • Hypertension Maternal Grandfather    • Hyperlipidemia Maternal Grandfather    • Heart Disease Paternal Grandfather    • Hypertension Paternal Grandfather    • Hyperlipidemia Paternal Grandfather    • Cancer Paternal Grandfather      Bone     CURRENT MEDICATIONS  Reviewed.  See Encounter Summary.     ALLERGIES  Allergies   Allergen Reactions   • Bactrim [Sulfamethoxazole W-Trimethoprim] Hives   • Actonel [Risedronate Sodium]    • Latex Rash     Irritation of skin    • Sulfa Drugs    • Tape Rash     Paper tape is ok     PHYSICAL EXAM  VITAL SIGNS: Pulse 99   Temp 36.5 °C (97.7 °F)   Ht 1.626 m (5' 4\")   Wt 53.6 kg (118 lb 2.7 oz)   SpO2 93%   BMI 20.28 kg/m²    Pulse ox interpretation: I interpret this pulse ox as normal.  Constitutional: Alert in no apparent distress.  HENT: Head normocephalic, atraumatic, Bilateral external ears normal, Nose normal.  Eyes: Pupils are equal, extraocular movements intact, Conjunctiva normal, Non-icteric.   Neck: Normal range of motion, Supple, No stridor.   Cardiovascular: Regular rate and rhythm   Thorax & Lungs: No acute respiratory distress, No wheezing, No increased work of breathing.   Abdomen: Soft, Tenderness in suprapubic region, " Slight distention, No pulsatile masses, No peritoneal signs.  Skin: Warm, Dry, No erythema, No rash.   Extremities: Intact distal pulses, No edema, No tenderness, No cyanosis.    Neurologic: Alert, Significant resting tremor which is greater in right arm than left, Normal sensory function, No focal deficits noted.   Psychiatric: Affect normal, Judgment normal, Mood normal.     DIAGNOSTIC STUDIES / PROCEDURES     LABS  Results for orders placed or performed during the hospital encounter of 04/26/18   URINALYSIS,CULTURE IF INDICATED   Result Value Ref Range    Color Yellow     Character Clear     Specific Gravity 1.020 <1.035    Ph 6.0 5.0 - 8.0    Glucose Negative Negative mg/dL    Ketones Negative Negative mg/dL    Protein Negative Negative mg/dL    Bilirubin Negative Negative    Urobilinogen, Urine 0.2 Negative    Nitrite Negative Negative    Leukocyte Esterase Small (A) Negative    Occult Blood Moderate (A) Negative    Micro Urine Req Microscopic      All labs were reviewed by me.    RADIOLOGY  No orders to display     The radiologist's interpretation of all radiological studies have been reviewed by me.    COURSE & MEDICAL DECISION MAKING  Pertinent Labs & Imaging studies reviewed. (See chart for details)    2:08 AM - Patient seen and examined at bedside. Ordered Urinalysis, Urine Microscopic to evaluate her symptoms.     Decision Making:  This is a 68 y.o. year old female who presents with suprapubic pain and a ba catheter that had not drained any urine in 8 hours. The patient had a ba catheter placed by a home health nurse today at approximately 5:00 this evening. The patient states that she hasn't had any urine output since that time and describes increasing suprapubic pain during that same period.  The patient had a catheter replaced here, and family the original catheter was only placed a few centimeters into the urethra. The patient had the catheter replaced, and had improvement of her pain with that.  The patient here on urinalysis has no evidence of infection, and she'll be discharged with instructions to follow-up with her primary care doctor.      FINAL IMPRESSION  1. Problem with Ta catheter, initial encounter (CMS-[facility])          Martin PANDYA (Scribe), am scribing for, and in the presence of, Jan Hicks M.D..    Electronically signed by: Martin Becker (Scribe), 4/26/2018    Jan PANDYA M.D. personally performed the services described in this documentation, as scribed by Martin Becker in my presence, and it is both accurate and complete.    The note accurately reflects work and decisions made by me.  Jan Hicks  4/26/2018  3:10 AM

## 2018-04-26 NOTE — ED NOTES
Discharge instructions given to pt and  at bedside. Questions answered. Pt wheeled out of ED and home with .

## 2018-04-26 NOTE — DISCHARGE INSTRUCTIONS
Please return to the emergency department if you develop fever, intractable vomiting, or any other new or worsening symptoms.

## 2018-04-26 NOTE — ED TRIAGE NOTES
Pt BIB EMS from home for urinary retention. Pt had ba replaced by home health nurse on 4/25, no output since ba was placed noted from pt. Pt states a lot of discomfort and pain in abdomen.

## 2018-04-30 ENCOUNTER — APPOINTMENT (OUTPATIENT)
Dept: RADIOLOGY | Facility: MEDICAL CENTER | Age: 68
End: 2018-04-30
Attending: SPECIALIST
Payer: MEDICARE

## 2018-05-05 ENCOUNTER — HOSPITAL ENCOUNTER (INPATIENT)
Facility: MEDICAL CENTER | Age: 68
LOS: 3 days | DRG: 699 | End: 2018-05-08
Attending: EMERGENCY MEDICINE | Admitting: HOSPITALIST
Payer: MEDICARE

## 2018-05-05 ENCOUNTER — APPOINTMENT (OUTPATIENT)
Dept: RADIOLOGY | Facility: MEDICAL CENTER | Age: 68
DRG: 699 | End: 2018-05-05
Payer: MEDICARE

## 2018-05-05 DIAGNOSIS — G21.8 OTHER SECONDARY PARKINSONISM (HCC): ICD-10-CM

## 2018-05-05 DIAGNOSIS — A41.9 SEPSIS, DUE TO UNSPECIFIED ORGANISM: ICD-10-CM

## 2018-05-05 DIAGNOSIS — A09 DIARRHEA OF INFECTIOUS ORIGIN: ICD-10-CM

## 2018-05-05 DIAGNOSIS — E43 SEVERE PROTEIN-CALORIE MALNUTRITION (HCC): ICD-10-CM

## 2018-05-05 DIAGNOSIS — N39.0 ACUTE UTI: ICD-10-CM

## 2018-05-05 DIAGNOSIS — R33.9 URINARY RETENTION: ICD-10-CM

## 2018-05-05 PROBLEM — T83.511A URINARY TRACT INFECTION ASSOCIATED WITH INDWELLING URETHRAL CATHETER (HCC): Status: ACTIVE | Noted: 2018-05-05

## 2018-05-05 LAB
ALBUMIN SERPL BCP-MCNC: 4.4 G/DL (ref 3.2–4.9)
ALBUMIN/GLOB SERPL: 1.3 G/DL
ALP SERPL-CCNC: 81 U/L (ref 30–99)
ALT SERPL-CCNC: 5 U/L (ref 2–50)
ANION GAP SERPL CALC-SCNC: 10 MMOL/L (ref 0–11.9)
APPEARANCE UR: ABNORMAL
AST SERPL-CCNC: 23 U/L (ref 12–45)
BACTERIA #/AREA URNS HPF: ABNORMAL /HPF
BASOPHILS # BLD AUTO: 0.7 % (ref 0–1.8)
BASOPHILS # BLD: 0.05 K/UL (ref 0–0.12)
BILIRUB SERPL-MCNC: 0.3 MG/DL (ref 0.1–1.5)
BILIRUB UR QL STRIP.AUTO: NEGATIVE
BUN SERPL-MCNC: 16 MG/DL (ref 8–22)
CALCIUM SERPL-MCNC: 9.9 MG/DL (ref 8.5–10.5)
CHLORIDE SERPL-SCNC: 106 MMOL/L (ref 96–112)
CO2 SERPL-SCNC: 25 MMOL/L (ref 20–33)
COLOR UR: ABNORMAL
CREAT SERPL-MCNC: 0.67 MG/DL (ref 0.5–1.4)
EOSINOPHIL # BLD AUTO: 0.31 K/UL (ref 0–0.51)
EOSINOPHIL NFR BLD: 4.4 % (ref 0–6.9)
EPI CELLS #/AREA URNS HPF: NEGATIVE /HPF
ERYTHROCYTE [DISTWIDTH] IN BLOOD BY AUTOMATED COUNT: 47.9 FL (ref 35.9–50)
GLOBULIN SER CALC-MCNC: 3.4 G/DL (ref 1.9–3.5)
GLUCOSE SERPL-MCNC: 78 MG/DL (ref 65–99)
GLUCOSE UR STRIP.AUTO-MCNC: NEGATIVE MG/DL
HCT VFR BLD AUTO: 42.8 % (ref 37–47)
HGB BLD-MCNC: 14.2 G/DL (ref 12–16)
HYALINE CASTS #/AREA URNS LPF: ABNORMAL /LPF
IMM GRANULOCYTES # BLD AUTO: 0.01 K/UL (ref 0–0.11)
IMM GRANULOCYTES NFR BLD AUTO: 0.1 % (ref 0–0.9)
KETONES UR STRIP.AUTO-MCNC: NEGATIVE MG/DL
LACTATE BLD-SCNC: 3.3 MMOL/L (ref 0.5–2)
LEUKOCYTE ESTERASE UR QL STRIP.AUTO: ABNORMAL
LYMPHOCYTES # BLD AUTO: 2.99 K/UL (ref 1–4.8)
LYMPHOCYTES NFR BLD: 42.6 % (ref 22–41)
MCH RBC QN AUTO: 32.1 PG (ref 27–33)
MCHC RBC AUTO-ENTMCNC: 33.2 G/DL (ref 33.6–35)
MCV RBC AUTO: 96.8 FL (ref 81.4–97.8)
MICRO URNS: ABNORMAL
MONOCYTES # BLD AUTO: 0.49 K/UL (ref 0–0.85)
MONOCYTES NFR BLD AUTO: 7 % (ref 0–13.4)
NEUTROPHILS # BLD AUTO: 3.17 K/UL (ref 2–7.15)
NEUTROPHILS NFR BLD: 45.2 % (ref 44–72)
NITRITE UR QL STRIP.AUTO: POSITIVE
NRBC # BLD AUTO: 0 K/UL
NRBC BLD-RTO: 0 /100 WBC
PH UR STRIP.AUTO: 5.5 [PH]
PLATELET # BLD AUTO: 225 K/UL (ref 164–446)
PMV BLD AUTO: 11.9 FL (ref 9–12.9)
POTASSIUM SERPL-SCNC: 5.1 MMOL/L (ref 3.6–5.5)
PROT SERPL-MCNC: 7.8 G/DL (ref 6–8.2)
PROT UR QL STRIP: 30 MG/DL
RBC # BLD AUTO: 4.42 M/UL (ref 4.2–5.4)
RBC # URNS HPF: ABNORMAL /HPF
RBC UR QL AUTO: ABNORMAL
SODIUM SERPL-SCNC: 141 MMOL/L (ref 135–145)
SP GR UR STRIP.AUTO: 1.02
UROBILINOGEN UR STRIP.AUTO-MCNC: 0.2 MG/DL
WBC # BLD AUTO: 7 K/UL (ref 4.8–10.8)
WBC #/AREA URNS HPF: ABNORMAL /HPF

## 2018-05-05 PROCEDURE — 80053 COMPREHEN METABOLIC PANEL: CPT

## 2018-05-05 PROCEDURE — 303105 HCHG CATHETER EXTRA

## 2018-05-05 PROCEDURE — 700111 HCHG RX REV CODE 636 W/ 250 OVERRIDE (IP): Performed by: EMERGENCY MEDICINE

## 2018-05-05 PROCEDURE — 770006 HCHG ROOM/CARE - MED/SURG/GYN SEMI*

## 2018-05-05 PROCEDURE — 87040 BLOOD CULTURE FOR BACTERIA: CPT

## 2018-05-05 PROCEDURE — 96365 THER/PROPH/DIAG IV INF INIT: CPT

## 2018-05-05 PROCEDURE — 87086 URINE CULTURE/COLONY COUNT: CPT

## 2018-05-05 PROCEDURE — 87186 SC STD MICRODIL/AGAR DIL: CPT

## 2018-05-05 PROCEDURE — 85025 COMPLETE CBC W/AUTO DIFF WBC: CPT

## 2018-05-05 PROCEDURE — 83605 ASSAY OF LACTIC ACID: CPT

## 2018-05-05 PROCEDURE — 700101 HCHG RX REV CODE 250: Performed by: EMERGENCY MEDICINE

## 2018-05-05 PROCEDURE — 36415 COLL VENOUS BLD VENIPUNCTURE: CPT

## 2018-05-05 PROCEDURE — 99223 1ST HOSP IP/OBS HIGH 75: CPT | Performed by: HOSPITALIST

## 2018-05-05 PROCEDURE — 96375 TX/PRO/DX INJ NEW DRUG ADDON: CPT

## 2018-05-05 PROCEDURE — A9270 NON-COVERED ITEM OR SERVICE: HCPCS | Performed by: EMERGENCY MEDICINE

## 2018-05-05 PROCEDURE — 51702 INSERT TEMP BLADDER CATH: CPT

## 2018-05-05 PROCEDURE — 0T2BX0Z CHANGE DRAINAGE DEVICE IN BLADDER, EXTERNAL APPROACH: ICD-10-PCS | Performed by: HOSPITALIST

## 2018-05-05 PROCEDURE — 81001 URINALYSIS AUTO W/SCOPE: CPT

## 2018-05-05 PROCEDURE — 700102 HCHG RX REV CODE 250 W/ 637 OVERRIDE(OP): Performed by: EMERGENCY MEDICINE

## 2018-05-05 PROCEDURE — 83036 HEMOGLOBIN GLYCOSYLATED A1C: CPT

## 2018-05-05 PROCEDURE — 99285 EMERGENCY DEPT VISIT HI MDM: CPT

## 2018-05-05 PROCEDURE — 700105 HCHG RX REV CODE 258: Performed by: EMERGENCY MEDICINE

## 2018-05-05 PROCEDURE — 87077 CULTURE AEROBIC IDENTIFY: CPT

## 2018-05-05 RX ORDER — SODIUM CHLORIDE 9 MG/ML
INJECTION, SOLUTION INTRAVENOUS CONTINUOUS
Status: DISCONTINUED | OUTPATIENT
Start: 2018-05-06 | End: 2018-05-08 | Stop reason: HOSPADM

## 2018-05-05 RX ORDER — METAXALONE 800 MG/1
800 TABLET ORAL 4 TIMES DAILY
Status: DISCONTINUED | OUTPATIENT
Start: 2018-05-06 | End: 2018-05-08 | Stop reason: HOSPADM

## 2018-05-05 RX ORDER — GABAPENTIN 600 MG/1
TABLET ORAL 2 TIMES DAILY
Status: DISCONTINUED | OUTPATIENT
Start: 2018-05-06 | End: 2018-05-06

## 2018-05-05 RX ORDER — CEFTRIAXONE 2 G/1
2 INJECTION, POWDER, FOR SOLUTION INTRAMUSCULAR; INTRAVENOUS ONCE
Status: COMPLETED | OUTPATIENT
Start: 2018-05-05 | End: 2018-05-05

## 2018-05-05 RX ORDER — MORPHINE SULFATE 4 MG/ML
4 INJECTION, SOLUTION INTRAMUSCULAR; INTRAVENOUS ONCE
Status: COMPLETED | OUTPATIENT
Start: 2018-05-05 | End: 2018-05-05

## 2018-05-05 RX ORDER — OXYBUTYNIN CHLORIDE 5 MG/1
5 TABLET ORAL 3 TIMES DAILY
Status: DISCONTINUED | OUTPATIENT
Start: 2018-05-06 | End: 2018-05-08 | Stop reason: HOSPADM

## 2018-05-05 RX ORDER — DIPHENHYDRAMINE HYDROCHLORIDE 50 MG/ML
25 INJECTION INTRAMUSCULAR; INTRAVENOUS EVERY 6 HOURS PRN
Status: DISCONTINUED | OUTPATIENT
Start: 2018-05-05 | End: 2018-05-08 | Stop reason: HOSPADM

## 2018-05-05 RX ORDER — HYDROCODONE BITARTRATE AND ACETAMINOPHEN 5; 325 MG/1; MG/1
1-2 TABLET ORAL EVERY 6 HOURS PRN
Status: DISCONTINUED | OUTPATIENT
Start: 2018-05-05 | End: 2018-05-06

## 2018-05-05 RX ORDER — ONDANSETRON 4 MG/1
4 TABLET, ORALLY DISINTEGRATING ORAL EVERY 4 HOURS PRN
Status: DISCONTINUED | OUTPATIENT
Start: 2018-05-05 | End: 2018-05-08 | Stop reason: HOSPADM

## 2018-05-05 RX ORDER — DOXYCYCLINE HYCLATE 100 MG
100 TABLET ORAL 2 TIMES DAILY
Status: ON HOLD | COMMUNITY
End: 2018-05-08

## 2018-05-05 RX ORDER — DIAZEPAM 5 MG/1
5-10 TABLET ORAL
COMMUNITY
End: 2018-06-13

## 2018-05-05 RX ORDER — CARBIDOPA AND LEVODOPA 50; 200 MG/1; MG/1
1 TABLET, EXTENDED RELEASE ORAL EVERY EVENING
Status: DISCONTINUED | OUTPATIENT
Start: 2018-05-06 | End: 2018-05-08 | Stop reason: HOSPADM

## 2018-05-05 RX ORDER — DIAZEPAM 2 MG/1
2 TABLET ORAL ONCE
Status: COMPLETED | OUTPATIENT
Start: 2018-05-05 | End: 2018-05-05

## 2018-05-05 RX ORDER — BISACODYL 10 MG
10 SUPPOSITORY, RECTAL RECTAL
Status: DISCONTINUED | OUTPATIENT
Start: 2018-05-05 | End: 2018-05-08 | Stop reason: HOSPADM

## 2018-05-05 RX ORDER — ACETAMINOPHEN 325 MG/1
650 TABLET ORAL EVERY 6 HOURS PRN
Status: DISCONTINUED | OUTPATIENT
Start: 2018-05-05 | End: 2018-05-08 | Stop reason: HOSPADM

## 2018-05-05 RX ORDER — SODIUM CHLORIDE 9 MG/ML
30 INJECTION, SOLUTION INTRAVENOUS
Status: COMPLETED | OUTPATIENT
Start: 2018-05-05 | End: 2018-05-05

## 2018-05-05 RX ORDER — POLYETHYLENE GLYCOL 3350 17 G/17G
1 POWDER, FOR SOLUTION ORAL
Status: DISCONTINUED | OUTPATIENT
Start: 2018-05-05 | End: 2018-05-08 | Stop reason: HOSPADM

## 2018-05-05 RX ORDER — ONDANSETRON 2 MG/ML
4 INJECTION INTRAMUSCULAR; INTRAVENOUS EVERY 4 HOURS PRN
Status: DISCONTINUED | OUTPATIENT
Start: 2018-05-05 | End: 2018-05-08 | Stop reason: HOSPADM

## 2018-05-05 RX ORDER — AMOXICILLIN 250 MG
2 CAPSULE ORAL 2 TIMES DAILY
Status: DISCONTINUED | OUTPATIENT
Start: 2018-05-06 | End: 2018-05-08 | Stop reason: HOSPADM

## 2018-05-05 RX ORDER — DIPHENHYDRAMINE HYDROCHLORIDE 50 MG/ML
25 INJECTION INTRAMUSCULAR; INTRAVENOUS ONCE
Status: COMPLETED | OUTPATIENT
Start: 2018-05-05 | End: 2018-05-05

## 2018-05-05 RX ADMIN — DIPHENHYDRAMINE HYDROCHLORIDE 25 MG: 50 INJECTION, SOLUTION INTRAMUSCULAR; INTRAVENOUS at 22:31

## 2018-05-05 RX ADMIN — SODIUM CHLORIDE 1293 ML: 9 INJECTION, SOLUTION INTRAVENOUS at 22:21

## 2018-05-05 RX ADMIN — DIAZEPAM 2 MG: 2 TABLET ORAL at 22:35

## 2018-05-05 RX ADMIN — CEFTRIAXONE SODIUM 2 G: 2 INJECTION, POWDER, FOR SOLUTION INTRAMUSCULAR; INTRAVENOUS at 22:21

## 2018-05-05 RX ADMIN — METRONIDAZOLE 500 MG: 500 INJECTION, SOLUTION INTRAVENOUS at 22:39

## 2018-05-05 RX ADMIN — MORPHINE SULFATE 4 MG: 4 INJECTION INTRAVENOUS at 22:31

## 2018-05-05 ASSESSMENT — ENCOUNTER SYMPTOMS
EYES NEGATIVE: 1
MUSCULOSKELETAL NEGATIVE: 1
RESPIRATORY NEGATIVE: 1
TREMORS: 1
CARDIOVASCULAR NEGATIVE: 1
NERVOUS/ANXIOUS: 1
GASTROINTESTINAL NEGATIVE: 1
CHILLS: 1

## 2018-05-06 PROBLEM — E87.20 LACTIC ACIDOSIS: Status: ACTIVE | Noted: 2018-05-06

## 2018-05-06 LAB — LACTATE BLD-SCNC: 0.8 MMOL/L (ref 0.5–2)

## 2018-05-06 PROCEDURE — A9270 NON-COVERED ITEM OR SERVICE: HCPCS | Performed by: HOSPITALIST

## 2018-05-06 PROCEDURE — 700102 HCHG RX REV CODE 250 W/ 637 OVERRIDE(OP): Mod: JG | Performed by: HOSPITALIST

## 2018-05-06 PROCEDURE — 700105 HCHG RX REV CODE 258: Performed by: HOSPITALIST

## 2018-05-06 PROCEDURE — 700102 HCHG RX REV CODE 250 W/ 637 OVERRIDE(OP): Performed by: HOSPITALIST

## 2018-05-06 PROCEDURE — 36415 COLL VENOUS BLD VENIPUNCTURE: CPT

## 2018-05-06 PROCEDURE — 99233 SBSQ HOSP IP/OBS HIGH 50: CPT | Performed by: INTERNAL MEDICINE

## 2018-05-06 PROCEDURE — 83605 ASSAY OF LACTIC ACID: CPT

## 2018-05-06 PROCEDURE — 700111 HCHG RX REV CODE 636 W/ 250 OVERRIDE (IP): Performed by: HOSPITALIST

## 2018-05-06 PROCEDURE — A9270 NON-COVERED ITEM OR SERVICE: HCPCS | Performed by: INTERNAL MEDICINE

## 2018-05-06 PROCEDURE — 770006 HCHG ROOM/CARE - MED/SURG/GYN SEMI*

## 2018-05-06 PROCEDURE — 700102 HCHG RX REV CODE 250 W/ 637 OVERRIDE(OP): Performed by: INTERNAL MEDICINE

## 2018-05-06 PROCEDURE — A9270 NON-COVERED ITEM OR SERVICE: HCPCS | Mod: JG | Performed by: HOSPITALIST

## 2018-05-06 RX ORDER — OXYCODONE HYDROCHLORIDE AND ACETAMINOPHEN 5; 325 MG/1; MG/1
1 TABLET ORAL 2 TIMES DAILY PRN
COMMUNITY
End: 2018-06-13

## 2018-05-06 RX ORDER — DIAZEPAM 2 MG/1
2 TABLET ORAL EVERY 6 HOURS PRN
Status: DISCONTINUED | OUTPATIENT
Start: 2018-05-06 | End: 2018-05-08 | Stop reason: HOSPADM

## 2018-05-06 RX ORDER — CARISOPRODOL 350 MG/1
350 TABLET ORAL 4 TIMES DAILY
COMMUNITY
End: 2018-06-13

## 2018-05-06 RX ORDER — CARISOPRODOL 350 MG/1
350 TABLET ORAL EVERY 6 HOURS
Status: DISCONTINUED | OUTPATIENT
Start: 2018-05-07 | End: 2018-05-08 | Stop reason: HOSPADM

## 2018-05-06 RX ORDER — OXYBUTYNIN CHLORIDE 5 MG/1
5 TABLET ORAL 2 TIMES DAILY
COMMUNITY
End: 2018-05-20

## 2018-05-06 RX ORDER — GABAPENTIN 300 MG/1
1800 CAPSULE ORAL EVERY MORNING
Status: DISCONTINUED | OUTPATIENT
Start: 2018-05-06 | End: 2018-05-08 | Stop reason: HOSPADM

## 2018-05-06 RX ORDER — GABAPENTIN 300 MG/1
1800 CAPSULE ORAL
Status: ON HOLD | COMMUNITY
End: 2018-05-08

## 2018-05-06 RX ORDER — OXYCODONE HYDROCHLORIDE AND ACETAMINOPHEN 5; 325 MG/1; MG/1
1 TABLET ORAL 2 TIMES DAILY PRN
Status: DISCONTINUED | OUTPATIENT
Start: 2018-05-06 | End: 2018-05-08 | Stop reason: HOSPADM

## 2018-05-06 RX ORDER — GABAPENTIN 400 MG/1
2400 CAPSULE ORAL EVERY EVENING
Status: DISCONTINUED | OUTPATIENT
Start: 2018-05-06 | End: 2018-05-08 | Stop reason: HOSPADM

## 2018-05-06 RX ADMIN — ENOXAPARIN SODIUM 40 MG: 100 INJECTION SUBCUTANEOUS at 08:42

## 2018-05-06 RX ADMIN — CARBIDOPA AND LEVODOPA 1 TABLET: 25; 100 TABLET ORAL at 22:43

## 2018-05-06 RX ADMIN — CARBIDOPA AND LEVODOPA 1 TABLET: 25; 100 TABLET ORAL at 02:35

## 2018-05-06 RX ADMIN — DIAZEPAM 2 MG: 2 TABLET ORAL at 16:17

## 2018-05-06 RX ADMIN — SODIUM CHLORIDE: 9 INJECTION, SOLUTION INTRAVENOUS at 03:24

## 2018-05-06 RX ADMIN — OXYBUTYNIN CHLORIDE 5 MG: 5 TABLET ORAL at 16:17

## 2018-05-06 RX ADMIN — METAXALONE 800 MG: 800 TABLET ORAL at 18:01

## 2018-05-06 RX ADMIN — SODIUM CHLORIDE: 9 INJECTION, SOLUTION INTRAVENOUS at 21:36

## 2018-05-06 RX ADMIN — HYDROCODONE BITARTRATE AND ACETAMINOPHEN 2 TABLET: 5; 325 TABLET ORAL at 03:22

## 2018-05-06 RX ADMIN — SODIUM CHLORIDE: 9 INJECTION, SOLUTION INTRAVENOUS at 12:28

## 2018-05-06 RX ADMIN — DIPHENHYDRAMINE HYDROCHLORIDE 25 MG: 50 INJECTION, SOLUTION INTRAMUSCULAR; INTRAVENOUS at 08:44

## 2018-05-06 RX ADMIN — GABAPENTIN 2400 MG: 400 CAPSULE ORAL at 21:13

## 2018-05-06 RX ADMIN — CARBIDOPA AND LEVODOPA 1 TABLET: 50; 200 TABLET, EXTENDED RELEASE ORAL at 21:14

## 2018-05-06 RX ADMIN — CARBIDOPA AND LEVODOPA 1 TABLET: 25; 100 TABLET ORAL at 10:18

## 2018-05-06 RX ADMIN — CARBIDOPA AND LEVODOPA 1 TABLET: 25; 100 TABLET ORAL at 07:17

## 2018-05-06 RX ADMIN — METAXALONE 800 MG: 800 TABLET ORAL at 05:58

## 2018-05-06 RX ADMIN — OXYCODONE HYDROCHLORIDE AND ACETAMINOPHEN 1 TABLET: 5; 325 TABLET ORAL at 13:29

## 2018-05-06 RX ADMIN — METAXALONE 800 MG: 800 TABLET ORAL at 12:18

## 2018-05-06 RX ADMIN — CEFTRIAXONE 2 G: 2 INJECTION, POWDER, FOR SOLUTION INTRAMUSCULAR; INTRAVENOUS at 21:34

## 2018-05-06 RX ADMIN — OXYBUTYNIN CHLORIDE 5 MG: 5 TABLET ORAL at 08:43

## 2018-05-06 RX ADMIN — DIPHENHYDRAMINE HYDROCHLORIDE 25 MG: 50 INJECTION, SOLUTION INTRAMUSCULAR; INTRAVENOUS at 18:02

## 2018-05-06 RX ADMIN — GABAPENTIN 1800 MG: 300 CAPSULE ORAL at 08:43

## 2018-05-06 RX ADMIN — DIAZEPAM 2 MG: 2 TABLET ORAL at 22:43

## 2018-05-06 RX ADMIN — OXYBUTYNIN CHLORIDE 5 MG: 5 TABLET ORAL at 21:14

## 2018-05-06 RX ADMIN — CARBIDOPA AND LEVODOPA 1 TABLET: 25; 100 TABLET ORAL at 13:28

## 2018-05-06 RX ADMIN — DIPHENHYDRAMINE HYDROCHLORIDE 25 MG: 50 INJECTION, SOLUTION INTRAMUSCULAR; INTRAVENOUS at 02:22

## 2018-05-06 RX ADMIN — CARISOPRODOL 350 MG: 350 TABLET ORAL at 23:27

## 2018-05-06 RX ADMIN — CARBIDOPA AND LEVODOPA 1 TABLET: 25; 100 TABLET ORAL at 19:40

## 2018-05-06 RX ADMIN — METAXALONE 800 MG: 800 TABLET ORAL at 22:43

## 2018-05-06 RX ADMIN — CARBIDOPA AND LEVODOPA 1 TABLET: 25; 100 TABLET ORAL at 16:17

## 2018-05-06 ASSESSMENT — PATIENT HEALTH QUESTIONNAIRE - PHQ9
SUM OF ALL RESPONSES TO PHQ9 QUESTIONS 1 AND 2: 2
1. LITTLE INTEREST OR PLEASURE IN DOING THINGS: SEVERAL DAYS
SUM OF ALL RESPONSES TO PHQ QUESTIONS 1-9: 10
6. FEELING BAD ABOUT YOURSELF - OR THAT YOU ARE A FAILURE OR HAVE LET YOURSELF OR YOUR FAMILY DOWN: SEVERAL DAYS
2. FEELING DOWN, DEPRESSED, IRRITABLE, OR HOPELESS: SEVERAL DAYS
4. FEELING TIRED OR HAVING LITTLE ENERGY: NEARLY EVERY DAY
9. THOUGHTS THAT YOU WOULD BE BETTER OFF DEAD, OR OF HURTING YOURSELF: NOT AT ALL
5. POOR APPETITE OR OVEREATING: NOT AT ALL
3. TROUBLE FALLING OR STAYING ASLEEP OR SLEEPING TOO MUCH: MORE THAN HALF THE DAYS
7. TROUBLE CONCENTRATING ON THINGS, SUCH AS READING THE NEWSPAPER OR WATCHING TELEVISION: SEVERAL DAYS
8. MOVING OR SPEAKING SO SLOWLY THAT OTHER PEOPLE COULD HAVE NOTICED. OR THE OPPOSITE, BEING SO FIGETY OR RESTLESS THAT YOU HAVE BEEN MOVING AROUND A LOT MORE THAN USUAL: SEVERAL DAYS

## 2018-05-06 ASSESSMENT — ENCOUNTER SYMPTOMS
COUGH: 0
DIARRHEA: 0
INSOMNIA: 0
BACK PAIN: 0
ORTHOPNEA: 0
NAUSEA: 0
HEARTBURN: 0
BLURRED VISION: 0
HEADACHES: 0
EYE REDNESS: 0
FOCAL WEAKNESS: 0
EYE DISCHARGE: 0
VOMITING: 0
WEIGHT LOSS: 0
DEPRESSION: 0
SPUTUM PRODUCTION: 0
NERVOUS/ANXIOUS: 0
STRIDOR: 0
EYE PAIN: 0
SHORTNESS OF BREATH: 0
PALPITATIONS: 0
MYALGIAS: 0
DIZZINESS: 0
FEVER: 0
SEIZURES: 0
CHILLS: 0
ABDOMINAL PAIN: 0
NECK PAIN: 0

## 2018-05-06 ASSESSMENT — COPD QUESTIONNAIRES
HAVE YOU SMOKED AT LEAST 100 CIGARETTES IN YOUR ENTIRE LIFE: NO/DON'T KNOW
COPD SCREENING SCORE: 4
HAVE YOU SMOKED AT LEAST 100 CIGARETTES IN YOUR ENTIRE LIFE: YES
COPD SCREENING SCORE: 3
DO YOU EVER COUGH UP ANY MUCUS OR PHLEGM?: YES, A FEW DAYS A WEEK OR MONTH
DURING THE PAST 4 WEEKS HOW MUCH DID YOU FEEL SHORT OF BREATH: NONE/LITTLE OF THE TIME
DURING THE PAST 4 WEEKS HOW MUCH DID YOU FEEL SHORT OF BREATH: NONE/LITTLE OF THE TIME
IN THE PAST 12 MONTHS DO YOU DO LESS THAN YOU USED TO BECAUSE OF YOUR BREATHING PROBLEMS: DISAGREE/UNSURE
DO YOU EVER COUGH UP ANY MUCUS OR PHLEGM?: NO/ONLY WITH OCCASIONAL COLDS OR INFECTIONS

## 2018-05-06 ASSESSMENT — PAIN SCALES - GENERAL
PAINLEVEL_OUTOF10: 8
PAINLEVEL_OUTOF10: 0
PAINLEVEL_OUTOF10: 8
PAINLEVEL_OUTOF10: 8

## 2018-05-06 ASSESSMENT — LIFESTYLE VARIABLES
ALCOHOL_USE: NO
EVER_SMOKED: NEVER
EVER_SMOKED: YES

## 2018-05-06 NOTE — PROGRESS NOTES
Patient requesting a different room.  She advises that housekeeping has not cleaned the room all day.  Housekeeping contacted to clean room.  She refuses ba care at this time because she does not think it is sanitary with her roommate.  Tried to provide education that most rooms on this unit are shared, patient not receptive to this information.  Offered to advise charge RN, patient states she has already discussed it with her.

## 2018-05-06 NOTE — RESPIRATORY CARE
COPD EDUCATION by COPD CLINICAL EDUCATOR  5/6/2018 at 6:39 AM by Rica Logan     Patient reviewed by COPD education team. Patient does not qualify for COPD program.

## 2018-05-06 NOTE — PROGRESS NOTES
Patient's  brought in the following medications for this RN to review:    Alive! Once daily women's multivitamin- takes one daily  Carbidopa-Levo ER  tab- takes once daily at bedtime  Oxycodone-Acetaminphen 5-325- Take one BID as needed for pain  Carisoprodol 350mg- Take 1 tablet 4 times a day  Carbidopa-Levodopa - Take one 6 times a day  Gabapentin 600mg- Take one TID and 3 at bedtime  Metaxalone 800mg- Take one four times a day    Reviewed by a second RN.

## 2018-05-06 NOTE — ED NOTES
Pt bib [facility] from home for diarrhea, abd distension and tremors from parkinson's.  Pt 88% room air. 90-93% on 2L.

## 2018-05-06 NOTE — ED PROVIDER NOTES
ED Provider Note    Scribed for Moy Cisneros M.D. by Renay Jeffries. 5/5/2018, 9:39 PM.    Primary care provider: Raul Iverson M.D.  Means of arrival: ambulance  History obtained from: patient  History limited by: none    CHIEF COMPLAINT  Chief Complaint   Patient presents with   • Diarrhea   • Tremors       HPI  Delmis Draepr is a 68 y.o. female with a history of GERD, hypertension, parkinsons who presents to the Emergency Department for evaluation of an increase in severity to her baseline tremors she experiences with her Parkinson's onset several days ago with associated pain down her legs where her muscles are spasming. She is also complaining of lower abdominal pain and diarrhea that has been present for the last few days as well. Patient currently has a Ta in place, and reports that it has not been draining appropriately over the last few days either.  No complaints of cough.    REVIEW OF SYSTEMS  Pertinent positives include abdominal pain, diarrhea, increased tremors,pain down her legs, abnormal Ta draining. Pertinent negatives include no cough. All other systems negative.    PAST MEDICAL HISTORY   has a past medical history of Abnormal finding on mammography, microcalcification; Anesthesia; Bowel obstruction (HCC); Bursitis; Cataract; Cellulitis (7/19/2012); Dilated bile duct; Disc disorder; Dystonia; Dystonia; Dysuria; GERD (gastroesophageal reflux disease); Heart burn; Hypertension; Hypotension; Insulin resistance; Leukopenia; Low blood pressure reading; Malaise and fatigue; OSTEOPOROSIS (3/22/2010); Other specified disorder of intestines; Pain; Parkinson disease ([facility]); Peripheral neuropathy ([facility]); Rash, skin; Scoliosis; Spinal stenosis, lumbar; Thyrotoxicosis (remote); Unspecified disorder of thyroid; and vitamin D deficiency.    SURGICAL HISTORY   has a past surgical history that includes thyroidectomy (1967); gastroscopy with biopsy (5/9/2012); egd w/endoscopic ultrasound (5/9/2012);  cataract phaco with iol (4/14/2014); cataract phaco with iol (5/12/2014); breast biopsy (Left, 5/5/2015); gyn surgery (1985, 1988); knee arthrotomy (1964, 1968); and other.    SOCIAL HISTORY  Social History   Substance Use Topics   • Smoking status: Former Smoker     Packs/day: 0.50     Years: 10.00     Quit date: 1/1/1985   • Smokeless tobacco: Never Used   • Alcohol use No      Comment: none since age 31      History   Drug Use No       FAMILY HISTORY  Family History   Problem Relation Age of Onset   • Arthritis Mother      Giant Cell Arteritis   • Lung Disease Father      COPD/Emphysema   • Hyperlipidemia Sister    • Heart Disease Maternal Aunt    • Heart Disease Maternal Uncle    • Heart Disease Paternal Aunt    • Heart Disease Paternal Uncle    • Heart Disease Maternal Grandmother    • Hypertension Maternal Grandmother    • Hyperlipidemia Maternal Grandmother    • Heart Disease Maternal Grandfather    • Hypertension Maternal Grandfather    • Hyperlipidemia Maternal Grandfather    • Heart Disease Paternal Grandfather    • Hypertension Paternal Grandfather    • Hyperlipidemia Paternal Grandfather    • Cancer Paternal Grandfather      Bone       CURRENT MEDICATIONS  No current facility-administered medications on file prior to encounter.      Current Outpatient Prescriptions on File Prior to Encounter   Medication Sig Dispense Refill   • gabapentin (NEURONTIN) 600 MG tablet Take 1,800-2,400 mg by mouth 2 times a day. 1800 mg every morning and 2400 mg every evening.     • Melatonin 1 MG Tab Take 1 mg by mouth every bedtime.     • carbidopa-levodopa SR (SINEMET CR)  MG per tablet Take 1 Tab by mouth every bedtime.     • metaxalone (SKELAXIN) 800 MG Tab Take 800 mg by mouth 4 times a day. 0700, 1000, 1300, 1600     • vitamin D, Ergocalciferol, (DRISDOL) 11563 units Cap capsule Take 50,000 Units by mouth every 48 hours.     • docusate sodium (COLACE) 100 MG Cap Take 300 mg by mouth every morning.     • Multiple  Vitamin (MULTI VITAMIN DAILY PO) Take 1 Tab by mouth every day.         ALLERGIES  Allergies   Allergen Reactions   • Bactrim [Sulfamethoxazole W-Trimethoprim] Hives   • Actonel [Risedronate Sodium]    • Latex Rash     Irritation of skin    • Sulfa Drugs    • Tape Rash     Paper tape is ok       PHYSICAL EXAM  VITAL SIGNS: /117   Pulse (!) 122   Temp 36.4 °C (97.6 °F)   Resp 18   Wt 43.1 kg (95 lb)   SpO2 88%   BMI 16.31 kg/m²     Constitutional: Well developed, Well nourished, moderate distress.   HENT: Normocephalic, Atraumatic, Oropharynx moist.   Eyes: Conjunctiva normal, No discharge.   Neck: Supple, No stridor  Cardiovascular: tachycardic, Normal rhythm, No murmurs, equal pulses.   Pulmonary: Normal breath sounds, No respiratory distress, No wheezing, No rales, No rhonchi.  Chest: No chest wall tenderness or deformity.   Abdomen:Soft, no tenderness, No masses, no rebound, no guarding. Ta in place with significant amount of sediment in the bag  Back: right CVA tenderness.   Musculoskeletal: No major deformities noted, No tenderness.   Skin: Warm, Dry, No erythema, No rash.   Neurologic: Alert & oriented x 3, rhythmic tremor in right hand, muscle spasm and contraction of BLE  Psychiatric: Affect normal, Judgment normal, Mood normal.     LABS  Results for orders placed or performed during the hospital encounter of 05/05/18   Lactic acid (lactate)   Result Value Ref Range    Lactic Acid 3.3 (H) 0.5 - 2.0 mmol/L   CBC WITH DIFFERENTIAL   Result Value Ref Range    WBC 7.0 4.8 - 10.8 K/uL    RBC 4.42 4.20 - 5.40 M/uL    Hemoglobin 14.2 12.0 - 16.0 g/dL    Hematocrit 42.8 37.0 - 47.0 %    MCV 96.8 81.4 - 97.8 fL    MCH 32.1 27.0 - 33.0 pg    MCHC 33.2 (L) 33.6 - 35.0 g/dL    RDW 47.9 35.9 - 50.0 fL    Platelet Count 225 164 - 446 K/uL    MPV 11.9 9.0 - 12.9 fL    Neutrophils-Polys 45.20 44.00 - 72.00 %    Lymphocytes 42.60 (H) 22.00 - 41.00 %    Monocytes 7.00 0.00 - 13.40 %    Eosinophils 4.40 0.00 -  6.90 %    Basophils 0.70 0.00 - 1.80 %    Immature Granulocytes 0.10 0.00 - 0.90 %    Nucleated RBC 0.00 /100 WBC    Neutrophils (Absolute) 3.17 2.00 - 7.15 K/uL    Lymphs (Absolute) 2.99 1.00 - 4.80 K/uL    Monos (Absolute) 0.49 0.00 - 0.85 K/uL    Eos (Absolute) 0.31 0.00 - 0.51 K/uL    Baso (Absolute) 0.05 0.00 - 0.12 K/uL    Immature Granulocytes (abs) 0.01 0.00 - 0.11 K/uL    NRBC (Absolute) 0.00 K/uL   COMP METABOLIC PANEL   Result Value Ref Range    Sodium 141 135 - 145 mmol/L    Potassium 5.1 3.6 - 5.5 mmol/L    Chloride 106 96 - 112 mmol/L    Co2 25 20 - 33 mmol/L    Anion Gap 10.0 0.0 - 11.9    Glucose 78 65 - 99 mg/dL    Bun 16 8 - 22 mg/dL    Creatinine 0.67 0.50 - 1.40 mg/dL    Calcium 9.9 8.5 - 10.5 mg/dL    AST(SGOT) 23 12 - 45 U/L    ALT(SGPT) 5 2 - 50 U/L    Alkaline Phosphatase 81 30 - 99 U/L    Total Bilirubin 0.3 0.1 - 1.5 mg/dL    Albumin 4.4 3.2 - 4.9 g/dL    Total Protein 7.8 6.0 - 8.2 g/dL    Globulin 3.4 1.9 - 3.5 g/dL    A-G Ratio 1.3 g/dL   URINALYSIS   Result Value Ref Range    Color DK Yellow     Character Turbid (A)     Specific Gravity 1.024 <1.035    Ph 5.5 5.0 - 8.0    Glucose Negative Negative mg/dL    Ketones Negative Negative mg/dL    Protein 30 (A) Negative mg/dL    Bilirubin Negative Negative    Urobilinogen, Urine 0.2 Negative    Nitrite Positive (A) Negative    Leukocyte Esterase Large (A) Negative    Occult Blood Moderate (A) Negative    Micro Urine Req Microscopic    URINE MICROSCOPIC (W/UA)   Result Value Ref Range    WBC Packed (A) /hpf    RBC 20-50 (A) /hpf    Bacteria Many (A) None /hpf    Epithelial Cells Negative /hpf    Hyaline Cast 3-5 (A) /lpf   ESTIMATED GFR   Result Value Ref Range    GFR If African American >60 >60 mL/min/1.73 m 2    GFR If Non African American >60 >60 mL/min/1.73 m 2   LACTIC ACID   Result Value Ref Range    Lactic Acid 0.8 0.5 - 2.0 mmol/L       All labs reviewed by me.    RADIOLOGY  DX-CHEST-PORTABLE (1 VIEW)    (Results Pending)     The  radiologist's interpretation of all radiological studies have been reviewed by me.    COURSE & MEDICAL DECISION MAKING  Pertinent Labs & Imaging studies reviewed. (See chart for details)    9:39 PM - Patient seen and examined at bedside. She presents tachycardic and mildly hypoxic 88% on RA, low 90s on 2L nc O2 with otherwise normal vital signs complaining of an increase in severity of her Parkinson tremors, diarrhea and abdominal pain onset several days ago. Patient will be treated with 2g IV Rocephin and 500mg IV Flagyl. The patient will receive IV fluids for possible sepsis and tachycardia. Ordered chest xray, lactic acid, CBC, CMP, UA, urine culture, blood culture, and urine microscopic to evaluate her symptoms. The differential diagnoses include but are not limited to: sepsis, pyelonephritis, gastritis. I informed the patient that we would begin treating her with IV antibiotics as well as muscle relaxers for her spasm and discomfort while we wait for imaging and labs to return. I explained that because her lactate is elevated, there is a good chance she will be admitted for further evaluation. Patient understands and agrees with treatment plan.    10:54 PM Paged Hospitalist.    11:19 PM Spoke with Dr. Covington, Hospitalist, about the patient's condition. Agrees to admit.    There was a positive response to IV fluids after patient reevaluation including improved heart rate.    Medical Decision Making: At this point, patient most likely has pyelonephritis with early onset of possible sepsis given her elevated heart rate, elevated lactic acid. White count is normal. Patient was given dose of Rocephin and IV fluids. With that her lactic acid is improved. I think this patient would still benefit from close observation overnight.  patient's abdomen is otherwise soft she does not have a surgical abdomen do not think imaging is necessary for that.     DISPOSITION:  Patient will be admitted to Dr. Covington, Hospitalist in  guarded condition.    FINAL IMPRESSION  1. Acute UTI    2. Diarrhea of infectious origin    3. Sepsis, due to unspecified organism (HCC)          IRenay (Scribe), am scribing for, and in the presence of, Moy Cisneros M.D.    Electronically signed by: Renay Jeffries (Scribe), 5/5/2018    Moy PANDYA M.D. personally performed the services described in this documentation, as scribed by Renay Jeffries in my presence, and it is both accurate and complete.    The note accurately reflects work and decisions made by me.  Moy Cisneros  5/6/2018  1:30 AM

## 2018-05-06 NOTE — PROGRESS NOTES
[facility] Hospitalist Progress Note    Date of Service: 2018    Chief Complaint  68 y.o. female with PMH of parkinson's disease admitted 2018 with UTI    Interval Problem Update  Complaint about right leg pain and asked for narcotics.    Consultants/Specialty  none    Disposition  Remain on the floor        Review of Systems   Constitutional: Positive for malaise/fatigue. Negative for chills, fever and weight loss.   HENT: Negative for congestion and nosebleeds.    Eyes: Negative for blurred vision, pain, discharge and redness.   Respiratory: Negative for cough, sputum production, shortness of breath and stridor.    Cardiovascular: Negative for chest pain, palpitations and orthopnea.   Gastrointestinal: Negative for abdominal pain, diarrhea, heartburn, nausea and vomiting.   Genitourinary: Negative for dysuria, frequency and urgency.   Musculoskeletal: Negative for back pain, myalgias and neck pain.        Leg pain   Skin: Negative for itching and rash.   Neurological: Negative for dizziness, focal weakness, seizures and headaches.   Psychiatric/Behavioral: Negative for depression. The patient is not nervous/anxious and does not have insomnia.       Physical Exam  Laboratory/Imaging   Hemodynamics  Temp (24hrs), Av.7 °C (98 °F), Min:36.4 °C (97.6 °F), Max:36.9 °C (98.4 °F)   Temperature: 36.9 °C (98.4 °F)  Pulse  Av.8  Min: 66  Max: 122 Heart Rate (Monitored): 68  Blood Pressure : 110/71      Respiratory      Respiration: 20, Pulse Oximetry: 90 %, O2 Daily Delivery Respiratory : Silicone Nasal Cannula        RUL Breath Sounds: Clear, RML Breath Sounds: Diminished, RLL Breath Sounds: Diminished, RUPALI Breath Sounds: Clear, LLL Breath Sounds: Diminished    Fluids    Intake/Output Summary (Last 24 hours) at 18 0754  Last data filed at 18 0600   Gross per 24 hour   Intake              400 ml   Output              350 ml   Net               50 ml       Nutrition  Orders Placed This Encounter    Procedures   • Diet Order     Standing Status:   Standing     Number of Occurrences:   1     Order Specific Question:   Diet:     Answer:   Regular [1]     Physical Exam   Constitutional: She is oriented to person, place, and time. No distress.   HENT:   Head: Normocephalic and atraumatic.   Mouth/Throat: Oropharynx is clear and moist.   Eyes: Conjunctivae and EOM are normal. Pupils are equal, round, and reactive to light.   Neck: Normal range of motion. Neck supple. No tracheal deviation present. No thyromegaly present.   Cardiovascular: Normal rate and regular rhythm.    No murmur heard.  Pulmonary/Chest: Effort normal and breath sounds normal. No respiratory distress. She has no wheezes.   Abdominal: Soft. Bowel sounds are normal. She exhibits no distension. There is no tenderness.   Musculoskeletal: She exhibits tenderness. She exhibits no edema.   Contracted leg   Neurological: She is alert and oriented to person, place, and time. No cranial nerve deficit.   Skin: Skin is warm and dry. She is not diaphoretic. No erythema.       Recent Labs      05/05/18 2110   WBC  7.0   RBC  4.42   HEMOGLOBIN  14.2   HEMATOCRIT  42.8   MCV  96.8   MCH  32.1   MCHC  33.2*   RDW  47.9   PLATELETCT  225   MPV  11.9     Recent Labs      05/05/18   2110   SODIUM  141   POTASSIUM  5.1   CHLORIDE  106   CO2  25   GLUCOSE  78   BUN  16   CREATININE  0.67   CALCIUM  9.9                      Assessment/Plan     * Urinary tract infection associated with indwelling urethral catheter (HCC)- (present on admission)   Assessment & Plan    On IV ceftriaxone 3 days  Follow culture        Other secondary parkinsonism (HCC)- (present on admission)   Assessment & Plan    On cinemet short and long-acting.  Follow with neurology a outpatient.  Does have quite severe tremors, anxiety, quelled with IV benadryl.  Will monitor.         Lactic acidosis- (present on admission)   Assessment & Plan    improved        Urinary retention- (present on  admission)   Assessment & Plan    Ta catheter replaced          Quality-Core Measures

## 2018-05-06 NOTE — PROGRESS NOTES
2 RN skin check completed with Dave Welch. Feet dry, calloused. Sacrum pink and blanching.  Skin over all bony prominences intact.

## 2018-05-06 NOTE — ED NOTES
Med rec complete per pt at bedside  Allergies reviewed  Pt reports she finished a 14 day course of doxycycline >week ago

## 2018-05-06 NOTE — CARE PLAN
Problem: Urinary Elimination:  Goal: Ability to reestablish a normal urinary elimination pattern will improve    Intervention: Evaluate need to continue indwelling urinary catheter  Ba in place for urinary retention related to her Parkinson's.  It was replaced in the ER this hospitalization per patient report.  Obtained order for ba in place.        Problem: Pain Management  Goal: Pain level will decrease to patient's comfort goal    Intervention: Follow pain managment plan developed in collaboration with patient and Interdisciplinary Team  Medicating patient per PRN need and per MAR.  Working with physician throughout the day to get updated medication list for patient to best reflect home medications.

## 2018-05-06 NOTE — PROGRESS NOTES
This patient advised this RN and another RN (Atilio) that she would like to be transferred to [facility], or to another unit as she feels her needs are not being met.  Patient calls for RN/assistance more than twice an hour, and has almost hourly meds requiring the RN to be present to assist patient.  Her admit medication profile was incorrect so she has expressed frustration with not having her meds the way she takes them at home.  Called her pharmacy, got her med list sent over, had our pharmacist review and update medication profile, and then updated MD that the med list was now up to date.  No new medication orders have been received.  When she asks questions, they are often repetitive, and education needs to be provided repeatedly.  This RN is making every attempt to meet the patient's needs.  Notified charge RN, and advised patient charge RN would come to speak with her.

## 2018-05-06 NOTE — PROGRESS NOTES
Received bedside report and accepted care of patient.  Patient currently resting in bed in no visible or stated distress.  Bed controls on and bed in locked position.  Bed alarm on.  Call light and personal possessions within reach.  Plan of care to include pain management, assistance with ADL's, PT/OT eval, I&O monitoring, IV antibiotics and continued care planning.  Patient verbalizes agreement with plan of care, and has no additional questions or concerns at this time.  Will continue to update notes/plan of care as needed throughout shift.

## 2018-05-06 NOTE — H&P
Hospital Medicine History and Physical    Date of Service  5/5/2018    Chief Complaint  Chief Complaint   Patient presents with   • Diarrhea   • Tremors       History of Presenting Illness  68 y.o. 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  She reports severe sharp pain which came and went without exacerbating or alleviating factors.  Additionally, she does have urinary urgency and dysuria despite the presence of a catheter.  She endorses chills but no fever.       Primary Care Physician  Raul Iverson M.D.    Consultants  none    Code Status  Full code     Review of Systems  Review of Systems   Constitutional: Positive for chills.   HENT: Negative.    Eyes: Negative.    Respiratory: Negative.    Cardiovascular: Negative.    Gastrointestinal: Negative.    Genitourinary: Positive for dysuria, frequency and urgency.   Musculoskeletal: Negative.    Skin: Negative.    Neurological: Positive for tremors.   Endo/Heme/Allergies: Negative.    Psychiatric/Behavioral: The patient is nervous/anxious.         Past Medical History  Past Medical History:   Diagnosis Date   • Cellulitis 7/19/2012    right low extremity   • OSTEOPOROSIS 3/22/2010   • Abnormal finding on mammography, microcalcification    • Anesthesia     hypotensive in the past   • Bowel obstruction (HCC)     fecal impaction   • Bursitis    • Cataract    • Dilated bile duct    • Disc disorder    • Dystonia    • Dystonia    • Dysuria    • GERD (gastroesophageal reflux disease)    • Heart burn    • Hypertension     pt can run very low   • Hypotension    • Insulin resistance    • Leukopenia    • Low blood pressure reading    • Malaise and fatigue    • Other specified disorder of intestines     constipation  "  • Pain     right hip, right knee   • Parkinson disease (HCC)     \"atypical\"   • Peripheral neuropathy (HCC)    • Rash, skin    • Scoliosis    • Spinal stenosis, lumbar    • Thyrotoxicosis remote    S/P I-131 Rx / subtotal thyroidectomy   • Unspecified disorder of thyroid     had partial thyroidectomy   • vitamin D deficiency        Surgical History  Past Surgical History:   Procedure Laterality Date   • BREAST BIOPSY Left 5/5/2015    Procedure: BREAST BIOPSY;  Surgeon: Deedee Bautista M.D.;  Location: SURGERY SAME DAY [facility];  Service:    • CATARACT PHACO WITH IOL  5/12/2014    Performed by Roland Becerra M.D. at [facility]   • CATARACT PHACO WITH IOL  4/14/2014    Performed by Roland Becerra M.D. at [facility]   • GASTROSCOPY WITH BIOPSY  5/9/2012    Performed by CIERRA SUAZO at [facility]   • EGD W/ENDOSCOPIC ULTRASOUND  5/9/2012    Performed by CIERRA SUAZO at [facility]   • THYROIDECTOMY  1967    partial   • GYN SURGERY  1985, 1988    c sec x 2   • KNEE ARTHROTOMY  1964, 1968    right   • OTHER      left fifth digit       Medications  No current facility-administered medications on file prior to encounter.      Current Outpatient Prescriptions on File Prior to Encounter   Medication Sig Dispense Refill   • diphenhydrAMINE (BENADRYL) 25 MG Tab Take 25 mg by mouth every 6 hours as needed.     • gabapentin (NEURONTIN) 600 MG tablet Take 1,800-2,400 mg by mouth 2 times a day. 1800 mg every morning and 2400 mg every evening.     • Melatonin 1 MG Tab Take 1 mg by mouth every bedtime.     • LevOCARNitine L-Tartrate (L-CARNITINE) 500 MG Cap Take 500 mg by mouth every day.     • carbidopa-levodopa (SINEMET)  MG Tab Take 1 Tab by mouth 6 Times a Day. 240 Tab 2   • carbidopa-levodopa SR (SINEMET CR)  MG per tablet Take 1 Tab by mouth every evening. By 2000 each night     • metaxalone (SKELAXIN) 800 MG Tab Take 800 mg by mouth 4 times a " day. 0700, 1000, 1300, 1600     • vitamin D, Ergocalciferol, (DRISDOL) 70151 units Cap capsule Take 50,000 Units by mouth every 48 hours.     • docusate sodium (COLACE) 100 MG Cap Take 300 mg by mouth every day.     • Magnesium Citrate Powder Take 2 Doses by mouth as needed. 2 teaspoons every other days     • Multiple Vitamin (MULTI VITAMIN DAILY PO) Take 1 Tab by mouth every day.         Family History  Family History   Problem Relation Age of Onset   • Arthritis Mother      Giant Cell Arteritis   • Lung Disease Father      COPD/Emphysema   • Hyperlipidemia Sister    • Heart Disease Maternal Aunt    • Heart Disease Maternal Uncle    • Heart Disease Paternal Aunt    • Heart Disease Paternal Uncle    • Heart Disease Maternal Grandmother    • Hypertension Maternal Grandmother    • Hyperlipidemia Maternal Grandmother    • Heart Disease Maternal Grandfather    • Hypertension Maternal Grandfather    • Hyperlipidemia Maternal Grandfather    • Heart Disease Paternal Grandfather    • Hypertension Paternal Grandfather    • Hyperlipidemia Paternal Grandfather    • Cancer Paternal Grandfather      Bone       Social History  Social History   Substance Use Topics   • Smoking status: Former Smoker     Packs/day: 0.50     Years: 10.00     Quit date: 1985   • Smokeless tobacco: Never Used   • Alcohol use No      Comment: none since age 31       Allergies  Allergies   Allergen Reactions   • Bactrim [Sulfamethoxazole W-Trimethoprim] Hives   • Actonel [Risedronate Sodium]    • Latex Rash     Irritation of skin    • Sulfa Drugs    • Tape Rash     Paper tape is ok        Physical Exam  Laboratory   Hemodynamics  Temp (24hrs), Av.4 °C (97.6 °F), Min:36.4 °C (97.6 °F), Max:36.4 °C (97.6 °F)   Temperature: 36.4 °C (97.6 °F)  Pulse  Av.5  Min: 96  Max: 122 Heart Rate (Monitored): (!) 101  Blood Pressure : 107/71      Respiratory      Respiration: (!) 22, Pulse Oximetry: 100 %             Physical Exam   Constitutional: She is  oriented to person, place, and time. She appears well-developed and well-nourished. She appears distressed.   HENT:   Head: Normocephalic and atraumatic.   Eyes: Conjunctivae are normal. Pupils are equal, round, and reactive to light.   Neck: Normal range of motion. Neck supple. No tracheal deviation present. No thyromegaly present.   Cardiovascular: Normal rate, regular rhythm and normal heart sounds.  Exam reveals no gallop and no friction rub.    No murmur heard.  Pulmonary/Chest: Effort normal and breath sounds normal. No respiratory distress. She has no wheezes. She has no rales.   Abdominal: Soft. Bowel sounds are normal. She exhibits no distension. There is no tenderness. There is no rebound.   Musculoskeletal: Normal range of motion. She exhibits deformity. She exhibits no edema.   Flexion contractures noted in lower extremities, increased muscle tone noted throughout   Lymphadenopathy:     She has no cervical adenopathy.   Neurological: She is alert and oriented to person, place, and time. No cranial nerve deficit. She exhibits abnormal muscle tone.   Skin: Skin is warm and dry. She is not diaphoretic.   Psychiatric: She has a normal mood and affect.   Anxious appearing    Nursing note and vitals reviewed.      Recent Labs      05/05/18 2110   WBC  7.0   RBC  4.42   HEMOGLOBIN  14.2   HEMATOCRIT  42.8   MCV  96.8   MCH  32.1   MCHC  33.2*   RDW  47.9   PLATELETCT  225   MPV  11.9     Recent Labs      05/05/18 2110   SODIUM  141   POTASSIUM  5.1   CHLORIDE  106   CO2  25   GLUCOSE  78   BUN  16   CREATININE  0.67   CALCIUM  9.9     Recent Labs      05/05/18 2110   ALTSGPT  5   ASTSGOT  23   ALKPHOSPHAT  81   TBILIRUBIN  0.3   GLUCOSE  78                 Lab Results   Component Value Date    TROPONINI <0.01 03/07/2018     Urinalysis:    Lab Results  Component Value Date/Time   SPECGRAVITY 1.024 05/05/2018 2110   GLUCOSEUR Negative 05/05/2018 2110   KETONES Negative 05/05/2018 2110   NITRITE Positive (A)  05/05/2018 2110   WBCURINE Packed (A) 05/05/2018 2110   RBCURINE 20-50 (A) 05/05/2018 2110   BACTERIA Many (A) 05/05/2018 2110   EPITHELCELL Negative 05/05/2018 2110        Imaging  No new imaging for review at this time   Assessment/Plan     I anticipate this patient will require at least two midnights for appropriate medical management, necessitating inpatient admission.    * Urinary tract infection associated with indwelling urethral catheter (HCC)   Assessment & Plan    Expected leukocytosis in urine due to indwelling catheter, however in addition patient is reporting urgency and pelvic pain.  Will continue rocephin, follow up culture results.  Ta catheter was exchanged in ED.  Apparently placed since most recent admission.  May benefit from outpatient urology follow-up.        Other secondary parkinsonism (HCC)- (present on admission)   Assessment & Plan    On cinemet short and long-acting.  Follow with neurology a outpatient.  Does have quite severe tremors, anxiety, quelled with IV benadryl.  Will monitor.         Urinary retention- (present on admission)   Assessment & Plan    Ta catheter replaced            VTE prophylaxis: SCD, Lovenox.

## 2018-05-06 NOTE — CARE PLAN
"Problem: Safety  Goal: Will remain free from falls  Outcome: PROGRESSING AS EXPECTED  Per Tosha Vyas fall risk assessment- pt is at high risk to fall. High fall risk precautions are in place. Pt given yellow wrist band.     Problem: Discharge Barriers/Planning  Goal: Patient's continuum of care needs will be met  Outcome: PROGRESSING AS EXPECTED  Pt states, \"I want to be able to go home tomorrow.\" pt educated on POC at this time, verbalizes understanding.       "

## 2018-05-06 NOTE — ASSESSMENT & PLAN NOTE
On cinemet short and long-acting.  Follow with neurology a outpatient.  Does have quite severe tremors, anxiety, quelled with IV benadryl.  Will monitor.

## 2018-05-07 LAB
ANION GAP SERPL CALC-SCNC: 11 MMOL/L (ref 0–11.9)
BACTERIA UR CULT: ABNORMAL
BACTERIA UR CULT: ABNORMAL
BASOPHILS # BLD AUTO: 1 % (ref 0–1.8)
BASOPHILS # BLD: 0.06 K/UL (ref 0–0.12)
BUN SERPL-MCNC: 9 MG/DL (ref 8–22)
CALCIUM SERPL-MCNC: 9.2 MG/DL (ref 8.5–10.5)
CHLORIDE SERPL-SCNC: 108 MMOL/L (ref 96–112)
CO2 SERPL-SCNC: 22 MMOL/L (ref 20–33)
CREAT SERPL-MCNC: 0.58 MG/DL (ref 0.5–1.4)
EOSINOPHIL # BLD AUTO: 0.24 K/UL (ref 0–0.51)
EOSINOPHIL NFR BLD: 4 % (ref 0–6.9)
ERYTHROCYTE [DISTWIDTH] IN BLOOD BY AUTOMATED COUNT: 47.9 FL (ref 35.9–50)
GLUCOSE SERPL-MCNC: 86 MG/DL (ref 65–99)
HCT VFR BLD AUTO: 41.1 % (ref 37–47)
HGB BLD-MCNC: 13.1 G/DL (ref 12–16)
IMM GRANULOCYTES # BLD AUTO: 0.02 K/UL (ref 0–0.11)
IMM GRANULOCYTES NFR BLD AUTO: 0.3 % (ref 0–0.9)
LYMPHOCYTES # BLD AUTO: 1.41 K/UL (ref 1–4.8)
LYMPHOCYTES NFR BLD: 23.3 % (ref 22–41)
MCH RBC QN AUTO: 31.3 PG (ref 27–33)
MCHC RBC AUTO-ENTMCNC: 31.9 G/DL (ref 33.6–35)
MCV RBC AUTO: 98.3 FL (ref 81.4–97.8)
MONOCYTES # BLD AUTO: 0.48 K/UL (ref 0–0.85)
MONOCYTES NFR BLD AUTO: 7.9 % (ref 0–13.4)
NEUTROPHILS # BLD AUTO: 3.84 K/UL (ref 2–7.15)
NEUTROPHILS NFR BLD: 63.5 % (ref 44–72)
NRBC # BLD AUTO: 0 K/UL
NRBC BLD-RTO: 0 /100 WBC
PLATELET # BLD AUTO: 182 K/UL (ref 164–446)
PMV BLD AUTO: 11.8 FL (ref 9–12.9)
POTASSIUM SERPL-SCNC: 4.1 MMOL/L (ref 3.6–5.5)
RBC # BLD AUTO: 4.18 M/UL (ref 4.2–5.4)
SIGNIFICANT IND 70042: ABNORMAL
SITE SITE: ABNORMAL
SODIUM SERPL-SCNC: 141 MMOL/L (ref 135–145)
SOURCE SOURCE: ABNORMAL
WBC # BLD AUTO: 6.1 K/UL (ref 4.8–10.8)

## 2018-05-07 PROCEDURE — G8979 MOBILITY GOAL STATUS: HCPCS | Mod: CM

## 2018-05-07 PROCEDURE — A9270 NON-COVERED ITEM OR SERVICE: HCPCS | Performed by: INTERNAL MEDICINE

## 2018-05-07 PROCEDURE — G8987 SELF CARE CURRENT STATUS: HCPCS | Mod: CK

## 2018-05-07 PROCEDURE — 700105 HCHG RX REV CODE 258: Performed by: HOSPITALIST

## 2018-05-07 PROCEDURE — G8980 MOBILITY D/C STATUS: HCPCS | Mod: CM

## 2018-05-07 PROCEDURE — G8988 SELF CARE GOAL STATUS: HCPCS | Mod: CK

## 2018-05-07 PROCEDURE — 85025 COMPLETE CBC W/AUTO DIFF WBC: CPT

## 2018-05-07 PROCEDURE — 80048 BASIC METABOLIC PNL TOTAL CA: CPT

## 2018-05-07 PROCEDURE — 700102 HCHG RX REV CODE 250 W/ 637 OVERRIDE(OP): Performed by: HOSPITALIST

## 2018-05-07 PROCEDURE — 36415 COLL VENOUS BLD VENIPUNCTURE: CPT

## 2018-05-07 PROCEDURE — G8989 SELF CARE D/C STATUS: HCPCS | Mod: CK

## 2018-05-07 PROCEDURE — 99233 SBSQ HOSP IP/OBS HIGH 50: CPT | Performed by: INTERNAL MEDICINE

## 2018-05-07 PROCEDURE — 700111 HCHG RX REV CODE 636 W/ 250 OVERRIDE (IP): Performed by: HOSPITALIST

## 2018-05-07 PROCEDURE — 97165 OT EVAL LOW COMPLEX 30 MIN: CPT

## 2018-05-07 PROCEDURE — A9270 NON-COVERED ITEM OR SERVICE: HCPCS | Mod: JG | Performed by: HOSPITALIST

## 2018-05-07 PROCEDURE — 770006 HCHG ROOM/CARE - MED/SURG/GYN SEMI*

## 2018-05-07 PROCEDURE — A9270 NON-COVERED ITEM OR SERVICE: HCPCS | Performed by: HOSPITALIST

## 2018-05-07 PROCEDURE — 700102 HCHG RX REV CODE 250 W/ 637 OVERRIDE(OP): Performed by: INTERNAL MEDICINE

## 2018-05-07 PROCEDURE — 700102 HCHG RX REV CODE 250 W/ 637 OVERRIDE(OP): Mod: JG | Performed by: HOSPITALIST

## 2018-05-07 PROCEDURE — 97162 PT EVAL MOD COMPLEX 30 MIN: CPT

## 2018-05-07 PROCEDURE — G8978 MOBILITY CURRENT STATUS: HCPCS | Mod: CM

## 2018-05-07 RX ADMIN — METAXALONE 800 MG: 800 TABLET ORAL at 17:58

## 2018-05-07 RX ADMIN — CARBIDOPA AND LEVODOPA 1 TABLET: 25; 100 TABLET ORAL at 16:30

## 2018-05-07 RX ADMIN — DIAZEPAM 2 MG: 2 TABLET ORAL at 16:33

## 2018-05-07 RX ADMIN — OXYBUTYNIN CHLORIDE 5 MG: 5 TABLET ORAL at 15:33

## 2018-05-07 RX ADMIN — METAXALONE 800 MG: 800 TABLET ORAL at 13:08

## 2018-05-07 RX ADMIN — ENOXAPARIN SODIUM 40 MG: 100 INJECTION SUBCUTANEOUS at 08:28

## 2018-05-07 RX ADMIN — GABAPENTIN 1800 MG: 300 CAPSULE ORAL at 08:28

## 2018-05-07 RX ADMIN — METAXALONE 800 MG: 800 TABLET ORAL at 06:45

## 2018-05-07 RX ADMIN — CARISOPRODOL 350 MG: 350 TABLET ORAL at 13:08

## 2018-05-07 RX ADMIN — OXYBUTYNIN CHLORIDE 5 MG: 5 TABLET ORAL at 08:29

## 2018-05-07 RX ADMIN — OXYBUTYNIN CHLORIDE 5 MG: 5 TABLET ORAL at 21:06

## 2018-05-07 RX ADMIN — CARISOPRODOL 350 MG: 350 TABLET ORAL at 05:35

## 2018-05-07 RX ADMIN — GABAPENTIN 2400 MG: 400 CAPSULE ORAL at 21:03

## 2018-05-07 RX ADMIN — CARBIDOPA AND LEVODOPA 1 TABLET: 25; 100 TABLET ORAL at 10:26

## 2018-05-07 RX ADMIN — CARBIDOPA AND LEVODOPA 1 TABLET: 25; 100 TABLET ORAL at 13:08

## 2018-05-07 RX ADMIN — CARBIDOPA AND LEVODOPA 1 TABLET: 25; 100 TABLET ORAL at 21:04

## 2018-05-07 RX ADMIN — SODIUM CHLORIDE: 9 INJECTION, SOLUTION INTRAVENOUS at 21:11

## 2018-05-07 RX ADMIN — SODIUM CHLORIDE: 9 INJECTION, SOLUTION INTRAVENOUS at 06:03

## 2018-05-07 RX ADMIN — CEFTRIAXONE 2 G: 2 INJECTION, POWDER, FOR SOLUTION INTRAMUSCULAR; INTRAVENOUS at 21:04

## 2018-05-07 RX ADMIN — CARISOPRODOL 350 MG: 350 TABLET ORAL at 17:58

## 2018-05-07 RX ADMIN — OXYCODONE HYDROCHLORIDE AND ACETAMINOPHEN 1 TABLET: 5; 325 TABLET ORAL at 04:30

## 2018-05-07 RX ADMIN — CARBIDOPA AND LEVODOPA 1 TABLET: 25; 100 TABLET ORAL at 06:41

## 2018-05-07 RX ADMIN — CARBIDOPA AND LEVODOPA 1 TABLET: 50; 200 TABLET, EXTENDED RELEASE ORAL at 21:04

## 2018-05-07 RX ADMIN — CARBIDOPA AND LEVODOPA 1 TABLET: 25; 100 TABLET ORAL at 18:02

## 2018-05-07 ASSESSMENT — ENCOUNTER SYMPTOMS
NERVOUS/ANXIOUS: 0
BACK PAIN: 0
SPUTUM PRODUCTION: 0
NAUSEA: 0
SEIZURES: 0
FOCAL WEAKNESS: 0
COUGH: 0
ABDOMINAL PAIN: 0
MYALGIAS: 0
WEIGHT LOSS: 0
NECK PAIN: 0
BLURRED VISION: 0
DIARRHEA: 0
CHILLS: 0
EYE PAIN: 0
DEPRESSION: 0
HEARTBURN: 0
ORTHOPNEA: 0
FEVER: 0
DIZZINESS: 0

## 2018-05-07 ASSESSMENT — COGNITIVE AND FUNCTIONAL STATUS - GENERAL
MOVING FROM LYING ON BACK TO SITTING ON SIDE OF FLAT BED: UNABLE
CLIMB 3 TO 5 STEPS WITH RAILING: TOTAL
DAILY ACTIVITIY SCORE: 17
HELP NEEDED FOR BATHING: A LOT
TOILETING: A LOT
SUGGESTED CMS G CODE MODIFIER DAILY ACTIVITY: CK
MOBILITY SCORE: 7
WALKING IN HOSPITAL ROOM: TOTAL
SUGGESTED CMS G CODE MODIFIER MOBILITY: CM
TURNING FROM BACK TO SIDE WHILE IN FLAT BAD: UNABLE
MOVING TO AND FROM BED TO CHAIR: UNABLE
DRESSING REGULAR LOWER BODY CLOTHING: A LOT
STANDING UP FROM CHAIR USING ARMS: A LOT
DRESSING REGULAR UPPER BODY CLOTHING: A LITTLE

## 2018-05-07 ASSESSMENT — GAIT ASSESSMENTS: GAIT LEVEL OF ASSIST: UNABLE TO PARTICIPATE

## 2018-05-07 ASSESSMENT — ACTIVITIES OF DAILY LIVING (ADL): TOILETING: REQUIRES ASSIST

## 2018-05-07 NOTE — THERAPY
"Occupational Therapy Evaluation completed.   Functional Status:  Pt is min a supine to sit, min a sit to supine, max a xfer chair to bed, max a LB dressing, SUA for seated self feeding and light grooming, mod a toileting. Pt is limited by weakness, tremors.  Pt has B LE contractures and is unable to xfer without assist. Pt has supportive family and Mon - Fri caregiver assistance. Pt is functioning at baseline.  No further acute OT needs.   Plan of Care: Patient with no further skilled OT needs in the acute care setting at this time  Discharge Recommendations:  Equipment: No Equipment Needed. Post-acute therapy Discharge to home with outpatient or home health for additional skilled therapy services.    See \"Rehab Therapy-Acute\" Patient Summary Report for complete documentation.    "

## 2018-05-07 NOTE — DISCHARGE PLANNING
Medical Social Work  Patient is aware of PT/OT recommendations, selected [facility].  Faxed Choice to [facility].

## 2018-05-07 NOTE — PROGRESS NOTES
Soma given.  Pt stated that she will try to go to sleep.  Asked this RN to close door and not to bother her until she call.  Pt does have call light within reach and has bed alarm.

## 2018-05-07 NOTE — PROGRESS NOTES
RN went into check on pt.  Pt wasn't sleeping.  She knew she was in hospital but didn't remember why she is in hospital.  Reoriented.  C/o being cold.  Warm blanket provided.

## 2018-05-07 NOTE — PROGRESS NOTES
"Frequently on call light.  c/o discomfort.  This RN, charge RN or CNA went into pt's room multiple times to make her comfortable.  c/o unable to sleep for \"36 hours\".  Eye mask given.  Offered valium.  Asked this RN dosage of valium.  Pt claimed that she takes 10 mg not 2 mg.  Pt also claimed that she is on Soma which is not ordered.  Called Dr. Dorman re: Valium and Soma.  No change in Valium dosage but MD did order Soma.    "

## 2018-05-07 NOTE — THERAPY
"Physical Therapy Evaluation completed.   Bed Mobility:  Supine to Sit:  (up in chair )  Transfers: Sit to Stand: Maximal Assist  Gait: Level Of Assist: Unable to Participate with No Equipment Needed       Plan of Care: Patient with no further skilled PT needs in the acute care setting at this time  Discharge Recommendations: Equipment: No Equipment Needed.    Ms. Draper is a 69 y/o female who present so acute secondary to UTI. She has PMH of Parkinsons. Her Parkinsons is quite advanced and she presents with significant lower extremity weakness, gross motor coordination deficits, contractures of B ankles, and dynamic balance deficits. After movement assessment and discussion with patient it appears that she is at her baseline level of function. Family and caregiver services have been performing all functional transfers for her at a maximum assist level. She reports she tried slide board transfers and it was unsuccessful. As she is at her baseline level of function, acute phyiscal therapy services not needed at this time. She would benefit from home health physical therapy services to focus on contracture management of her B feet. If her contractures can be reduced she will be better able to participate in transfers, however due to the severity this will require a long term bracing system.     See \"Rehab Therapy-Acute\" Patient Summary Report for complete documentation.     "

## 2018-05-07 NOTE — PROGRESS NOTES
Pt wanted to sit edge of bed and exercise.  This RN stayed with pt for at least 30 min and helped her exercise.

## 2018-05-07 NOTE — PROGRESS NOTES
RN assumed care of patient this morning. Patient alert and oriented, compliant with meds and care. Pt has contractures in her feet and require assistance out of bed. RN and CNA helped patient to wheelchair per her request for comfort. Patient up for meal, call light in reach, and hourly rounding in place.

## 2018-05-07 NOTE — PROGRESS NOTES
[facility] Hospitalist Progress Note    Date of Service: 2018    Chief Complaint  68 y.o. female with PMH of parkinson's disease admitted 2018 with UTI    Interval Problem Update  Feeling a lot better today. Sitting on her wheelchair. Muscles cramp is improving.   Consultants/Specialty  none    Disposition  Remain on the floor        Review of Systems   Constitutional: Positive for malaise/fatigue. Negative for chills, fever and weight loss.   HENT: Negative for congestion and nosebleeds.    Eyes: Negative for blurred vision and pain.   Respiratory: Negative for cough and sputum production.    Cardiovascular: Negative for chest pain and orthopnea.   Gastrointestinal: Negative for abdominal pain, diarrhea, heartburn and nausea.   Genitourinary: Negative for dysuria and frequency.   Musculoskeletal: Negative for back pain, myalgias and neck pain.        Leg pain   Skin: Negative for itching and rash.   Neurological: Negative for dizziness, focal weakness and seizures.   Psychiatric/Behavioral: Negative for depression. The patient is not nervous/anxious.       Physical Exam  Laboratory/Imaging   Hemodynamics  Temp (24hrs), Av.1 °C (98.7 °F), Min:36.9 °C (98.5 °F), Max:37.2 °C (99 °F)   Temperature: 36.9 °C (98.5 °F)  Pulse  Av.6  Min: 66  Max: 122    Blood Pressure : 113/65      Respiratory      Respiration: 16, Pulse Oximetry: 92 %        RUL Breath Sounds: Clear, RML Breath Sounds: Diminished, RLL Breath Sounds: Diminished, RUPALI Breath Sounds: Clear, LLL Breath Sounds: Diminished    Fluids    Intake/Output Summary (Last 24 hours) at 18 0753  Last data filed at 18 0600   Gross per 24 hour   Intake             1920 ml   Output             5600 ml   Net            -3680 ml       Nutrition  Orders Placed This Encounter   Procedures   • Diet Order     Standing Status:   Standing     Number of Occurrences:   1     Order Specific Question:   Diet:     Answer:   Regular [1]     Physical Exam    Constitutional: She is oriented to person, place, and time. No distress.   HENT:   Head: Normocephalic and atraumatic.   Mouth/Throat: Oropharynx is clear and moist.   Eyes: Conjunctivae and EOM are normal. Pupils are equal, round, and reactive to light.   Neck: Normal range of motion. Neck supple. No tracheal deviation present. No thyromegaly present.   Cardiovascular: Normal rate and regular rhythm.    No murmur heard.  Pulmonary/Chest: Effort normal and breath sounds normal. No respiratory distress. She has no wheezes.   Abdominal: Soft. Bowel sounds are normal. She exhibits no distension. There is no tenderness.   Musculoskeletal: She exhibits tenderness. She exhibits no edema.   Contracted leg   Neurological: She is alert and oriented to person, place, and time. No cranial nerve deficit.   Skin: Skin is warm and dry. She is not diaphoretic. No erythema.       Recent Labs      05/05/18 2110 05/07/18   0559   WBC  7.0  6.1   RBC  4.42  4.18*   HEMOGLOBIN  14.2  13.1   HEMATOCRIT  42.8  41.1   MCV  96.8  98.3*   MCH  32.1  31.3   MCHC  33.2*  31.9*   RDW  47.9  47.9   PLATELETCT  225  182   MPV  11.9  11.8     Recent Labs      05/05/18 2110 05/07/18   0559   SODIUM  141  141   POTASSIUM  5.1  4.1   CHLORIDE  106  108   CO2  25  22   GLUCOSE  78  86   BUN  16  9   CREATININE  0.67  0.58   CALCIUM  9.9  9.2                      Assessment/Plan     * Urinary tract infection associated with indwelling urethral catheter (HCC)- (present on admission)   Assessment & Plan    On IV ceftriaxone 3 days  Culture: lactose fermenting g-rods        Other secondary parkinsonism (HCC)- (present on admission)   Assessment & Plan    On cinemet short and long-acting.  Follow with neurology a outpatient.  Does have quite severe tremors, anxiety, quelled with IV benadryl.  Will monitor.         Lactic acidosis- (present on admission)   Assessment & Plan    improved        Urinary retention- (present on admission)   Assessment &  Plan    Ta catheter replaced          Quality-Core Measures

## 2018-05-07 NOTE — PROGRESS NOTES
At bedside report, pt c/o discomfort from bed and she mentioned about waffle  Mattress.  Overlay applied and c/o more discomfort.  Wanted RN to remove.  Asked pt to try for at least one hour or so.  Agreed for now.

## 2018-05-07 NOTE — DISCHARGE PLANNING
Received [facility] for [facility], awaiting Order and F2F to be placed to send referral. [facility] notified.

## 2018-05-08 VITALS
TEMPERATURE: 98.5 F | WEIGHT: 95 LBS | HEIGHT: 60 IN | HEART RATE: 72 BPM | SYSTOLIC BLOOD PRESSURE: 117 MMHG | OXYGEN SATURATION: 90 % | BODY MASS INDEX: 18.65 KG/M2 | RESPIRATION RATE: 16 BRPM | DIASTOLIC BLOOD PRESSURE: 77 MMHG

## 2018-05-08 PROBLEM — N39.0 URINARY TRACT INFECTION ASSOCIATED WITH INDWELLING URETHRAL CATHETER (HCC): Status: RESOLVED | Noted: 2018-05-05 | Resolved: 2018-05-08

## 2018-05-08 PROBLEM — E87.20 LACTIC ACIDOSIS: Status: RESOLVED | Noted: 2018-05-06 | Resolved: 2018-05-08

## 2018-05-08 PROBLEM — T83.511A URINARY TRACT INFECTION ASSOCIATED WITH INDWELLING URETHRAL CATHETER (HCC): Status: RESOLVED | Noted: 2018-05-05 | Resolved: 2018-05-08

## 2018-05-08 PROCEDURE — A9270 NON-COVERED ITEM OR SERVICE: HCPCS | Performed by: HOSPITALIST

## 2018-05-08 PROCEDURE — 99239 HOSP IP/OBS DSCHRG MGMT >30: CPT | Performed by: HOSPITALIST

## 2018-05-08 PROCEDURE — 700102 HCHG RX REV CODE 250 W/ 637 OVERRIDE(OP): Performed by: HOSPITALIST

## 2018-05-08 PROCEDURE — 700111 HCHG RX REV CODE 636 W/ 250 OVERRIDE (IP): Performed by: HOSPITALIST

## 2018-05-08 RX ORDER — GABAPENTIN 400 MG/1
2400 CAPSULE ORAL EVERY EVENING
Qty: 90 CAP | Refills: 0 | Status: SHIPPED | OUTPATIENT
Start: 2018-05-08 | End: 2019-12-01

## 2018-05-08 RX ORDER — AMOXICILLIN 250 MG
2 CAPSULE ORAL 2 TIMES DAILY
Qty: 30 TAB | Refills: 0 | Status: SHIPPED | OUTPATIENT
Start: 2018-05-08 | End: 2019-12-01

## 2018-05-08 RX ORDER — CIPROFLOXACIN 500 MG/1
500 TABLET, FILM COATED ORAL EVERY 12 HOURS
Qty: 4 TAB | Refills: 0 | Status: SHIPPED | OUTPATIENT
Start: 2018-05-08 | End: 2018-05-10

## 2018-05-08 RX ORDER — GABAPENTIN 300 MG/1
1800 CAPSULE ORAL EVERY MORNING
Qty: 90 CAP | Refills: 0 | Status: SHIPPED | OUTPATIENT
Start: 2018-05-09 | End: 2019-12-01

## 2018-05-08 RX ORDER — CIPROFLOXACIN 500 MG/1
500 TABLET, FILM COATED ORAL EVERY 12 HOURS
Status: DISCONTINUED | OUTPATIENT
Start: 2018-05-08 | End: 2018-05-08 | Stop reason: HOSPADM

## 2018-05-08 RX ORDER — POLYETHYLENE GLYCOL 3350 17 G/17G
17 POWDER, FOR SOLUTION ORAL DAILY
Qty: 5 EACH | Refills: 0 | Status: SHIPPED | OUTPATIENT
Start: 2018-05-08 | End: 2018-05-13

## 2018-05-08 RX ADMIN — CARBIDOPA AND LEVODOPA 1 TABLET: 25; 100 TABLET ORAL at 12:45

## 2018-05-08 RX ADMIN — OXYBUTYNIN CHLORIDE 5 MG: 5 TABLET ORAL at 08:08

## 2018-05-08 RX ADMIN — METAXALONE 800 MG: 800 TABLET ORAL at 00:05

## 2018-05-08 RX ADMIN — STANDARDIZED SENNA CONCENTRATE AND DOCUSATE SODIUM 2 TABLET: 8.6; 5 TABLET, FILM COATED ORAL at 08:08

## 2018-05-08 RX ADMIN — CIPROFLOXACIN HYDROCHLORIDE 500 MG: 500 TABLET, FILM COATED ORAL at 08:46

## 2018-05-08 RX ADMIN — CARISOPRODOL 350 MG: 350 TABLET ORAL at 12:45

## 2018-05-08 RX ADMIN — CARBIDOPA AND LEVODOPA 1 TABLET: 25; 100 TABLET ORAL at 06:22

## 2018-05-08 RX ADMIN — METAXALONE 800 MG: 800 TABLET ORAL at 12:45

## 2018-05-08 RX ADMIN — CARISOPRODOL 350 MG: 350 TABLET ORAL at 00:06

## 2018-05-08 RX ADMIN — GABAPENTIN 1800 MG: 300 CAPSULE ORAL at 08:08

## 2018-05-08 RX ADMIN — METAXALONE 800 MG: 800 TABLET ORAL at 06:22

## 2018-05-08 RX ADMIN — CARBIDOPA AND LEVODOPA 1 TABLET: 25; 100 TABLET ORAL at 10:56

## 2018-05-08 RX ADMIN — CARISOPRODOL 350 MG: 350 TABLET ORAL at 06:22

## 2018-05-08 RX ADMIN — ENOXAPARIN SODIUM 40 MG: 100 INJECTION SUBCUTANEOUS at 08:08

## 2018-05-08 ASSESSMENT — PAIN SCALES - GENERAL
PAINLEVEL_OUTOF10: 1
PAINLEVEL_OUTOF10: 4
PAINLEVEL_OUTOF10: 7

## 2018-05-08 NOTE — DISCHARGE PLANNING
ATTN: Case Management  RE: Referral for Home Health                We would like to take this opportunity to thank you for submitting a referral for your patient to continue care with [facility]. Our skilled team is dedicated to helping all patients recover and gain independence in the home setting.            As of 5/8/2018, we have accepted the above patient into our service. A [facility] clinician will be out to see the patient within 48 hours to conduct our initial visit. If you have any questions or concerns regarding the patient’s transition to Home Health, please do not hesitate to contact us. We are open for referrals 7 days a week from 8AM to 5PM at [phone].      We look forward to collaborating with you,  [facility] Team

## 2018-05-08 NOTE — PROGRESS NOTES
Alert and able to let her needs known. Appears less anxious compared to last night per documentation. Anticipating to dc home tomorrow. Cont plan of care, call light within reach and visual checks through the night

## 2018-05-08 NOTE — DISCHARGE INSTRUCTIONS
Discharge Instructions    Discharged to home by car with relative. Discharged via wheelchair, hospital escort: Refused.  Special equipment needed: Not Applicable    Be sure to schedule a follow-up appointment with your primary care doctor or any specialists as instructed.     Discharge Plan:   Influenza Vaccine Indication: Patient Refuses    I understand that a diet low in cholesterol, fat, and sodium is recommended for good health. Unless I have been given specific instructions below for another diet, I accept this instruction as my diet prescription.     Special Instructions: None    · Is patient discharged on Warfarin / Coumadin?   No     Depression / Suicide Risk    As you are discharged from this [facility] facility, it is important to learn how to keep safe from harming yourself.    Recognize the warning signs:  · Abrupt changes in personality, positive or negative- including increase in energy   · Giving away possessions  · Change in eating patterns- significant weight changes-  positive or negative  · Change in sleeping patterns- unable to sleep or sleeping all the time   · Unwillingness or inability to communicate  · Depression  · Unusual sadness, discouragement and loneliness  · Talk of wanting to die  · Neglect of personal appearance   · Rebelliousness- reckless behavior  · Withdrawal from people/activities they love  · Confusion- inability to concentrate     If you or a loved one observes any of these behaviors or has concerns about self-harm, here's what you can do:  · Talk about it- your feelings and reasons for harming yourself  · Remove any means that you might use to hurt yourself (examples: pills, rope, extension cords, firearm)  · Get professional help from the community (Mental Health, Substance Abuse, psychological counseling)  · Do not be alone:Call your Safe Contact- someone whom you trust who will be there for you.  · Call your local CRISIS HOTLINE 848-0447 or 951-588-3388  · Call your  local Children's Mobile Crisis Response Team [place] (778) 915-1943 or www.[employer]  · Call the toll free National Suicide Prevention Hotlines   · National Suicide Prevention Lifeline 650-684-WRHL (3735)  · National Hope Line Network 800-SUICIDE (029-5078)        Sepsis, Adult  Sepsis is a serious infection of your blood or tissues that affects your whole body. The infection that causes sepsis may be bacterial, viral, fungal, or parasitic. Sepsis may be life threatening. Sepsis can cause your blood pressure to drop. This may result in shock. Shock causes your central nervous system and your organs to stop working correctly.  What increases the risk?  Sepsis can happen in anyone, but it is more likely to happen in people who have weakened immune systems.  What are the signs or symptoms?  Symptoms of sepsis can include:  · Fever or low body temperature (hypothermia).  · Rapid breathing (hyperventilation).  · Chills.  · Rapid heartbeat (tachycardia).  · Confusion or light-headedness.  · Trouble breathing.  · Urinating much less than usual.  · Cool, clammy skin or red, flushed skin.  · Other problems with the heart, kidneys, or brain.  How is this diagnosed?  Your health care provider will likely do tests to look for an infection, to see if the infection has spread to your blood, and to see how serious your condition is. Tests can include:  · Blood tests, including cultures of your blood.  · Cultures of other fluids from your body, such as:  ¨ Urine.  ¨ Pus from wounds.  ¨ Mucus coughed up from your lungs.  · Urine tests other than cultures.  · X-ray exams or other imaging tests.  How is this treated?  Treatment will begin with elimination of the source of infection. If your sepsis is likely caused by a bacterial or fungal infection, you will be given antibiotic or antifungal medicines.  You may also receive:  · Oxygen.  · Fluids through an IV tube.  · Medicines to increase your blood pressure.  · A  machine to clean your blood (dialysis) if your kidneys fail.  · A machine to help you breathe if your lungs fail.  Get help right away if:  You get an infection or develop any of the signs and symptoms of sepsis after surgery or a hospitalization.  This information is not intended to replace advice given to you by your health care provider. Make sure you discuss any questions you have with your health care provider.  Document Released: 09/15/2004 Document Revised: 05/25/2017 Document Reviewed: 08/25/2014  [employer] Interactive Patient Education © 2017 [employer]

## 2018-05-08 NOTE — FACE TO FACE
Face to Face Supporting Documentation - Home Health    The encounter with this patient was in whole or in part the primary reason for home health admission.    Date of encounter:   Patient:                    MRN:                       YOB: 2017  Delmis Draper  [id number]  1950     Home health to see patient for:  Skilled Nursing care for assessment, interventions & education    PT/OT  Skilled need for:  Exacerbation of Chronic Disease State debility, weakness    Skilled nursing interventions to include:  Comment: PT/OT    Homebound status evidenced by:  Need the aid of supportive devices such as crutches, canes, wheelchairs or walkers, Require the use of special transportation, Needs the assistance of another person in order to leave the home or Have a condition such that leaving his or her home is medically contraindicated. Leaving home requires a considerable and taxing effort. There is a normal inability to leave the home.    Community Physician to provide follow up care: Raul Iverson M.D.     Optional Interventions? No      I certify the face to face encounter for this home health care referral meets the CMS requirements and the encounter/clinical assessment with the patient was, in whole, or in part, for the medical condition(s) listed above, which is the primary reason for home health care. Based on my clinical findings: the service(s) are medically necessary, support the need for home health care, and the homebound criteria are met.  I certify that this patient has had a face to face encounter by myself.  Renay Zimmer M.D. - NPI: [id number]

## 2018-05-08 NOTE — CARE PLAN
Problem: Safety  Goal: Will remain free from injury  Outcome: PROGRESSING AS EXPECTED  Bed alarm on for safety, assistance needed out of bed    Problem: Discharge Barriers/Planning  Goal: Patient's continuum of care needs will be met  Outcome: PROGRESSING AS EXPECTED  Awaiting dc home, possibly tomorrow

## 2018-05-08 NOTE — DISCHARGE SUMMARY
CHIEF COMPLAINT ON ADMISSION  Chief Complaint   Patient presents with   • Diarrhea   • Tremors       CODE STATUS  Full Code    HPI & HOSPITAL COURSE  68 y.o. 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  She reports severe sharp pain which came and went without exacerbating or alleviating factors.  Additionally, she does have urinary urgency and dysuria despite the presence of a catheter.  She endorses chills but no fever.   She was admitted to hospital for further management.    UA showed expected leukocytosis in urine due to indwelling catheter, however since she was symptomatic she was started on antibiotics. Ba catheter was exchanged in the ED.Urine cultures + Gram neg fermenting Rods, Citrobacter freundii and she was transitioned to oral ciprofloxacin at discharge.  She was also constipated and was started on a bowel regimen. I suspect her retention might be due to the constipation as well.Her labs were monitored, she was given IVF for her lactic acidosis which resolved. Patient worked with PT who recommended Home health PT and an order was placed. [facility] accepted patient and will be following her at home. For her neuropathy her gabapentin was adjusted and patient tolerated well. She was with improved symptoms and was medically cleared for discharge. Advised to fu with PCP and outpatient urology for ongoing urinary retention. Patient understood and agreed with the above plan.       The patient recovered much more quickly than anticipated on admission.    Therefore, she is discharged in fair and stable condition with close outpatient follow-up.    SPECIFIC OUTPATIENT FOLLOW-UP  pcp  urology    DISCHARGE PROBLEM LIST  Active: parkinsons  disease  Urinary retention    Resolved  UTI  Lactic acidosis    FOLLOW UP  No future appointments.  [facility]  [address] [facility] [place] [address]  [phone]          MEDICATIONS ON DISCHARGE   Delmis Draper   Home Medication Instructions ROBBY:[id number]    Printed on:05/08/18 1212   Medication Information                      carbidopa-levodopa (SINEMET)  MG Tab  Take 1 Tab by mouth 6 Times a Day. 0700, 1000, 1300, 1600, 1900, 2200             carbidopa-levodopa SR (SINEMET CR)  MG per tablet  Take 1 Tab by mouth every bedtime.             carisoprodol (SOMA) 350 MG Tab  Take 350 mg by mouth 4 times a day.             diazePAM (VALIUM) 5 MG Tab  Take 5 mg by mouth every 6 hours as needed for Anxiety.             docusate sodium (COLACE) 100 MG Cap  Take 300 mg by mouth every morning.             doxycycline (VIBRAMYCIN) 100 MG Tab  Take 100 mg by mouth 2 times a day. 14 day course, finished >week ago. Reported 5/5/18   Stop taking          gabapentin (NEURONTIN) 300 MG Cap  Take 1,800 mg by mouth every bedtime.   Stop taling          gabapentin (NEURONTIN) 600 MG tablet  Take  by mouth 3 times a day. 1800 mg every morning and 2400 mg every evening.    Stop taking          Melatonin 1 MG Tab  Take 1 mg by mouth every bedtime.             metaxalone (SKELAXIN) 800 MG Tab  Take 800 mg by mouth 4 times a day. 0700, 1000, 1300, 1600             Multiple Vitamin (MULTI VITAMIN DAILY PO)  Take 1 Tab by mouth every day.             oxybutynin (DITROPAN) 5 MG Tab  Take 5 mg by mouth 2 Times a Day.             oxyCODONE-acetaminophen (PERCOCET) 5-325 MG Tab  Take 1 Tab by mouth 2 times a day as needed.             vitamin D, Ergocalciferol, (DRISDOL) 79994 units Cap capsule  Take 50,000 Units by mouth every 48 hours.             Ciprofloxacin 500mg BID x 2 days  DIET  Orders Placed This Encounter   Procedures   • Diet Order     Standing Status:   Standing     Number of Occurrences:   1     Order  Specific Question:   Diet:     Answer:   Regular [1]       ACTIVITY  As tolerated.  Weight bearing as tolerated      CONSULTATIONS  none    PROCEDURES  none    LABORATORY  Lab Results   Component Value Date/Time    SODIUM 141 05/07/2018 05:59 AM    POTASSIUM 4.1 05/07/2018 05:59 AM    CHLORIDE 108 05/07/2018 05:59 AM    CO2 22 05/07/2018 05:59 AM    GLUCOSE 86 05/07/2018 05:59 AM    BUN 9 05/07/2018 05:59 AM    CREATININE 0.58 05/07/2018 05:59 AM    CREATININE 0.8 12/22/2008 09:30 AM        Lab Results   Component Value Date/Time    WBC 6.1 05/07/2018 05:59 AM    HEMOGLOBIN 13.1 05/07/2018 05:59 AM    HEMATOCRIT 41.1 05/07/2018 05:59 AM    PLATELETCT 182 05/07/2018 05:59 AM        Total time of the discharge process exceeds 38 minutes

## 2018-05-09 ENCOUNTER — PATIENT OUTREACH (OUTPATIENT)
Dept: HEALTH INFORMATION MANAGEMENT | Facility: OTHER | Age: 68
End: 2018-05-09

## 2018-05-09 LAB
EST. AVERAGE GLUCOSE BLD GHB EST-MCNC: 105 MG/DL
HBA1C MFR BLD: 5.3 % (ref 0–5.6)

## 2018-05-09 NOTE — PROGRESS NOTES
Received referral from [facility] patient advocate [place] regarding discharge medication questions. Outbound call to Delmis. Patient wanted to review the discharge medications and specific dosing times. Patient gave verbal consent to speak with her caregiver Marisol Kern from [facility] regarding her medications. See completed medication review pharmacy follow-up [facility].     Gabapentin was recently increased to total of 4200 mg per day in hospital. Patient states Dr. Patel manages her gabapentin. Recommended maximum dose is 3600 mg per day. Placed call to MA with Dr. Patel to clarify gabapentin dosing. Will follow-up with patient once clarification is received.     Provided caregiver Marisol with my contact information for any additional medication questions/ concerns.     Shannen Ramirez, PharmD

## 2018-05-09 NOTE — PROGRESS NOTES
[facility] Pharmac[facility] Shannen Ramirez and requested  Outreach call to the patient to review medications . The patient had questions regarding her discharge medications [facility]

## 2018-05-10 ENCOUNTER — HOME HEALTH ADMISSION (OUTPATIENT)
Dept: HOME HEALTH SERVICES | Facility: HOME HEALTHCARE | Age: 68
End: 2018-05-10
Payer: MEDICARE

## 2018-05-10 ENCOUNTER — HOME CARE VISIT (OUTPATIENT)
Dept: HOME HEALTH SERVICES | Facility: HOME HEALTHCARE | Age: 68
End: 2018-05-10
Payer: MEDICARE

## 2018-05-10 VITALS
OXYGEN SATURATION: 91 % | TEMPERATURE: 97.7 F | RESPIRATION RATE: 18 BRPM | HEIGHT: 63 IN | SYSTOLIC BLOOD PRESSURE: 108 MMHG | HEART RATE: 90 BPM | DIASTOLIC BLOOD PRESSURE: 58 MMHG

## 2018-05-10 LAB
BACTERIA BLD CULT: NORMAL
SIGNIFICANT IND 70042: NORMAL
SITE SITE: NORMAL
SOURCE SOURCE: NORMAL

## 2018-05-10 PROCEDURE — 665001 SOC-HOME HEALTH

## 2018-05-10 PROCEDURE — G0493 RN CARE EA 15 MIN HH/HOSPICE: HCPCS

## 2018-05-10 PROCEDURE — A4369 SKIN BARRIER LIQUID PER OZ: HCPCS

## 2018-05-10 SDOH — ECONOMIC STABILITY: HOUSING INSECURITY: UNSAFE APPLIANCES: 0

## 2018-05-10 SDOH — ECONOMIC STABILITY: HOUSING INSECURITY: UNSAFE COOKING RANGE AREA: 0

## 2018-05-10 ASSESSMENT — ACTIVITIES OF DAILY LIVING (ADL)
HOME_HEALTH_OASIS: 01
OASIS_M1830: 06

## 2018-05-10 ASSESSMENT — PATIENT HEALTH QUESTIONNAIRE - PHQ9
2. FEELING DOWN, DEPRESSED, IRRITABLE, OR HOPELESS: 02
1. LITTLE INTEREST OR PLEASURE IN DOING THINGS: 02

## 2018-05-10 ASSESSMENT — ENCOUNTER SYMPTOMS
VOMITING: DENIES
NAUSEA: DENIES

## 2018-05-11 ENCOUNTER — TELEPHONE (OUTPATIENT)
Dept: HEALTH INFORMATION MANAGEMENT | Facility: OTHER | Age: 68
End: 2018-05-11

## 2018-05-11 LAB
BACTERIA BLD CULT: NORMAL
SIGNIFICANT IND 70042: NORMAL
SITE SITE: NORMAL
SOURCE SOURCE: NORMAL

## 2018-05-11 NOTE — TELEPHONE ENCOUNTER
Received referral from [facility]. Medications reviewed against discharge summary. Patient is currently taking 4200 mg/ day of gabapentin. Left message with neurologist Dr. Patel's office to verify gabapentin dosing. Patient's caregiver Marisol counseled on risks of sedation and respiratory depression for combination of carisoprodol, diazepam, oxycodone-APAP, and metaxalone.     hSannen Ramirez, PharmD

## 2018-05-12 ENCOUNTER — HOME CARE VISIT (OUTPATIENT)
Dept: HOME HEALTH SERVICES | Facility: HOME HEALTHCARE | Age: 68
End: 2018-05-12
Payer: MEDICARE

## 2018-05-13 ENCOUNTER — HOME CARE VISIT (OUTPATIENT)
Dept: HOME HEALTH SERVICES | Facility: HOME HEALTHCARE | Age: 68
End: 2018-05-13
Payer: MEDICARE

## 2018-05-14 ENCOUNTER — HOME CARE VISIT (OUTPATIENT)
Dept: HOME HEALTH SERVICES | Facility: HOME HEALTHCARE | Age: 68
End: 2018-05-14
Payer: MEDICARE

## 2018-05-14 VITALS
DIASTOLIC BLOOD PRESSURE: 60 MMHG | HEART RATE: 81 BPM | OXYGEN SATURATION: 95 % | RESPIRATION RATE: 16 BRPM | TEMPERATURE: 98.9 F | SYSTOLIC BLOOD PRESSURE: 100 MMHG

## 2018-05-14 PROCEDURE — G0496 LPN CARE TRAIN/EDU IN HH: HCPCS

## 2018-05-14 SDOH — ECONOMIC STABILITY: HOUSING INSECURITY: UNSAFE APPLIANCES: 0

## 2018-05-14 SDOH — ECONOMIC STABILITY: HOUSING INSECURITY: UNSAFE COOKING RANGE AREA: 0

## 2018-05-15 ENCOUNTER — HOME CARE VISIT (OUTPATIENT)
Dept: HOME HEALTH SERVICES | Facility: HOME HEALTHCARE | Age: 68
End: 2018-05-15
Payer: MEDICARE

## 2018-05-16 ENCOUNTER — HOME CARE VISIT (OUTPATIENT)
Dept: HOME HEALTH SERVICES | Facility: HOME HEALTHCARE | Age: 68
End: 2018-05-16
Payer: MEDICARE

## 2018-05-16 VITALS
HEART RATE: 82 BPM | SYSTOLIC BLOOD PRESSURE: 94 MMHG | RESPIRATION RATE: 16 BRPM | DIASTOLIC BLOOD PRESSURE: 60 MMHG | OXYGEN SATURATION: 94 % | TEMPERATURE: 99.1 F

## 2018-05-16 PROCEDURE — G0153 HHCP-SVS OF S/L PATH,EA 15MN: HCPCS

## 2018-05-16 PROCEDURE — G0151 HHCP-SERV OF PT,EA 15 MIN: HCPCS

## 2018-05-16 ASSESSMENT — ACTIVITIES OF DAILY LIVING (ADL)
IADLS_COMMENTS: <!--EPICS-->SEE OT EVAL<!--EPICE-->
ADLS_COMMENTS: <!--EPICS-->SEE OT EVAL<!--EPICE-->

## 2018-05-17 ENCOUNTER — HOME CARE VISIT (OUTPATIENT)
Dept: HOME HEALTH SERVICES | Facility: HOME HEALTHCARE | Age: 68
End: 2018-05-17
Payer: MEDICARE

## 2018-05-17 ENCOUNTER — HOSPITAL ENCOUNTER (OUTPATIENT)
Facility: MEDICAL CENTER | Age: 68
End: 2018-05-17
Attending: FAMILY MEDICINE
Payer: MEDICARE

## 2018-05-17 VITALS
OXYGEN SATURATION: 92 % | HEART RATE: 72 BPM | TEMPERATURE: 98.5 F | DIASTOLIC BLOOD PRESSURE: 60 MMHG | SYSTOLIC BLOOD PRESSURE: 95 MMHG | RESPIRATION RATE: 16 BRPM

## 2018-05-17 VITALS
HEART RATE: 64 BPM | DIASTOLIC BLOOD PRESSURE: 58 MMHG | OXYGEN SATURATION: 93 % | RESPIRATION RATE: 18 BRPM | TEMPERATURE: 97.3 F | SYSTOLIC BLOOD PRESSURE: 104 MMHG

## 2018-05-17 VITALS
RESPIRATION RATE: 16 BRPM | TEMPERATURE: 99.1 F | DIASTOLIC BLOOD PRESSURE: 60 MMHG | SYSTOLIC BLOOD PRESSURE: 94 MMHG | HEART RATE: 82 BPM

## 2018-05-17 LAB
APPEARANCE UR: CLEAR
BACTERIA #/AREA URNS HPF: NEGATIVE /HPF
BILIRUB UR QL STRIP.AUTO: NEGATIVE
COLOR UR: YELLOW
EPI CELLS #/AREA URNS HPF: ABNORMAL /HPF
GLUCOSE UR STRIP.AUTO-MCNC: NEGATIVE MG/DL
HYALINE CASTS #/AREA URNS LPF: ABNORMAL /LPF
KETONES UR STRIP.AUTO-MCNC: NEGATIVE MG/DL
LEUKOCYTE ESTERASE UR QL STRIP.AUTO: ABNORMAL
MICRO URNS: ABNORMAL
NITRITE UR QL STRIP.AUTO: NEGATIVE
PH UR STRIP.AUTO: 7 [PH]
PROT UR QL STRIP: NEGATIVE MG/DL
RBC # URNS HPF: ABNORMAL /HPF
RBC UR QL AUTO: ABNORMAL
SP GR UR STRIP.AUTO: 1.01
UROBILINOGEN UR STRIP.AUTO-MCNC: 0.2 MG/DL
WBC #/AREA URNS HPF: ABNORMAL /HPF

## 2018-05-17 PROCEDURE — 87077 CULTURE AEROBIC IDENTIFY: CPT | Mod: 91

## 2018-05-17 PROCEDURE — 87086 URINE CULTURE/COLONY COUNT: CPT

## 2018-05-17 PROCEDURE — G0152 HHCP-SERV OF OT,EA 15 MIN: HCPCS

## 2018-05-17 PROCEDURE — 81001 URINALYSIS AUTO W/SCOPE: CPT

## 2018-05-17 PROCEDURE — A4315 CATH W/DRAINAGE 2-WAY SILCNE: HCPCS

## 2018-05-17 PROCEDURE — 87186 SC STD MICRODIL/AGAR DIL: CPT

## 2018-05-17 PROCEDURE — G0299 HHS/HOSPICE OF RN EA 15 MIN: HCPCS

## 2018-05-17 ASSESSMENT — ACTIVITIES OF DAILY LIVING (ADL)
HOUSEKEEPING_ASSISTANCE: 6
LAUNDRY_ASSISTANCE: 6
TRANSPORTATION_ASSISTANCE: 6
GROOMING_ASSISTANCE: 0
MEAL_PREP_ASSISTANCE: 6
TELEPHONE_ASSISTANCE: 6
DRESSING_UB_ASSISTANCE: 0
TOILETING_ASSISTANCE: 4
SHOPPING_ASSISTANCE: 6
ORAL_CARE_ASSISTANCE: 0
DRESSING_LB_ASSISTANCE: 4
EATING_ASSISTANCE: 0

## 2018-05-17 ASSESSMENT — ENCOUNTER SYMPTOMS: POOR JUDGMENT: 1

## 2018-05-18 ENCOUNTER — HOME CARE VISIT (OUTPATIENT)
Dept: HOME HEALTH SERVICES | Facility: HOME HEALTHCARE | Age: 68
End: 2018-05-18
Payer: MEDICARE

## 2018-05-18 SDOH — ECONOMIC STABILITY: HOUSING INSECURITY: UNSAFE APPLIANCES: 0

## 2018-05-18 SDOH — ECONOMIC STABILITY: HOUSING INSECURITY: UNSAFE COOKING RANGE AREA: 0

## 2018-05-18 ASSESSMENT — ACTIVITIES OF DAILY LIVING (ADL)
TRANSPORTATION_ASSISTANCE: 6
TELEPHONE_ASSISTANCE: 0
LAUNDRY_ASSISTANCE: 6
HOUSEKEEPING_ASSISTANCE: 6
SHOPPING_ASSISTANCE: 6
MEAL_PREP_ASSISTANCE: 6

## 2018-05-18 ASSESSMENT — ENCOUNTER SYMPTOMS
VOMITING: DENIES
NAUSEA: DENIES

## 2018-05-19 ENCOUNTER — HOSPITAL ENCOUNTER (EMERGENCY)
Facility: MEDICAL CENTER | Age: 68
End: 2018-05-20
Attending: EMERGENCY MEDICINE
Payer: MEDICARE

## 2018-05-19 ENCOUNTER — HOME CARE VISIT (OUTPATIENT)
Dept: HOME HEALTH SERVICES | Facility: HOME HEALTHCARE | Age: 68
End: 2018-05-19
Payer: MEDICARE

## 2018-05-19 VITALS
OXYGEN SATURATION: 93 % | BODY MASS INDEX: 18.25 KG/M2 | TEMPERATURE: 96.9 F | DIASTOLIC BLOOD PRESSURE: 69 MMHG | WEIGHT: 103 LBS | SYSTOLIC BLOOD PRESSURE: 109 MMHG | HEART RATE: 78 BPM | RESPIRATION RATE: 18 BRPM

## 2018-05-19 DIAGNOSIS — R10.2 SUPRAPUBIC PAIN: ICD-10-CM

## 2018-05-19 DIAGNOSIS — N30.90 CYSTITIS: ICD-10-CM

## 2018-05-19 LAB
APPEARANCE UR: CLEAR
BACTERIA #/AREA URNS HPF: NEGATIVE /HPF
BASOPHILS # BLD AUTO: 0.8 % (ref 0–1.8)
BASOPHILS # BLD: 0.04 K/UL (ref 0–0.12)
BILIRUB UR QL STRIP.AUTO: NEGATIVE
COLOR UR: YELLOW
EOSINOPHIL # BLD AUTO: 0.17 K/UL (ref 0–0.51)
EOSINOPHIL NFR BLD: 3.4 % (ref 0–6.9)
EPI CELLS #/AREA URNS HPF: NEGATIVE /HPF
ERYTHROCYTE [DISTWIDTH] IN BLOOD BY AUTOMATED COUNT: 46.8 FL (ref 35.9–50)
GLUCOSE UR STRIP.AUTO-MCNC: NEGATIVE MG/DL
HCT VFR BLD AUTO: 40.7 % (ref 37–47)
HGB BLD-MCNC: 13.2 G/DL (ref 12–16)
HYALINE CASTS #/AREA URNS LPF: ABNORMAL /LPF
IMM GRANULOCYTES # BLD AUTO: 0 K/UL (ref 0–0.11)
IMM GRANULOCYTES NFR BLD AUTO: 0 % (ref 0–0.9)
KETONES UR STRIP.AUTO-MCNC: NEGATIVE MG/DL
LEUKOCYTE ESTERASE UR QL STRIP.AUTO: ABNORMAL
LYMPHOCYTES # BLD AUTO: 1.42 K/UL (ref 1–4.8)
LYMPHOCYTES NFR BLD: 28.3 % (ref 22–41)
MCH RBC QN AUTO: 31.2 PG (ref 27–33)
MCHC RBC AUTO-ENTMCNC: 32.4 G/DL (ref 33.6–35)
MCV RBC AUTO: 96.2 FL (ref 81.4–97.8)
MICRO URNS: ABNORMAL
MONOCYTES # BLD AUTO: 0.5 K/UL (ref 0–0.85)
MONOCYTES NFR BLD AUTO: 10 % (ref 0–13.4)
NEUTROPHILS # BLD AUTO: 2.88 K/UL (ref 2–7.15)
NEUTROPHILS NFR BLD: 57.5 % (ref 44–72)
NITRITE UR QL STRIP.AUTO: NEGATIVE
NRBC # BLD AUTO: 0 K/UL
NRBC BLD-RTO: 0 /100 WBC
PH UR STRIP.AUTO: 7.5 [PH]
PLATELET # BLD AUTO: 196 K/UL (ref 164–446)
PMV BLD AUTO: 11.4 FL (ref 9–12.9)
PROT UR QL STRIP: NEGATIVE MG/DL
RBC # BLD AUTO: 4.23 M/UL (ref 4.2–5.4)
RBC # URNS HPF: ABNORMAL /HPF
RBC UR QL AUTO: NEGATIVE
SP GR UR STRIP.AUTO: 1.01
UROBILINOGEN UR STRIP.AUTO-MCNC: 0.2 MG/DL
WBC # BLD AUTO: 5 K/UL (ref 4.8–10.8)
WBC #/AREA URNS HPF: ABNORMAL /HPF

## 2018-05-19 PROCEDURE — 81001 URINALYSIS AUTO W/SCOPE: CPT

## 2018-05-19 PROCEDURE — 80048 BASIC METABOLIC PNL TOTAL CA: CPT

## 2018-05-19 PROCEDURE — A9270 NON-COVERED ITEM OR SERVICE: HCPCS | Performed by: EMERGENCY MEDICINE

## 2018-05-19 PROCEDURE — 85025 COMPLETE CBC W/AUTO DIFF WBC: CPT

## 2018-05-19 PROCEDURE — 99284 EMERGENCY DEPT VISIT MOD MDM: CPT

## 2018-05-19 PROCEDURE — 700102 HCHG RX REV CODE 250 W/ 637 OVERRIDE(OP): Performed by: EMERGENCY MEDICINE

## 2018-05-19 RX ORDER — PHENAZOPYRIDINE HYDROCHLORIDE 200 MG/1
100 TABLET, FILM COATED ORAL ONCE
Status: COMPLETED | OUTPATIENT
Start: 2018-05-19 | End: 2018-05-19

## 2018-05-19 RX ADMIN — PHENAZOPYRIDINE HYDROCHLORIDE 100 MG: 200 TABLET ORAL at 23:58

## 2018-05-19 ASSESSMENT — PAIN SCALES - GENERAL: PAINLEVEL_OUTOF10: 5

## 2018-05-20 LAB
ANION GAP SERPL CALC-SCNC: 6 MMOL/L (ref 0–11.9)
BACTERIA UR CULT: ABNORMAL
BUN SERPL-MCNC: 18 MG/DL (ref 8–22)
CALCIUM SERPL-MCNC: 9.3 MG/DL (ref 8.5–10.5)
CHLORIDE SERPL-SCNC: 104 MMOL/L (ref 96–112)
CO2 SERPL-SCNC: 28 MMOL/L (ref 20–33)
CREAT SERPL-MCNC: 0.72 MG/DL (ref 0.5–1.4)
GLUCOSE SERPL-MCNC: 99 MG/DL (ref 65–99)
POTASSIUM SERPL-SCNC: 4.3 MMOL/L (ref 3.6–5.5)
SIGNIFICANT IND 70042: ABNORMAL
SITE SITE: ABNORMAL
SODIUM SERPL-SCNC: 138 MMOL/L (ref 135–145)
SOURCE SOURCE: ABNORMAL

## 2018-05-20 PROCEDURE — A9270 NON-COVERED ITEM OR SERVICE: HCPCS | Performed by: EMERGENCY MEDICINE

## 2018-05-20 PROCEDURE — 700102 HCHG RX REV CODE 250 W/ 637 OVERRIDE(OP): Performed by: EMERGENCY MEDICINE

## 2018-05-20 RX ORDER — PHENAZOPYRIDINE HYDROCHLORIDE 100 MG/1
100 TABLET, FILM COATED ORAL 3 TIMES DAILY PRN
Qty: 6 TAB | Refills: 0 | Status: SHIPPED | OUTPATIENT
Start: 2018-05-20 | End: 2018-05-22

## 2018-05-20 RX ORDER — OXYCODONE HYDROCHLORIDE AND ACETAMINOPHEN 5; 325 MG/1; MG/1
1 TABLET ORAL ONCE
Status: COMPLETED | OUTPATIENT
Start: 2018-05-20 | End: 2018-05-20

## 2018-05-20 RX ORDER — OXYBUTYNIN CHLORIDE 5 MG/1
5 TABLET ORAL 2 TIMES DAILY
Qty: 90 TAB | Refills: 0 | Status: SHIPPED | OUTPATIENT
Start: 2018-05-20 | End: 2018-05-20

## 2018-05-20 RX ORDER — OXYBUTYNIN CHLORIDE 5 MG/1
5 TABLET ORAL 2 TIMES DAILY
Qty: 90 TAB | Refills: 0 | Status: SHIPPED | OUTPATIENT
Start: 2018-05-20 | End: 2019-12-01

## 2018-05-20 RX ADMIN — OXYCODONE AND ACETAMINOPHEN 1 TABLET: 5; 325 TABLET ORAL at 00:30

## 2018-05-20 ASSESSMENT — PAIN SCALES - GENERAL: PAINLEVEL_OUTOF10: 2

## 2018-05-20 NOTE — ED NOTES
Patient discharged home with  by personal WC. Verbalizes understanding of discharge instructions.

## 2018-05-20 NOTE — ED PROVIDER NOTES
CHIEF COMPLAINT  Chief Complaint   Patient presents with   • Urinary Retention   • Urinary Catheter Problem       HPI  Delmis Draper is a 68 y.o. female who presents with pain to the suprapubic region in the abdomen.  States that she had a Ta replaced approximately 2 days ago.  Has a history of urinary retention and has had a Ta in place for the past several weeks.  Was recently started on ciprofloxacin 2 days ago.  Was started on a prior dose of Cipro on 5/8/2018.  Cultures did grow out vancomycin resistant Enterococci.  No vomiting.  No fevers.    REVIEW OF SYSTEMS  See [facility] for further details. All other systems are negative.     PAST MEDICAL HISTORY   has a past medical history of Abnormal finding on mammography, microcalcification; Anesthesia; Bowel obstruction ([facility]); Bursitis; Cataract; Cellulitis (7/19/2012); Dilated bile duct; Disc disorder; Dystonia; Dystonia; Dysuria; GERD (gastroesophageal reflux disease); Heart burn; Hypertension; Hypotension; Insulin resistance; Leukopenia; Low blood pressure reading; Malaise and fatigue; OSTEOPOROSIS (3/22/2010); Other specified disorder of intestines; Pain; Parkinson disease ([facility]); Peripheral neuropathy ([facility]); Rash, skin; Scoliosis; Spinal stenosis, lumbar; Thyrotoxicosis ([facility]); Unspecified disorder of thyroid; and vitamin D deficiency.    SOCIAL HISTORY  Social History     Social History Main Topics   • Smoking status: Former Smoker     Packs/day: 0.50     Years: 10.00     Quit date: 1/1/1985   • Smokeless tobacco: Never Used   • Alcohol use No      Comment: none since age 31   • Drug use: No   • Sexual activity: No      Comment: , 2 boys, diasability       SURGICAL HISTORY   has a past surgical history that includes thyroidectomy (1967); gastroscopy with biopsy (5/9/2012); egd w/endoscopic ultrasound (5/9/2012); cataract phaco with iol (4/14/2014); cataract phaco with iol (5/12/2014); breast biopsy (Left, 5/5/2015); gyn surgery (1985, 1988); knee  arthrotomy (1964, 1968); and other.    CURRENT MEDICATIONS  Home Medications    **Home medications have not yet been reviewed for this encounter**         ALLERGIES  Allergies   Allergen Reactions   • Bactrim [Sulfamethoxazole W-Trimethoprim] Hives   • Actonel [Risedronate Sodium]    • Latex Rash     Irritation of skin    • Sulfa Drugs    • Tape Rash     Paper tape is ok       PHYSICAL EXAM  VITAL SIGNS: /69   Pulse 78   Temp 36.1 °C (96.9 °F)   Resp 18   Wt 46.7 kg (103 lb)   SpO2 93%   BMI 18.25 kg/m²   Pulse ox interpretation: I interpret this pulse ox as normal.  Constitutional: Alert in no apparent distress.  HENT: No signs of trauma, Bilateral external ears normal, Nose normal.   Cardiovascular: Regular rate and rhythm, no murmurs.   Thorax & Lungs: Normal breath sounds, No respiratory distress, No wheezing, No chest tenderness.   Abdomen: Bowel sounds normal, Soft, No tenderness, No masses, No pulsatile masses. No peritoneal signs.  Ta catheter in place.  Skin: Warm, Dry, No erythema, No rash.   Extremities: Intact distal pulses, No edema, No tenderness, No cyanosis  Neurologic: Alert,  baseline tremors.      DIAGNOSTIC STUDIES / PROCEDURES    LABS  Labs Reviewed   URINALYSIS - Abnormal; Notable for the following:        Result Value    Leukocyte Esterase Moderate (*)     All other components within normal limits   CBC WITH DIFFERENTIAL - Abnormal; Notable for the following:     MCHC 32.4 (*)     All other components within normal limits   URINE MICROSCOPIC (W/UA) - Abnormal; Notable for the following:     WBC 10-20 (*)     All other components within normal limits   BASIC METABOLIC PANEL   ESTIMATED GFR       RADIOLOGY  No orders to display       COURSE & MEDICAL DECISION MAKING  Pertinent Labs & Imaging studies reviewed. (See chart for details)  68 y.o. female presenting with suprapubic discomfort.  Has an indwelling Ta catheter.  Is currently on ciprofloxacin for urinary tract infection.   No fevers, vomiting.  States that she had some suprapubic fullness and is concerned that her catheter is not draining.  Had it replaced just 2 days ago.  There is urine in the Ta catheter at this time that is yellow in color.  No obvious blood.    Bedside ultrasound was performed.  Showed Ta balloon within the bladder.  No surrounding urine.  Does not appear to have a distended bladder.  Ta was flushed and flushed easily clear.  Did have urine output.  Continue to have urine output in the Ta catheter.    Laboratory studies were performed.  Patient is without evidence of renal failure.  No leukocytosis.  Does have evidence of a urinary tract infection with elevated WBC and moderate LE in the presence of a Ta catheter.  Patient is already on ciprofloxacin.  Pending urine cultures for sensitivity.    Patient appears stable for discharge at this time.  Discomfort likely secondary to cystitis associated with her Ta catheter.  Will treat with Pyridium for symptomatic management of her likely bladder pain.    To follow-up with urology for further management.  To follow-up with primary care physician as well.  To return here for any worsening of the patient's symptoms or development of any other concerning signs or symptoms of fever, vomiting, worsening pain.    /69   Pulse 78   Temp 36.1 °C (96.9 °F)   Resp 18   Wt 46.7 kg (103 lb)   SpO2 93%   BMI 18.25 kg/m²     Raul Iverson M.D.  [address] [address]  [phone]    In 2 days      [facility], Emergency Dept  [address] [facility] [address] [phone]    As needed, If symptoms worsen    The patient will return for worsening symptoms or failure of improvement and is stable at the time of discharge. The patient verbalizes understanding in their own words.    FINAL IMPRESSION  1. Suprapubic pain    2. Cystitis            Electronically signed by: Alfred Kathleen, 5/19/2018 10:04 PM

## 2018-05-20 NOTE — ED TRIAGE NOTES
Enters by EMS c/o urinary retention and urinary cath problem. Ta replaced 2 days ago. Distention and urinary discomfort, onset a few hours ago. EMS reports ~100ml UOP en route. Hx of Parkinson's and MS.

## 2018-05-21 ENCOUNTER — HOME CARE VISIT (OUTPATIENT)
Dept: HOME HEALTH SERVICES | Facility: HOME HEALTHCARE | Age: 68
End: 2018-05-21
Payer: MEDICARE

## 2018-05-21 VITALS
HEART RATE: 76 BPM | DIASTOLIC BLOOD PRESSURE: 50 MMHG | OXYGEN SATURATION: 96 % | RESPIRATION RATE: 16 BRPM | TEMPERATURE: 99 F | SYSTOLIC BLOOD PRESSURE: 78 MMHG

## 2018-05-21 VITALS
SYSTOLIC BLOOD PRESSURE: 100 MMHG | TEMPERATURE: 99.2 F | OXYGEN SATURATION: 93 % | HEART RATE: 84 BPM | RESPIRATION RATE: 18 BRPM | DIASTOLIC BLOOD PRESSURE: 50 MMHG

## 2018-05-21 PROCEDURE — G0496 LPN CARE TRAIN/EDU IN HH: HCPCS

## 2018-05-21 PROCEDURE — G0157 HHC PT ASSISTANT EA 15: HCPCS

## 2018-05-21 SDOH — ECONOMIC STABILITY: HOUSING INSECURITY: UNSAFE COOKING RANGE AREA: 0

## 2018-05-21 SDOH — ECONOMIC STABILITY: HOUSING INSECURITY: UNSAFE APPLIANCES: 0

## 2018-05-22 ENCOUNTER — HOME CARE VISIT (OUTPATIENT)
Dept: HOME HEALTH SERVICES | Facility: HOME HEALTHCARE | Age: 68
End: 2018-05-22
Payer: MEDICARE

## 2018-05-22 PROCEDURE — G0152 HHCP-SERV OF OT,EA 15 MIN: HCPCS

## 2018-05-23 ENCOUNTER — HOME CARE VISIT (OUTPATIENT)
Dept: HOME HEALTH SERVICES | Facility: HOME HEALTHCARE | Age: 68
End: 2018-05-23
Payer: MEDICARE

## 2018-05-23 VITALS
DIASTOLIC BLOOD PRESSURE: 60 MMHG | RESPIRATION RATE: 18 BRPM | TEMPERATURE: 98.4 F | SYSTOLIC BLOOD PRESSURE: 100 MMHG | OXYGEN SATURATION: 95 % | HEART RATE: 73 BPM

## 2018-05-23 VITALS
DIASTOLIC BLOOD PRESSURE: 60 MMHG | TEMPERATURE: 98.4 F | SYSTOLIC BLOOD PRESSURE: 90 MMHG | HEART RATE: 73 BPM | RESPIRATION RATE: 16 BRPM

## 2018-05-23 VITALS
TEMPERATURE: 99 F | HEART RATE: 87 BPM | DIASTOLIC BLOOD PRESSURE: 55 MMHG | RESPIRATION RATE: 16 BRPM | SYSTOLIC BLOOD PRESSURE: 88 MMHG | OXYGEN SATURATION: 92 %

## 2018-05-23 PROCEDURE — G0157 HHC PT ASSISTANT EA 15: HCPCS

## 2018-05-23 PROCEDURE — G0153 HHCP-SVS OF S/L PATH,EA 15MN: HCPCS

## 2018-05-24 ENCOUNTER — HOME CARE VISIT (OUTPATIENT)
Dept: HOME HEALTH SERVICES | Facility: HOME HEALTHCARE | Age: 68
End: 2018-05-24
Payer: MEDICARE

## 2018-05-24 PROCEDURE — G0152 HHCP-SERV OF OT,EA 15 MIN: HCPCS

## 2018-05-24 PROCEDURE — G0299 HHS/HOSPICE OF RN EA 15 MIN: HCPCS

## 2018-05-25 ENCOUNTER — HOME CARE VISIT (OUTPATIENT)
Dept: HOME HEALTH SERVICES | Facility: HOME HEALTHCARE | Age: 68
End: 2018-05-25
Payer: MEDICARE

## 2018-05-25 VITALS
DIASTOLIC BLOOD PRESSURE: 52 MMHG | RESPIRATION RATE: 18 BRPM | HEART RATE: 87 BPM | OXYGEN SATURATION: 92 % | TEMPERATURE: 99 F | SYSTOLIC BLOOD PRESSURE: 90 MMHG

## 2018-05-25 VITALS
TEMPERATURE: 99.1 F | DIASTOLIC BLOOD PRESSURE: 65 MMHG | SYSTOLIC BLOOD PRESSURE: 108 MMHG | HEART RATE: 83 BPM | OXYGEN SATURATION: 92 % | RESPIRATION RATE: 16 BRPM

## 2018-05-25 PROCEDURE — G0153 HHCP-SVS OF S/L PATH,EA 15MN: HCPCS

## 2018-05-25 SDOH — ECONOMIC STABILITY: HOUSING INSECURITY: UNSAFE COOKING RANGE AREA: 0

## 2018-05-25 SDOH — ECONOMIC STABILITY: HOUSING INSECURITY: UNSAFE APPLIANCES: 0

## 2018-05-25 ASSESSMENT — ACTIVITIES OF DAILY LIVING (ADL)
BATHING_ASSISTANCE: 6
GROOMING_ASSISTANCE: 5
TOILETING_ASSISTANCE: 6
GROOMING_ASSISTANCE: 4
SHOPPING_ASSISTANCE: 6
TRANSPORTATION_ASSISTANCE: 6
DRESSING_LB_ASSISTANCE: 6
LAUNDRY_ASSISTANCE: 6
HOUSEKEEPING_ASSISTANCE: 6
TELEPHONE_ASSISTANCE: 0
DRESSING_UB_ASSISTANCE: 6
ORAL_CARE_ASSISTANCE: 4
MEAL_PREP_ASSISTANCE: 6
EATING_ASSISTANCE: 6

## 2018-05-25 ASSESSMENT — ENCOUNTER SYMPTOMS
VOMITING: DENIES
NAUSEA: DENIES

## 2018-05-26 ENCOUNTER — HOME CARE VISIT (OUTPATIENT)
Dept: HOME HEALTH SERVICES | Facility: HOME HEALTHCARE | Age: 68
End: 2018-05-26
Payer: MEDICARE

## 2018-05-26 VITALS
SYSTOLIC BLOOD PRESSURE: 102 MMHG | HEART RATE: 77 BPM | TEMPERATURE: 99.3 F | RESPIRATION RATE: 16 BRPM | DIASTOLIC BLOOD PRESSURE: 68 MMHG

## 2018-05-28 ENCOUNTER — HOME CARE VISIT (OUTPATIENT)
Dept: HOME HEALTH SERVICES | Facility: HOME HEALTHCARE | Age: 68
End: 2018-05-28
Payer: MEDICARE

## 2018-05-28 VITALS
TEMPERATURE: 97.8 F | OXYGEN SATURATION: 93 % | SYSTOLIC BLOOD PRESSURE: 86 MMHG | HEART RATE: 76 BPM | DIASTOLIC BLOOD PRESSURE: 62 MMHG

## 2018-05-28 PROCEDURE — G0300 HHS/HOSPICE OF LPN EA 15 MIN: HCPCS

## 2018-05-28 ASSESSMENT — ENCOUNTER SYMPTOMS
DEBILITATING PAIN: 1
POOR JUDGMENT: 1

## 2018-05-29 ENCOUNTER — HOME CARE VISIT (OUTPATIENT)
Dept: HOME HEALTH SERVICES | Facility: HOME HEALTHCARE | Age: 68
End: 2018-05-29
Payer: MEDICARE

## 2018-05-30 ENCOUNTER — HOME CARE VISIT (OUTPATIENT)
Dept: HOME HEALTH SERVICES | Facility: HOME HEALTHCARE | Age: 68
End: 2018-05-30
Payer: MEDICARE

## 2018-05-30 PROCEDURE — G0153 HHCP-SVS OF S/L PATH,EA 15MN: HCPCS

## 2018-05-31 ENCOUNTER — HOME CARE VISIT (OUTPATIENT)
Dept: HOME HEALTH SERVICES | Facility: HOME HEALTHCARE | Age: 68
End: 2018-05-31
Payer: MEDICARE

## 2018-05-31 ENCOUNTER — HOSPITAL ENCOUNTER (OUTPATIENT)
Facility: MEDICAL CENTER | Age: 68
End: 2018-05-31
Attending: FAMILY MEDICINE
Payer: MEDICARE

## 2018-05-31 LAB
APPEARANCE UR: ABNORMAL
BACTERIA #/AREA URNS HPF: ABNORMAL /HPF
BILIRUB UR QL STRIP.AUTO: NEGATIVE
COLOR UR: YELLOW
EPI CELLS #/AREA URNS HPF: ABNORMAL /HPF
GLUCOSE UR STRIP.AUTO-MCNC: NEGATIVE MG/DL
KETONES UR STRIP.AUTO-MCNC: NEGATIVE MG/DL
LEUKOCYTE ESTERASE UR QL STRIP.AUTO: ABNORMAL
MICRO URNS: ABNORMAL
NITRITE UR QL STRIP.AUTO: NEGATIVE
PH UR STRIP.AUTO: 7 [PH]
PROT UR QL STRIP: NEGATIVE MG/DL
RBC UR QL AUTO: NEGATIVE
SP GR UR STRIP.AUTO: 1.02
UROBILINOGEN UR STRIP.AUTO-MCNC: 0.2 MG/DL
WBC #/AREA URNS HPF: ABNORMAL /HPF
YEAST BUDDING URNS QL: PRESENT /HPF
YEAST HYPHAE #/AREA URNS HPF: PRESENT /HPF

## 2018-05-31 PROCEDURE — 87086 URINE CULTURE/COLONY COUNT: CPT | Mod: GZ

## 2018-05-31 PROCEDURE — G0299 HHS/HOSPICE OF RN EA 15 MIN: HCPCS

## 2018-05-31 PROCEDURE — 81001 URINALYSIS AUTO W/SCOPE: CPT

## 2018-06-02 VITALS
HEART RATE: 61 BPM | DIASTOLIC BLOOD PRESSURE: 60 MMHG | TEMPERATURE: 99.1 F | OXYGEN SATURATION: 92 % | RESPIRATION RATE: 18 BRPM | SYSTOLIC BLOOD PRESSURE: 100 MMHG

## 2018-06-02 LAB
BACTERIA UR CULT: NORMAL
SIGNIFICANT IND 70042: NORMAL
SITE SITE: NORMAL
SOURCE SOURCE: NORMAL

## 2018-06-02 SDOH — ECONOMIC STABILITY: HOUSING INSECURITY: UNSAFE APPLIANCES: 0

## 2018-06-02 SDOH — ECONOMIC STABILITY: HOUSING INSECURITY: UNSAFE COOKING RANGE AREA: 0

## 2018-06-02 ASSESSMENT — ENCOUNTER SYMPTOMS
RESPIRATORY SYMPTOMS COMMENTS: NO S/S OF RESP DISTRESS
DEBILITATING PAIN: 1

## 2018-06-06 ENCOUNTER — HOME CARE VISIT (OUTPATIENT)
Dept: HOME HEALTH SERVICES | Facility: HOME HEALTHCARE | Age: 68
End: 2018-06-06
Payer: MEDICARE

## 2018-06-07 ENCOUNTER — PATIENT OUTREACH (OUTPATIENT)
Dept: HEALTH INFORMATION MANAGEMENT | Facility: OTHER | Age: 68
End: 2018-06-07

## 2018-06-07 NOTE — PROGRESS NOTES
Patient  Delmis Draper was discharged on 05/08/2018. [facility] Patient Advocate assisted with multiple discharge orders including, 1-  Primary Care Physician follow up. The patient did not follow discharge orders instructions to follow up with  her urologist Dr. Alexandra Carmona. [facility] offered assistance with scheduling appointment multiple times and patient declined each time. [facility] also assisted with discharge orders for Home Health which the patient started utilizing on 05/10/2018.  [facility] was currently working with the patient to  get her a DME order placed for a wheelchair  by patient's PCP when [facility] was notified  on 5/31/2018 by patient's caregiver that [facility] was [facility] on  [facility]. [facility] contacted  [facility] to confirm patient status and was notified that  the  patient was started on  hospice on 05/31/2018. The patient has  1 future  Primary Care Physician appointment scheduled. PPS score was  conducted PPS Score outcome  was 50%

## 2018-06-10 ENCOUNTER — APPOINTMENT (OUTPATIENT)
Dept: RADIOLOGY | Facility: MEDICAL CENTER | Age: 68
DRG: 700 | End: 2018-06-10
Attending: EMERGENCY MEDICINE
Payer: MEDICARE

## 2018-06-10 ENCOUNTER — HOSPITAL ENCOUNTER (INPATIENT)
Facility: MEDICAL CENTER | Age: 68
LOS: 2 days | DRG: 700 | End: 2018-06-12
Attending: EMERGENCY MEDICINE | Admitting: HOSPITALIST
Payer: MEDICARE

## 2018-06-10 DIAGNOSIS — R33.9 URINARY RETENTION: ICD-10-CM

## 2018-06-10 DIAGNOSIS — N30.00 ACUTE CYSTITIS WITHOUT HEMATURIA: ICD-10-CM

## 2018-06-10 PROBLEM — G62.9 PERIPHERAL NEUROPATHY: Status: ACTIVE | Noted: 2018-06-10

## 2018-06-10 LAB
ALBUMIN SERPL BCP-MCNC: 4.1 G/DL (ref 3.2–4.9)
ALBUMIN/GLOB SERPL: 1.1 G/DL
ALP SERPL-CCNC: 60 U/L (ref 30–99)
ALT SERPL-CCNC: 8 U/L (ref 2–50)
ANION GAP SERPL CALC-SCNC: 9 MMOL/L (ref 0–11.9)
APPEARANCE UR: ABNORMAL
AST SERPL-CCNC: 23 U/L (ref 12–45)
BACTERIA #/AREA URNS HPF: ABNORMAL /HPF
BASOPHILS # BLD AUTO: 0.2 % (ref 0–1.8)
BASOPHILS # BLD: 0.02 K/UL (ref 0–0.12)
BILIRUB SERPL-MCNC: 0.4 MG/DL (ref 0.1–1.5)
BILIRUB UR QL STRIP.AUTO: NEGATIVE
BUN SERPL-MCNC: 14 MG/DL (ref 8–22)
CALCIUM SERPL-MCNC: 9.4 MG/DL (ref 8.4–10.2)
CHLORIDE SERPL-SCNC: 103 MMOL/L (ref 96–112)
CO2 SERPL-SCNC: 23 MMOL/L (ref 20–33)
COLOR UR: YELLOW
CREAT SERPL-MCNC: 0.64 MG/DL (ref 0.5–1.4)
EOSINOPHIL # BLD AUTO: 0.13 K/UL (ref 0–0.51)
EOSINOPHIL NFR BLD: 1.3 % (ref 0–6.9)
EPI CELLS #/AREA URNS HPF: ABNORMAL /HPF
ERYTHROCYTE [DISTWIDTH] IN BLOOD BY AUTOMATED COUNT: 45.9 FL (ref 35.9–50)
GLOBULIN SER CALC-MCNC: 3.6 G/DL (ref 1.9–3.5)
GLUCOSE SERPL-MCNC: 112 MG/DL (ref 65–99)
GLUCOSE UR STRIP.AUTO-MCNC: NEGATIVE MG/DL
HCT VFR BLD AUTO: 42.3 % (ref 37–47)
HGB BLD-MCNC: 13.9 G/DL (ref 12–16)
IMM GRANULOCYTES # BLD AUTO: 0.04 K/UL (ref 0–0.11)
IMM GRANULOCYTES NFR BLD AUTO: 0.4 % (ref 0–0.9)
KETONES UR STRIP.AUTO-MCNC: NEGATIVE MG/DL
LACTATE BLD-SCNC: 1.81 MMOL/L (ref 0.5–2)
LEUKOCYTE ESTERASE UR QL STRIP.AUTO: ABNORMAL
LYMPHOCYTES # BLD AUTO: 0.92 K/UL (ref 1–4.8)
LYMPHOCYTES NFR BLD: 9.2 % (ref 22–41)
MCH RBC QN AUTO: 31.6 PG (ref 27–33)
MCHC RBC AUTO-ENTMCNC: 32.9 G/DL (ref 33.6–35)
MCV RBC AUTO: 96.1 FL (ref 81.4–97.8)
MICRO URNS: ABNORMAL
MONOCYTES # BLD AUTO: 0.78 K/UL (ref 0–0.85)
MONOCYTES NFR BLD AUTO: 7.8 % (ref 0–13.4)
MUCOUS THREADS #/AREA URNS HPF: ABNORMAL /HPF
NEUTROPHILS # BLD AUTO: 8.13 K/UL (ref 2–7.15)
NEUTROPHILS NFR BLD: 81.1 % (ref 44–72)
NITRITE UR QL STRIP.AUTO: NEGATIVE
NRBC # BLD AUTO: 0 K/UL
NRBC BLD-RTO: 0 /100 WBC
PH UR STRIP.AUTO: 5 [PH]
PLATELET # BLD AUTO: 232 K/UL (ref 164–446)
PMV BLD AUTO: 10.9 FL (ref 9–12.9)
POTASSIUM SERPL-SCNC: 3.8 MMOL/L (ref 3.6–5.5)
PROT SERPL-MCNC: 7.7 G/DL (ref 6–8.2)
PROT UR QL STRIP: NEGATIVE MG/DL
RBC # BLD AUTO: 4.4 M/UL (ref 4.2–5.4)
RBC # URNS HPF: ABNORMAL /HPF
RBC UR QL AUTO: NEGATIVE
SODIUM SERPL-SCNC: 135 MMOL/L (ref 135–145)
SP GR UR STRIP.AUTO: 1.01
UNIDENT CRYS URNS QL MICRO: ABNORMAL /HPF
WBC # BLD AUTO: 10 K/UL (ref 4.8–10.8)
WBC #/AREA URNS HPF: ABNORMAL /HPF
YEAST #/AREA URNS HPF: ABNORMAL /HPF

## 2018-06-10 PROCEDURE — 83605 ASSAY OF LACTIC ACID: CPT

## 2018-06-10 PROCEDURE — 303105 HCHG CATHETER EXTRA

## 2018-06-10 PROCEDURE — A9270 NON-COVERED ITEM OR SERVICE: HCPCS | Performed by: EMERGENCY MEDICINE

## 2018-06-10 PROCEDURE — 700102 HCHG RX REV CODE 250 W/ 637 OVERRIDE(OP): Performed by: EMERGENCY MEDICINE

## 2018-06-10 PROCEDURE — 700102 HCHG RX REV CODE 250 W/ 637 OVERRIDE(OP): Performed by: HOSPITALIST

## 2018-06-10 PROCEDURE — 99285 EMERGENCY DEPT VISIT HI MDM: CPT

## 2018-06-10 PROCEDURE — 87040 BLOOD CULTURE FOR BACTERIA: CPT | Mod: 91

## 2018-06-10 PROCEDURE — 700111 HCHG RX REV CODE 636 W/ 250 OVERRIDE (IP): Performed by: EMERGENCY MEDICINE

## 2018-06-10 PROCEDURE — 304561 HCHG STAT O2

## 2018-06-10 PROCEDURE — A9270 NON-COVERED ITEM OR SERVICE: HCPCS | Performed by: HOSPITALIST

## 2018-06-10 PROCEDURE — 96365 THER/PROPH/DIAG IV INF INIT: CPT

## 2018-06-10 PROCEDURE — 700101 HCHG RX REV CODE 250: Performed by: HOSPITALIST

## 2018-06-10 PROCEDURE — 87086 URINE CULTURE/COLONY COUNT: CPT

## 2018-06-10 PROCEDURE — 36415 COLL VENOUS BLD VENIPUNCTURE: CPT

## 2018-06-10 PROCEDURE — 700105 HCHG RX REV CODE 258: Performed by: HOSPITALIST

## 2018-06-10 PROCEDURE — 770001 HCHG ROOM/CARE - MED/SURG/GYN PRIV*

## 2018-06-10 PROCEDURE — 94760 N-INVAS EAR/PLS OXIMETRY 1: CPT

## 2018-06-10 PROCEDURE — 0T2BX0Z CHANGE DRAINAGE DEVICE IN BLADDER, EXTERNAL APPROACH: ICD-10-PCS | Performed by: EMERGENCY MEDICINE

## 2018-06-10 PROCEDURE — 80053 COMPREHEN METABOLIC PANEL: CPT

## 2018-06-10 PROCEDURE — 73564 X-RAY EXAM KNEE 4 OR MORE: CPT | Mod: LT

## 2018-06-10 PROCEDURE — 99222 1ST HOSP IP/OBS MODERATE 55: CPT | Mod: GW | Performed by: HOSPITALIST

## 2018-06-10 PROCEDURE — 700105 HCHG RX REV CODE 258: Performed by: EMERGENCY MEDICINE

## 2018-06-10 PROCEDURE — 71045 X-RAY EXAM CHEST 1 VIEW: CPT

## 2018-06-10 PROCEDURE — 85025 COMPLETE CBC W/AUTO DIFF WBC: CPT

## 2018-06-10 PROCEDURE — 81001 URINALYSIS AUTO W/SCOPE: CPT

## 2018-06-10 PROCEDURE — 51702 INSERT TEMP BLADDER CATH: CPT

## 2018-06-10 RX ORDER — AMOXICILLIN 250 MG
2 CAPSULE ORAL 2 TIMES DAILY
Status: DISCONTINUED | OUTPATIENT
Start: 2018-06-10 | End: 2018-06-12 | Stop reason: HOSPADM

## 2018-06-10 RX ORDER — ONDANSETRON 4 MG/1
4 TABLET, ORALLY DISINTEGRATING ORAL EVERY 4 HOURS PRN
Status: DISCONTINUED | OUTPATIENT
Start: 2018-06-10 | End: 2018-06-12 | Stop reason: HOSPADM

## 2018-06-10 RX ORDER — MORPHINE SULFATE 15 MG/1
15 TABLET, FILM COATED, EXTENDED RELEASE ORAL EVERY 12 HOURS
Status: DISCONTINUED | OUTPATIENT
Start: 2018-06-10 | End: 2018-06-12 | Stop reason: HOSPADM

## 2018-06-10 RX ORDER — METAXALONE 800 MG/1
800 TABLET ORAL 4 TIMES DAILY
Status: DISCONTINUED | OUTPATIENT
Start: 2018-06-10 | End: 2018-06-12 | Stop reason: HOSPADM

## 2018-06-10 RX ORDER — BISACODYL 10 MG
10 SUPPOSITORY, RECTAL RECTAL
Status: DISCONTINUED | OUTPATIENT
Start: 2018-06-10 | End: 2018-06-12 | Stop reason: HOSPADM

## 2018-06-10 RX ORDER — SODIUM CHLORIDE 9 MG/ML
INJECTION, SOLUTION INTRAVENOUS CONTINUOUS
Status: DISCONTINUED | OUTPATIENT
Start: 2018-06-10 | End: 2018-06-12 | Stop reason: HOSPADM

## 2018-06-10 RX ORDER — CARBIDOPA AND LEVODOPA 50; 200 MG/1; MG/1
1 TABLET, EXTENDED RELEASE ORAL
Status: DISCONTINUED | OUTPATIENT
Start: 2018-06-10 | End: 2018-06-12 | Stop reason: HOSPADM

## 2018-06-10 RX ORDER — DIAZEPAM 5 MG/1
10 TABLET ORAL DAILY
Status: DISCONTINUED | OUTPATIENT
Start: 2018-06-11 | End: 2018-06-12 | Stop reason: HOSPADM

## 2018-06-10 RX ORDER — LORAZEPAM 2 MG/ML
0.5 INJECTION INTRAMUSCULAR SEE ADMIN INSTRUCTIONS
COMMUNITY
End: 2018-06-13

## 2018-06-10 RX ORDER — METAXALONE 800 MG/1
800 TABLET ORAL ONCE
Status: COMPLETED | OUTPATIENT
Start: 2018-06-10 | End: 2018-06-10

## 2018-06-10 RX ORDER — ONDANSETRON 2 MG/ML
4 INJECTION INTRAMUSCULAR; INTRAVENOUS EVERY 4 HOURS PRN
Status: DISCONTINUED | OUTPATIENT
Start: 2018-06-10 | End: 2018-06-12 | Stop reason: HOSPADM

## 2018-06-10 RX ORDER — GABAPENTIN 400 MG/1
2400 CAPSULE ORAL EVERY EVENING
Status: DISCONTINUED | OUTPATIENT
Start: 2018-06-10 | End: 2018-06-12 | Stop reason: HOSPADM

## 2018-06-10 RX ORDER — OXYBUTYNIN CHLORIDE 5 MG/1
5 TABLET ORAL 2 TIMES DAILY
Status: DISCONTINUED | OUTPATIENT
Start: 2018-06-10 | End: 2018-06-12 | Stop reason: HOSPADM

## 2018-06-10 RX ORDER — AMOXICILLIN AND CLAVULANATE POTASSIUM 875; 125 MG/1; MG/1
1 TABLET, FILM COATED ORAL 2 TIMES DAILY
Status: ON HOLD | COMMUNITY
Start: 2018-05-30 | End: 2018-06-12

## 2018-06-10 RX ORDER — DIPHENHYDRAMINE HCL 12.5MG/5ML
25 LIQUID (ML) ORAL
Status: DISCONTINUED | OUTPATIENT
Start: 2018-06-10 | End: 2018-06-11

## 2018-06-10 RX ORDER — GABAPENTIN 300 MG/1
1800 CAPSULE ORAL EVERY MORNING
Status: DISCONTINUED | OUTPATIENT
Start: 2018-06-11 | End: 2018-06-12 | Stop reason: HOSPADM

## 2018-06-10 RX ORDER — MORPHINE SULFATE 15 MG/1
15 TABLET, FILM COATED, EXTENDED RELEASE ORAL EVERY 12 HOURS
COMMUNITY
End: 2019-12-01

## 2018-06-10 RX ORDER — CARISOPRODOL 350 MG/1
350 TABLET ORAL 4 TIMES DAILY
Status: DISCONTINUED | OUTPATIENT
Start: 2018-06-10 | End: 2018-06-12 | Stop reason: HOSPADM

## 2018-06-10 RX ORDER — DIAZEPAM 5 MG/1
5 TABLET ORAL 4 TIMES DAILY
Status: DISCONTINUED | OUTPATIENT
Start: 2018-06-10 | End: 2018-06-12 | Stop reason: HOSPADM

## 2018-06-10 RX ORDER — CIPROFLOXACIN 500 MG/1
500 TABLET, FILM COATED ORAL 2 TIMES DAILY
Status: ON HOLD | COMMUNITY
Start: 2018-05-30 | End: 2018-06-12

## 2018-06-10 RX ORDER — OXYCODONE HYDROCHLORIDE AND ACETAMINOPHEN 5; 325 MG/1; MG/1
1 TABLET ORAL 2 TIMES DAILY PRN
Status: DISCONTINUED | OUTPATIENT
Start: 2018-06-10 | End: 2018-06-12 | Stop reason: HOSPADM

## 2018-06-10 RX ORDER — SODIUM CHLORIDE 9 MG/ML
1000 INJECTION, SOLUTION INTRAVENOUS ONCE
Status: COMPLETED | OUTPATIENT
Start: 2018-06-10 | End: 2018-06-10

## 2018-06-10 RX ORDER — ACETAMINOPHEN 325 MG/1
650 TABLET ORAL ONCE
Status: COMPLETED | OUTPATIENT
Start: 2018-06-10 | End: 2018-06-10

## 2018-06-10 RX ORDER — POLYETHYLENE GLYCOL 3350 17 G/17G
1 POWDER, FOR SOLUTION ORAL
Status: DISCONTINUED | OUTPATIENT
Start: 2018-06-10 | End: 2018-06-12 | Stop reason: HOSPADM

## 2018-06-10 RX ORDER — CEFTRIAXONE 1 G/1
1 INJECTION, POWDER, FOR SOLUTION INTRAMUSCULAR; INTRAVENOUS ONCE
Status: COMPLETED | OUTPATIENT
Start: 2018-06-10 | End: 2018-06-10

## 2018-06-10 RX ORDER — MORPHINE SULFATE 20 MG/5ML
0.25 SOLUTION ORAL SEE ADMIN INSTRUCTIONS
COMMUNITY
End: 2018-06-13

## 2018-06-10 RX ADMIN — CARBIDOPA AND LEVODOPA 1 TABLET: 25; 100 TABLET ORAL at 13:48

## 2018-06-10 RX ADMIN — CARBIDOPA AND LEVODOPA 1 TABLET: 25; 100 TABLET ORAL at 18:00

## 2018-06-10 RX ADMIN — CARBIDOPA AND LEVODOPA 1 TABLET: 50; 200 TABLET, EXTENDED RELEASE ORAL at 21:02

## 2018-06-10 RX ADMIN — METAXALONE 800 MG: 800 TABLET ORAL at 18:04

## 2018-06-10 RX ADMIN — SODIUM CHLORIDE 1000 ML: 9 INJECTION, SOLUTION INTRAVENOUS at 13:33

## 2018-06-10 RX ADMIN — GABAPENTIN 2400 MG: 400 CAPSULE ORAL at 21:02

## 2018-06-10 RX ADMIN — SODIUM CHLORIDE: 9 INJECTION, SOLUTION INTRAVENOUS at 17:09

## 2018-06-10 RX ADMIN — CARISOPRODOL 350 MG: 350 TABLET ORAL at 18:00

## 2018-06-10 RX ADMIN — DIPHENHYDRAMINE HYDROCHLORIDE 12.5 MG: 12.5 SOLUTION ORAL at 21:02

## 2018-06-10 RX ADMIN — MORPHINE SULFATE 15 MG: 15 TABLET, EXTENDED RELEASE ORAL at 21:18

## 2018-06-10 RX ADMIN — DIAZEPAM 5 MG: 5 TABLET ORAL at 20:31

## 2018-06-10 RX ADMIN — ACETAMINOPHEN 650 MG: 325 TABLET, FILM COATED ORAL at 13:33

## 2018-06-10 RX ADMIN — METAXALONE 800 MG: 800 TABLET ORAL at 14:47

## 2018-06-10 RX ADMIN — OXYBUTYNIN CHLORIDE 5 MG: 5 TABLET ORAL at 21:02

## 2018-06-10 RX ADMIN — CEFTRIAXONE 1 G: 1 INJECTION, POWDER, FOR SOLUTION INTRAMUSCULAR; INTRAVENOUS at 15:48

## 2018-06-10 ASSESSMENT — LIFESTYLE VARIABLES
EVER_SMOKED: YES
SUBSTANCE_ABUSE: 0
PACK_YEARS: 5
EVER_SMOKED: YES

## 2018-06-10 ASSESSMENT — COPD QUESTIONNAIRES
COPD SCREENING SCORE: 5
DO YOU EVER COUGH UP ANY MUCUS OR PHLEGM?: NO/ONLY WITH OCCASIONAL COLDS OR INFECTIONS
DURING THE PAST 4 WEEKS HOW MUCH DID YOU FEEL SHORT OF BREATH: NONE/LITTLE OF THE TIME
HAVE YOU SMOKED AT LEAST 100 CIGARETTES IN YOUR ENTIRE LIFE: YES

## 2018-06-10 ASSESSMENT — ENCOUNTER SYMPTOMS
COUGH: 0
BLOOD IN STOOL: 0
NAUSEA: 1
PALPITATIONS: 0
TINGLING: 0
DOUBLE VISION: 0
PHOTOPHOBIA: 0
DEPRESSION: 0
HEMOPTYSIS: 0
BACK PAIN: 0
CHILLS: 0
INSOMNIA: 0
SENSORY CHANGE: 0
MYALGIAS: 0
CONSTIPATION: 0
FOCAL WEAKNESS: 1
FEVER: 0
NECK PAIN: 0
BLURRED VISION: 0
HEADACHES: 0
SPUTUM PRODUCTION: 0
WEIGHT LOSS: 0
CLAUDICATION: 0
DIARRHEA: 0
NERVOUS/ANXIOUS: 0
EYE PAIN: 0
ABDOMINAL PAIN: 1
TREMORS: 0
HALLUCINATIONS: 0
VOMITING: 0
DIZZINESS: 0

## 2018-06-10 ASSESSMENT — PAIN SCALES - GENERAL
PAINLEVEL_OUTOF10: 8
PAINLEVEL_OUTOF10: 6
PAINLEVEL_OUTOF10: 0
PAINLEVEL_OUTOF10: 8

## 2018-06-10 NOTE — ED NOTES
Med rec updated and complete  Allergies reviewed  Pt was not sure how long she was on DOXYCYLINE.  Called Yi @ [phone] to verify dose and when she picked it up.  Went back asked pt if she finished her antibiotics.  Pt reports no vitamins.   Waiting for a call back from [facility] @ [phone], just wanted to check pts dose of ATIVAN and MORPHINE.  [facility] called back, went over other medications.

## 2018-06-10 NOTE — ED PROVIDER NOTES
ED Provider Note    CHIEF COMPLAINT  Chief Complaint   Patient presents with   • Abdominal Pain     Ba cath for 3 mo.; pt reports blood clot from ba this AM and increased spasming mid lower abd pain since; rates 8/10   • Knee Pain     Pt reports GLF this AM onto knees; incrased L knee pain since       HPI  Delmis Draper is a 68 y.o. female who presents to the emergency department with a complaint of passing a blood clot from her Ba catheter.  The patient's catheter is been in place for more than 1 month and she says she has had it in place for at least 3 months but she has no idea why it is there.  She says that earlier today she had some crampy lower abdominal discomfort and then noted that she had passed a blood clot from her Ba catheter.  In addition she says that she twisted her left knee getting out of bed today.  The patient took her home morphine prior to coming in she says that she is currently taking Cipro for urinary tract infection and is also taking Augmentin for a tooth infection.  The patient is a frequent emergency department visitor with multiple chronic medical problems.    REVIEW OF SYSTEMS no chest pain no difficulty breathing she is unaware of having fever.  All other systems negative    PAST MEDICAL HISTORY  Past Medical History:   Diagnosis Date   • Abnormal finding on mammography, microcalcification    • Anesthesia     hypotensive in the past   • Bowel obstruction (HCC)     fecal impaction   • Bursitis    • Cataract    • Cellulitis 7/19/2012    right low extremity   • Dilated bile duct    • Disc disorder    • Dystonia    • Dystonia    • Dysuria    • GERD (gastroesophageal reflux disease)    • Heart burn    • Hypertension     pt can run very low   • Hypotension    • Insulin resistance    • Leukopenia    • Low blood pressure reading    • Malaise and fatigue    • OSTEOPOROSIS 3/22/2010   • Other specified disorder of intestines     constipation   • Pain     right hip, right knee  "  • Parkinson disease (HCC)     \"atypical\"   • Peripheral neuropathy (HCC)    • Rash, skin    • Scoliosis    • Spinal stenosis, lumbar    • Thyrotoxicosis remote    S/P I-131 Rx / subtotal thyroidectomy   • Unspecified disorder of thyroid     had partial thyroidectomy   • vitamin D deficiency        FAMILY HISTORY  Family History   Problem Relation Age of Onset   • Arthritis Mother      Giant Cell Arteritis   • Lung Disease Father      COPD/Emphysema   • Hyperlipidemia Sister    • Heart Disease Maternal Aunt    • Heart Disease Maternal Uncle    • Heart Disease Paternal Aunt    • Heart Disease Paternal Uncle    • Heart Disease Maternal Grandmother    • Hypertension Maternal Grandmother    • Hyperlipidemia Maternal Grandmother    • Heart Disease Maternal Grandfather    • Hypertension Maternal Grandfather    • Hyperlipidemia Maternal Grandfather    • Heart Disease Paternal Grandfather    • Hypertension Paternal Grandfather    • Hyperlipidemia Paternal Grandfather    • Cancer Paternal Grandfather      Bone       SOCIAL HISTORY  Social History     Social History   • Marital status:      Spouse name: N/A   • Number of children: 2   • Years of education: N/A     Social History Main Topics   • Smoking status: Former Smoker     Packs/day: 0.50     Years: 10.00     Quit date: 1/1/1985   • Smokeless tobacco: Never Used   • Alcohol use No      Comment: none since age 31   • Drug use: No   • Sexual activity: No      Comment: , 2 boys, diasability     Other Topics Concern   • Not on file     Social History Narrative   • No narrative on file       SURGICAL HISTORY  Past Surgical History:   Procedure Laterality Date   • BREAST BIOPSY Left 5/5/2015    Procedure: BREAST BIOPSY;  Surgeon: Deedee Bautista M.D.;  Location: SURGERY SAME DAY [facility];  Service:    • CATARACT PHACO WITH IOL  5/12/2014    Performed by Roland Becerra M.D. at SURGERY SUR[facility] ORS   • CATARACT PHACO WITH IOL  4/14/2014    Performed by " Roland Becerra M.D. at SURGERY [facility]S   • GASTROSCOPY WITH BIOPSY  5/9/2012    Performed by CIERRA SUAZO at SURGERY [facility] ORS   • EGD W/ENDOSCOPIC ULTRASOUND  5/9/2012    Performed by CIERRA SUAZO at SURGERY [facility] ORS   • THYROIDECTOMY  1967    partial   • GYN SURGERY  1985, 1988    c sec x 2   • KNEE ARTHROTOMY  1964, 1968    right   • OTHER      left fifth digit       CURRENT MEDICATIONS  Home Medications     Reviewed by Kathryn Hall (Pharmacy Tech) on 06/10/18 at 1416  Med List Status: Complete   Medication Last Dose Status   amoxicillin-clavulanate (AUGMENTIN) 875-125 MG Tab 6/5/2018 Active   carbidopa-levodopa (SINEMET)  MG Tab 6/10/2018 Active   carbidopa-levodopa SR (SINEMET CR)  MG per tablet 6/9/2018 Active   carisoprodol (SOMA) 350 MG Tab 6/10/2018 Active   ciprofloxacin (CIPRO) 500 MG Tab 6/3/2018 Active   diazePAM (VALIUM) 5 MG Tab 6/9/2018 Active   diphenhydrAMINE (BENADRYL) 12.5 MG/5ML Liquid liquid 6/9/2018 Active   docusate sodium (COLACE) 100 MG Cap 6/10/2018 Active   gabapentin (NEURONTIN) 300 MG Cap 6/10/2018 Active   gabapentin (NEURONTIN) 400 MG Cap 6/9/2018 Active   LORazepam (ATIVAN) 2 MG/ML Solution 6/10/2018 Active   Melatonin 1 MG Tab 6/8/2018 Active   metaxalone (SKELAXIN) 800 MG Tab 6/10/2018 Active   morphine 20 MG/5ML solution 6/10/2018 Active   morphine ER (MS CONTIN) 15 MG Tab CR tablet 6/10/2018 Active   oxybutynin (DITROPAN) 5 MG Tab 6/10/2018 Active   oxyCODONE-acetaminophen (PERCOCET) 5-325 MG Tab 6/9/2018 Active   senna-docusate (PERICOLACE OR SENOKOT S) 8.6-50 MG Tab 6/10/2018 Active                ALLERGIES  Allergies   Allergen Reactions   • Bactrim [Sulfamethoxazole W-Trimethoprim] Hives   • Actonel [Risedronate Sodium] Hives   • Latex Rash     Irritation of skin    • Sulfa Drugs Hives   • Tape Rash     Paper tape is ok       PHYSICAL EXAM  VITAL SIGNS: BP (!) 99/67   Pulse 81   Temp (!) 38.5 °C (101.3 °F)   Resp 20    "Ht 1.6 m (5' 3\")   Wt 47 kg (103 lb 8.1 oz)   LMP 01/01/1990   SpO2 95%   Breastfeeding? Unknown   BMI 18.34 kg/m²    Oxygen saturation is interpreted as borderline for hypoxia going as low as 91% on room air  Constitutional: Awake and the patient appears frail and chronically ill-appearing  HENT: Mucous membranes are dry  Eyes: No erythema or discharge or jaundice  Neck: Trachea midline no JVD  Cardiovascular: Regular rate and rhythm  Lungs: Distant bilaterally but I do not hear crackles or wheezes  Abdomen/Back: Soft nondistended there is no focal tenderness or peritoneal findings there is an indwelling Ta catheter the urine in the bag is not bloody but the patient did bring in some bloody tissue which she says is a blood clot which she passed earlier today  Skin: Warm and dry  Musculoskeletal: No acute bony deformity the left knee looks normal there is no erythema or swelling or deformity the patient appears to have chronic contractures of the legs at the ankles  Neurologic: Awake verbal and globally very very weak    Laboratory  CBC shows a normal white blood cell count of 10 hemoglobin segment 30.9 basic metabolic panel is unremarkable lactic acid level is normal at 1.81 urinalysis is positive for moderate leukocyte esterase with 20-50 white blood cells and rare red blood cells and rare bacteria seen and a culture is initiated by the laboratory    Radiology  DX-KNEE COMPLETE 4+ LEFT   Final Result         1. No acute osseous abnormality.      DX-CHEST-PORTABLE (1 VIEW)   Final Result      Retrocardiac opacity may represent atelectasis or consolidation.      Emphysematous changes.                 MEDICAL DECISION MAKING and DISPOSITION  In the emergency department an IV was established the patient was given intravenous fluids and placed on supplemental oxygen by nasal cannula.  I reviewed all the findings thus far available with her.  I reviewed the findings thus far available with the hospitalist and " the patient is admitted for further evaluation and treatment.  I have ordered a dose of intravenous ceftriaxone for urinary tract infection.  We did change the patient's Ta catheter since she said it had not been changed in 1 month and the urinalysis was obtained through the new catheter.      IMPRESSION  1.  Urinary tract infection  2.  Left knee pain  3.  Chronic debility         Electronically signed by: Nacho Goodrich, 6/10/2018 2:59 PM

## 2018-06-10 NOTE — ED NOTES
Pt updated on POC. Aware of admission. Pt educated on need for IV abx. Pt re-educated on how to use call light. Pt denies questions. Able to explain how to use call light. Denies questions or needs at this time

## 2018-06-10 NOTE — ED NOTES
Pt updated on POC. Educated on need for new ba catheter. Provided with phone to contact . Pt requesting to receive Parkinson's meds prior to ba insertion.

## 2018-06-10 NOTE — ED NOTES
"Chief Complaint   Patient presents with   • Abdominal Pain     Ba cath for 3 mo.; pt reports blood clot from ba this AM and increased spasming mid lower abd pain since; rates 8/10   • Knee Pain     Pt reports GLF this AM onto knees; incrased L knee pain since   BP (!) 99/67   Pulse 83   Temp (!) 38.5 °C (101.3 °F)   Resp 16   Ht 1.6 m (5' 3\")   Wt 47 kg (103 lb 8.1 oz)   BMI 18.34 kg/m²      Pt on 2L/min oxygen. Not on oxygen at home. On hospice at home for 3 weeks. Reports living with  and son, as well as having [facility]. Pt reports taking home morphine prior to [facility] arrival at pt's home. On cipro for \"urinary tract infection\" and on augmentin for \"tooth abscess\"  "

## 2018-06-10 NOTE — ED NOTES
Vlad called from Pharm for order clarification, states per Epic, pt is pregnant. Confirmed with pt she is NOT pregnant and is in fact post menopausal. Epic updated to same

## 2018-06-11 ENCOUNTER — HOSPICE ADMISSION (OUTPATIENT)
Dept: HOSPICE | Facility: HOSPICE | Age: 68
End: 2018-06-11
Payer: MEDICARE

## 2018-06-11 ENCOUNTER — HOME CARE VISIT (OUTPATIENT)
Dept: HOSPICE | Facility: HOSPICE | Age: 68
End: 2018-06-11
Payer: MEDICARE

## 2018-06-11 LAB
ANION GAP SERPL CALC-SCNC: 6 MMOL/L (ref 0–11.9)
BASOPHILS # BLD AUTO: 1 % (ref 0–1.8)
BASOPHILS # BLD: 0.05 K/UL (ref 0–0.12)
BUN SERPL-MCNC: 11 MG/DL (ref 8–22)
CALCIUM SERPL-MCNC: 8.8 MG/DL (ref 8.4–10.2)
CHLORIDE SERPL-SCNC: 109 MMOL/L (ref 96–112)
CO2 SERPL-SCNC: 26 MMOL/L (ref 20–33)
CREAT SERPL-MCNC: 0.56 MG/DL (ref 0.5–1.4)
EOSINOPHIL # BLD AUTO: 0.31 K/UL (ref 0–0.51)
EOSINOPHIL NFR BLD: 6 % (ref 0–6.9)
ERYTHROCYTE [DISTWIDTH] IN BLOOD BY AUTOMATED COUNT: 47 FL (ref 35.9–50)
GLUCOSE SERPL-MCNC: 89 MG/DL (ref 65–99)
HCT VFR BLD AUTO: 38 % (ref 37–47)
HGB BLD-MCNC: 11.9 G/DL (ref 12–16)
IMM GRANULOCYTES # BLD AUTO: 0.01 K/UL (ref 0–0.11)
IMM GRANULOCYTES NFR BLD AUTO: 0.2 % (ref 0–0.9)
LYMPHOCYTES # BLD AUTO: 1.66 K/UL (ref 1–4.8)
LYMPHOCYTES NFR BLD: 31.9 % (ref 22–41)
MCH RBC QN AUTO: 30.9 PG (ref 27–33)
MCHC RBC AUTO-ENTMCNC: 31.3 G/DL (ref 33.6–35)
MCV RBC AUTO: 98.7 FL (ref 81.4–97.8)
MONOCYTES # BLD AUTO: 0.45 K/UL (ref 0–0.85)
MONOCYTES NFR BLD AUTO: 8.7 % (ref 0–13.4)
NEUTROPHILS # BLD AUTO: 2.72 K/UL (ref 2–7.15)
NEUTROPHILS NFR BLD: 52.2 % (ref 44–72)
NRBC # BLD AUTO: 0 K/UL
NRBC BLD-RTO: 0 /100 WBC
PLATELET # BLD AUTO: 209 K/UL (ref 164–446)
PMV BLD AUTO: 11.8 FL (ref 9–12.9)
POTASSIUM SERPL-SCNC: 3.9 MMOL/L (ref 3.6–5.5)
RBC # BLD AUTO: 3.85 M/UL (ref 4.2–5.4)
SODIUM SERPL-SCNC: 141 MMOL/L (ref 135–145)
WBC # BLD AUTO: 5.2 K/UL (ref 4.8–10.8)

## 2018-06-11 PROCEDURE — 700111 HCHG RX REV CODE 636 W/ 250 OVERRIDE (IP): Performed by: FAMILY MEDICINE

## 2018-06-11 PROCEDURE — 700102 HCHG RX REV CODE 250 W/ 637 OVERRIDE(OP): Performed by: HOSPITALIST

## 2018-06-11 PROCEDURE — 700101 HCHG RX REV CODE 250: Performed by: HOSPITALIST

## 2018-06-11 PROCEDURE — 700102 HCHG RX REV CODE 250 W/ 637 OVERRIDE(OP): Performed by: FAMILY MEDICINE

## 2018-06-11 PROCEDURE — 770001 HCHG ROOM/CARE - MED/SURG/GYN PRIV*

## 2018-06-11 PROCEDURE — A9270 NON-COVERED ITEM OR SERVICE: HCPCS | Performed by: HOSPITALIST

## 2018-06-11 PROCEDURE — 80048 BASIC METABOLIC PNL TOTAL CA: CPT

## 2018-06-11 PROCEDURE — 99232 SBSQ HOSP IP/OBS MODERATE 35: CPT | Performed by: FAMILY MEDICINE

## 2018-06-11 PROCEDURE — 700105 HCHG RX REV CODE 258: Performed by: FAMILY MEDICINE

## 2018-06-11 PROCEDURE — 700111 HCHG RX REV CODE 636 W/ 250 OVERRIDE (IP): Performed by: HOSPITALIST

## 2018-06-11 PROCEDURE — 85025 COMPLETE CBC W/AUTO DIFF WBC: CPT

## 2018-06-11 PROCEDURE — 36415 COLL VENOUS BLD VENIPUNCTURE: CPT

## 2018-06-11 PROCEDURE — 700105 HCHG RX REV CODE 258: Performed by: HOSPITALIST

## 2018-06-11 RX ORDER — MORPHINE SULFATE 4 MG/ML
2 INJECTION, SOLUTION INTRAMUSCULAR; INTRAVENOUS ONCE
Status: COMPLETED | OUTPATIENT
Start: 2018-06-11 | End: 2018-06-11

## 2018-06-11 RX ORDER — SODIUM CHLORIDE 9 MG/ML
500 INJECTION, SOLUTION INTRAVENOUS ONCE
Status: COMPLETED | OUTPATIENT
Start: 2018-06-11 | End: 2018-06-11

## 2018-06-11 RX ADMIN — CARBIDOPA AND LEVODOPA 1 TABLET: 25; 100 TABLET ORAL at 09:53

## 2018-06-11 RX ADMIN — METAXALONE 800 MG: 800 TABLET ORAL at 00:13

## 2018-06-11 RX ADMIN — CARBIDOPA AND LEVODOPA 1 TABLET: 25; 100 TABLET ORAL at 00:08

## 2018-06-11 RX ADMIN — CARBIDOPA AND LEVODOPA 1 TABLET: 25; 100 TABLET ORAL at 12:26

## 2018-06-11 RX ADMIN — CARISOPRODOL 350 MG: 350 TABLET ORAL at 22:43

## 2018-06-11 RX ADMIN — CARISOPRODOL 350 MG: 350 TABLET ORAL at 15:23

## 2018-06-11 RX ADMIN — DIPHENHYDRAMINE HCL 12.5 MG: 12.5 LIQUID ORAL at 22:33

## 2018-06-11 RX ADMIN — DIAZEPAM 5 MG: 5 TABLET ORAL at 15:48

## 2018-06-11 RX ADMIN — CARBIDOPA AND LEVODOPA 1 TABLET: 25; 100 TABLET ORAL at 19:10

## 2018-06-11 RX ADMIN — CEFTRIAXONE SODIUM 2 G: 2 INJECTION, POWDER, FOR SOLUTION INTRAMUSCULAR; INTRAVENOUS at 09:54

## 2018-06-11 RX ADMIN — DIAZEPAM 5 MG: 5 TABLET ORAL at 00:07

## 2018-06-11 RX ADMIN — DIAZEPAM 5 MG: 5 TABLET ORAL at 22:43

## 2018-06-11 RX ADMIN — OXYBUTYNIN CHLORIDE 5 MG: 5 TABLET ORAL at 09:53

## 2018-06-11 RX ADMIN — SODIUM CHLORIDE 500 ML: 9 INJECTION, SOLUTION INTRAVENOUS at 15:56

## 2018-06-11 RX ADMIN — DIAZEPAM 5 MG: 5 TABLET ORAL at 19:10

## 2018-06-11 RX ADMIN — DIAZEPAM 5 MG: 5 TABLET ORAL at 12:26

## 2018-06-11 RX ADMIN — OXYBUTYNIN CHLORIDE 5 MG: 5 TABLET ORAL at 22:32

## 2018-06-11 RX ADMIN — DOCUSATE SODIUM AND SENNOSIDES 2 TABLET: 8.6; 5 TABLET, FILM COATED ORAL at 22:32

## 2018-06-11 RX ADMIN — CARBIDOPA AND LEVODOPA 1 TABLET: 25; 100 TABLET ORAL at 06:53

## 2018-06-11 RX ADMIN — METAXALONE 800 MG: 800 TABLET ORAL at 15:23

## 2018-06-11 RX ADMIN — CARISOPRODOL 350 MG: 350 TABLET ORAL at 09:53

## 2018-06-11 RX ADMIN — CARBIDOPA AND LEVODOPA 1 TABLET: 25; 100 TABLET ORAL at 15:23

## 2018-06-11 RX ADMIN — GABAPENTIN 2400 MG: 400 CAPSULE ORAL at 22:31

## 2018-06-11 RX ADMIN — MORPHINE SULFATE 15 MG: 15 TABLET, EXTENDED RELEASE ORAL at 09:53

## 2018-06-11 RX ADMIN — CARBIDOPA AND LEVODOPA 1 TABLET: 25; 100 TABLET ORAL at 22:43

## 2018-06-11 RX ADMIN — SODIUM CHLORIDE: 9 INJECTION, SOLUTION INTRAVENOUS at 18:31

## 2018-06-11 RX ADMIN — MORPHINE SULFATE 15 MG: 15 TABLET, EXTENDED RELEASE ORAL at 22:31

## 2018-06-11 RX ADMIN — DIPHENHYDRAMINE HYDROCHLORIDE 12.5 MG: 12.5 SOLUTION ORAL at 00:06

## 2018-06-11 RX ADMIN — CARISOPRODOL 350 MG: 350 TABLET ORAL at 01:24

## 2018-06-11 RX ADMIN — GABAPENTIN 1800 MG: 300 CAPSULE ORAL at 09:52

## 2018-06-11 RX ADMIN — SODIUM CHLORIDE: 9 INJECTION, SOLUTION INTRAVENOUS at 12:32

## 2018-06-11 RX ADMIN — DIAZEPAM 10 MG: 5 TABLET ORAL at 06:53

## 2018-06-11 RX ADMIN — OXYCODONE HYDROCHLORIDE AND ACETAMINOPHEN 1 TABLET: 5; 325 TABLET ORAL at 13:58

## 2018-06-11 RX ADMIN — CARBIDOPA AND LEVODOPA 1 TABLET: 50; 200 TABLET, EXTENDED RELEASE ORAL at 22:31

## 2018-06-11 RX ADMIN — METAXALONE 800 MG: 800 TABLET ORAL at 23:32

## 2018-06-11 RX ADMIN — MORPHINE SULFATE 2 MG: 4 INJECTION INTRAVENOUS at 15:33

## 2018-06-11 RX ADMIN — METAXALONE 800 MG: 800 TABLET ORAL at 09:52

## 2018-06-11 RX ADMIN — DOCUSATE SODIUM AND SENNOSIDES 2 TABLET: 8.6; 5 TABLET, FILM COATED ORAL at 09:52

## 2018-06-11 ASSESSMENT — ENCOUNTER SYMPTOMS
DOUBLE VISION: 0
CHILLS: 0
ORTHOPNEA: 0
SPUTUM PRODUCTION: 0
FEVER: 0
TINGLING: 0
BLURRED VISION: 0
PALPITATIONS: 0
BACK PAIN: 1
NAUSEA: 0
NECK PAIN: 0
VOMITING: 0
PHOTOPHOBIA: 0
COUGH: 0
HEARTBURN: 0
TREMORS: 0
HEMOPTYSIS: 0
HEADACHES: 0

## 2018-06-11 ASSESSMENT — PAIN SCALES - GENERAL
PAINLEVEL_OUTOF10: 6
PAINLEVEL_OUTOF10: 6
PAINLEVEL_OUTOF10: 4
PAINLEVEL_OUTOF10: 2
PAINLEVEL_OUTOF10: 9
PAINLEVEL_OUTOF10: 6

## 2018-06-11 ASSESSMENT — ACTIVITIES OF DAILY LIVING (ADL)
PHYSICAL_TRANSFER_REQUIRES_ASSISTANCE: 1
CONTINENCE_REQUIRES_ASSISTANCE: 1
AMBULATION_REQUIRES_ASSISTANCE: 1
BATHING_REQUIRES_ASSISTANCE: 1
DRESSING_REQUIRES_ASSISTANCE: 1

## 2018-06-11 NOTE — DISCHARGE PLANNING
JOSE recieved hospice referral order for this patient.  JOSE met with this patient bedside.  JOSE informed that this patient was on [facility] prior to coming to the hospital. JOSE called [facility] per patient request and was informed that this patient is no longer on their service for not following their plan of care.  Hospice SNF choice was obtained.  Patient requested [facility].  JOSE faxed completed choice form to HARMAN Reeves for referral follow up.

## 2018-06-11 NOTE — H&P
Hospital Medicine History and Physical    Date of Service  6/10/2018    Chief Complaint  Chief Complaint   Patient presents with   • Abdominal Pain     Ba cath for 3 mo.; pt reports blood clot from ba this AM and increased spasming mid lower abd pain since; rates 8/10   • Knee Pain     Pt reports GLF this AM onto knees; incrased L knee pain since       History of Presenting Illness  Delmis Draper is a 68 y.o. female who presents to the emergency department with a complaint of passing a blood clot from her Ba catheter.  She has a history of parkinsons disorder and recurrent UTis due to indwelling ba catheter    In the ER, ba was changed. UA shows acute UTI. Patient referred to me for further evaluation. Pt is NOT septic    She is weak    crampy lower abdominal pain, intermittent, no radiation, intensity 3/10        Primary Care Physician  Raul Iverson M.D.    Consultants  none    Code Status  full    Review of Systems  Review of Systems   Constitutional: Positive for malaise/fatigue. Negative for chills, fever and weight loss.   HENT: Negative for ear discharge, ear pain, hearing loss and tinnitus.    Eyes: Negative for blurred vision, double vision, photophobia and pain.   Respiratory: Negative for cough, hemoptysis and sputum production.    Cardiovascular: Negative for chest pain, palpitations and claudication.   Gastrointestinal: Positive for abdominal pain and nausea. Negative for blood in stool, constipation, diarrhea and vomiting.   Genitourinary: Positive for hematuria.   Musculoskeletal: Negative for back pain, myalgias and neck pain.   Neurological: Positive for focal weakness (legs). Negative for dizziness, tingling, tremors, sensory change and headaches.   Psychiatric/Behavioral: Negative for depression, hallucinations, substance abuse and suicidal ideas. The patient is not nervous/anxious and does not have insomnia.    All other systems reviewed and are negative.       Past Medical  "History  Past Medical History:   Diagnosis Date   • Cellulitis 7/19/2012    right low extremity   • OSTEOPOROSIS 3/22/2010   • Abnormal finding on mammography, microcalcification    • Anesthesia     hypotensive in the past   • Bowel obstruction (HCC)     fecal impaction   • Bursitis    • Cataract    • Dilated bile duct    • Disc disorder    • Dystonia    • Dystonia    • Dysuria    • GERD (gastroesophageal reflux disease)    • Heart burn    • Hypertension     pt can run very low   • Hypotension    • Insulin resistance    • Leukopenia    • Low blood pressure reading    • Malaise and fatigue    • Other specified disorder of intestines     constipation   • Pain     right hip, right knee   • Parkinson disease (HCC)     \"atypical\"   • Peripheral neuropathy (HCC)    • Rash, skin    • Scoliosis    • Spinal stenosis, lumbar    • Thyrotoxicosis remote    S/P I-131 Rx / subtotal thyroidectomy   • Unspecified disorder of thyroid     had partial thyroidectomy   • vitamin D deficiency        Surgical History  Past Surgical History:   Procedure Laterality Date   • BREAST BIOPSY Left 5/5/2015    Procedure: BREAST BIOPSY;  Surgeon: Deedee Bautista M.D.;  Location: SURGERY SAME DAY [facility];  Service:    • CATARACT PHACO WITH IOL  5/12/2014    Performed by Roland Becerra M.D. at SURGERY [facility]   • CATARACT PHACO WITH IOL  4/14/2014    Performed by Roland Becerra M.D. at [facility] ORS   • GASTROSCOPY WITH BIOPSY  5/9/2012    Performed by CIERRA SUAZO at [facility]   • EGD W/ENDOSCOPIC ULTRASOUND  5/9/2012    Performed by CIERRA SUAZO at [facility]   • THYROIDECTOMY  1967    partial   • GYN SURGERY  1985, 1988    c sec x 2   • KNEE ARTHROTOMY  1964, 1968    right   • OTHER      left fifth digit       Medications  No current facility-administered medications on file prior to encounter.      Current Outpatient Prescriptions on File Prior to Encounter   Medication Sig " Dispense Refill   • oxybutynin (DITROPAN) 5 MG Tab Take 1 Tab by mouth 2 Times a Day. 90 Tab 0   • gabapentin (NEURONTIN) 300 MG Cap Take 6 Caps by mouth every morning. 90 Cap 0   • gabapentin (NEURONTIN) 400 MG Cap Take 6 Caps by mouth every evening. 90 Cap 0   • senna-docusate (PERICOLACE OR SENOKOT S) 8.6-50 MG Tab Take 2 Tabs by mouth 2 Times a Day. 30 Tab 0   • carisoprodol (SOMA) 350 MG Tab Take 350 mg by mouth 4 times a day.     • oxyCODONE-acetaminophen (PERCOCET) 5-325 MG Tab Take 1 Tab by mouth 2 times a day as needed (For breakthrough pain).     • carbidopa-levodopa (SINEMET)  MG Tab Take 1 Tab by mouth 6 Times a Day. 0700, 1000, 1300, 1600, 1900, 2200     • diazePAM (VALIUM) 5 MG Tab Take 5-10 mg by mouth 5 Times a Day. Pt takes:  10MG in Am  5MG 4 times a day     • Melatonin 1 MG Tab Take 1 mg by mouth at bedtime as needed.     • carbidopa-levodopa SR (SINEMET CR)  MG per tablet Take 1 Tab by mouth every bedtime.     • metaxalone (SKELAXIN) 800 MG Tab Take 800 mg by mouth 4 times a day. 0700, 1000, 1300, 1600     • docusate sodium (COLACE) 100 MG Cap Take 300 mg by mouth every morning.         Family History  Family History   Problem Relation Age of Onset   • Arthritis Mother      Giant Cell Arteritis   • Lung Disease Father      COPD/Emphysema   • Hyperlipidemia Sister    • Heart Disease Maternal Aunt    • Heart Disease Maternal Uncle    • Heart Disease Paternal Aunt    • Heart Disease Paternal Uncle    • Heart Disease Maternal Grandmother    • Hypertension Maternal Grandmother    • Hyperlipidemia Maternal Grandmother    • Heart Disease Maternal Grandfather    • Hypertension Maternal Grandfather    • Hyperlipidemia Maternal Grandfather    • Heart Disease Paternal Grandfather    • Hypertension Paternal Grandfather    • Hyperlipidemia Paternal Grandfather    • Cancer Paternal Grandfather      Bone       Social History  Social History   Substance Use Topics   • Smoking status: Former Smoker      Packs/day: 0.50     Years: 10.00     Quit date: 1985   • Smokeless tobacco: Never Used   • Alcohol use No      Comment: none since age 31       Allergies  Allergies   Allergen Reactions   • Bactrim [Sulfamethoxazole W-Trimethoprim] Hives   • Actonel [Risedronate Sodium] Hives   • Latex Rash     Irritation of skin    • Sulfa Drugs Hives   • Tape Rash     Paper tape is ok        Physical Exam  Laboratory   Hemodynamics  Temp (24hrs), Av.6 °C (99.6 °F), Min:36.6 °C (97.9 °F), Max:38.5 °C (101.3 °F)   Temperature: 36.6 °C (97.9 °F)  Pulse  Av.4  Min: 68  Max: 86 Heart Rate (Monitored): 72  Blood Pressure : (!) 96/57, NIBP: (!) 96/62      Respiratory      Respiration: 20, Pulse Oximetry: 95 %, O2 Daily Delivery Respiratory : Silicone Nasal Cannula        RUL Breath Sounds: Clear, RML Breath Sounds: Clear, RLL Breath Sounds: Diminished, RUPALI Breath Sounds: Clear, LLL Breath Sounds: Diminished    Physical Exam   Constitutional: She is oriented to person, place, and time. No distress.   HENT:   Head: Normocephalic and atraumatic.   Right Ear: External ear normal.   Mouth/Throat: No oropharyngeal exudate.   Eyes: Left eye exhibits no discharge.   Neck: No JVD present. No tracheal deviation present. No thyromegaly present.   Cardiovascular: Normal rate.  Exam reveals no gallop and no friction rub.    No murmur heard.  Pulmonary/Chest: Effort normal and breath sounds normal. No respiratory distress. She has no wheezes. She has no rales.   Abdominal: She exhibits no distension. There is no tenderness. There is no rebound and no guarding.   Musculoskeletal: She exhibits deformity. She exhibits no edema or tenderness.   Neurological: She is alert and oriented to person, place, and time.   Leg weakness    tremors   Skin: There is pallor.   Psychiatric: She has a normal mood and affect.       Recent Labs      06/10/18   1259   WBC  10.0   RBC  4.40   HEMOGLOBIN  13.9   HEMATOCRIT  42.3   MCV  96.1   MCH  31.6   MCHC   32.9*   RDW  45.9   PLATELETCT  232   MPV  10.9     Recent Labs      06/10/18   1259   SODIUM  135   POTASSIUM  3.8   CHLORIDE  103   CO2  23   GLUCOSE  112*   BUN  14   CREATININE  0.64   CALCIUM  9.4     Recent Labs      06/10/18   1259   ALTSGPT  8   ASTSGOT  23   ALKPHOSPHAT  60   TBILIRUBIN  0.4   GLUCOSE  112*                 Lab Results   Component Value Date    TROPONINI <0.01 2018     Urinalysis:    Lab Results  Component Value Date/Time   SPECGRAVITY 1.010 06/10/2018 1417   GLUCOSEUR Negative 06/10/2018 1417   KETONES Negative 06/10/2018 1417   NITRITE Negative 06/10/2018 1417   WBCURINE 20-50 (A) 06/10/2018 1417   RBCURINE 0-2 06/10/2018 1417   BACTERIA Rare (A) 06/10/2018 1417   EPITHELCELL Few 06/10/2018 1417        Imaging  Patient Information     Patient Name  Delmis Draper ([id number]) Sex  Female   1950   Room Bed Code Status Current Location   7 00 FULL 00   Reprint Order Requisition     DX-CHEST-PORTABLE (1 VIEW) (Order #[id number]) on 6/10/18   Last Resulted Time   Sun Clifford 10, 2018  1:40 PM   Images     Show images for DX-CHEST-PORTABLE (1 VIEW)   Imaging Result Status     Status: Final result (Exam End: 6/10/2018  1:34 PM)   Imaging Previous Results     Open Hard Copy Result Report (Order #[id number] - DX-CHEST-PORTABLE (1 VIEW))   Narrative       6/10/2018 1:06 PM    HISTORY/REASON FOR EXAM:  Possible sepsis.      TECHNIQUE/EXAM DESCRIPTION AND NUMBER OF VIEWS:  Single portable view of the chest.    COMPARISON: 2017    FINDINGS:    The cardiomediastinal silhouette is stable. Retrocardiac opacity may represent atelectasis or consolidation. There are emphysematous changes. No pleural effusion or pneumothorax is identified.   Impression       Retrocardiac opacity may represent atelectasis or consolidation.    Emphysematous changes.        Assessment/Plan     I anticipate this patient will require at least two midnights for appropriate medical management, necessitating  inpatient admission.    * Urinary tract infection associated with indwelling urethral catheter (HCC)- (present on admission)   Assessment & Plan    Hydrate    Iv rocephin    Follow urine culture        Chronic pain- (present on admission)   Assessment & Plan    Cont home pain medication        Peripheral neuropathy (HCC)- (present on admission)   Assessment & Plan    Cont  neurontin        Parkinson disease (HCC)- (present on admission)   Assessment & Plan    Cont sinemet        Urinary retention- (present on admission)   Assessment & Plan    Ta -changed in er            VTE prophylaxis: scd

## 2018-06-11 NOTE — CARE PLAN
Problem: Safety  Goal: Will remain free from injury    Intervention: Provide assistance with mobility  Bed in low position, treaded socks on, call light within reach. Pt instructed to call nurse for any further needs.       Problem: Knowledge Deficit  Goal: Knowledge of disease process/condition, treatment plan, diagnostic tests, and medications will improve    Intervention: Assess knowledge level of disease process/condition, treatment plan, diagnostic tests, and medications  POC discussed with pt, pt verbalized understanding.        Problem: Pain Management  Goal: Pain level will decrease to patient's comfort goal    Intervention: Follow pain managment plan developed in collaboration with patient and Interdisciplinary Team  Pain assessed throughout shift, medicated as needed per MD order, see MAR.

## 2018-06-11 NOTE — PROGRESS NOTES
Received from the er via rsarthak,transferred to her bed.  Oriented to her room.call light in place.  Bed alarm on.  Iv fluids started.ba patent  Denies pain at present.

## 2018-06-11 NOTE — PROGRESS NOTES
Pt c/o pain after percocet, hospitalist Dr. Meadows notified, pt is hypotensive, new orders received to give 500cc bolus, then one time dose of 2mg morphine IV

## 2018-06-11 NOTE — DISCHARGE PLANNING
Patient is currently on [facility] with [facility]. Please write home care orders upon discharge to home for continuum of care.Please contact [facility] at  /[phone] if there are any questions.

## 2018-06-11 NOTE — RESPIRATORY CARE
COPD EDUCATION by COPD CLINICAL EDUCATOR  6/11/2018 at 8:09 AM by Velia Victoria     Patient reviewed by COPD education team. Patient does not qualify for COPD program.

## 2018-06-11 NOTE — DISCHARGE PLANNING
Received Choice form at [phone]  Agency/Facility Name: [facility]  Referral sent per Choice form @ [phone]    Obtained Choice form from ERICKA Rodas.

## 2018-06-11 NOTE — PROGRESS NOTES
[facility] Hospitalist Progress Note    Date of Service: 2018    Chief Complaint  68 y.o. female admitted 6/10/2018 with uti and wheel chair bound    Interval Problem Update  none    Consultants/Specialty  none    Disposition  pending        Review of Systems   Constitutional: Positive for malaise/fatigue. Negative for chills and fever.   HENT: Negative for ear discharge, ear pain and tinnitus.    Eyes: Negative for blurred vision, double vision and photophobia.   Respiratory: Negative for cough, hemoptysis and sputum production.    Cardiovascular: Negative for chest pain, palpitations and orthopnea.   Gastrointestinal: Negative for heartburn, nausea and vomiting.   Genitourinary: Negative for frequency, hematuria and urgency.   Musculoskeletal: Positive for back pain (chronic). Negative for neck pain.   Skin: Negative for itching and rash.   Neurological: Negative for tingling, tremors and headaches.      Physical Exam  Laboratory/Imaging   Hemodynamics  Temp (24hrs), Av °C (98.6 °F), Min:36.6 °C (97.9 °F), Max:38.5 °C (101.3 °F)   Temperature: 36.6 °C (97.9 °F)  Pulse  Av.3  Min: 67  Max: 91 Heart Rate (Monitored): 72  Blood Pressure : (!) 88/50, NIBP: (!) 96/62      Respiratory      Respiration: 16, Pulse Oximetry: 92 %, O2 Daily Delivery Respiratory : Silicone Nasal Cannula        RUL Breath Sounds: Clear, RML Breath Sounds: Clear, RLL Breath Sounds: Diminished, RUPALI Breath Sounds: Clear, LLL Breath Sounds: Diminished    Fluids    Intake/Output Summary (Last 24 hours) at 18 1237  Last data filed at 18 0400   Gross per 24 hour   Intake             2001 ml   Output             2100 ml   Net              -99 ml       Nutrition  Orders Placed This Encounter   Procedures   • Diet Order     Standing Status:   Standing     Number of Occurrences:   1     Order Specific Question:   Diet:     Answer:   Regular [1]     Physical Exam   Constitutional: No distress.   HENT:   Head: Normocephalic and  atraumatic.   Eyes: Conjunctivae are normal. Pupils are equal, round, and reactive to light.   Neck: No JVD present.   Cardiovascular: Normal rate and regular rhythm.    Pulmonary/Chest: Effort normal and breath sounds normal. No stridor.   Abdominal: Soft. Bowel sounds are normal.   Musculoskeletal: She exhibits no edema or tenderness.   Both feet are flexed chronically   Neurological: She is alert.   Skin: Skin is warm and dry. She is not diaphoretic.       Recent Labs      06/10/18   1259  06/11/18   0511   WBC  10.0  5.2   RBC  4.40  3.85*   HEMOGLOBIN  13.9  11.9*   HEMATOCRIT  42.3  38.0   MCV  96.1  98.7*   MCH  31.6  30.9   MCHC  32.9*  31.3*   RDW  45.9  47.0   PLATELETCT  232  209   MPV  10.9  11.8     Recent Labs      06/10/18   1259  06/11/18   0511   SODIUM  135  141   POTASSIUM  3.8  3.9   CHLORIDE  103  109   CO2  23  26   GLUCOSE  112*  89   BUN  14  11   CREATININE  0.64  0.56   CALCIUM  9.4  8.8                      Assessment/Plan     * Urinary tract infection associated with indwelling urethral catheter (HCC)- (present on admission)   Assessment & Plan    Urine culture is pending  UA result is noted  rocephin        Chronic pain- (present on admission)   Assessment & Plan    Cont home pain medication        Peripheral neuropathy (HCC)- (present on admission)   Assessment & Plan    Cont  neurontin        Parkinson disease (HCC)- (present on admission)   Assessment & Plan    Cont sinemet  Wheel chair bound        Urinary retention- (present on admission)   Assessment & Plan    Ta -changed in er          Quality-Core Measures   DVT prophylaxis pharmacological::  Enoxaparin (Lovenox)

## 2018-06-11 NOTE — PROGRESS NOTES
Pain experiencing more tremors than normal, and feeling very hot, RN at bedside, cold packs given, pt given scheduled medication per MAR. Pt pain decreasing.

## 2018-06-11 NOTE — PROGRESS NOTES
Received report from Thu RN. Assumed care. This pt is AOx4,   reports pain, will medicate per MAR. Patient and RN discussed plan of care including pain management, IV abx: questions answered. Chart reviewed. Call light in place, fall precautions in place, patient educated on importance of calling for assistance. No additional needs at this time.

## 2018-06-11 NOTE — PROGRESS NOTES
Received report from day shift nurse. Assumed pt care at 1915. Pt is A&Ox4, resting comfortably in bed. Pt on r.a.. No signs of SOB/respiratory distress. Labs noted, VSS. Pt c/o pain to her bilat ankle and bladder spasm; given scheduled pain meds and Oxybutynin per MAR. Pt c/o leakage from her Ba; checked and didn't notice any wetness. However, pt insisted on the leakage; rechecked with CHRISSY Key and wetness noticed at this time. Assisted to provide perineal care and re-adjusted the ba. Informed pt to report to the nurse if pt still experience the leakage. Heels floated off bed. Fall precautions in place. Bed locked & at lowest position. Call light and personal belongings within reach. Continue to monitor

## 2018-06-12 ENCOUNTER — HOME CARE VISIT (OUTPATIENT)
Dept: HOSPICE | Facility: HOSPICE | Age: 68
End: 2018-06-12
Payer: MEDICARE

## 2018-06-12 VITALS
HEIGHT: 63 IN | TEMPERATURE: 97.8 F | HEART RATE: 60 BPM | WEIGHT: 103.51 LBS | BODY MASS INDEX: 18.34 KG/M2 | DIASTOLIC BLOOD PRESSURE: 81 MMHG | OXYGEN SATURATION: 97 % | SYSTOLIC BLOOD PRESSURE: 108 MMHG | RESPIRATION RATE: 18 BRPM

## 2018-06-12 VITALS — WEIGHT: 103 LBS | BODY MASS INDEX: 18.25 KG/M2 | HEIGHT: 63 IN

## 2018-06-12 PROBLEM — N39.0 URINARY TRACT INFECTION ASSOCIATED WITH INDWELLING URETHRAL CATHETER (HCC): Status: RESOLVED | Noted: 2018-05-05 | Resolved: 2018-06-12

## 2018-06-12 PROBLEM — T83.511A URINARY TRACT INFECTION ASSOCIATED WITH INDWELLING URETHRAL CATHETER (HCC): Status: RESOLVED | Noted: 2018-05-05 | Resolved: 2018-06-12

## 2018-06-12 LAB
ALBUMIN SERPL BCP-MCNC: 3.1 G/DL (ref 3.2–4.9)
ALBUMIN/GLOB SERPL: 1.1 G/DL
ALP SERPL-CCNC: 54 U/L (ref 30–99)
ALT SERPL-CCNC: <5 U/L (ref 2–50)
ANION GAP SERPL CALC-SCNC: 7 MMOL/L (ref 0–11.9)
AST SERPL-CCNC: 16 U/L (ref 12–45)
BACTERIA UR CULT: ABNORMAL
BACTERIA UR CULT: ABNORMAL
BASOPHILS # BLD AUTO: 0.8 % (ref 0–1.8)
BASOPHILS # BLD: 0.04 K/UL (ref 0–0.12)
BILIRUB SERPL-MCNC: 0.3 MG/DL (ref 0.1–1.5)
BUN SERPL-MCNC: 8 MG/DL (ref 8–22)
CALCIUM SERPL-MCNC: 8.8 MG/DL (ref 8.4–10.2)
CHLORIDE SERPL-SCNC: 107 MMOL/L (ref 96–112)
CO2 SERPL-SCNC: 26 MMOL/L (ref 20–33)
CREAT SERPL-MCNC: 0.63 MG/DL (ref 0.5–1.4)
EOSINOPHIL # BLD AUTO: 0.29 K/UL (ref 0–0.51)
EOSINOPHIL NFR BLD: 6.1 % (ref 0–6.9)
ERYTHROCYTE [DISTWIDTH] IN BLOOD BY AUTOMATED COUNT: 46.3 FL (ref 35.9–50)
GLOBULIN SER CALC-MCNC: 2.9 G/DL (ref 1.9–3.5)
GLUCOSE SERPL-MCNC: 93 MG/DL (ref 65–99)
HCT VFR BLD AUTO: 39.1 % (ref 37–47)
HGB BLD-MCNC: 12.4 G/DL (ref 12–16)
IMM GRANULOCYTES # BLD AUTO: 0.01 K/UL (ref 0–0.11)
IMM GRANULOCYTES NFR BLD AUTO: 0.2 % (ref 0–0.9)
LYMPHOCYTES # BLD AUTO: 1.55 K/UL (ref 1–4.8)
LYMPHOCYTES NFR BLD: 32.7 % (ref 22–41)
MCH RBC QN AUTO: 31 PG (ref 27–33)
MCHC RBC AUTO-ENTMCNC: 31.7 G/DL (ref 33.6–35)
MCV RBC AUTO: 97.8 FL (ref 81.4–97.8)
MONOCYTES # BLD AUTO: 0.37 K/UL (ref 0–0.85)
MONOCYTES NFR BLD AUTO: 7.8 % (ref 0–13.4)
NEUTROPHILS # BLD AUTO: 2.48 K/UL (ref 2–7.15)
NEUTROPHILS NFR BLD: 52.4 % (ref 44–72)
NRBC # BLD AUTO: 0 K/UL
NRBC BLD-RTO: 0 /100 WBC
PLATELET # BLD AUTO: 193 K/UL (ref 164–446)
PMV BLD AUTO: 12 FL (ref 9–12.9)
POTASSIUM SERPL-SCNC: 3.9 MMOL/L (ref 3.6–5.5)
PROT SERPL-MCNC: 6 G/DL (ref 6–8.2)
RBC # BLD AUTO: 4 M/UL (ref 4.2–5.4)
SIGNIFICANT IND 70042: ABNORMAL
SITE SITE: ABNORMAL
SODIUM SERPL-SCNC: 140 MMOL/L (ref 135–145)
SOURCE SOURCE: ABNORMAL
WBC # BLD AUTO: 4.7 K/UL (ref 4.8–10.8)

## 2018-06-12 PROCEDURE — S9126 HOSPICE CARE, IN THE HOME, P: HCPCS

## 2018-06-12 PROCEDURE — 665036 HSPC NOTICE OF ELECTION NOE

## 2018-06-12 PROCEDURE — 99239 HOSP IP/OBS DSCHRG MGMT >30: CPT | Performed by: HOSPITALIST

## 2018-06-12 PROCEDURE — 85025 COMPLETE CBC W/AUTO DIFF WBC: CPT

## 2018-06-12 PROCEDURE — 700102 HCHG RX REV CODE 250 W/ 637 OVERRIDE(OP): Performed by: HOSPITALIST

## 2018-06-12 PROCEDURE — 700111 HCHG RX REV CODE 636 W/ 250 OVERRIDE (IP): Performed by: HOSPITALIST

## 2018-06-12 PROCEDURE — A9270 NON-COVERED ITEM OR SERVICE: HCPCS | Performed by: HOSPITALIST

## 2018-06-12 PROCEDURE — 6650990 HC HOSPICE AND HOME CARE RX REV CODE 0250

## 2018-06-12 PROCEDURE — 700111 HCHG RX REV CODE 636 W/ 250 OVERRIDE (IP): Performed by: FAMILY MEDICINE

## 2018-06-12 PROCEDURE — 700105 HCHG RX REV CODE 258: Performed by: HOSPITALIST

## 2018-06-12 PROCEDURE — 36415 COLL VENOUS BLD VENIPUNCTURE: CPT

## 2018-06-12 PROCEDURE — G0299 HHS/HOSPICE OF RN EA 15 MIN: HCPCS

## 2018-06-12 PROCEDURE — 80053 COMPREHEN METABOLIC PANEL: CPT

## 2018-06-12 RX ORDER — DIPHENHYDRAMINE HCL 12.5MG/5ML
12.5 LIQUID (ML) ORAL
Status: DISCONTINUED | OUTPATIENT
Start: 2018-06-12 | End: 2018-06-12 | Stop reason: HOSPADM

## 2018-06-12 RX ADMIN — CARBIDOPA AND LEVODOPA 1 TABLET: 25; 100 TABLET ORAL at 14:26

## 2018-06-12 RX ADMIN — DIAZEPAM 10 MG: 5 TABLET ORAL at 05:29

## 2018-06-12 RX ADMIN — METAXALONE 800 MG: 800 TABLET ORAL at 14:35

## 2018-06-12 RX ADMIN — CARISOPRODOL 350 MG: 350 TABLET ORAL at 14:35

## 2018-06-12 RX ADMIN — DIAZEPAM 5 MG: 5 TABLET ORAL at 14:26

## 2018-06-12 RX ADMIN — CARBIDOPA AND LEVODOPA 1 TABLET: 25; 100 TABLET ORAL at 05:23

## 2018-06-12 RX ADMIN — ENOXAPARIN SODIUM 40 MG: 100 INJECTION SUBCUTANEOUS at 08:48

## 2018-06-12 RX ADMIN — DOCUSATE SODIUM AND SENNOSIDES 2 TABLET: 8.6; 5 TABLET, FILM COATED ORAL at 08:48

## 2018-06-12 RX ADMIN — CARISOPRODOL 350 MG: 350 TABLET ORAL at 10:49

## 2018-06-12 RX ADMIN — CARBIDOPA AND LEVODOPA 1 TABLET: 25; 100 TABLET ORAL at 15:50

## 2018-06-12 RX ADMIN — OXYCODONE HYDROCHLORIDE AND ACETAMINOPHEN 1 TABLET: 5; 325 TABLET ORAL at 05:24

## 2018-06-12 RX ADMIN — CARISOPRODOL 350 MG: 350 TABLET ORAL at 04:29

## 2018-06-12 RX ADMIN — MORPHINE SULFATE 15 MG: 15 TABLET, EXTENDED RELEASE ORAL at 08:48

## 2018-06-12 RX ADMIN — OXYCODONE HYDROCHLORIDE AND ACETAMINOPHEN 1 TABLET: 5; 325 TABLET ORAL at 15:49

## 2018-06-12 RX ADMIN — CARBIDOPA AND LEVODOPA 1 TABLET: 25; 100 TABLET ORAL at 10:48

## 2018-06-12 RX ADMIN — DIAZEPAM 5 MG: 5 TABLET ORAL at 10:49

## 2018-06-12 RX ADMIN — SODIUM CHLORIDE: 9 INJECTION, SOLUTION INTRAVENOUS at 05:22

## 2018-06-12 RX ADMIN — OXYBUTYNIN CHLORIDE 5 MG: 5 TABLET ORAL at 08:48

## 2018-06-12 RX ADMIN — CEFTRIAXONE SODIUM 2 G: 2 INJECTION, POWDER, FOR SOLUTION INTRAMUSCULAR; INTRAVENOUS at 08:47

## 2018-06-12 RX ADMIN — METAXALONE 800 MG: 800 TABLET ORAL at 04:29

## 2018-06-12 RX ADMIN — METAXALONE 800 MG: 800 TABLET ORAL at 10:49

## 2018-06-12 SDOH — ECONOMIC STABILITY: HOUSING INSECURITY: UNSAFE APPLIANCES: 0

## 2018-06-12 SDOH — ECONOMIC STABILITY: HOUSING INSECURITY: UNSAFE COOKING RANGE AREA: 0

## 2018-06-12 SDOH — ECONOMIC STABILITY: GENERAL

## 2018-06-12 ASSESSMENT — ENCOUNTER SYMPTOMS
COUGH: 0
HEADACHES: 0
SLEEP QUALITY: POOR
HEMOPTYSIS: 0
DOUBLE VISION: 0
SPUTUM PRODUCTION: 0
CHILLS: 0
TINGLING: 0
ORTHOPNEA: 0
PHOTOPHOBIA: 0
TREMORS: 1
NECK PAIN: 0
STOOL FREQUENCY: LESS THAN DAILY
CONSTIPATION: 1
FEVER: 0
VOMITING: 0
BLURRED VISION: 0
NAUSEA: 0
FATIGUE: 1
TREMORS: 0
HEARTBURN: 0
PALPITATIONS: 0
BACK PAIN: 1
LAST BOWEL MOVEMENT: 64809
DRY SKIN: 1

## 2018-06-12 ASSESSMENT — ACTIVITIES OF DAILY LIVING (ADL)
DRESSING_REQUIRES_ASSISTANCE: 1
CONTINENCE_REQUIRES_ASSISTANCE: 1
BATHING_REQUIRES_ASSISTANCE: 1
MONEY MANAGEMENT (EXPENSES/BILLS): NEEDS ASSISTANCE
PHYSICAL_TRANSFER_REQUIRES_ASSISTANCE: 1
AMBULATION_REQUIRES_ASSISTANCE: 1

## 2018-06-12 ASSESSMENT — SOCIAL DETERMINANTS OF HEALTH (SDOH)
ACTIVE STRESSOR - HEALTH CHANGES: 1
ACTIVE STRESSOR - LOSS OF CONTROL: 1
ACTIVE STRESSOR - EXPRESSED EMOTIONAL NEED: 1
ACTIVE STRESSOR - EXHAUSTION: 1

## 2018-06-12 ASSESSMENT — PAIN SCALES - GENERAL
PAINLEVEL_OUTOF10: 4
PAINLEVEL_OUTOF10: 5

## 2018-06-12 NOTE — DIETARY
NUTRITION SERVICES - Food Preferences Consult Brief Note  The pt is a 69 yo female with an admitting dx of UTI d/t indwelling catheter.    Pt seen in her room to discuss food preferences. BMI also noted to be <19 (=18.34). She reports a good appetite and was able to provide a list of foods that she likes so that meals may be tailed accordingly. Pt requesting to add snacks BID between meals as she normally snacks at home. PO intake has been % x3 meal records with dinner record last night much better than prior meals. Will continue to monitor for good PO intake during clinical course.    RD monitoring.

## 2018-06-12 NOTE — DISCHARGE INSTRUCTIONS
Discharge Instructions    Discharged to home by car with relative. Discharged via wheelchair, hospital escort: Yes.  Special equipment needed: Wheelchair    Be sure to schedule a follow-up appointment with your primary care doctor or any specialists as instructed.     Discharge Plan:        I understand that a diet low in cholesterol, fat, and sodium is recommended for good health. Unless I have been given specific instructions below for another diet, I accept this instruction as my diet prescription.   Other diet: regular    Special Instructions: None    · Is patient discharged on Warfarin / Coumadin?   No     Depression / Suicide Risk    As you are discharged from this [facility] facility, it is important to learn how to keep safe from harming yourself.    Recognize the warning signs:  · Abrupt changes in personality, positive or negative- including increase in energy   · Giving away possessions  · Change in eating patterns- significant weight changes-  positive or negative  · Change in sleeping patterns- unable to sleep or sleeping all the time   · Unwillingness or inability to communicate  · Depression  · Unusual sadness, discouragement and loneliness  · Talk of wanting to die  · Neglect of personal appearance   · Rebelliousness- reckless behavior  · Withdrawal from people/activities they love  · Confusion- inability to concentrate     If you or a loved one observes any of these behaviors or has concerns about self-harm, here's what you can do:  · Talk about it- your feelings and reasons for harming yourself  · Remove any means that you might use to hurt yourself (examples: pills, rope, extension cords, firearm)  · Get professional help from the community (Mental Health, Substance Abuse, psychological counseling)  · Do not be alone:Call your Safe Contact- someone whom you trust who will be there for you.  · Call your local CRISIS HOTLINE 840-7115 or 441-671-5466  · Call your local Children's Mobile Crisis  Response Team [facility] (305) 584-6671 or www.[facility].Gratci  · Call the toll free National Suicide Prevention Hotlines   · National Suicide Prevention Lifeline 092-532-SKKN (9992)  · National Hope Line Network 800-SUICIDE (415-4089)

## 2018-06-12 NOTE — CARE PLAN
Problem: Communication  Goal: The ability to communicate needs accurately and effectively will improve  Outcome: PROGRESSING AS EXPECTED  Patient expresses herself clearly

## 2018-06-12 NOTE — PROGRESS NOTES
[facility]ist Progress Note    Date of Service: 2018    Chief Complaint  68 y.o. female with history of being wheelchair-bound admitted 6/10/2018 urinary tract infection as well as knee pain from a ground-level fall. Interval Problem Update  none    Consultants/Specialty  Patient doing well overall decreased pain in her knees  Urine cultures pending, continue IV Rocephin for now    Disposition  TBD        Review of Systems   Constitutional: Positive for malaise/fatigue. Negative for chills and fever.   HENT: Negative for ear discharge, ear pain and tinnitus.    Eyes: Negative for blurred vision, double vision and photophobia.   Respiratory: Negative for cough, hemoptysis and sputum production.    Cardiovascular: Negative for chest pain, palpitations and orthopnea.   Gastrointestinal: Negative for heartburn, nausea and vomiting.   Genitourinary: Negative for frequency, hematuria and urgency.   Musculoskeletal: Positive for back pain (chronic). Negative for neck pain.   Skin: Negative for itching and rash.   Neurological: Negative for tingling, tremors and headaches.      Physical Exam  Laboratory/Imaging   Hemodynamics  Temp (24hrs), Av.3 °C (97.4 °F), Min:36.3 °C (97.3 °F), Max:36.4 °C (97.5 °F)   Temperature: 36.3 °C (97.3 °F)  Pulse  Av  Min: 63  Max: 91    Blood Pressure : 104/60      Respiratory      Respiration: 18, Pulse Oximetry: 94 %        RUL Breath Sounds: Clear, RML Breath Sounds: Clear, RLL Breath Sounds: Diminished, RUPALI Breath Sounds: Clear, LLL Breath Sounds: Diminished    Fluids    Intake/Output Summary (Last 24 hours) at 18 0749  Last data filed at 18 0446   Gross per 24 hour   Intake             2240 ml   Output             3300 ml   Net            -1060 ml       Nutrition  Orders Placed This Encounter   Procedures   • Diet Order     Standing Status:   Standing     Number of Occurrences:   1     Order Specific Question:   Diet:     Answer:   Regular [1]     Physical  Exam   Constitutional: No distress.   HENT:   Head: Normocephalic and atraumatic.   Eyes: Conjunctivae are normal. Pupils are equal, round, and reactive to light.   Neck: No JVD present.   Cardiovascular: Normal rate and regular rhythm.    Pulmonary/Chest: Effort normal and breath sounds normal. No stridor.   Abdominal: Soft. Bowel sounds are normal.   Musculoskeletal: She exhibits no edema or tenderness.   Both feet are flexed chronically   Neurological: She is alert.   Skin: Skin is warm and dry. She is not diaphoretic.       Recent Labs      06/10/18   1259  06/11/18   0511  06/12/18   0439   WBC  10.0  5.2  4.7*   RBC  4.40  3.85*  4.00*   HEMOGLOBIN  13.9  11.9*  12.4   HEMATOCRIT  42.3  38.0  39.1   MCV  96.1  98.7*  97.8   MCH  31.6  30.9  31.0   MCHC  32.9*  31.3*  31.7*   RDW  45.9  47.0  46.3   PLATELETCT  232  209  193   MPV  10.9  11.8  12.0     Recent Labs      06/10/18   1259  06/11/18   0511  06/12/18   0439   SODIUM  135  141  140   POTASSIUM  3.8  3.9  3.9   CHLORIDE  103  109  107   CO2  23  26  26   GLUCOSE  112*  89  93   BUN  14  11  8   CREATININE  0.64  0.56  0.63   CALCIUM  9.4  8.8  8.8                      Assessment/Plan     * Urinary tract infection associated with indwelling urethral catheter (HCC)- (present on admission)   Assessment & Plan    Urine culture pending appears to be showing yeast  Continue IV Rocephin        Chronic pain- (present on admission)   Assessment & Plan    Pain control continue home medications       Peripheral neuropathy (HCC)- (present on admission)   Assessment & Plan    Continue home dose of Neurontin       Parkinson disease (HCC)- (present on admission)   Assessment & Plan    Continue Sinemet       Urinary retention- (present on admission)   Assessment & Plan    Ta was changed in the ER       Patient plan of care discussed at multidisplinary team rounds and with patient and R.N at beside.    Quality-Core Measures   DVT prophylaxis pharmacological::   Enoxaparin (Lovenox)

## 2018-06-12 NOTE — PROGRESS NOTES
"This RN educated on importance of repositioning weight off of sacrum, pt declined repositionings stating, \" It will hurt my back.\" Noted redness to bilateral BLE bony prominences, educated on risk for skin breakdown, offered pillow or foam for prevention, patient declined. Allowed for barrier cream to be administered to reddened areas  "

## 2018-06-12 NOTE — CARE PLAN
Problem: Safety  Goal: Will remain free from falls  Outcome: PROGRESSING AS EXPECTED  Patient calls for assistance

## 2018-06-12 NOTE — PROGRESS NOTES
Discharge teaching completed, IV removed. All questions addressed. Pt sent home with hospice. Pt has personal wheelchair and all belongings.

## 2018-06-12 NOTE — DISCHARGE SUMMARY
Discharge Summary    CHIEF COMPLAINT ON ADMISSION  Chief Complaint   Patient presents with   • Abdominal Pain     Ba cath for 3 mo.; pt reports blood clot from ba this AM and increased spasming mid lower abd pain since; rates 8/10   • Knee Pain     Pt reports GLF this AM onto knees; incrased L knee pain since       Reason for Admission  abd pain     Admission Date  6/10/2018    CODE STATUS  Full Code    HPI & HOSPITAL COURSE  This is a 68 y.o. female here admitted for abdominal pain as well as left sided knee pain. Essentially patient presented to the ER with a complaint of passing blood clots in her Ba catheter which she has an indwelling catheter because of urinary retention from Parkinson's disorder. On admission patient's Ba catheter was flushed which eased the obstruction and hence patient's bladder was decompressed. Right eye for this patient says she had immediate relief in addition urinalysis was performed which showed evidence of WBCs and she was placed on antibiotics. However overall patient has been clinically stable denies having fevers or chills chest pain shortness of breath or nausea vomiting. I believe that given that she has an indwelling catheter she will most of the time have pyuria. I do not recommend continuous antibiotics for this as patient will develop resistance as well as secondary side effects from the antibiotics. At this point patient will be discharged to Home with hospice.        Therefore, she is discharged in good and stable condition to home with close outpatient follow-up.    The patient met 2-midnight criteria for an inpatient stay at the time of discharge.    Discharge Date  6/12/2018    FOLLOW UP ITEMS POST DISCHARGE  Hospice home.     DISCHARGE DIAGNOSES  Principal Problem (Resolved):    Urinary tract infection associated with indwelling urethral catheter (HCC) POA: Yes  Active Problems:    Chronic pain POA: Yes    Parkinson disease (HCC) POA: Yes    Peripheral  neuropathy (HCC) POA: Yes  Resolved Problems:    Urinary retention POA: Yes      FOLLOW UP  No future appointments.  Raul Iverson M.D.  [address] [address]  [phone]    Schedule an appointment as soon as possible for a visit in 1 week  Follow up appointment      MEDICATIONS ON DISCHARGE     Medication List      CONTINUE taking these medications      Instructions   ATIVAN 2 MG/ML Soln  Generic drug:  LORazepam   0.5 mg by Intravenous route See Admin Instructions. Every 30 min, PRN for anxiety  Dose:  0.5 mg     carisoprodol 350 MG Tabs  Commonly known as:  SOMA   Take 350 mg by mouth 4 times a day.  Dose:  350 mg     diazePAM 5 MG Tabs  Commonly known as:  VALIUM   Take 5-10 mg by mouth 5 Times a Day. Pt takes: 10MG in Am 5MG 4 times a day  Dose:  5-10 mg     diphenhydrAMINE 12.5 MG/5ML Liqd liquid  Commonly known as:  BENADRYL   Take 25 mg by mouth every bedtime.  Dose:  25 mg     docusate sodium 100 MG Caps  Commonly known as:  COLACE   Take 300 mg by mouth every morning.  Dose:  300 mg     * gabapentin 400 MG Caps  Commonly known as:  NEURONTIN   Take 6 Caps by mouth every evening.  Dose:  2400 mg     * gabapentin 300 MG Caps  Commonly known as:  NEURONTIN   Take 6 Caps by mouth every morning.  Dose:  1800 mg     Melatonin 1 MG Tabs   Take 1 mg by mouth at bedtime as needed.  Dose:  1 mg     metaxalone 800 MG Tabs  Commonly known as:  SKELAXIN   Take 800 mg by mouth 4 times a day. 0700, 1000, 1300, 1600  Dose:  800 mg     * morphine 20 MG/5ML solution   Take 0.25 mg by mouth See Admin Instructions. Every 15 mins.PRN for pain  Dose:  0.25 mg     * MS CONTIN 15 MG Tbcr tablet  Generic drug:  morphine ER   Take 15 mg by mouth every 12 hours.  Dose:  15 mg     oxybutynin 5 MG Tabs  Commonly known as:  DITROPAN   Take 1 Tab by mouth 2 Times a Day.  Dose:  5 mg     oxyCODONE-acetaminophen 5-325 MG Tabs  Commonly known as:  PERCOCET   Take 1 Tab by mouth 2 times a day as needed (For breakthrough  pain).  Dose:  1 Tab     senna-docusate 8.6-50 MG Tabs  Commonly known as:  PERICOLACE or SENOKOT S   Take 2 Tabs by mouth 2 Times a Day.  Dose:  2 Tab     * SINEMET CR  MG per tablet  Generic drug:  carbidopa-levodopa SR   Take 1 Tab by mouth every bedtime.  Dose:  1 Tab     * carbidopa-levodopa  MG Tabs  Commonly known as:  SINEMET   Take 1 Tab by mouth 6 Times a Day. 0700, 1000, 1300, 1600, 1900, 2200  Dose:  1 Tab        * This list has 6 medication(s) that are the same as other medications prescribed for you. Read the directions carefully, and ask your doctor or other care provider to review them with you.            STOP taking these medications    amoxicillin-clavulanate 875-125 MG Tabs  Commonly known as:  AUGMENTIN     ciprofloxacin 500 MG Tabs  Commonly known as:  CIPRO            Allergies  Allergies   Allergen Reactions   • Bactrim [Sulfamethoxazole W-Trimethoprim] Hives   • Actonel [Risedronate Sodium] Hives   • Latex Rash     Irritation of skin    • Sulfa Drugs Hives   • Tape Rash     Paper tape is ok       DIET  Orders Placed This Encounter   Procedures   • Diet Order     Standing Status:   Standing     Number of Occurrences:   1     Order Specific Question:   Diet:     Answer:   Regular [1]       ACTIVITY  As tolerated.  n/a    CONSULTATIONS  None    PROCEDURES  None    LABORATORY  Lab Results   Component Value Date    SODIUM 140 06/12/2018    POTASSIUM 3.9 06/12/2018    CHLORIDE 107 06/12/2018    CO2 26 06/12/2018    GLUCOSE 93 06/12/2018    BUN 8 06/12/2018    CREATININE 0.63 06/12/2018    CREATININE 0.8 12/22/2008        Lab Results   Component Value Date    WBC 4.7 (L) 06/12/2018    HEMOGLOBIN 12.4 06/12/2018    HEMATOCRIT 39.1 06/12/2018    PLATELETCT 193 06/12/2018        Total time of the discharge process exceeds 35 minutes.

## 2018-06-12 NOTE — HOSPICE
Met with pt and  and being that she was on hospice with [facility] understood about hospice and the benefits. Reviewed hx and needs as well as symptom mgt. Dr. Kaufman reviewed pt's diagnosis and Agreed that pt is appropriate for routine [facility] hosp[facility] care.   Will contact MSW in the am and also pts  to set up time for D/C when pt is ready for D/C by MD.

## 2018-06-13 ENCOUNTER — HOME CARE VISIT (OUTPATIENT)
Dept: HOSPICE | Facility: HOSPICE | Age: 68
End: 2018-06-13
Payer: MEDICARE

## 2018-06-13 PROCEDURE — S9126 HOSPICE CARE, IN THE HOME, P: HCPCS

## 2018-06-13 PROCEDURE — G0299 HHS/HOSPICE OF RN EA 15 MIN: HCPCS

## 2018-06-14 ENCOUNTER — HOME CARE VISIT (OUTPATIENT)
Dept: HOSPICE | Facility: HOSPICE | Age: 68
End: 2018-06-14
Payer: MEDICARE

## 2018-06-14 VITALS — HEART RATE: 101 BPM | RESPIRATION RATE: 22 BRPM | TEMPERATURE: 97.5 F | OXYGEN SATURATION: 87 %

## 2018-06-14 VITALS — RESPIRATION RATE: 16 BRPM

## 2018-06-14 PROCEDURE — G0299 HHS/HOSPICE OF RN EA 15 MIN: HCPCS

## 2018-06-14 PROCEDURE — S9126 HOSPICE CARE, IN THE HOME, P: HCPCS

## 2018-06-14 SDOH — ECONOMIC STABILITY: HOUSING INSECURITY: UNSAFE APPLIANCES: 0

## 2018-06-14 SDOH — ECONOMIC STABILITY: HOUSING INSECURITY: UNSAFE COOKING RANGE AREA: 0

## 2018-06-14 SDOH — ECONOMIC STABILITY: GENERAL

## 2018-06-14 ASSESSMENT — ENCOUNTER SYMPTOMS
SLEEP QUALITY: FAIR
CONSTIPATION: 1
STOOL FREQUENCY: LESS THAN DAILY
LAST BOWEL MOVEMENT: 64808
LAST BOWEL MOVEMENT: 64809
DRY SKIN: 1
FORGETFULNESS: 1

## 2018-06-14 ASSESSMENT — SOCIAL DETERMINANTS OF HEALTH (SDOH)
ACTIVE STRESSOR - LOSS OF CONTROL: 1
ACTIVE STRESSOR - HEALTH CHANGES: 1
ACTIVE STRESSOR - HEALTH CHANGES: 1

## 2018-06-14 ASSESSMENT — ACTIVITIES OF DAILY LIVING (ADL): MONEY MANAGEMENT (EXPENSES/BILLS): NEEDS ASSISTANCE

## 2018-06-15 ENCOUNTER — HOME CARE VISIT (OUTPATIENT)
Dept: HOSPICE | Facility: HOSPICE | Age: 68
End: 2018-06-15
Payer: MEDICARE

## 2018-06-15 LAB
BACTERIA BLD CULT: NORMAL
SIGNIFICANT IND 70042: NORMAL
SITE SITE: NORMAL
SOURCE SOURCE: NORMAL

## 2018-06-15 PROCEDURE — G0156 HHCP-SVS OF AIDE,EA 15 MIN: HCPCS

## 2018-06-15 PROCEDURE — S9126 HOSPICE CARE, IN THE HOME, P: HCPCS

## 2018-06-16 ENCOUNTER — HOME CARE VISIT (OUTPATIENT)
Dept: HOSPICE | Facility: HOSPICE | Age: 68
End: 2018-06-16
Payer: MEDICARE

## 2018-06-16 PROCEDURE — G0299 HHS/HOSPICE OF RN EA 15 MIN: HCPCS

## 2018-06-16 PROCEDURE — S9126 HOSPICE CARE, IN THE HOME, P: HCPCS

## 2018-06-16 SDOH — ECONOMIC STABILITY: HOUSING INSECURITY: UNSAFE APPLIANCES: 0

## 2018-06-16 SDOH — ECONOMIC STABILITY: HOUSING INSECURITY: UNSAFE COOKING RANGE AREA: 0

## 2018-06-16 ASSESSMENT — ENCOUNTER SYMPTOMS
STOOL FREQUENCY: LESS THAN DAILY
NAUSEA: 1
LAST BOWEL MOVEMENT: 64815
RECTAL BLEEDING: 1
ABDOMINAL PAIN: 1
CONSTIPATION: 1

## 2018-06-17 ENCOUNTER — HOME CARE VISIT (OUTPATIENT)
Dept: HOSPICE | Facility: HOSPICE | Age: 68
End: 2018-06-17
Payer: MEDICARE

## 2018-06-17 PROCEDURE — S9126 HOSPICE CARE, IN THE HOME, P: HCPCS

## 2018-06-18 ENCOUNTER — HOME CARE VISIT (OUTPATIENT)
Dept: HOSPICE | Facility: HOSPICE | Age: 68
End: 2018-06-18
Payer: MEDICARE

## 2018-06-18 VITALS — RESPIRATION RATE: 16 BRPM | HEART RATE: 60 BPM | OXYGEN SATURATION: 100 %

## 2018-06-18 PROCEDURE — G0299 HHS/HOSPICE OF RN EA 15 MIN: HCPCS

## 2018-06-18 PROCEDURE — S9126 HOSPICE CARE, IN THE HOME, P: HCPCS

## 2018-06-18 SDOH — ECONOMIC STABILITY: HOUSING INSECURITY: UNSAFE APPLIANCES: 0

## 2018-06-18 SDOH — ECONOMIC STABILITY: HOUSING INSECURITY: UNSAFE COOKING RANGE AREA: 0

## 2018-06-18 ASSESSMENT — ENCOUNTER SYMPTOMS: LAST BOWEL MOVEMENT: 64815

## 2018-06-19 ENCOUNTER — HOME CARE VISIT (OUTPATIENT)
Dept: HOSPICE | Facility: HOSPICE | Age: 68
End: 2018-06-19
Payer: MEDICARE

## 2018-06-19 PROCEDURE — S9126 HOSPICE CARE, IN THE HOME, P: HCPCS

## 2018-06-20 LAB
BACTERIA BLD CULT: NORMAL
SIGNIFICANT IND 70042: NORMAL
SITE SITE: NORMAL
SOURCE SOURCE: NORMAL

## 2018-06-20 PROCEDURE — S9126 HOSPICE CARE, IN THE HOME, P: HCPCS

## 2018-06-20 SDOH — ECONOMIC STABILITY: GENERAL

## 2018-06-20 ASSESSMENT — ACTIVITIES OF DAILY LIVING (ADL): MONEY MANAGEMENT (EXPENSES/BILLS): NEEDS ASSISTANCE

## 2018-06-21 ENCOUNTER — HOME CARE VISIT (OUTPATIENT)
Dept: HOSPICE | Facility: HOSPICE | Age: 68
End: 2018-06-21
Payer: MEDICARE

## 2018-06-21 PROCEDURE — S9126 HOSPICE CARE, IN THE HOME, P: HCPCS

## 2018-06-22 ENCOUNTER — HOME CARE VISIT (OUTPATIENT)
Dept: HOSPICE | Facility: HOSPICE | Age: 68
End: 2018-06-22
Payer: MEDICARE

## 2018-06-22 ENCOUNTER — APPOINTMENT (OUTPATIENT)
Dept: HOSPICE | Facility: HOSPICE | Age: 68
End: 2018-06-22
Payer: MEDICARE

## 2018-06-22 PROCEDURE — G0299 HHS/HOSPICE OF RN EA 15 MIN: HCPCS

## 2018-06-22 PROCEDURE — G0155 HHCP-SVS OF CSW,EA 15 MIN: HCPCS

## 2018-06-22 PROCEDURE — S9126 HOSPICE CARE, IN THE HOME, P: HCPCS

## 2018-06-22 RX ADMIN — MORPHINE SULFATE 15 MG: 15 TABLET, FILM COATED, EXTENDED RELEASE ORAL at 17:05

## 2018-06-22 RX ADMIN — MORPHINE SULFATE 5 MG: 20 SOLUTION ORAL at 17:05

## 2018-06-22 RX ADMIN — LORAZEPAM 0.5 MG: 2 CONCENTRATE ORAL at 16:20

## 2018-06-22 ASSESSMENT — ENCOUNTER SYMPTOMS: SLEEP QUALITY: FAIR

## 2018-06-22 ASSESSMENT — SOCIAL DETERMINANTS OF HEALTH (SDOH)
ACTIVE STRESSOR - HEALTH CHANGES: 1
ACTIVE STRESSOR - LOSS OF CONTROL: 1

## 2018-06-23 VITALS — RESPIRATION RATE: 17 BRPM

## 2018-06-23 PROCEDURE — S9126 HOSPICE CARE, IN THE HOME, P: HCPCS

## 2018-06-23 ASSESSMENT — ENCOUNTER SYMPTOMS: LAST BOWEL MOVEMENT: 64821

## 2018-06-24 PROCEDURE — S9126 HOSPICE CARE, IN THE HOME, P: HCPCS

## 2018-06-25 ENCOUNTER — HOME CARE VISIT (OUTPATIENT)
Dept: HOSPICE | Facility: HOSPICE | Age: 68
End: 2018-06-25
Payer: MEDICARE

## 2018-06-25 PROCEDURE — S9126 HOSPICE CARE, IN THE HOME, P: HCPCS

## 2018-06-26 ENCOUNTER — HOME CARE VISIT (OUTPATIENT)
Dept: HOSPICE | Facility: HOSPICE | Age: 68
End: 2018-06-26
Payer: MEDICARE

## 2018-06-26 VITALS
SYSTOLIC BLOOD PRESSURE: 100 MMHG | TEMPERATURE: 97.8 F | DIASTOLIC BLOOD PRESSURE: 70 MMHG | RESPIRATION RATE: 22 BRPM | OXYGEN SATURATION: 90 %

## 2018-06-26 PROCEDURE — 6650346

## 2018-06-26 PROCEDURE — G0299 HHS/HOSPICE OF RN EA 15 MIN: HCPCS

## 2018-06-26 PROCEDURE — S9126 HOSPICE CARE, IN THE HOME, P: HCPCS

## 2018-06-26 SDOH — ECONOMIC STABILITY: HOUSING INSECURITY: UNSAFE APPLIANCES: 0

## 2018-06-26 SDOH — ECONOMIC STABILITY: GENERAL

## 2018-06-26 SDOH — ECONOMIC STABILITY: HOUSING INSECURITY: UNSAFE COOKING RANGE AREA: 0

## 2018-06-26 ASSESSMENT — ENCOUNTER SYMPTOMS
FORGETFULNESS: 1
LAST BOWEL MOVEMENT: 64825
STOOL FREQUENCY: LESS THAN DAILY
SLEEP QUALITY: ADEQUATE
CONSTIPATION: 1

## 2018-06-26 ASSESSMENT — ACTIVITIES OF DAILY LIVING (ADL): MONEY MANAGEMENT (EXPENSES/BILLS): TOTALLY DEPENDENT

## 2018-06-26 ASSESSMENT — SOCIAL DETERMINANTS OF HEALTH (SDOH): ACTIVE STRESSOR - HEALTH CHANGES: 1

## 2018-06-27 PROCEDURE — S9126 HOSPICE CARE, IN THE HOME, P: HCPCS

## 2018-06-27 PROCEDURE — 6650990 HC HOSPICE AND HOME CARE RX REV CODE 0250

## 2018-06-28 ENCOUNTER — HOME CARE VISIT (OUTPATIENT)
Dept: HOSPICE | Facility: HOSPICE | Age: 68
End: 2018-06-28
Payer: MEDICARE

## 2018-06-28 PROCEDURE — G0299 HHS/HOSPICE OF RN EA 15 MIN: HCPCS

## 2018-06-28 PROCEDURE — S9126 HOSPICE CARE, IN THE HOME, P: HCPCS

## 2018-06-28 PROCEDURE — 6650990 HC HOSPICE AND HOME CARE RX REV CODE 0250

## 2018-06-29 ENCOUNTER — HOME CARE VISIT (OUTPATIENT)
Dept: HOSPICE | Facility: HOSPICE | Age: 68
End: 2018-06-29
Payer: MEDICARE

## 2018-06-29 PROCEDURE — S9126 HOSPICE CARE, IN THE HOME, P: HCPCS

## 2018-06-29 SDOH — ECONOMIC STABILITY: HOUSING INSECURITY: HOME SAFETY: NO O2

## 2018-06-29 SDOH — ECONOMIC STABILITY: HOUSING INSECURITY: UNSAFE COOKING RANGE AREA: 0

## 2018-06-29 SDOH — ECONOMIC STABILITY: HOUSING INSECURITY: UNSAFE APPLIANCES: 0

## 2018-06-29 ASSESSMENT — ENCOUNTER SYMPTOMS
LAST BOWEL MOVEMENT: 64825
STOOL FREQUENCY: LESS THAN DAILY

## 2018-06-30 PROCEDURE — S9126 HOSPICE CARE, IN THE HOME, P: HCPCS

## 2018-07-01 PROCEDURE — S9126 HOSPICE CARE, IN THE HOME, P: HCPCS

## 2018-07-02 PROCEDURE — S9126 HOSPICE CARE, IN THE HOME, P: HCPCS

## 2018-07-03 ENCOUNTER — HOME CARE VISIT (OUTPATIENT)
Dept: HOSPICE | Facility: HOSPICE | Age: 68
End: 2018-07-03
Payer: MEDICARE

## 2018-07-03 PROCEDURE — G0299 HHS/HOSPICE OF RN EA 15 MIN: HCPCS

## 2018-07-03 PROCEDURE — 6650990 HC HOSPICE AND HOME CARE RX REV CODE 0250

## 2018-07-03 PROCEDURE — S9126 HOSPICE CARE, IN THE HOME, P: HCPCS

## 2018-07-04 ENCOUNTER — HOME CARE VISIT (OUTPATIENT)
Dept: HOSPICE | Facility: HOSPICE | Age: 68
End: 2018-07-04
Payer: MEDICARE

## 2018-07-04 VITALS — HEART RATE: 107 BPM | RESPIRATION RATE: 16 BRPM

## 2018-07-04 PROCEDURE — S9126 HOSPICE CARE, IN THE HOME, P: HCPCS

## 2018-07-04 SDOH — ECONOMIC STABILITY: GENERAL

## 2018-07-04 SDOH — ECONOMIC STABILITY: HOUSING INSECURITY: UNSAFE APPLIANCES: 0

## 2018-07-04 SDOH — ECONOMIC STABILITY: HOUSING INSECURITY
HOME SAFETY: HAS 2 LARGE DOGS AND ONE CAT.  CAT IS 20 YEARS OLD AND PATIENT DOES NOT LIKE THE CAT ON HER AS PATIENT STATES SHE SITS ON HER BLADDER.  PATIENT'S BEDROOM IS DOWNSTAIRS AND BOTH DOGS NEED TO BE TAKEN OUT OF THE ROOM AND BATHROOM AREA IN ORDER TO NOT S

## 2018-07-04 SDOH — ECONOMIC STABILITY: HOUSING INSECURITY: HOME SAFETY: NAG THE URINARY CATHETER, IT IS A CLUTTERED BATHROOM AND BEDROOM.

## 2018-07-04 SDOH — ECONOMIC STABILITY: HOUSING INSECURITY: UNSAFE COOKING RANGE AREA: 0

## 2018-07-04 ASSESSMENT — ACTIVITIES OF DAILY LIVING (ADL): MONEY MANAGEMENT (EXPENSES/BILLS): TOTALLY DEPENDENT

## 2018-07-04 ASSESSMENT — ENCOUNTER SYMPTOMS
LAST BOWEL MOVEMENT: 64832
CONSTIPATION: 1
BOWEL INCONTINENCE: 1
FORGETFULNESS: 1
STOOL FREQUENCY: LESS THAN DAILY

## 2018-07-05 ENCOUNTER — HOME CARE VISIT (OUTPATIENT)
Dept: HOSPICE | Facility: HOSPICE | Age: 68
End: 2018-07-05
Payer: MEDICARE

## 2018-07-05 ENCOUNTER — HOSPITAL ENCOUNTER (OUTPATIENT)
Dept: LAB | Facility: MEDICAL CENTER | Age: 68
End: 2018-07-05
Attending: INTERNAL MEDICINE
Payer: COMMERCIAL

## 2018-07-05 PROCEDURE — 87186 SC STD MICRODIL/AGAR DIL: CPT

## 2018-07-05 PROCEDURE — 87086 URINE CULTURE/COLONY COUNT: CPT

## 2018-07-05 PROCEDURE — S9126 HOSPICE CARE, IN THE HOME, P: HCPCS

## 2018-07-05 PROCEDURE — 81001 URINALYSIS AUTO W/SCOPE: CPT

## 2018-07-05 PROCEDURE — G0299 HHS/HOSPICE OF RN EA 15 MIN: HCPCS

## 2018-07-05 PROCEDURE — 87077 CULTURE AEROBIC IDENTIFY: CPT | Mod: 91

## 2018-07-06 LAB
APPEARANCE UR: CLEAR
BACTERIA #/AREA URNS HPF: ABNORMAL /HPF
BILIRUB UR QL STRIP.AUTO: NEGATIVE
COLOR UR: YELLOW
EPI CELLS #/AREA URNS HPF: NEGATIVE /HPF
GLUCOSE UR STRIP.AUTO-MCNC: NEGATIVE MG/DL
HYALINE CASTS #/AREA URNS LPF: ABNORMAL /LPF
KETONES UR STRIP.AUTO-MCNC: NEGATIVE MG/DL
LEUKOCYTE ESTERASE UR QL STRIP.AUTO: ABNORMAL
MICRO URNS: ABNORMAL
NITRITE UR QL STRIP.AUTO: POSITIVE
PH UR STRIP.AUTO: 8 [PH]
PROT UR QL STRIP: NEGATIVE MG/DL
RBC # URNS HPF: ABNORMAL /HPF
RBC UR QL AUTO: NEGATIVE
SP GR UR STRIP.AUTO: 1
UROBILINOGEN UR STRIP.AUTO-MCNC: 0.2 MG/DL
WBC #/AREA URNS HPF: ABNORMAL /HPF
YEAST BUDDING URNS QL: PRESENT /HPF
YEAST HYPHAE #/AREA URNS HPF: PRESENT /HPF

## 2018-07-06 PROCEDURE — S9126 HOSPICE CARE, IN THE HOME, P: HCPCS

## 2018-07-06 PROCEDURE — 6650990 HC HOSPICE AND HOME CARE RX REV CODE 0250

## 2018-07-07 ENCOUNTER — HOME CARE VISIT (OUTPATIENT)
Dept: HOSPICE | Facility: HOSPICE | Age: 68
End: 2018-07-07
Payer: MEDICARE

## 2018-07-07 PROCEDURE — G0156 HHCP-SVS OF AIDE,EA 15 MIN: HCPCS

## 2018-07-07 PROCEDURE — S9126 HOSPICE CARE, IN THE HOME, P: HCPCS

## 2018-07-08 PROCEDURE — S9126 HOSPICE CARE, IN THE HOME, P: HCPCS

## 2018-07-09 ENCOUNTER — HOME CARE VISIT (OUTPATIENT)
Dept: HOSPICE | Facility: HOSPICE | Age: 68
End: 2018-07-09
Payer: MEDICARE

## 2018-07-09 VITALS — RESPIRATION RATE: 20 BRPM

## 2018-07-09 PROCEDURE — A4344 CATH INDW FOLEY 2 WAY SILICN: HCPCS

## 2018-07-09 PROCEDURE — G0299 HHS/HOSPICE OF RN EA 15 MIN: HCPCS

## 2018-07-09 PROCEDURE — S9126 HOSPICE CARE, IN THE HOME, P: HCPCS

## 2018-07-10 PROCEDURE — S9126 HOSPICE CARE, IN THE HOME, P: HCPCS

## 2018-07-11 ENCOUNTER — HOME CARE VISIT (OUTPATIENT)
Dept: HOSPICE | Facility: HOSPICE | Age: 68
End: 2018-07-11
Payer: MEDICARE

## 2018-07-11 LAB
BACTERIA UR CULT: ABNORMAL
SIGNIFICANT IND 70042: ABNORMAL
SITE SITE: ABNORMAL
SOURCE SOURCE: ABNORMAL

## 2018-07-11 PROCEDURE — S9126 HOSPICE CARE, IN THE HOME, P: HCPCS

## 2018-07-11 PROCEDURE — G0156 HHCP-SVS OF AIDE,EA 15 MIN: HCPCS

## 2018-07-12 PROCEDURE — S9126 HOSPICE CARE, IN THE HOME, P: HCPCS

## 2018-07-13 ENCOUNTER — HOME CARE VISIT (OUTPATIENT)
Dept: HOSPICE | Facility: HOSPICE | Age: 68
End: 2018-07-13
Payer: MEDICARE

## 2018-07-13 PROCEDURE — G0299 HHS/HOSPICE OF RN EA 15 MIN: HCPCS

## 2018-07-13 PROCEDURE — S9126 HOSPICE CARE, IN THE HOME, P: HCPCS

## 2018-07-13 PROCEDURE — 6650990 HC HOSPICE AND HOME CARE RX REV CODE 0250

## 2018-07-14 ENCOUNTER — HOME CARE VISIT (OUTPATIENT)
Dept: HOSPICE | Facility: HOSPICE | Age: 68
End: 2018-07-14
Payer: MEDICARE

## 2018-07-14 VITALS — TEMPERATURE: 97.6 F

## 2018-07-14 PROCEDURE — S9126 HOSPICE CARE, IN THE HOME, P: HCPCS

## 2018-07-14 PROCEDURE — G0299 HHS/HOSPICE OF RN EA 15 MIN: HCPCS

## 2018-07-14 ASSESSMENT — ENCOUNTER SYMPTOMS
STOOL FREQUENCY: LESS THAN DAILY
CONSTIPATION: 1
LAST BOWEL MOVEMENT: 64839

## 2018-07-15 ENCOUNTER — HOME CARE VISIT (OUTPATIENT)
Dept: HOSPICE | Facility: HOSPICE | Age: 68
End: 2018-07-15
Payer: MEDICARE

## 2018-07-15 VITALS
RESPIRATION RATE: 16 BRPM | DIASTOLIC BLOOD PRESSURE: 60 MMHG | HEART RATE: 75 BPM | OXYGEN SATURATION: 92 % | SYSTOLIC BLOOD PRESSURE: 100 MMHG

## 2018-07-15 PROCEDURE — S9126 HOSPICE CARE, IN THE HOME, P: HCPCS

## 2018-07-15 SDOH — ECONOMIC STABILITY: HOUSING INSECURITY
HOME SAFETY: PATIENT'S SMALL BEDROOM IS CLUTTERED, ALSO HAS 2 DOGS AND A 20 YEAR OLD CAT THAT ALWAYS WANT TO BE WITH HER. HER CAREGIVERS CONSTANTLY HAVE TO SHUT THE DOOR SO THE CAT DOES NOT COME IN AND LAY ON HER STOMACH AS IT BOTHERS HER BLADDER - PER PATIENT. C

## 2018-07-15 SDOH — ECONOMIC STABILITY: HOUSING INSECURITY: UNSAFE COOKING RANGE AREA: 0

## 2018-07-15 SDOH — ECONOMIC STABILITY: HOUSING INSECURITY: HOME SAFETY: AREGIVERS TAKE CARE OF PETS.

## 2018-07-15 SDOH — ECONOMIC STABILITY: HOUSING INSECURITY: UNSAFE APPLIANCES: 0

## 2018-07-15 SDOH — ECONOMIC STABILITY: GENERAL

## 2018-07-15 ASSESSMENT — ENCOUNTER SYMPTOMS
LAST BOWEL MOVEMENT: 64840
DEPRESSED MOOD: 1
FORGETFULNESS: 1
CONSTIPATION: 1
STOOL FREQUENCY: LESS THAN DAILY
SLEEP QUALITY: ADEQUATE
FATIGUE: 1

## 2018-07-15 ASSESSMENT — SOCIAL DETERMINANTS OF HEALTH (SDOH): ACTIVE STRESSOR - NO STRESS FACTORS: 1

## 2018-07-15 ASSESSMENT — ACTIVITIES OF DAILY LIVING (ADL): MONEY MANAGEMENT (EXPENSES/BILLS): TOTALLY DEPENDENT

## 2018-07-16 ENCOUNTER — HOME CARE VISIT (OUTPATIENT)
Dept: HOSPICE | Facility: HOSPICE | Age: 68
End: 2018-07-16
Payer: MEDICARE

## 2018-07-16 PROCEDURE — 6650990 HC HOSPICE AND HOME CARE RX REV CODE 0250

## 2018-07-16 PROCEDURE — S9126 HOSPICE CARE, IN THE HOME, P: HCPCS

## 2018-07-17 ENCOUNTER — HOME CARE VISIT (OUTPATIENT)
Dept: HOSPICE | Facility: HOSPICE | Age: 68
End: 2018-07-17
Payer: MEDICARE

## 2018-07-17 PROCEDURE — S9126 HOSPICE CARE, IN THE HOME, P: HCPCS

## 2018-07-18 ENCOUNTER — HOME CARE VISIT (OUTPATIENT)
Dept: HOSPICE | Facility: HOSPICE | Age: 68
End: 2018-07-18
Payer: MEDICARE

## 2018-07-18 PROCEDURE — S9126 HOSPICE CARE, IN THE HOME, P: HCPCS

## 2018-07-19 ENCOUNTER — HOME CARE VISIT (OUTPATIENT)
Dept: HOSPICE | Facility: HOSPICE | Age: 68
End: 2018-07-19
Payer: MEDICARE

## 2018-07-19 PROCEDURE — S9126 HOSPICE CARE, IN THE HOME, P: HCPCS

## 2018-07-19 PROCEDURE — G0155 HHCP-SVS OF CSW,EA 15 MIN: HCPCS

## 2018-07-20 ENCOUNTER — HOME CARE VISIT (OUTPATIENT)
Dept: HOSPICE | Facility: HOSPICE | Age: 68
End: 2018-07-20
Payer: MEDICARE

## 2018-07-20 VITALS
RESPIRATION RATE: 16 BRPM | OXYGEN SATURATION: 91 % | SYSTOLIC BLOOD PRESSURE: 110 MMHG | DIASTOLIC BLOOD PRESSURE: 60 MMHG | HEART RATE: 90 BPM

## 2018-07-20 PROCEDURE — G0156 HHCP-SVS OF AIDE,EA 15 MIN: HCPCS

## 2018-07-20 PROCEDURE — G0299 HHS/HOSPICE OF RN EA 15 MIN: HCPCS

## 2018-07-20 PROCEDURE — 6650990 HC HOSPICE AND HOME CARE RX REV CODE 0250

## 2018-07-20 PROCEDURE — S9126 HOSPICE CARE, IN THE HOME, P: HCPCS

## 2018-07-20 ASSESSMENT — SOCIAL DETERMINANTS OF HEALTH (SDOH)
ACTIVE STRESSOR - EXPRESSED EMOTIONAL NEED: 1
ACTIVE STRESSOR - LOSS OF CONTROL: 1
EXPRESSED_FINANCIAL_NEED: 1

## 2018-07-20 ASSESSMENT — ENCOUNTER SYMPTOMS
LAST BOWEL MOVEMENT: 64847
BOWEL INCONTINENCE: 1
STOOL FREQUENCY: LESS THAN DAILY
CONSTIPATION: 1

## 2018-07-21 PROCEDURE — S9126 HOSPICE CARE, IN THE HOME, P: HCPCS

## 2018-07-22 PROCEDURE — S9126 HOSPICE CARE, IN THE HOME, P: HCPCS

## 2018-07-22 SDOH — ECONOMIC STABILITY: HOUSING INSECURITY: HOME SAFETY: HAS 2 DOGS AND ONE CAT.  DOES NOT LIKE THEM IN HER ROOM

## 2018-07-22 SDOH — ECONOMIC STABILITY: GENERAL

## 2018-07-22 SDOH — ECONOMIC STABILITY: HOUSING INSECURITY: UNSAFE COOKING RANGE AREA: 0

## 2018-07-22 SDOH — ECONOMIC STABILITY: HOUSING INSECURITY: UNSAFE APPLIANCES: 0

## 2018-07-22 ASSESSMENT — ENCOUNTER SYMPTOMS
FORGETFULNESS: 1
FATIGUE: 1

## 2018-07-22 ASSESSMENT — ACTIVITIES OF DAILY LIVING (ADL): MONEY MANAGEMENT (EXPENSES/BILLS): TOTALLY DEPENDENT

## 2018-07-23 ENCOUNTER — HOME CARE VISIT (OUTPATIENT)
Dept: HOSPICE | Facility: HOSPICE | Age: 68
End: 2018-07-23
Payer: MEDICARE

## 2018-07-23 PROCEDURE — 6650990 HC HOSPICE AND HOME CARE RX REV CODE 0250

## 2018-07-23 PROCEDURE — S9126 HOSPICE CARE, IN THE HOME, P: HCPCS

## 2018-07-24 ENCOUNTER — HOME CARE VISIT (OUTPATIENT)
Dept: HOSPICE | Facility: HOSPICE | Age: 68
End: 2018-07-24
Payer: MEDICARE

## 2018-07-24 PROCEDURE — S9126 HOSPICE CARE, IN THE HOME, P: HCPCS

## 2018-07-25 PROCEDURE — S9126 HOSPICE CARE, IN THE HOME, P: HCPCS

## 2018-07-25 PROCEDURE — 6650990 HC HOSPICE AND HOME CARE RX REV CODE 0250

## 2018-07-26 PROCEDURE — S9126 HOSPICE CARE, IN THE HOME, P: HCPCS

## 2018-07-27 ENCOUNTER — HOME CARE VISIT (OUTPATIENT)
Dept: HOSPICE | Facility: HOSPICE | Age: 68
End: 2018-07-27
Payer: MEDICARE

## 2018-07-27 PROCEDURE — G0299 HHS/HOSPICE OF RN EA 15 MIN: HCPCS

## 2018-07-27 PROCEDURE — G0156 HHCP-SVS OF AIDE,EA 15 MIN: HCPCS

## 2018-07-27 PROCEDURE — S9126 HOSPICE CARE, IN THE HOME, P: HCPCS

## 2018-07-28 VITALS
DIASTOLIC BLOOD PRESSURE: 60 MMHG | HEART RATE: 83 BPM | RESPIRATION RATE: 16 BRPM | OXYGEN SATURATION: 92 % | SYSTOLIC BLOOD PRESSURE: 98 MMHG

## 2018-07-28 PROCEDURE — S9126 HOSPICE CARE, IN THE HOME, P: HCPCS

## 2018-07-28 SDOH — ECONOMIC STABILITY: HOUSING INSECURITY: UNSAFE COOKING RANGE AREA: 0

## 2018-07-28 SDOH — ECONOMIC STABILITY: HOUSING INSECURITY: UNSAFE APPLIANCES: 0

## 2018-07-28 SDOH — ECONOMIC STABILITY: GENERAL

## 2018-07-28 ASSESSMENT — ACTIVITIES OF DAILY LIVING (ADL): MONEY MANAGEMENT (EXPENSES/BILLS): NEEDS ASSISTANCE

## 2018-07-28 ASSESSMENT — ENCOUNTER SYMPTOMS
STOOL FREQUENCY: DAILY
FORGETFULNESS: 1
LAST BOWEL MOVEMENT: 64855

## 2018-07-29 PROCEDURE — S9126 HOSPICE CARE, IN THE HOME, P: HCPCS

## 2018-07-30 PROCEDURE — 6650990 HC HOSPICE AND HOME CARE RX REV CODE 0250

## 2018-07-30 PROCEDURE — S9126 HOSPICE CARE, IN THE HOME, P: HCPCS

## 2018-07-31 ENCOUNTER — HOME CARE VISIT (OUTPATIENT)
Dept: HOSPICE | Facility: HOSPICE | Age: 68
End: 2018-07-31
Payer: MEDICARE

## 2018-07-31 PROCEDURE — S9126 HOSPICE CARE, IN THE HOME, P: HCPCS

## 2018-07-31 PROCEDURE — 6650990 HC HOSPICE AND HOME CARE RX REV CODE 0250

## 2018-08-01 ENCOUNTER — HOME CARE VISIT (OUTPATIENT)
Dept: HOSPICE | Facility: HOSPICE | Age: 68
End: 2018-08-01
Payer: MEDICARE

## 2018-08-01 PROCEDURE — G0299 HHS/HOSPICE OF RN EA 15 MIN: HCPCS

## 2018-08-01 PROCEDURE — S9126 HOSPICE CARE, IN THE HOME, P: HCPCS

## 2018-08-02 VITALS
RESPIRATION RATE: 16 BRPM | OXYGEN SATURATION: 92 % | SYSTOLIC BLOOD PRESSURE: 110 MMHG | HEART RATE: 64 BPM | DIASTOLIC BLOOD PRESSURE: 60 MMHG

## 2018-08-02 PROCEDURE — S9126 HOSPICE CARE, IN THE HOME, P: HCPCS

## 2018-08-02 ASSESSMENT — ENCOUNTER SYMPTOMS: LAST BOWEL MOVEMENT: 64860

## 2018-08-03 ENCOUNTER — HOME CARE VISIT (OUTPATIENT)
Dept: HOSPICE | Facility: HOSPICE | Age: 68
End: 2018-08-03
Payer: MEDICARE

## 2018-08-03 PROCEDURE — S9126 HOSPICE CARE, IN THE HOME, P: HCPCS

## 2018-08-03 PROCEDURE — G0299 HHS/HOSPICE OF RN EA 15 MIN: HCPCS

## 2018-08-03 PROCEDURE — G0156 HHCP-SVS OF AIDE,EA 15 MIN: HCPCS

## 2018-08-04 SDOH — ECONOMIC STABILITY: HOUSING INSECURITY: UNSAFE COOKING RANGE AREA: 0

## 2018-08-04 SDOH — ECONOMIC STABILITY: HOUSING INSECURITY: UNSAFE APPLIANCES: 0

## 2018-08-04 SDOH — ECONOMIC STABILITY: GENERAL

## 2018-08-04 ASSESSMENT — ACTIVITIES OF DAILY LIVING (ADL): MONEY MANAGEMENT (EXPENSES/BILLS): NEEDS ASSISTANCE

## 2018-08-04 ASSESSMENT — ENCOUNTER SYMPTOMS
STOOL FREQUENCY: LESS THAN DAILY
FORGETFULNESS: 1
SLEEP QUALITY: FAIR
FATIGUE: 1

## 2019-12-01 ENCOUNTER — APPOINTMENT (OUTPATIENT)
Dept: RADIOLOGY | Facility: MEDICAL CENTER | Age: 69
DRG: 871 | End: 2019-12-01
Attending: EMERGENCY MEDICINE
Payer: MEDICARE

## 2019-12-01 ENCOUNTER — HOSPITAL ENCOUNTER (INPATIENT)
Facility: MEDICAL CENTER | Age: 69
LOS: 6 days | DRG: 871 | End: 2019-12-07
Attending: EMERGENCY MEDICINE | Admitting: INTERNAL MEDICINE
Payer: MEDICARE

## 2019-12-01 DIAGNOSIS — K83.09 CHOLANGITIS: ICD-10-CM

## 2019-12-01 DIAGNOSIS — G20.A1 PARKINSON DISEASE (HCC): ICD-10-CM

## 2019-12-01 DIAGNOSIS — K72.00 SEPSIS WITH ACUTE LIVER FAILURE WITHOUT HEPATIC COMA OR SEPTIC SHOCK, DUE TO UNSPECIFIED ORGANISM (HCC): ICD-10-CM

## 2019-12-01 DIAGNOSIS — A41.9 SEPSIS WITH ACUTE LIVER FAILURE WITHOUT HEPATIC COMA OR SEPTIC SHOCK, DUE TO UNSPECIFIED ORGANISM (HCC): ICD-10-CM

## 2019-12-01 DIAGNOSIS — R65.20 SEPSIS WITH ACUTE LIVER FAILURE WITHOUT HEPATIC COMA OR SEPTIC SHOCK, DUE TO UNSPECIFIED ORGANISM (HCC): ICD-10-CM

## 2019-12-01 PROBLEM — D64.9 ANEMIA: Status: ACTIVE | Noted: 2019-12-01

## 2019-12-01 PROBLEM — D70.3 NEUTROPENIA ASSOCIATED WITH INFECTION (HCC): Status: ACTIVE | Noted: 2019-12-01

## 2019-12-01 PROBLEM — R65.21 SEPTIC SHOCK (HCC): Status: ACTIVE | Noted: 2019-12-01

## 2019-12-01 PROBLEM — K62.89 PROCTITIS: Status: ACTIVE | Noted: 2019-12-01

## 2019-12-01 LAB
ALBUMIN SERPL BCP-MCNC: 3.9 G/DL (ref 3.2–4.9)
ALBUMIN/GLOB SERPL: 1.2 G/DL
ALP SERPL-CCNC: 657 U/L (ref 30–99)
ALT SERPL-CCNC: 307 U/L (ref 2–50)
ANION GAP SERPL CALC-SCNC: 13 MMOL/L (ref 0–11.9)
ANISOCYTOSIS BLD QL SMEAR: ABNORMAL
APPEARANCE UR: ABNORMAL
AST SERPL-CCNC: 3883 U/L (ref 12–45)
BACTERIA #/AREA URNS HPF: ABNORMAL /HPF
BASOPHILS # BLD AUTO: 1.4 % (ref 0–1.8)
BASOPHILS # BLD: 0.01 K/UL (ref 0–0.12)
BILIRUB SERPL-MCNC: 2.9 MG/DL (ref 0.1–1.5)
BILIRUB UR QL STRIP.AUTO: ABNORMAL
BUN SERPL-MCNC: 25 MG/DL (ref 8–22)
CALCIUM SERPL-MCNC: 9.3 MG/DL (ref 8.5–10.5)
CHLORIDE SERPL-SCNC: 102 MMOL/L (ref 96–112)
CO2 SERPL-SCNC: 28 MMOL/L (ref 20–33)
COLOR UR: ABNORMAL
CREAT SERPL-MCNC: 0.83 MG/DL (ref 0.5–1.4)
EKG IMPRESSION: NORMAL
EOSINOPHIL # BLD AUTO: 0.01 K/UL (ref 0–0.51)
EOSINOPHIL NFR BLD: 0.7 % (ref 0–6.9)
EPI CELLS #/AREA URNS HPF: ABNORMAL /HPF
ERYTHROCYTE [DISTWIDTH] IN BLOOD BY AUTOMATED COUNT: 50.4 FL (ref 35.9–50)
GLOBULIN SER CALC-MCNC: 3.2 G/DL (ref 1.9–3.5)
GLUCOSE SERPL-MCNC: 167 MG/DL (ref 65–99)
GLUCOSE UR STRIP.AUTO-MCNC: ABNORMAL MG/DL
HCT VFR BLD AUTO: 30 % (ref 37–47)
HGB BLD-MCNC: 9.3 G/DL (ref 12–16)
INR PPP: 1.17 (ref 0.87–1.13)
KETONES UR STRIP.AUTO-MCNC: ABNORMAL MG/DL
LACTATE BLD-SCNC: 4.5 MMOL/L (ref 0.5–2)
LACTATE BLD-SCNC: 5.2 MMOL/L (ref 0.5–2)
LEUKOCYTE ESTERASE UR QL STRIP.AUTO: ABNORMAL
LYMPHOCYTES # BLD AUTO: 0.21 K/UL (ref 1–4.8)
LYMPHOCYTES NFR BLD: 23.8 % (ref 22–41)
MACROCYTES BLD QL SMEAR: ABNORMAL
MAGNESIUM SERPL-MCNC: 2 MG/DL (ref 1.5–2.5)
MANUAL DIFF BLD: ABNORMAL
MCH RBC QN AUTO: 32.1 PG (ref 27–33)
MCHC RBC AUTO-ENTMCNC: 31 G/DL (ref 33.6–35)
MCV RBC AUTO: 103.4 FL (ref 81.4–97.8)
METAMYELOCYTES NFR BLD MANUAL: 2.9 %
MICRO URNS: ABNORMAL
MONOCYTES # BLD AUTO: 0.08 K/UL (ref 0–0.85)
MONOCYTES NFR BLD AUTO: 8.6 % (ref 0–13.4)
MORPHOLOGY BLD-IMP: NORMAL
MYELOCYTES NFR BLD MANUAL: 4.3 %
NEUTROPHILS # BLD AUTO: 0.52 K/UL (ref 2–7.15)
NEUTROPHILS NFR BLD: 44.6 % (ref 44–72)
NEUTS BAND NFR BLD MANUAL: 13.7 % (ref 0–10)
NITRITE UR QL STRIP.AUTO: ABNORMAL
NRBC # BLD AUTO: 0 K/UL
NRBC BLD-RTO: 0 /100 WBC
PH UR STRIP.AUTO: ABNORMAL [PH] (ref 5–8)
PLATELET # BLD AUTO: 196 K/UL (ref 164–446)
PLATELET BLD QL SMEAR: NORMAL
PMV BLD AUTO: 12.1 FL (ref 9–12.9)
POLYCHROMASIA BLD QL SMEAR: NORMAL
POTASSIUM SERPL-SCNC: 3.4 MMOL/L (ref 3.6–5.5)
PROT SERPL-MCNC: 7.1 G/DL (ref 6–8.2)
PROT UR QL STRIP: ABNORMAL MG/DL
PROTHROMBIN TIME: 15.2 SEC (ref 12–14.6)
RBC # BLD AUTO: 2.9 M/UL (ref 4.2–5.4)
RBC # URNS HPF: ABNORMAL /HPF
RBC BLD AUTO: PRESENT
RBC UR QL AUTO: ABNORMAL
SODIUM SERPL-SCNC: 143 MMOL/L (ref 135–145)
SP GR UR STRIP.AUTO: ABNORMAL
TROPONIN T SERPL-MCNC: 24 NG/L (ref 6–19)
UROBILINOGEN UR STRIP.AUTO-MCNC: ABNORMAL MG/DL
WBC # BLD AUTO: 0.9 K/UL (ref 4.8–10.8)
WBC #/AREA URNS HPF: ABNORMAL /HPF

## 2019-12-01 PROCEDURE — 87086 URINE CULTURE/COLONY COUNT: CPT

## 2019-12-01 PROCEDURE — 87077 CULTURE AEROBIC IDENTIFY: CPT | Mod: 91

## 2019-12-01 PROCEDURE — 80053 COMPREHEN METABOLIC PANEL: CPT

## 2019-12-01 PROCEDURE — 770022 HCHG ROOM/CARE - ICU (200)

## 2019-12-01 PROCEDURE — 700111 HCHG RX REV CODE 636 W/ 250 OVERRIDE (IP): Performed by: EMERGENCY MEDICINE

## 2019-12-01 PROCEDURE — 81001 URINALYSIS AUTO W/SCOPE: CPT

## 2019-12-01 PROCEDURE — 99292 CRITICAL CARE ADDL 30 MIN: CPT | Performed by: INTERNAL MEDICINE

## 2019-12-01 PROCEDURE — 83735 ASSAY OF MAGNESIUM: CPT

## 2019-12-01 PROCEDURE — 87040 BLOOD CULTURE FOR BACTERIA: CPT

## 2019-12-01 PROCEDURE — 93005 ELECTROCARDIOGRAM TRACING: CPT

## 2019-12-01 PROCEDURE — 99291 CRITICAL CARE FIRST HOUR: CPT

## 2019-12-01 PROCEDURE — 87186 SC STD MICRODIL/AGAR DIL: CPT | Mod: 91

## 2019-12-01 PROCEDURE — 74177 CT ABD & PELVIS W/CONTRAST: CPT

## 2019-12-01 PROCEDURE — 304561 HCHG STAT O2

## 2019-12-01 PROCEDURE — 700105 HCHG RX REV CODE 258: Performed by: INTERNAL MEDICINE

## 2019-12-01 PROCEDURE — 99291 CRITICAL CARE FIRST HOUR: CPT | Performed by: INTERNAL MEDICINE

## 2019-12-01 PROCEDURE — 700117 HCHG RX CONTRAST REV CODE 255: Performed by: EMERGENCY MEDICINE

## 2019-12-01 PROCEDURE — 71045 X-RAY EXAM CHEST 1 VIEW: CPT

## 2019-12-01 PROCEDURE — 93005 ELECTROCARDIOGRAM TRACING: CPT | Performed by: EMERGENCY MEDICINE

## 2019-12-01 PROCEDURE — 85610 PROTHROMBIN TIME: CPT

## 2019-12-01 PROCEDURE — 96365 THER/PROPH/DIAG IV INF INIT: CPT

## 2019-12-01 PROCEDURE — 83605 ASSAY OF LACTIC ACID: CPT | Mod: 91

## 2019-12-01 PROCEDURE — 85027 COMPLETE CBC AUTOMATED: CPT

## 2019-12-01 PROCEDURE — 84484 ASSAY OF TROPONIN QUANT: CPT

## 2019-12-01 PROCEDURE — 700105 HCHG RX REV CODE 258: Performed by: EMERGENCY MEDICINE

## 2019-12-01 PROCEDURE — 85007 BL SMEAR W/DIFF WBC COUNT: CPT

## 2019-12-01 RX ORDER — POLYETHYLENE GLYCOL 3350 17 G/17G
1 POWDER, FOR SOLUTION ORAL
Status: DISCONTINUED | OUTPATIENT
Start: 2019-12-01 | End: 2019-12-06

## 2019-12-01 RX ORDER — AMPICILLIN 500 MG/1
500 CAPSULE ORAL 2 TIMES DAILY
Refills: 0 | COMMUNITY
Start: 2019-11-01 | End: 2019-12-07

## 2019-12-01 RX ORDER — GABAPENTIN 300 MG/1
600 CAPSULE ORAL 3 TIMES DAILY
COMMUNITY

## 2019-12-01 RX ORDER — BISACODYL 10 MG
10 SUPPOSITORY, RECTAL RECTAL
Status: DISCONTINUED | OUTPATIENT
Start: 2019-12-01 | End: 2019-12-06

## 2019-12-01 RX ORDER — SODIUM CHLORIDE 9 MG/ML
1000 INJECTION, SOLUTION INTRAVENOUS ONCE
Status: COMPLETED | OUTPATIENT
Start: 2019-12-01 | End: 2019-12-01

## 2019-12-01 RX ORDER — LANOLIN ALCOHOL/MO/W.PET/CERES
3 CREAM (GRAM) TOPICAL
Refills: 0 | COMMUNITY
Start: 2019-10-22

## 2019-12-01 RX ORDER — POLYETHYLENE GLYCOL 3350 17 G/17G
17 POWDER, FOR SOLUTION ORAL DAILY
COMMUNITY

## 2019-12-01 RX ORDER — SODIUM CHLORIDE, SODIUM LACTATE, POTASSIUM CHLORIDE, AND CALCIUM CHLORIDE .6; .31; .03; .02 G/100ML; G/100ML; G/100ML; G/100ML
30 INJECTION, SOLUTION INTRAVENOUS
Status: COMPLETED | OUTPATIENT
Start: 2019-12-01 | End: 2019-12-01

## 2019-12-01 RX ORDER — PHENAZOPYRIDINE HYDROCHLORIDE 100 MG/1
100 TABLET, FILM COATED ORAL DAILY
Refills: 0 | Status: ON HOLD | COMMUNITY
Start: 2019-11-29 | End: 2019-12-07

## 2019-12-01 RX ORDER — NITROFURANTION MACROCRYSTALS 100 MG/1
95 CAPSULE ORAL DAILY
Refills: 18 | COMMUNITY
Start: 2019-11-19

## 2019-12-01 RX ORDER — LORATADINE 10 MG/1
10 TABLET ORAL DAILY
Refills: 5 | COMMUNITY

## 2019-12-01 RX ORDER — TRAZODONE HYDROCHLORIDE 50 MG/1
50 TABLET ORAL
Refills: 0 | COMMUNITY
Start: 2019-10-22

## 2019-12-01 RX ORDER — FENTANYL 50 UG/1
1 PATCH TRANSDERMAL
Refills: 0 | COMMUNITY
Start: 2019-11-22

## 2019-12-01 RX ORDER — AMOXICILLIN 250 MG
2 CAPSULE ORAL 2 TIMES DAILY
Status: DISCONTINUED | OUTPATIENT
Start: 2019-12-01 | End: 2019-12-06

## 2019-12-01 RX ORDER — FLUOXETINE HYDROCHLORIDE 20 MG/1
20 CAPSULE ORAL DAILY
Refills: 9 | COMMUNITY
Start: 2019-11-26

## 2019-12-01 RX ORDER — SODIUM CHLORIDE, SODIUM LACTATE, POTASSIUM CHLORIDE, CALCIUM CHLORIDE 600; 310; 30; 20 MG/100ML; MG/100ML; MG/100ML; MG/100ML
1000 INJECTION, SOLUTION INTRAVENOUS ONCE
Status: COMPLETED | OUTPATIENT
Start: 2019-12-01 | End: 2019-12-01

## 2019-12-01 RX ADMIN — SODIUM CHLORIDE, POTASSIUM CHLORIDE, SODIUM LACTATE AND CALCIUM CHLORIDE 1401 ML: 600; 310; 30; 20 INJECTION, SOLUTION INTRAVENOUS at 18:57

## 2019-12-01 RX ADMIN — PIPERACILLIN AND TAZOBACTAM 4.5 G: 4; .5 INJECTION, POWDER, LYOPHILIZED, FOR SOLUTION INTRAVENOUS; PARENTERAL at 18:42

## 2019-12-01 RX ADMIN — SODIUM CHLORIDE 1000 ML: 9 INJECTION, SOLUTION INTRAVENOUS at 18:22

## 2019-12-01 RX ADMIN — SODIUM CHLORIDE, POTASSIUM CHLORIDE, SODIUM LACTATE AND CALCIUM CHLORIDE 1000 ML: 600; 310; 30; 20 INJECTION, SOLUTION INTRAVENOUS at 21:52

## 2019-12-01 RX ADMIN — IOHEXOL 80 ML: 350 INJECTION, SOLUTION INTRAVENOUS at 20:14

## 2019-12-01 ASSESSMENT — LIFESTYLE VARIABLES
TOTAL SCORE: 0
TOTAL SCORE: 0
DO YOU DRINK ALCOHOL: NO
TOTAL SCORE: 0
EVER HAD A DRINK FIRST THING IN THE MORNING TO STEADY YOUR NERVES TO GET RID OF A HANGOVER: NO
HAVE PEOPLE ANNOYED YOU BY CRITICIZING YOUR DRINKING: NO
HAVE YOU EVER FELT YOU SHOULD CUT DOWN ON YOUR DRINKING: NO
CONSUMPTION TOTAL: INCOMPLETE
EVER FELT BAD OR GUILTY ABOUT YOUR DRINKING: NO

## 2019-12-02 PROBLEM — R19.7 DIARRHEA: Status: ACTIVE | Noted: 2019-12-02

## 2019-12-02 PROBLEM — R74.01 ELEVATED AST (SGOT): Status: ACTIVE | Noted: 2019-12-02

## 2019-12-02 PROBLEM — R41.0 DISORIENTATION: Status: ACTIVE | Noted: 2019-12-02

## 2019-12-02 PROBLEM — G93.40 ENCEPHALOPATHY: Status: ACTIVE | Noted: 2019-12-02

## 2019-12-02 LAB
ALBUMIN SERPL BCP-MCNC: 3.2 G/DL (ref 3.2–4.9)
ALBUMIN/GLOB SERPL: 1.2 G/DL
ALP SERPL-CCNC: 475 U/L (ref 30–99)
ALT SERPL-CCNC: 558 U/L (ref 2–50)
ANION GAP SERPL CALC-SCNC: 7 MMOL/L (ref 0–11.9)
APTT PPP: 31.9 SEC (ref 24.7–36)
AST SERPL-CCNC: 1987 U/L (ref 12–45)
BASOPHILS # BLD AUTO: 0 % (ref 0–1.8)
BASOPHILS # BLD: 0 K/UL (ref 0–0.12)
BILIRUB SERPL-MCNC: 4.5 MG/DL (ref 0.1–1.5)
BUN SERPL-MCNC: 21 MG/DL (ref 8–22)
C DIFF DNA SPEC QL NAA+PROBE: NEGATIVE
C DIFF TOX A+B STL QL IA: NEGATIVE
C DIFF TOX GENS STL QL NAA+PROBE: NORMAL
CALCIUM SERPL-MCNC: 8.6 MG/DL (ref 8.5–10.5)
CHLORIDE SERPL-SCNC: 109 MMOL/L (ref 96–112)
CK SERPL-CCNC: 493 U/L (ref 0–154)
CO2 SERPL-SCNC: 28 MMOL/L (ref 20–33)
CORTIS SERPL-MCNC: 31.6 UG/DL (ref 0–23)
CREAT SERPL-MCNC: 0.88 MG/DL (ref 0.5–1.4)
D DIMER PPP IA.FEU-MCNC: 13.52 UG/ML (FEU) (ref 0–0.5)
EOSINOPHIL # BLD AUTO: 0 K/UL (ref 0–0.51)
EOSINOPHIL NFR BLD: 0 % (ref 0–6.9)
ERYTHROCYTE [DISTWIDTH] IN BLOOD BY AUTOMATED COUNT: 49.7 FL (ref 35.9–50)
FIBRINOGEN PPP-MCNC: 374 MG/DL (ref 215–460)
GLOBULIN SER CALC-MCNC: 2.6 G/DL (ref 1.9–3.5)
GLUCOSE SERPL-MCNC: 108 MG/DL (ref 65–99)
HCT VFR BLD AUTO: 24.6 % (ref 37–47)
HGB BLD-MCNC: 7.7 G/DL (ref 12–16)
INR PPP: 1.2 (ref 0.87–1.13)
LACTATE BLD-SCNC: 1.7 MMOL/L (ref 0.5–2)
LYMPHOCYTES # BLD AUTO: 0.25 K/UL (ref 1–4.8)
LYMPHOCYTES NFR BLD: 2 % (ref 22–41)
MANUAL DIFF BLD: NORMAL
MCH RBC QN AUTO: 31.7 PG (ref 27–33)
MCHC RBC AUTO-ENTMCNC: 31.3 G/DL (ref 33.6–35)
MCV RBC AUTO: 101.2 FL (ref 81.4–97.8)
METAMYELOCYTES NFR BLD MANUAL: 2 %
MONOCYTES # BLD AUTO: 0.75 K/UL (ref 0–0.85)
MONOCYTES NFR BLD AUTO: 6 % (ref 0–13.4)
MORPHOLOGY BLD-IMP: NORMAL
NEUTROPHILS # BLD AUTO: 11.25 K/UL (ref 2–7.15)
NEUTROPHILS NFR BLD: 81 % (ref 44–72)
NEUTS BAND NFR BLD MANUAL: 9 % (ref 0–10)
NRBC # BLD AUTO: 0 K/UL
NRBC BLD-RTO: 0 /100 WBC
PLATELET # BLD AUTO: 171 K/UL (ref 164–446)
PMV BLD AUTO: 12 FL (ref 9–12.9)
POTASSIUM SERPL-SCNC: 3.1 MMOL/L (ref 3.6–5.5)
PROT SERPL-MCNC: 5.8 G/DL (ref 6–8.2)
PROTHROMBIN TIME: 15.5 SEC (ref 12–14.6)
RBC # BLD AUTO: 2.43 M/UL (ref 4.2–5.4)
SODIUM SERPL-SCNC: 144 MMOL/L (ref 135–145)
WBC # BLD AUTO: 12.5 K/UL (ref 4.8–10.8)

## 2019-12-02 PROCEDURE — 99291 CRITICAL CARE FIRST HOUR: CPT | Mod: GC | Performed by: INTERNAL MEDICINE

## 2019-12-02 PROCEDURE — 700111 HCHG RX REV CODE 636 W/ 250 OVERRIDE (IP): Performed by: STUDENT IN AN ORGANIZED HEALTH CARE EDUCATION/TRAINING PROGRAM

## 2019-12-02 PROCEDURE — 82533 TOTAL CORTISOL: CPT

## 2019-12-02 PROCEDURE — A9270 NON-COVERED ITEM OR SERVICE: HCPCS | Performed by: INTERNAL MEDICINE

## 2019-12-02 PROCEDURE — 700105 HCHG RX REV CODE 258: Performed by: STUDENT IN AN ORGANIZED HEALTH CARE EDUCATION/TRAINING PROGRAM

## 2019-12-02 PROCEDURE — 85379 FIBRIN DEGRADATION QUANT: CPT

## 2019-12-02 PROCEDURE — 85007 BL SMEAR W/DIFF WBC COUNT: CPT

## 2019-12-02 PROCEDURE — 80053 COMPREHEN METABOLIC PANEL: CPT

## 2019-12-02 PROCEDURE — 85384 FIBRINOGEN ACTIVITY: CPT

## 2019-12-02 PROCEDURE — 770022 HCHG ROOM/CARE - ICU (200)

## 2019-12-02 PROCEDURE — 85610 PROTHROMBIN TIME: CPT

## 2019-12-02 PROCEDURE — 85027 COMPLETE CBC AUTOMATED: CPT

## 2019-12-02 PROCEDURE — 87324 CLOSTRIDIUM AG IA: CPT

## 2019-12-02 PROCEDURE — 83605 ASSAY OF LACTIC ACID: CPT

## 2019-12-02 PROCEDURE — 700102 HCHG RX REV CODE 250 W/ 637 OVERRIDE(OP): Performed by: INTERNAL MEDICINE

## 2019-12-02 PROCEDURE — 51798 US URINE CAPACITY MEASURE: CPT

## 2019-12-02 PROCEDURE — 85730 THROMBOPLASTIN TIME PARTIAL: CPT

## 2019-12-02 PROCEDURE — 700105 HCHG RX REV CODE 258: Performed by: INTERNAL MEDICINE

## 2019-12-02 PROCEDURE — 700111 HCHG RX REV CODE 636 W/ 250 OVERRIDE (IP): Performed by: INTERNAL MEDICINE

## 2019-12-02 PROCEDURE — 700111 HCHG RX REV CODE 636 W/ 250 OVERRIDE (IP): Performed by: SURGERY

## 2019-12-02 PROCEDURE — 82550 ASSAY OF CK (CPK): CPT

## 2019-12-02 PROCEDURE — 87493 C DIFF AMPLIFIED PROBE: CPT

## 2019-12-02 PROCEDURE — 700101 HCHG RX REV CODE 250: Performed by: INTERNAL MEDICINE

## 2019-12-02 RX ORDER — FENTANYL 50 UG/1
1 PATCH TRANSDERMAL
Status: DISCONTINUED | OUTPATIENT
Start: 2019-12-02 | End: 2019-12-07 | Stop reason: HOSPADM

## 2019-12-02 RX ORDER — POTASSIUM CHLORIDE 7.45 MG/ML
10 INJECTION INTRAVENOUS
Status: COMPLETED | OUTPATIENT
Start: 2019-12-02 | End: 2019-12-02

## 2019-12-02 RX ORDER — LORAZEPAM 2 MG/ML
2 INJECTION INTRAMUSCULAR
Status: COMPLETED | OUTPATIENT
Start: 2019-12-02 | End: 2019-12-03

## 2019-12-02 RX ORDER — FENTANYL 50 UG/1
1 PATCH TRANSDERMAL
Status: DISCONTINUED | OUTPATIENT
Start: 2019-12-02 | End: 2019-12-02

## 2019-12-02 RX ORDER — LORAZEPAM 2 MG/ML
0.25 INJECTION INTRAMUSCULAR
Status: DISCONTINUED | OUTPATIENT
Start: 2019-12-02 | End: 2019-12-07 | Stop reason: HOSPADM

## 2019-12-02 RX ORDER — SODIUM CHLORIDE, SODIUM LACTATE, POTASSIUM CHLORIDE, CALCIUM CHLORIDE 600; 310; 30; 20 MG/100ML; MG/100ML; MG/100ML; MG/100ML
INJECTION, SOLUTION INTRAVENOUS CONTINUOUS
Status: DISCONTINUED | OUTPATIENT
Start: 2019-12-02 | End: 2019-12-02

## 2019-12-02 RX ORDER — ONDANSETRON 2 MG/ML
4 INJECTION INTRAMUSCULAR; INTRAVENOUS EVERY 4 HOURS PRN
Status: DISCONTINUED | OUTPATIENT
Start: 2019-12-02 | End: 2019-12-07 | Stop reason: HOSPADM

## 2019-12-02 RX ORDER — LORAZEPAM 2 MG/ML
0.5 INJECTION INTRAMUSCULAR EVERY 4 HOURS PRN
Status: DISCONTINUED | OUTPATIENT
Start: 2019-12-02 | End: 2019-12-02

## 2019-12-02 RX ADMIN — POTASSIUM CHLORIDE 10 MEQ: 7.46 INJECTION, SOLUTION INTRAVENOUS at 13:00

## 2019-12-02 RX ADMIN — FENTANYL CITRATE 50 MCG: 0.05 INJECTION, SOLUTION INTRAMUSCULAR; INTRAVENOUS at 13:19

## 2019-12-02 RX ADMIN — FENTANYL CITRATE 50 MCG: 0.05 INJECTION, SOLUTION INTRAMUSCULAR; INTRAVENOUS at 21:00

## 2019-12-02 RX ADMIN — SODIUM CHLORIDE, POTASSIUM CHLORIDE, SODIUM LACTATE AND CALCIUM CHLORIDE: 600; 310; 30; 20 INJECTION, SOLUTION INTRAVENOUS at 02:00

## 2019-12-02 RX ADMIN — PIPERACILLIN SODIUM AND TAZOBACTAM SODIUM 3.38 G: 3; .375 INJECTION, POWDER, FOR SOLUTION INTRAVENOUS at 12:33

## 2019-12-02 RX ADMIN — FENTANYL CITRATE 25 MCG: 0.05 INJECTION, SOLUTION INTRAMUSCULAR; INTRAVENOUS at 09:24

## 2019-12-02 RX ADMIN — FENTANYL CITRATE 50 MCG: 0.05 INJECTION, SOLUTION INTRAMUSCULAR; INTRAVENOUS at 16:05

## 2019-12-02 RX ADMIN — FENTANYL CITRATE 50 MCG: 0.05 INJECTION, SOLUTION INTRAMUSCULAR; INTRAVENOUS at 22:03

## 2019-12-02 RX ADMIN — POTASSIUM CHLORIDE: 149 INJECTION, SOLUTION, CONCENTRATE INTRAVENOUS at 09:05

## 2019-12-02 RX ADMIN — POTASSIUM CHLORIDE: 149 INJECTION, SOLUTION, CONCENTRATE INTRAVENOUS at 22:08

## 2019-12-02 RX ADMIN — ONDANSETRON 4 MG: 2 INJECTION INTRAMUSCULAR; INTRAVENOUS at 22:03

## 2019-12-02 RX ADMIN — LORAZEPAM 0.5 MG: 2 INJECTION INTRAMUSCULAR; INTRAVENOUS at 13:20

## 2019-12-02 RX ADMIN — LORAZEPAM 0.26 MG: 2 INJECTION INTRAMUSCULAR; INTRAVENOUS at 22:01

## 2019-12-02 RX ADMIN — NOREPINEPHRINE BITARTRATE 5 MCG/MIN: 1 INJECTION INTRAVENOUS at 01:30

## 2019-12-02 RX ADMIN — ENOXAPARIN SODIUM 40 MG: 100 INJECTION SUBCUTANEOUS at 17:49

## 2019-12-02 RX ADMIN — POTASSIUM CHLORIDE 10 MEQ: 7.46 INJECTION, SOLUTION INTRAVENOUS at 11:46

## 2019-12-02 RX ADMIN — FENTANYL CITRATE 25 MCG: 0.05 INJECTION, SOLUTION INTRAMUSCULAR; INTRAVENOUS at 11:42

## 2019-12-02 RX ADMIN — POTASSIUM CHLORIDE 10 MEQ: 7.46 INJECTION, SOLUTION INTRAVENOUS at 10:13

## 2019-12-02 RX ADMIN — PIPERACILLIN SODIUM AND TAZOBACTAM SODIUM 3.38 G: 3; .375 INJECTION, POWDER, FOR SOLUTION INTRAVENOUS at 20:50

## 2019-12-02 RX ADMIN — ONDANSETRON 4 MG: 2 INJECTION INTRAMUSCULAR; INTRAVENOUS at 17:50

## 2019-12-02 RX ADMIN — FENTANYL CITRATE 50 MCG: 0.05 INJECTION, SOLUTION INTRAMUSCULAR; INTRAVENOUS at 20:00

## 2019-12-02 RX ADMIN — FENTANYL 1 PATCH: 50 PATCH TRANSDERMAL at 04:19

## 2019-12-02 RX ADMIN — PIPERACILLIN SODIUM AND TAZOBACTAM SODIUM 3.38 G: 3; .375 INJECTION, POWDER, FOR SOLUTION INTRAVENOUS at 01:32

## 2019-12-02 RX ADMIN — FENTANYL CITRATE 50 MCG: 0.05 INJECTION, SOLUTION INTRAMUSCULAR; INTRAVENOUS at 14:31

## 2019-12-02 RX ADMIN — POTASSIUM CHLORIDE 10 MEQ: 7.46 INJECTION, SOLUTION INTRAVENOUS at 12:34

## 2019-12-02 RX ADMIN — LORAZEPAM 0.25 MG: 2 INJECTION INTRAMUSCULAR; INTRAVENOUS at 19:46

## 2019-12-02 ASSESSMENT — COPD QUESTIONNAIRES
DURING THE PAST 4 WEEKS HOW MUCH DID YOU FEEL SHORT OF BREATH: MOST  OR ALL OF THE TIME
DO YOU EVER COUGH UP ANY MUCUS OR PHLEGM?: NO/ONLY WITH OCCASIONAL COLDS OR INFECTIONS
COPD SCREENING SCORE: 7
HAVE YOU SMOKED AT LEAST 100 CIGARETTES IN YOUR ENTIRE LIFE: YES

## 2019-12-02 ASSESSMENT — LIFESTYLE VARIABLES: EVER_SMOKED: YES

## 2019-12-02 NOTE — CONSULTS
Date of service: 12/2/2019    Attending Physician: Maximus Christianson M.D.    History of Present Illness: Delmis Draper is a 69 y.o. female here for biliary sepsis      69 year old female on hospice for past year was admitted via ER in shock with hypotension and tachycardia stabilizing in ER after fluid. Also put on pressors. I discussed the admission with the ER physician.    She had experienced nausea, vomiting and had complained of abdominal pain. In the ER her enzymes revealed a obstructive pattern and possibly liver shock with very high AST > 3000.    The patient has been on hospice for the past year and this was apparently reversed when her current decompensation came about and the hospice was called.    Nevertheless invasive procedures such as central line to advance ICU management have been declined sofar. Per my discussion with RN the  would allow and hope for stabilization so she would be able to undergo and ERCP.    She is on precautions for c. Diff  2/2 diarrhea episode prior to admission.    Blood cultures have been positive.    Review of Systems:   Review of Systems   Unable to perform ROS: Critical illness       Current Facility-Administered Medications   Medication Dose Route Frequency Provider Last Rate Last Dose   • norepinephrine (LEVOPHED) 8 mg in  mL Infusion  0-30 mcg/min Intravenous Continuous Ines Meyer M.D. 5.6 mL/hr at 12/02/19 0400 3 mcg/min at 12/02/19 0400   • enoxaparin (LOVENOX) inj 40 mg  40 mg Subcutaneous Q24HR Ines Meyer M.D.       • ondansetron (ZOFRAN) syringe/vial injection 4 mg  4 mg Intravenous Q4HRS PRN Ines Meyer M.D.       • lactated ringers infusion   Intravenous Continuous Maulik Gentile M.D. 75 mL/hr at 12/02/19 0200     • fentaNYL (DURAGESIC) 50 MCG/HR 1 Patch  1 Patch Transdermal Q72HRS Ines Meyer M.D.   1 Patch at 12/02/19 0419   • piperacillin-tazobactam (ZOSYN) 3.375 g in  mL IVPB  3.375 g Intravenous Q8HRS Ines Meyer  "M.D.   Stopped at 19 0532   • Pharmacy Consult Request  1 Each Other PHARMACY TO DOSE Ines Meyer M.D.       • senna-docusate (PERICOLACE or SENOKOT S) 8.6-50 MG per tablet 2 Tab  2 Tab Oral BID Ines Meyer M.D.   Stopped at 19 0600    And   • polyethylene glycol/lytes (MIRALAX) PACKET 1 Packet  1 Packet Oral QDAY PRN Ines Meyer M.D.        And   • magnesium hydroxide (MILK OF MAGNESIA) suspension 30 mL  30 mL Oral QDAY PRN Ines Meyer M.D.        And   • bisacodyl (DULCOLAX) suppository 10 mg  10 mg Rectal QDAY PRN Ines Meyer M.D.       • Respiratory Care per Protocol   Nebulization Continuous RT Ines Meyer M.D.       • fentaNYL (SUBLIMAZE) injection 25 mcg  25 mcg Intravenous Q HOUR PRN Ines Meyer M.D.           Social History     Tobacco Use   • Smoking status: Former Smoker     Packs/day: 0.50     Years: 10.00     Pack years: 5.00     Last attempt to quit: 1985     Years since quittin.9   • Smokeless tobacco: Never Used   Substance Use Topics   • Alcohol use: No     Alcohol/week: 0.0 oz     Comment: none since age 31   • Drug use: No        Past Medical History:   Diagnosis Date   • Abnormal finding on mammography, microcalcification    • Anesthesia     hypotensive in the past   • Bowel obstruction (HCC)     fecal impaction   • Bursitis    • Cataract    • Cellulitis 2012    right low extremity   • Dilated bile duct    • Disc disorder    • Dystonia    • Dystonia    • Dysuria    • GERD (gastroesophageal reflux disease)    • Heart burn    • Hypertension     pt can run very low   • Hypotension    • Insulin resistance    • Leukopenia    • Low blood pressure reading    • Malaise and fatigue    • OSTEOPOROSIS 3/22/2010   • Other specified disorder of intestines     constipation   • Pain     right hip, right knee   • Parkinson disease (HCC)     \"atypical\"   • Peripheral neuropathy    • Rash, skin    • Scoliosis    • Spinal stenosis, lumbar    • Thyrotoxicosis " "remote    S/P I-131 Rx / subtotal thyroidectomy   • Unspecified disorder of thyroid     had partial thyroidectomy   • vitamin D deficiency        Past Surgical History:   Procedure Laterality Date   • BREAST BIOPSY Left 5/5/2015    Procedure: BREAST BIOPSY;  Surgeon: Deedee Bautista M.D.;  Location: SURGERY SAME DAY [facility];  Service:    • CATARACT PHACO WITH IOL  5/12/2014    Performed by Roland Becerra M.D. at SURGERY [facility]   • CATARACT PHACO WITH IOL  4/14/2014    Performed by Roland Becerra M.D. at [facility] ORS   • GASTROSCOPY WITH BIOPSY  5/9/2012    Performed by CIERRA SUAZO at [facility]   • EGD W/ENDOSCOPIC ULTRASOUND  5/9/2012    Performed by CIERRA SUAZO at SURGERY [facility] ORS   • THYROIDECTOMY  1967    partial   • GYN SURGERY  1985, 1988    c sec x 2   • KNEE ARTHROTOMY  1964, 1968    right   • OTHER      left fifth digit       Allergies: Bactrim [sulfamethoxazole w-trimethoprim]; Actonel [risedronate sodium]; Latex; Sulfa drugs; and Tape    Family History   Problem Relation Age of Onset   • Arthritis Mother         Giant Cell Arteritis   • Lung Disease Father         COPD/Emphysema   • Hyperlipidemia Sister    • Heart Disease Maternal Aunt    • Heart Disease Maternal Uncle    • Heart Disease Paternal Aunt    • Heart Disease Paternal Uncle    • Heart Disease Maternal Grandmother    • Hypertension Maternal Grandmother    • Hyperlipidemia Maternal Grandmother    • Heart Disease Maternal Grandfather    • Hypertension Maternal Grandfather    • Hyperlipidemia Maternal Grandfather    • Heart Disease Paternal Grandfather    • Hypertension Paternal Grandfather    • Hyperlipidemia Paternal Grandfather    • Cancer Paternal Grandfather         Bone       Vitals:    12/01/19 1805 12/01/19 1817 12/01/19 1821 12/01/19 1826   Height: 1.6 m (5' 3\")      Weight: 46.7 kg (103 lb)      Weight % change since last entry.: 0 %      BP: (!) 85/66 (!) 94/47 (!) 66/48 (!) " 99/48   Pulse: (!) 122 (!) 115 (!) 116 (!) 114   BMI (Calculated): 18.25      Resp: (!) 24 19 15 (!) 22   Temp: (!) 38.7 °C (101.6 °F)      TempSrc: Temporal       12/01/19 1831 12/01/19 1836 12/01/19 1842 12/01/19 1846   Height:       Weight:       Weight % change since last entry.:       BP: (!) 97/48 (!) 87/70 (!) 98/49 (!) 80/53   Pulse: (!) 114 (!) 114 (!) 112 (!) 111   BMI (Calculated):       Resp: 18 18 19 19   Temp:       TempSrc:        12/01/19 1851 12/01/19 1906 12/01/19 1911 12/01/19 1916   Height:       Weight:       Weight % change since last entry.:       BP: 103/49 102/61 108/57 105/61   Pulse: (!) 110 (!) 117 (!) 115 (!) 114   BMI (Calculated):       Resp: 19 17 17 19   Temp:       TempSrc:        12/01/19 1921 12/01/19 1941 12/1951 12/01/19 2001   Height:       Weight:       Weight % change since last entry.:       BP: 108/55 109/54 105/52 (!) 99/53   Pulse: (!) 114 (!) 130 (!) 111 (!) 111   BMI (Calculated):       Resp: 17 18 17 17   Temp:       TempSrc:        12/01/19 2031 12/01/19 2101 12/01/19 2131 12/01/19 2146   Height:       Weight:       Weight % change since last entry.:       BP: 103/53 (!) 93/51 (!) 84/47 (!) 80/50   Pulse: (!) 110 (!) 108 (!) 107 (!) 106   BMI (Calculated):       Resp: (!) 21 (!) 21 18 19   Temp:       TempSrc:        12/01/19 2201 12/01/19 2231 12/01/19 2246 12/01/19 2301   Height:       Weight:       Weight % change since last entry.:       BP: (!) 94/51 (!) 97/55 (!) 92/51 (!) 86/47   Pulse: (!) 108 (!) 113 (!) 106 (!) 105   BMI (Calculated):       Resp: 18 17 16 18   Temp:       [facility]:        12/02/19 0009 12/02/19 0015 12/02/19 0016 12/02/19 0030   Height:       Weight:    57.6 kg (126 lb 15.8 oz)   Weight % change since last entry.:    23.29 %   BP:  (!) 89/49 (!) 85/48    Pulse: (!) 101 (!) 102 (!) 101 (!) 101   BMI (Calculated):       Resp: 18 19 (!) 21 17   Temp:       [facility]:        12/02/19 0035 12/02/19 0045 12/02/19 0100 12/02/19 0115   Height:        Weight:       Weight % change since last entry.:       BP: (!) 90/54 (!) 81/54 (!) 87/54 (!) 83/50   Pulse: (!) 105 (!) 109 (!) 109 (!) 101   BMI (Calculated):       Resp: 16 (!) 37 18 17   Temp:       TempSrc:        12/02/19 0130 12/02/19 0145 12/02/19 0200 12/02/19 0215   Height:       Weight:       Weight % change since last entry.:       BP: (!) 79/49  109/59 109/59   Pulse: 98 (!) 107 (!) 110 (!) 109   BMI (Calculated):       Resp: (!) 22 17 20 (!) 21   Temp:   37.2 °C (99 °F)    TempSrc:   Temporal     12/02/19 0230 12/02/19 0245 12/02/19 0300 12/02/19 0315   Height:       Weight:       Weight % change since last entry.:       BP: (!) 98/53 (!) 96/51 100/58 100/59   Pulse: (!) 106 (!) 105 (!) 105 (!) 105   BMI (Calculated):       Resp: (!) 23 19 15 20   Temp:       TempSrc:        12/02/19 0330 12/02/19 0345 12/02/19 0400 12/02/19 0415   Height:       Weight:       Weight % change since last entry.:       BP: 104/58 (!) 98/56 101/58 (!) 93/54   Pulse: 99 (!) 104 (!) 103 (!) 101   BMI (Calculated):       Resp: 18 19 (!) 23 15   Temp:       TempSrc:        12/02/19 0430 12/02/19 0445 12/02/19 0500 12/02/19 0515   Height:       Weight:       Weight % change since last entry.:       BP: (!) 96/55 (!) 93/50 (!) 92/51 (!) 97/56   Pulse: 100 95 93 92   BMI (Calculated):       Resp: 20 19 (!) 21 20   Temp:       TempSrc:        12/02/19 0530 12/02/19 0545 12/02/19 0600 12/02/19 0615   Height:       Weight:       Weight % change since last entry.:       BP: (!) 98/58 (!) 99/56 104/55 100/55   Pulse: 91 89 89 88   BMI (Calculated):       Resp: 18 (!) 29 20 (!) 21   Temp:   37.9 °C (100.2 °F)    TempSrc:   Axillary        Physical Examination:     *Physical Exam   Constitutional: She appears distressed.   HENT:   Head: Normocephalic and atraumatic.   Eyes: Scleral icterus is present.   Neck: No JVD present. No tracheal deviation present.   Cardiovascular:   tachy   Pulmonary/Chest: Effort normal. No respiratory  distress.   Abdominal: Soft. She exhibits no distension. There is tenderness. There is no rebound and no guarding.   Neurological:   Not awake, per RN oriented times one   Skin: Skin is warm and dry.         Lab Results   Component Value Date/Time    PROTHROMBTM 15.5 (H) 12/02/2019 05:15 AM    INR 1.20 (H) 12/02/2019 05:15 AM      Lab Results   Component Value Date/Time    WBC 12.5 (H) 12/02/2019 05:15 AM    RBC 2.43 (L) 12/02/2019 05:15 AM    HEMOGLOBIN 7.7 (L) 12/02/2019 05:15 AM    HEMATOCRIT 24.6 (L) 12/02/2019 05:15 AM    .2 (H) 12/02/2019 05:15 AM    MCH 31.7 12/02/2019 05:15 AM    MCHC 31.3 (L) 12/02/2019 05:15 AM    MPV 12.0 12/02/2019 05:15 AM    NEUTSPOLYS 44.60 12/01/2019 06:12 PM    LYMPHOCYTES 23.80 12/01/2019 06:12 PM    MONOCYTES 8.60 12/01/2019 06:12 PM    EOSINOPHILS 0.70 12/01/2019 06:12 PM    BASOPHILS 1.40 12/01/2019 06:12 PM    ANISOCYTOSIS 1+ 12/01/2019 06:12 PM      Lab Results   Component Value Date/Time    SODIUM 143 12/01/2019 06:12 PM    POTASSIUM 3.4 (L) 12/01/2019 06:12 PM    CHLORIDE 102 12/01/2019 06:12 PM    CO2 28 12/01/2019 06:12 PM    GLUCOSE 167 (H) 12/01/2019 06:12 PM    BUN 25 (H) 12/01/2019 06:12 PM    CREATININE 0.83 12/01/2019 06:12 PM    CREATININE 0.8 12/22/2008 09:30 AM      Recent Labs     12/01/19  1812 12/02/19  0515   ASTSGOT 3883*  --    ALTSGPT 307*  --    TBILIRUBIN 2.9*  --    ALKPHOSPHAT 657*  --    GLOBULIN 3.2  --    INR 1.17* 1.20*       Imaging:   CT-ABDOMEN-PELVIS WITH   Final Result      1.  Changes in the biliary tree suspicious for obstruction and/or cholangitis. No discrete obstructing mass is seen though small ampullary region mass or stricture could be present.   2.  Changes in the rectum suggestive of proctitis. Underlying mass is not excluded.   3.  RIGHT renal calyceal stone   4.  Appendix not visualized   5.  Duodenal diverticulum               DX-CHEST-PORTABLE (1 VIEW)   Final Result      1.  Linear atelectasis within the left lung base.       2.  Mild cardiomegaly.              Assessment and Plan:    Cholangitis    Biliary obstruction    Advanced Parkinson    Sepsis on Levo    Drop in HGB ~7  but no active bleeding, may be chronically low but now with drop after resucitation.    REC:  Stabilize further.    Awaiting decision on advanced interventions.    ERCP for decompression if that is the family decision and with maximum ICU supportive care.

## 2019-12-02 NOTE — ASSESSMENT & PLAN NOTE
Resolved.  Secondary to ascending cholangitis vs UTI  SIRS 4/4 ( T > 101 F, tachycardia, tachypnea, lactic acidosis, acute liver injury and hypotension requiring vasopressor support to maintain SBP > 90 and MAP > 60)  Leukocytosis resolved, LFTs are normalizing.   Extensive discussions had with family regarding goals of care with Palliative Care and GI consultation; the patient's family has decided to resume hospice, they would like the patient to complete her course of antibiotics with comfort measures for the remainder of this hospital stay.  Blood and urine cultures from 12/1/19 growing E. Coli    Plan:  Received 5 days of zosyn and additional 2 days of Ceftriaxone for total of 7 day course   GI signed off, patient's family decided against ERCP  Advance diet as tolerated to a LOW FAT diet  Discharge home on hospice

## 2019-12-02 NOTE — ED NOTES
MD Mitch aware of continued low BP. MD and family still discussing and making decision regarding the agressiveness of treatment and the next steps. Awaiting deciding from .

## 2019-12-02 NOTE — ASSESSMENT & PLAN NOTE
Finding on CT abdomen and pelvis. C. diff studies negative this admission.  -chronic constipation per patient   - resume home bowel regiment with daily miralax

## 2019-12-02 NOTE — ASSESSMENT & PLAN NOTE
End stage PD.  Largely immobile, had a Ta previously for neurogenic bladder but no longer with Ta.  Admitted with septic shock, urine and blood cultures growing E. coli.     Plan:  The patient's family has decided to resume Hospice  Completed antibiotic course for bacteremia with comfort care measures per discussion with family  Continue Sinemet and Prozac  Continue home pain regimen  Discharge back home with hospice

## 2019-12-02 NOTE — ED NOTES
Lab called with critical result of lactic 5.2 at 1900. Critical lab result read back to lab.   Dr. King notified of critical lab result at 1900.  Critical lab result read back by Dr. King.

## 2019-12-02 NOTE — RESPIRATORY CARE
COPD EDUCATION by COPD CLINICAL EDUCATOR  12/2/2019 at 7:24 AM by Cornelia Murphy     Patient reviewed by COPD education team. Patient does not have a history or diagnosis of COPD and is a non-smoker, therefore does not qualify for the COPD program.

## 2019-12-02 NOTE — ED NOTES
Med rec updated and complete. Allergies reviewed. Pt is unable to participate in an interview at this time. Family ( ) currently at bedside. Pt was unable to take any medications today.   narxcheck complete.

## 2019-12-02 NOTE — ED NOTES
MD Meyer at bedside discussing plan of care and next steps with patients spouse. Aware of BP, verbal order for additional fluids.

## 2019-12-02 NOTE — ED TRIAGE NOTES
Chief Complaint   Patient presents with   • ALOC   Pt bib REMSA from home.  Pt noted to be altered.  Per report pt typically A&Ox4 and has UTI x 1 month and was treated with antibiotics.  Pt febrile and tachycardic.  Sepsis score 6.  Charge RN notified of Code Sepsis.

## 2019-12-02 NOTE — ED NOTES
Viviane from Lab called with critical result of Lactate 4.5 at 2030. Dr. King notified of critical lab result at 2032.  Critical lab result read back by Dr. King.

## 2019-12-02 NOTE — ED PROVIDER NOTES
"ED Provider Note    CHIEF COMPLAINT  Chief Complaint   Patient presents with   • ALOC       HPI  Delmis Draper is a 69 y.o. female who presents for altered level of consciousness, and fatigue.  Patient has a past medical history of Parkinson and was on hospice up until tonight when her  revoked it so that she could get a \"full treatment\" here at the emergency department.  He notes she has been more altered and he feels she needs \"IV antibiotics for her resistant urinary tract infection.\"  He notes that she has been on Unasyn for this recently however there are no records to confirm this.    REVIEW OF SYSTEMS *review of systems mainly through the patient's  who speaks for her  Constitutional: Weakness, fatigue  Skin: No rashes  HEENT: Constant trouble speaking due to Parkinson's.  No recent sore throat.  Neck: No neck pain.  Chest: No pain or rashes  Pulm: No shortness of breath, or recent cough  Gastrointestinal: Nausea, vomiting, loose stools, generalized abdominal pain  Genitourinary: No dysuria or hematuria  Musculoskeletal: Patient does not walk or move extremities according to her   Neurologic: Decreased level of consciousness  Immuno: Patient's  states she is currently being treated for resistant urinary tract infection      PAST MEDICAL HISTORY   has a past medical history of Abnormal finding on mammography, microcalcification, Anesthesia, Bowel obstruction (HCC), Bursitis, Cataract, Cellulitis (7/19/2012), Dilated bile duct, Disc disorder, Dystonia, Dystonia, Dysuria, GERD (gastroesophageal reflux disease), Heart burn, Hypertension, Hypotension, Insulin resistance, Leukopenia, Low blood pressure reading, Malaise and fatigue, OSTEOPOROSIS (3/22/2010), Other specified disorder of intestines, Pain, Parkinson disease ([facility]), Peripheral neuropathy, Rash, skin, Scoliosis, Spinal stenosis, lumbar, Thyrotoxicosis (remote), Unspecified disorder of thyroid, and vitamin D " deficiency.    SOCIAL HISTORY  Social History     Tobacco Use   • Smoking status: Former Smoker     Packs/day: 0.50     Years: 10.00     Pack years: 5.00     Last attempt to quit: 1985     Years since quittin.9   • Smokeless tobacco: Never Used   Substance and Sexual Activity   • Alcohol use: No     Alcohol/week: 0.0 oz     Comment: none since age 31   • Drug use: No   • Sexual activity: Never     Comment: , 2 boys, diasability       SURGICAL HISTORY   has a past surgical history that includes thyroidectomy (); gastroscopy with biopsy (2012); egd w/endoscopic ultrasound (2012); cataract phaco with iol (2014); cataract phaco with iol (2014); breast biopsy (Left, 2015); gyn surgery (, ); knee arthrotomy (, ); and other.    CURRENT MEDICATIONS  Home Medications     Reviewed by Kathryn Pantoja (Pharmacy Tech) on 19 at 1939  Med List Status: Complete   Medication Last Dose Status   ALLERGY RELIEF 10 MG Tab 2019 Active   AZO URINARY PAIN RELIEF 95 MG tablet 2019 Active   carbidopa-levodopa (SINEMET)  MG Tab 2019 Active   carbidopa-levodopa SR (SINEMET CR)  MG per tablet 2019 Active   carisoprodol (SOMA) 350 MG Tab 2019 Active   fentaNYL (DURAGESIC) 50 MCG/HR PATCH 72 HR 2019 Active   FLUoxetine (PROZAC) 20 MG Cap 2019 Active   gabapentin (NEURONTIN) 300 MG Cap 2019 Active   LORazepam (ATIVAN) 2 MG/ML Conc 2019 Active   melatonin 3 MG Tab 2019 Active   morphine 20 MG/5ML solution 2019 Active   Multiple Vitamins-Minerals (ALIVE ONCE DAILY WOMENS) Tab 2019 Active   phenazopyridine (PYRIDIUM) 100 MG Tab 2019 Active   polyethylene glycol/lytes (MIRALAX) Pack 2019 Active   traZODone (DESYREL) 50 MG Tab 2019 Active                ALLERGIES  Allergies   Allergen Reactions   • Bactrim [Sulfamethoxazole W-Trimethoprim] Hives   • Actonel [Risedronate Sodium] Hives   •  "Latex Rash     Irritation of skin    • Sulfa Drugs Hives   • Tape Rash     Paper tape is ok       PHYSICAL EXAM  VITAL SIGNS: BP (!) 93/51   Pulse (!) 108   Temp (!) 38.7 °C (101.6 °F) (Temporal)   Resp (!) 21   Ht 1.6 m (5' 3\")   Wt 46.7 kg (103 lb)   LMP 01/01/1990   SpO2 96%   BMI 18.25 kg/m²    Gen: Somnolent, speaks incoherently or too softly to be understood.  Keeps eyes closed  HEENT: No signs of trauma, Bilateral external ears normal, Nose normal. Conjunctiva normal, Non-icteric.   Neck:  No distress with palpation of the neck, Supple, No masses  Lymphatic: No cervical lymphadenopathy noted.   Cardiovascular: Tachycardia noted.  Regular rhythm.  Capillary refill 4 to 5 seconds all extremities, with thready distal pulses..   Thorax & Lungs: Normal breath sounds, No respiratory distress, No wheezing bilateral chest rise  Abdomen: Bowel sounds normal, Soft, moderate diffuse abdominal tenderness, patient grimaces during exam,, No masses, No pulsatile masses.  No obvious guarding noted.  Skin: Cool, Dry, No erythema, No rash.   Extremities: Diffuse muscle wasting noted all extremities.  Contractures noted to ankles and feet    LABS  Results for orders placed or performed during the hospital encounter of 12/01/19   Lactic acid (lactate)   Result Value Ref Range    Lactic Acid 5.2 (HH) 0.5 - 2.0 mmol/L   Lactic acid (lactate): Repeat if initial lactic acid result is greater than 2   Result Value Ref Range    Lactic Acid 4.5 (HH) 0.5 - 2.0 mmol/L   CBC WITH DIFFERENTIAL   Result Value Ref Range    WBC 0.9 (LL) 4.8 - 10.8 K/uL    RBC 2.90 (L) 4.20 - 5.40 M/uL    Hemoglobin 9.3 (L) 12.0 - 16.0 g/dL    Hematocrit 30.0 (L) 37.0 - 47.0 %    .4 (H) 81.4 - 97.8 fL    MCH 32.1 27.0 - 33.0 pg    MCHC 31.0 (L) 33.6 - 35.0 g/dL    RDW 50.4 (H) 35.9 - 50.0 fL    Platelet Count 196 164 - 446 K/uL    MPV 12.1 9.0 - 12.9 fL    Neutrophils-Polys 44.60 44.00 - 72.00 %    Lymphocytes 23.80 22.00 - 41.00 %    Monocytes " 8.60 0.00 - 13.40 %    Eosinophils 0.70 0.00 - 6.90 %    Basophils 1.40 0.00 - 1.80 %    Nucleated RBC 0.00 /100 WBC    Neutrophils (Absolute) 0.52 (L) 2.00 - 7.15 K/uL    Lymphs (Absolute) 0.21 (L) 1.00 - 4.80 K/uL    Monos (Absolute) 0.08 0.00 - 0.85 K/uL    Eos (Absolute) 0.01 0.00 - 0.51 K/uL    Baso (Absolute) 0.01 0.00 - 0.12 K/uL    NRBC (Absolute) 0.00 K/uL    Anisocytosis 1+     Macrocytosis 1+    COMP METABOLIC PANEL   Result Value Ref Range    Sodium 143 135 - 145 mmol/L    Potassium 3.4 (L) 3.6 - 5.5 mmol/L    Chloride 102 96 - 112 mmol/L    Co2 28 20 - 33 mmol/L    Anion Gap 13.0 (H) 0.0 - 11.9    Glucose 167 (H) 65 - 99 mg/dL    Bun 25 (H) 8 - 22 mg/dL    Creatinine 0.83 0.50 - 1.40 mg/dL    Calcium 9.3 8.5 - 10.5 mg/dL    AST(SGOT) 3883 (HH) 12 - 45 U/L    ALT(SGPT) 307 (H) 2 - 50 U/L    Alkaline Phosphatase 657 (H) 30 - 99 U/L    Total Bilirubin 2.9 (H) 0.1 - 1.5 mg/dL    Albumin 3.9 3.2 - 4.9 g/dL    Total Protein 7.1 6.0 - 8.2 g/dL    Globulin 3.2 1.9 - 3.5 g/dL    A-G Ratio 1.2 g/dL   URINALYSIS   Result Value Ref Range    Color Brown     Character Turbid (A)     Specific Gravity see below <1.035    Ph see below 5.0 - 8.0    Glucose see below Negative mg/dL    Ketones see below Negative mg/dL    Protein see below Negative mg/dL    Bilirubin see below Negative    Urobilinogen, Urine see below Negative    Nitrite see below Negative    Leukocyte Esterase see below Negative    Occult Blood see below Negative    Micro Urine Req Microscopic    PROTHROMBIN TIME   Result Value Ref Range    PT 15.2 (H) 12.0 - 14.6 sec    INR 1.17 (H) 0.87 - 1.13   MAGNESIUM   Result Value Ref Range    Magnesium 2.0 1.5 - 2.5 mg/dL   ESTIMATED GFR   Result Value Ref Range    GFR If African American >60 >60 mL/min/1.73 m 2    GFR If Non African American >60 >60 mL/min/1.73 m 2   DIFFERENTIAL MANUAL   Result Value Ref Range    Bands-Stabs 13.70 (H) 0.00 - 10.00 %    Metamyelocytes 2.90 %    Myelocytes 4.30 %    Manual Diff  Status PERFORMED    PERIPHERAL SMEAR REVIEW   Result Value Ref Range    Peripheral Smear Review see below    PLATELET ESTIMATE   Result Value Ref Range    Plt Estimation Normal    MORPHOLOGY   Result Value Ref Range    RBC Morphology Present     Polychromia 1+    URINE MICROSCOPIC (W/UA)   Result Value Ref Range    WBC 20-50 (A) /hpf    RBC 10-20 (A) /hpf    Bacteria Many (A) None /hpf    Epithelial Cells Rare /hpf   EKG   Result Value Ref Range    Report       [facility] Emergency Dept.    Test Date:  2019  Pt Name:    GONZÁLEZ COOPER                   Department: ER  MRN:        [id number]                      Room:        02  Gender:     Female                       Technician: [id number]  :        1950                   Requested By:ER TRIAGE PROTOCOL  Order #:    [id number]                    Reading MD: Ariel King MD    Measurements  Intervals                                Axis  Rate:       116                          P:          56  CO:         144                          QRS:        -48  QRSD:       86                           T:          226  QT:         328  QTc:        456    Interpretive Statements  SINUS TACHYCARDIA  LAD, CONSIDER LEFT ANTERIOR FASCICULAR BLOCK  REPOL ABNRM SUGGESTS ISCHEMIA, ANT-LAT LEADS  Compared to ECG 10/13/2016 17:20:54  Early repolarization now present  Possible ischemia now present  Electronically Signed On 2019 21:22:49 PST by Ariel King MD       *Note: Due to a large number of results and/or encounters for the requested time period, some results have not been displayed. A complete set of results can be found in Results Review.       RADIOLOGY  CT-ABDOMEN-PELVIS WITH   Final Result      1.  Changes in the biliary tree suspicious for obstruction and/or cholangitis. No discrete obstructing mass is seen though small ampullary region mass or stricture could be present.   2.  Changes in the rectum suggestive of proctitis. Underlying mass is  not excluded.   3.  RIGHT renal calyceal stone   4.  Appendix not visualized   5.  Duodenal diverticulum               DX-CHEST-PORTABLE (1 VIEW)   Final Result      1.  Linear atelectasis within the left lung base.      2.  Mild cardiomegaly.        Critical Care Note  Upon my evaluation, this patient had high probability of imminent and life-threatening deterioration due to sepsis with hypotension, which required my direct attention, intervention, and personal management. I personally provided 35 minutes of critical care time exclusive of time spent on separately billable procedures. Time includes review of laboratory data, radiology results, discussion with consultants, and monitoring for potential decompensation.     HYDRATION: Based on the patient's presentation of Sepsis the patient was given IV fluids. IV Hydration was used because oral hydration was not adequate alone. Upon recheck following hydration, the patient was Somewhat improved.    COURSE & MEDICAL DECISION MAKING  Pertinent Labs & Imaging studies reviewed. (See chart for details)  6:15 PM  Evaluated patient at bedside due to patient triggering sepsis criteria.  Patient is hypotensive, tachycardic, and altered.  Her GCS is 6 at bedside based on her withdrawal to noxious stimuli, incoherent words, and not opening eyes.  Will likely need to intubate if the patient does not have any improvement after the fluid bolus.  Will check blood pressure closely for the next few minutes to determine whether central line is absolutely necessary as well.  This is discussed with .  He noted that the patient has been on hospice however he did not realize that it meant avoiding hospital and keeping the patient comfortable while allowing natural death.    6:47 PM  Patient now opening her eyes and, although she speaks softly, she is answering some basic questions and states that she is not in pain.  Based on this I feel holding off on intubation is reasonable.  Is  also notable that her map has improved after IV fluids and there may be room for avoiding central line at this point.    7:12 PM  Discussed patient's improving hemodynamics with her .  Patient herself is able to participate to a limited degree and notes that she has been having burning with urination and blood in her urine for a few days.  She notes that vomiting at least 2-3 times today as well as loose stools today.    7:55 PM  Patient still mildly tachycardic however map is above 65.  Her mental status has not deteriorated.  Informed  of the concern for possible cholangitis.    8:37 PM  The patient has dilated biliary system with a thickened gallbladder wall suggestive of ascending cholangitis.  Discussed the case with the on-call gastroenterologist who suggested the patient will likely need an ERCP but she will need some antibiotics in the meantime.  He will review the case in the meantime I will admit to the [facility] team    8:57 PM  Patient is apparently a [facility] patient and last saw Dr. Pagan  about a year ago.  Discussed the case with Dr. Olea who felt it is reasonable to have the patient on IV antibiotics and somebody from his group will see her in the morning provided there is no interval worsening.  Patient was then discussed with the [facility] gold team as well as the critical care/pulmonologist who agreed to admit the patient primarily.  Patient remained consistently above a map of 65 up to this point.  She is also consistently tachycardic and seemed mildly altered in terms of her level of consciousness.      FINAL IMPRESSION  1. Cholangitis    2. Sepsis with acute liver failure without hepatic coma or septic shock, due to unspecified organism (HCC)        Electronically signed by: Ariel King, 12/1/2019 6:22 PM

## 2019-12-02 NOTE — CONSULTS
Critical Care Consultation    Date of consult: 12/1/2019    Referring Physician  Ariel King M.D.    Reason for Consultation  Septic shock    History of Presenting Illness  69 y.o. 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  All history obtained from  as patient is unresponsive.  He reports this started this morning.  No hematemesis.  At baseline she is awake and conversant in the morning but sleeps most of the day after that.  She is essentially completely mobilized from her Parkinson's disease and severe dystonia.  She does have significant chronic pain for which she uses fentanyl and benzodiazepines.    Patient is on hospice and we had extensive goals of care and care planning discussions tonight see assessment and plan.    Code Status  Full Code    Review of Systems  Review of Systems   Unable to perform ROS: Mental status change       Past Medical History   has a past medical history of Abnormal finding on mammography, microcalcification, Anesthesia, Bowel obstruction (HCC), Bursitis, Cataract, Cellulitis (7/19/2012), Dilated bile duct, Disc disorder, Dystonia, Dystonia, Dysuria, GERD (gastroesophageal reflux disease), Heart burn, Hypertension, Hypotension, Insulin resistance, Leukopenia, Low blood pressure reading, Malaise and fatigue, OSTEOPOROSIS (3/22/2010), Other specified disorder of intestines, Pain, Parkinson disease (HCC), Peripheral neuropathy, Rash, skin, Scoliosis, Spinal stenosis, lumbar, Thyrotoxicosis (remote), Unspecified disorder of thyroid, and vitamin D deficiency.    Surgical History   has a past surgical history that includes thyroidectomy (1967); gastroscopy with biopsy (5/9/2012); egd w/endoscopic ultrasound (5/9/2012); cataract phaco with iol (4/14/2014); cataract phaco with iol (5/12/2014); breast biopsy (Left, 5/5/2015); gyn surgery (1985, 1988); knee arthrotomy (1964, 1968); and  other.    Family History  family history includes Arthritis in her mother; Cancer in her paternal grandfather; Heart Disease in her maternal aunt, maternal grandfather, maternal grandmother, maternal uncle, paternal aunt, paternal grandfather, and paternal uncle; Hyperlipidemia in her maternal grandfather, maternal grandmother, paternal grandfather, and sister; Hypertension in her maternal grandfather, maternal grandmother, and paternal grandfather; Lung Disease in her father.    Social History   reports that she quit smoking about 34 years ago. She has a 5.00 pack-year smoking history. She has never used smokeless tobacco. She reports that she does not drink alcohol or use drugs.    Medications  Home Medications     Reviewed by Kathryn Pantoja (Pharmacy Tech) on 12/01/19 at 1939  Med List Status: Complete   Medication Last Dose Status   ALLERGY RELIEF 10 MG Tab 11/8/2019 Active   AZO URINARY PAIN RELIEF 95 MG tablet 11/30/2019 Active   carbidopa-levodopa (SINEMET)  MG Tab 11/30/2019 Active   carbidopa-levodopa SR (SINEMET CR)  MG per tablet 11/30/2019 Active   carisoprodol (SOMA) 350 MG Tab 11/30/2019 Active   fentaNYL (DURAGESIC) 50 MCG/HR PATCH 72 HR 11/29/2019 Active   FLUoxetine (PROZAC) 20 MG Cap 11/30/2019 Active   gabapentin (NEURONTIN) 300 MG Cap 11/30/2019 Active   LORazepam (ATIVAN) 2 MG/ML Conc 11/30/2019 Active   melatonin 3 MG Tab 11/30/2019 Active   morphine 20 MG/5ML solution 11/30/2019 Active   Multiple Vitamins-Minerals (ALIVE ONCE DAILY WOMENS) Tab 11/30/2019 Active   phenazopyridine (PYRIDIUM) 100 MG Tab 11/30/2019 Active   polyethylene glycol/lytes (MIRALAX) Pack 11/30/2019 Active   traZODone (DESYREL) 50 MG Tab 11/30/2019 Active              Current Facility-Administered Medications   Medication Dose Route Frequency Provider Last Rate Last Dose   • norepinephrine (LEVOPHED) 8 mg in  mL Infusion  0-30 mcg/min Intravenous Continuous Ines Meyer M.D. 9.4 mL/hr at 12/02/19  0130 5 mcg/min at 12/02/19 0130   • enoxaparin (LOVENOX) inj 40 mg  40 mg Subcutaneous Q24HR Ines Meyer M.D.       • ondansetron (ZOFRAN) syringe/vial injection 4 mg  4 mg Intravenous Q4HRS PRN Ines Meyer M.D.       • lactated ringers infusion   Intravenous Continuous Maulik Gentile M.D. 75 mL/hr at 12/02/19 0200     • fentaNYL (DURAGESIC) 50 MCG/HR 1 Patch  1 Patch Transdermal Q72HRS Ines Meyer M.D.       • piperacillin-tazobactam (ZOSYN) 3.375 g in  mL IVPB  3.375 g Intravenous Q8HRS Ines Myeer M.D. 25 mL/hr at 12/02/19 0132 3.375 g at 12/02/19 0132   • Pharmacy Consult Request  1 Each Other PHARMACY TO DOSE Ines Meyer M.D.       • senna-docusate (PERICOLACE or SENOKOT S) 8.6-50 MG per tablet 2 Tab  2 Tab Oral BID Ines Meyer M.D.        And   • polyethylene glycol/lytes (MIRALAX) PACKET 1 Packet  1 Packet Oral QDAY PRN Ines Meyer M.D.        And   • magnesium hydroxide (MILK OF MAGNESIA) suspension 30 mL  30 mL Oral QDAY PRN Ines Meyer M.D.        And   • bisacodyl (DULCOLAX) suppository 10 mg  10 mg Rectal QDAY PRN Ines Meyer M.D.       • Respiratory Care per Protocol   Nebulization Continuous RT Ines Meyer M.D.       • fentaNYL (SUBLIMAZE) injection 25 mcg  25 mcg Intravenous Q HOUR PRN Ines Meyer M.D.           Allergies  Allergies   Allergen Reactions   • Bactrim [Sulfamethoxazole W-Trimethoprim] Hives   • Actonel [Risedronate Sodium] Hives   • Latex Rash     Irritation of skin    • Sulfa Drugs Hives   • Tape Rash     Paper tape is ok       Vital Signs last 24 hours  Temp:  [38.7 °C (101.6 °F)] 38.7 °C (101.6 °F)  Pulse:  [101-130] 101  Resp:  [15-24] 21  BP: ()/(47-70) 85/48  SpO2:  [94 %-100 %] 95 %    Physical Exam  Physical Exam  Constitutional:       Comments: Cachectic, chronically ill-appearing, unresponsive   HENT:      Head: Normocephalic and atraumatic.      Mouth/Throat:      Mouth: Mucous membranes are dry.   Eyes:      Pupils:  Pupils are equal, round, and reactive to light.   Neck:      Musculoskeletal: No neck rigidity.   Cardiovascular:      Rate and Rhythm: Normal rate and regular rhythm.   Pulmonary:      Effort: Pulmonary effort is normal. No respiratory distress.      Breath sounds: Normal breath sounds.   Abdominal:      General: Abdomen is flat. Bowel sounds are normal.      Palpations: Abdomen is soft.      Tenderness: There is tenderness (RUQ).   Musculoskeletal:         General: No swelling.      Right lower leg: No edema.      Left lower leg: No edema.   Skin:     General: Skin is dry.      Comments: Cool extremities   Neurological:      Comments: GCS 7         Fluids    Intake/Output Summary (Last 24 hours) at 2019 0204  Last data filed at 2019 2230  Gross per 24 hour   Intake 3401 ml   Output --   Net 3401 ml       Laboratory  Recent Results (from the past 48 hour(s))   EKG    Collection Time: 19  6:07 PM   Result Value Ref Range    Report       [facility]    Test Date:  2019  Pt Name:    GONZÁLEZ COOPER                   Department: ER  MRN:        [id number]                      Room:       [facility]  Gender:     Female                       Technician: [id number]  :        1950                   Requested By:ER TRIAGE PROTOCOL  Order #:    [id number]                    Reading MD: Ariel King MD    Measurements  Intervals                                Axis  Rate:       116                          P:          56  MI:         144                          QRS:        -48  QRSD:       86                           T:          226  QT:         328  QTc:        456    Interpretive Statements  SINUS TACHYCARDIA  LAD, CONSIDER LEFT ANTERIOR FASCICULAR BLOCK  REPOL ABNRM SUGGESTS ISCHEMIA, ANT-LAT LEADS  Compared to ECG 10/13/2016 17:20:54  Early repolarization now present  Possible ischemia now present  Electronically Signed On 2019 21:22:49 PST by Ariel King MD      Lactic acid (lactate)    Collection Time: 12/01/19  6:12 PM   Result Value Ref Range    Lactic Acid 5.2 (HH) 0.5 - 2.0 mmol/L   CBC WITH DIFFERENTIAL    Collection Time: 12/01/19  6:12 PM   Result Value Ref Range    WBC 0.9 (LL) 4.8 - 10.8 K/uL    RBC 2.90 (L) 4.20 - 5.40 M/uL    Hemoglobin 9.3 (L) 12.0 - 16.0 g/dL    Hematocrit 30.0 (L) 37.0 - 47.0 %    .4 (H) 81.4 - 97.8 fL    MCH 32.1 27.0 - 33.0 pg    MCHC 31.0 (L) 33.6 - 35.0 g/dL    RDW 50.4 (H) 35.9 - 50.0 fL    Platelet Count 196 164 - 446 K/uL    MPV 12.1 9.0 - 12.9 fL    Neutrophils-Polys 44.60 44.00 - 72.00 %    Lymphocytes 23.80 22.00 - 41.00 %    Monocytes 8.60 0.00 - 13.40 %    Eosinophils 0.70 0.00 - 6.90 %    Basophils 1.40 0.00 - 1.80 %    Nucleated RBC 0.00 /100 WBC    Neutrophils (Absolute) 0.52 (L) 2.00 - 7.15 K/uL    Lymphs (Absolute) 0.21 (L) 1.00 - 4.80 K/uL    Monos (Absolute) 0.08 0.00 - 0.85 K/uL    Eos (Absolute) 0.01 0.00 - 0.51 K/uL    Baso (Absolute) 0.01 0.00 - 0.12 K/uL    NRBC (Absolute) 0.00 K/uL    Anisocytosis 1+     Macrocytosis 1+    COMP METABOLIC PANEL    Collection Time: 12/01/19  6:12 PM   Result Value Ref Range    Sodium 143 135 - 145 mmol/L    Potassium 3.4 (L) 3.6 - 5.5 mmol/L    Chloride 102 96 - 112 mmol/L    Co2 28 20 - 33 mmol/L    Anion Gap 13.0 (H) 0.0 - 11.9    Glucose 167 (H) 65 - 99 mg/dL    Bun 25 (H) 8 - 22 mg/dL    Creatinine 0.83 0.50 - 1.40 mg/dL    Calcium 9.3 8.5 - 10.5 mg/dL    AST(SGOT) 3883 (HH) 12 - 45 U/L    ALT(SGPT) 307 (H) 2 - 50 U/L    Alkaline Phosphatase 657 (H) 30 - 99 U/L    Total Bilirubin 2.9 (H) 0.1 - 1.5 mg/dL    Albumin 3.9 3.2 - 4.9 g/dL    Total Protein 7.1 6.0 - 8.2 g/dL    Globulin 3.2 1.9 - 3.5 g/dL    A-G Ratio 1.2 g/dL   PROTHROMBIN TIME    Collection Time: 12/01/19  6:12 PM   Result Value Ref Range    PT 15.2 (H) 12.0 - 14.6 sec    INR 1.17 (H) 0.87 - 1.13   MAGNESIUM    Collection Time: 12/01/19  6:12 PM   Result Value Ref Range    Magnesium 2.0 1.5 - 2.5 mg/dL    ESTIMATED GFR    Collection Time: 12/01/19  6:12 PM   Result Value Ref Range    GFR If African American >60 >60 mL/min/1.73 m 2    GFR If Non African American >60 >60 mL/min/1.73 m 2   DIFFERENTIAL MANUAL    Collection Time: 12/01/19  6:12 PM   Result Value Ref Range    Bands-Stabs 13.70 (H) 0.00 - 10.00 %    Metamyelocytes 2.90 %    Myelocytes 4.30 %    Manual Diff Status PERFORMED    PERIPHERAL SMEAR REVIEW    Collection Time: 12/01/19  6:12 PM   Result Value Ref Range    Peripheral Smear Review see below    PLATELET ESTIMATE    Collection Time: 12/01/19  6:12 PM   Result Value Ref Range    Plt Estimation Normal    MORPHOLOGY    Collection Time: 12/01/19  6:12 PM   Result Value Ref Range    RBC Morphology Present     Polychromia 1+    TROPONIN    Collection Time: 12/01/19  6:12 PM   Result Value Ref Range    Troponin T 24 (H) 6 - 19 ng/L   URINALYSIS    Collection Time: 12/01/19  7:09 PM   Result Value Ref Range    Color Brown     Character Turbid (A)     Specific Gravity see below <1.035    Ph see below 5.0 - 8.0    Glucose see below Negative mg/dL    Ketones see below Negative mg/dL    Protein see below Negative mg/dL    Bilirubin see below Negative    Urobilinogen, Urine see below Negative    Nitrite see below Negative    Leukocyte Esterase see below Negative    Occult Blood see below Negative    Micro Urine Req Microscopic    URINE MICROSCOPIC (W/UA)    Collection Time: 12/01/19  7:09 PM   Result Value Ref Range    WBC 20-50 (A) /hpf    RBC 10-20 (A) /hpf    Bacteria Many (A) None /hpf    Epithelial Cells Rare /hpf   Lactic acid (lactate): Repeat if initial lactic acid result is greater than 2    Collection Time: 12/01/19  8:05 PM   Result Value Ref Range    Lactic Acid 4.5 (HH) 0.5 - 2.0 mmol/L       Imaging  CT-ABDOMEN-PELVIS WITH   Final Result      1.  Changes in the biliary tree suspicious for obstruction and/or cholangitis. No discrete obstructing mass is seen though small ampullary region mass or  "stricture could be present.   2.  Changes in the rectum suggestive of proctitis. Underlying mass is not excluded.   3.  RIGHT renal calyceal stone   4.  Appendix not visualized   5.  Duodenal diverticulum               DX-CHEST-PORTABLE (1 VIEW)   Final Result      1.  Linear atelectasis within the left lung base.      2.  Mild cardiomegaly.          Assessment/Plan  * Septic shock (HCC)  Assessment & Plan  Clinical presentation was abdominal pain, vomiting, diarrhea  CT with dilated biliary ducts and possible proctitis.  Pain is more impressive in RUQ.  Suspect the primary process is ascending cholangitis, however the severe elevation in AST and profound neutropenia are odd.  This could also be typhlitis.  Map goal 60 as she has chronic hypertension.    Pt on hospice.  If we proceed with aggressive care this would likely entail a central line, intubation for procedures as she's very altered, perc cholecystostomy tube vs ERCP, NGT or OGT for sinemet.  I discussed this with her  and my concern that all this may prolong her life a bit, but there is still a good chance she would die despite those interventions and her quality of life would probably be even more diminished after all those interventions.  Aggressive care clearly not in-line with hospice.  I had multiple prolonged conversations with the patient's  and 's brother who is a urologist.  Her  would like to proceed with interventions and aggressive treatment.  He indicated to the brother that he would have terrible guilt if he \"gives up.\"  The brother is understanding of the situation.   is concerned that patient hasn't gotten to say goodbye to their son in [place].      The plan we have decided on is to use IV antibiotics and peripheral vasopressors tonight.  Will not proceed with perc jacy tonight and will try and avoid a central line assuming she only requires low dose pressors.  In the morning will get palliative care " on board to assist with ongoing discussions about goals of care.      This is Septic shock Present on admission  SIRS criteria identified on my evaluation include: Fever, with temperature greater than 101 deg F and Leukopenia, with WBC less than 4,000  Source is abdominal.  Cholangitis vs proctisis  Presentation includes: Severe sepsis present, persistent hypotension after 30 ml/kg completed, and initial lactate level result is >= 4 mmol/L.   Despite appropriate fluid resuscitation with crystalloid given per sepsis guidelines, the patient remains hypotensive with systolic blood pressure less than 90 or MAP less than 65  Hemodynamic support with additional fluids and IV vasopressors as needed to maintain a SBP of 90 or MAP of 65  IV antibiotics as appropriate for source of sepsis  Reassessment: I have reassessed the patient's hemodynamic status      Neutropenia associated with infection (HCC)  Assessment & Plan  Unclear etiology  May simply be d/t sepsis  Further w/u pending goals of care      Proctitis  Assessment & Plan  Imaging finding.  Has been on ampicillin recently for UTI, so has risk for C. Diff  Send C. Diff   This could be typhilitis given her neutropenia.  zosyn    Cholangitis  Assessment & Plan  Currently will proceed with zosyn only  ERP has consulted GI for eval      Encephalopathy  Assessment & Plan  Apparently at baseline she does talk some in the morning, but is asleep most of the day  Currently pain responsive only  Most likely encephalopathy d/t sepsis    Diarrhea  Assessment & Plan  Has been on several recent UTI, so at some risk for C. difficile  If she stools check C. difficile    Elevated AST (SGOT)  Assessment & Plan  AST severely elevated compared to ALT  Check trop and CK  Rest of LFTs are c/w cholangitis, but it's odd the AST is so high    Parkinson disease (HCC)- (present on admission)  Assessment & Plan  End stage PD  Dystonia both feed  Baseline is a bit awake in the morning but mostly  sleeps all day  Mostly immobilized  Previously had a ba for neurogenic bladder, but doesn't now  She can't take PO now, so pending further C discussions tomorrow she may need enteral access for her sinemet    Anemia  Assessment & Plan  Mild  monitor      Discussed patient condition and risk of morbidity and/or mortality with Family, RN and [facility] resident.    The patient remains critically ill.  Critical care time = 115 minutes in directly providing and coordinating critical care and extensive data review.  No time overlap and excludes procedures.

## 2019-12-02 NOTE — PROGRESS NOTES
[facility] ICU Progress Note    Admission Date: 2019  ICU Resident: Karie Dailey M.D.   ICU Attending: Maximus Christianson M.D.    Patient ID:    Name:  Delmis Draper  :  1950  MRN:  [id number]    Today's Date: 2019    HPI:  69-year-old female with PD on hospice who was brought in by her  on 19 for abdominal pain, nausea and vomiting. Patient was unresponsive in the ED and all history was obtained from her . He reported onset of symptoms 19 morning. He denied hematemesis and blood per rectum. Patient is essentially immobilized from end-stage PD with severe dystonia and has chronic pain for which she takes Fentanyl and benzodiazepines at baseline. Patient had been on hospice prior to admission, her  changed code status to full code following admission, he is still finalizing this decision.    Interval Events:  · Admitted overnight for sepsis, intra-abdominal infection (cholangitis versus proctitis) most likely at this time.  · Evaluated by GI this morning; ERCP recommended.  · The patient's  has not yet finalized the patient's CODE STATUS, treatment course (aggressive versus conservative) remains unclear.  · Palliative care consulted to help with goals of care planning.  · Blood cultures x 2 growing GNRs.  · WBC 12.5, up from 0.9 yesterday.  · Hgb 7.7, down from 9.3 yesterday, likely dilutional.  · K 3.1, BMP otherwise unremarkable.  · , trop-T 24.  · Lactate normalized, AST and ALP trended down, T-bili trended up.    Review of Systems   Unable to perform ROS: Mental status change     Physical Exam  Vitals:    19 0545 19 0600 19 0615 19 0723   BP: (!) 99/56 104/55 100/55    Pulse: 89 89 88 87   Resp: (!) 29 20 (!) 21 (!) 26   Temp:  37.9 °C (100.2 °F)     TempSrc:  Axillary     SpO2: 96% 95% 96% 96%   Weight:       Height:       Body mass index is 22.49 kg/m².  Pulse Oximetry: 96 %, O2 (LPM): 3, O2 Delivery: Silicone Nasal  Cannula    Physical Exam  General: No apparent distress, less lethargic, cooperative  HEN: Normocephalic, atraumatic, bitemporal wasting  Eyes: EOMI, sclerae anicteric, normal conjunctivae  Throat: Oropharynx clear, oral mucosa dry  Neck: Supple, no lymphadenopathy    Heart: Regular rhythm, normal rate, no murmurs, distal pulses intact throughout  Lung: Normal work of breathing, clear to auscultation bilaterally   Abdomen: Soft, bowel sounds present, non-tender, no peritoneal signs  Extremities: No cyanosis, no edema, subcutaneous fat loss, foot and ankle contractures bilaterally   Neuro: Drowsy, oriented x 2-3, responding to verbal stimuli  Skin: Warm, well perfused, no suspicious rashes or lesions on visualized skin    Fluids:  Intake-Output Summary Last 24 hours at 2019 0636  Last data filed at 2019 0600  Gross per 24 hour   Intake 3832.85 ml   Output --   Net 3832.85 ml     Weight: 57.6 kg (126 lb 15.8 oz)    Recent Labs     19  0515   SODIUM 143 144   POTASSIUM 3.4* 3.1*   CHLORIDE 102 109   CO2 28 28   BUN 25* 21   CREATININE 0.83 0.88   MAGNESIUM 2.0  --    CALCIUM 9.3 8.6     Respiratory:  Respiration: (!) 26  Pulse Oximetry: 96 %  O2 (LPM): 3  O2 Delivery: Silicone Nasal Cannula  Chest Tube Drains: NA    Hemodynamics:  Pulse: 87   Blood Pressure : 100/55     Neuro:  Total [place] Coma Score: 12    GI-Nutrition:  Recent Labs     19  0515   ALTSGPT 307* 558*   ASTSGOT 3883* 1987*   ALKPHOSPHAT 657* 475*   TBILIRUBIN 2.9* 4.5*   GLUCOSE 167* 108*     Heme-Onc:  Recent Labs     19  0515   RBC 2.90* 2.43*   HEMOGLOBIN 9.3* 7.7*   HEMATOCRIT 30.0* 24.6*   PLATELETCT 196 171   PROTHROMBTM 15.2* 15.5*   APTT  --  31.9   INR 1.17* 1.20*     Infectious Disease:  Temp  Av.9 °C (100.3 °F)  Min: 37.2 °C (99 °F)  Max: 38.7 °C (101.6 °F)  Recent Labs     19  1812 19  0515   WBC 0.9* 12.5*   NEUTSPOLYS 44.60 81.00*   LYMPHOCYTES 23.80  2.00*   MONOCYTES 8.60 6.00   EOSINOPHILS 0.70 0.00   BASOPHILS 1.40 0.00   ASTSGOT 3883* 1987*   ALTSGPT 307* 558*   ALKPHOSPHAT 657* 475*   TBILIRUBIN 2.9* 4.5*     Medications:  • NORepinephrine (LEVOPHED) infusion  0-30 mcg/min 3 mcg/min (12/02/19 0400)   • enoxaparin  40 mg     • ondansetron  4 mg     • fentaNYL  1 Patch     • lactated ringers with KCL infusion   Stopped (12/02/19 0837)   • piperacillin-tazobactam  3.375 g Stopped (12/02/19 0532)   • Pharmacy  1 Each     • senna-docusate  2 Tab      And   • polyethylene glycol/lytes  1 Packet      And   • magnesium hydroxide  30 mL      And   • bisacodyl  10 mg     • Respiratory Care per Protocol       • fentaNYL  25 mcg       Procedures:  TBD    Imaging:    CT-ABDOMEN-PELVIS WITH   Final Result      1.  Changes in the biliary tree suspicious for obstruction and/or cholangitis. No discrete obstructing mass is seen though small ampullary region mass or stricture could be present.   2.  Changes in the rectum suggestive of proctitis. Underlying mass is not excluded.   3.  RIGHT renal calyceal stone   4.  Appendix not visualized   5.  Duodenal diverticulum               DX-CHEST-PORTABLE (1 VIEW)   Final Result      1.  Linear atelectasis within the left lung base.      2.  Mild cardiomegaly.        Assessment and Plan:    * Septic shock (HCC)  Assessment & Plan  Present on admission  SIRS criteria identified on my evaluation include: Fever with temperature greater than 101 F, Tachycardia, Tachypnea, Leukopenia  Source: Likely abdominal, ascending cholangitis vs. Proctitis  Presentation includes: Persistent hypotension despite appropriate fluid resuscitation with crystalloids administered per sepsis guidelines, vasopressor support to maintain SBP > 90 and MAP > 65    Plan:  Empiric Zosyn 3.375g q8h  Continue IVF and Levophed to keep MAP > 60  Source control for cholangitis via percutaneous cholecystostomy vs. ERCP, GI consulted, appreciate recs  Palliative Care  consulted for goals of care planning  Holding aggressive interventions, e.g. central line, until goals of care clarified by patient's     Neutropenia associated with infection (HCC)  Assessment & Plan  Resolved.  WBC 0.9 with ANC 0.5 on admission; WBC 12.5 with 88% neutrophils on repeat CBC.  CTM, treating sepsis as above.     Proctitis  Assessment & Plan  Finding on CT abdomen and pelvis.   Has been on ampicillin recently for UTI therefore at risk for C. diff infection.  Can not rule out proctitis as cause of her septic shock.    Plan:  Empiric Zosyn  C. diff studies pending     Cholangitis  Assessment & Plan   and septic shock with biliary dilation on CT abdomen concerning for obstruction vs. cholangitis.    Plan:  Zosyn 3.375g q8h empirically  Continue IVF and vasopressor support to maintain MAP > 60  GI following, appreciate recs  Palliative care consulted for goals of care, aggressive vs. conservative management accordingly     Encephalopathy  Assessment & Plan  Improved overnight.   Usually alert and oriented x 4, especially in the morning.   Current waxing and waning responsiveness is new.   Most likely due to her sepsis.     Plan:  Continue to monitor  Treating underlying cause(s)    Diarrhea  Assessment & Plan  At least one instance of loose stools. She has been on ampicillin for UTI, therefore at risk for C. diff infection.     Plan:  Follow up on C. diff studies     Elevated AST (SGOT)  Assessment & Plan  Improving.  AST 3883 on admission, markedly elevated compared with ALT.  Remainder of LFTs consistent with cholangitis.   CPK mildly elevated, likely due to PD and systemic infection.   Trop-T mildly elevated, likely demand ischemia in the setting of septic shock.    Plan:  CTM, treating potential underlying causes    Chronic pain- (present on admission)  Assessment & Plan  Continue home fentanyl    Parkinson disease (HCC)- (present on admission)  Assessment & Plan  End stage PD.  Largely  immobile, had a Ta previously for neurogenic bladder but no longer with Ta.    Plan:  Patient is NPO and will need an NG tube to continue her Sinemet, follow up with  regarding goals of care  Palliative Care consulted, appreciate recs    Anemia  Assessment & Plan  Mild, Hgb 9.3 on admission.   Unclear if she has been having GI bleeding, though per her  she has not had blood in stools or in emesis. She has also been hypotensive, though this could be from sepsis.    Plan:  Continue to trend  FOBT  GI consulted for cholangitis     Consultants:   Gastroenterology   Palliative Care     Disposition: Inpatient, continue ICU level care for septic shock  Code Status: Full Code (may change per patient's , was previously on hospice, code was changed to full on admission, Palliative Care consulted to help with goals of care)    Quality Measures:  Ta Catheter: No  DVT Prophylaxis: Lovenox  Ulcer Prophylaxis: Not at this time  Antibiotics: Zosyn  Lines: Has 2 PIVs    This note was created using voice recognition software. There may be unintended errors in spelling, grammar or content.

## 2019-12-02 NOTE — ASSESSMENT & PLAN NOTE
Resolved. AST 3883 on admission, markedly elevated compared with ALT.  Remainder of LFTs consistent with cholangitis.   CPK mildly elevated, likely due to PD and systemic infection.   Trop-T mildly elevated, likely demand ischemia in the setting of septic shock.  AST and ALT normalized, ALP continuing to trend down towards normal.

## 2019-12-02 NOTE — ED NOTES
discussing hospice/code status with family, family is requesting to also speak with Dr Meyer. Number provided to MD who will contact family to help make care decision.

## 2019-12-02 NOTE — PROGRESS NOTES
Dr. Gentile called unit. Updated pt was started on the levophed drip at 5 mcg/min for MAP less than 60 and SBP 70-80 range , heart rate in the 100 - 105 range, o2 at 3 L NC. Orders given.

## 2019-12-02 NOTE — ED NOTES
Bedside report completed with Blanca LOWE. Pt is resting in stretcher with no needs or complaints at this time. Pt is drowsy but answers questions appropriately and reports she is feeling better with the fluids she has received.  at bedside. Discussed plan of care with family and patient.

## 2019-12-02 NOTE — ASSESSMENT & PLAN NOTE
End stage PD  Dystonia both feed  Baseline is a bit awake in the morning but mostly sleeps all day  Mostly immobilized  Previously had a ba for neurogenic bladder, but doesn't now  She can't take PO now, so pending further GOC discussions tomorrow she may need enteral access for her sinemet

## 2019-12-02 NOTE — ASSESSMENT & PLAN NOTE
Resolved. Likely secondary to sepsis.  Mental status returned to the patient's baseline prior to admission with IVF resuscitation and resolution of septic shock.

## 2019-12-02 NOTE — ASSESSMENT & PLAN NOTE
Imaging finding.  Has been on ampicillin recently for UTI, so has risk for C. Diff  Send C. Diff   This could be typhilitis given her neutropenia.  zosyn

## 2019-12-02 NOTE — CONSULTS
Reason for PC Consult: Advance Care Planning    Consulted by: Dr. Dailey    Assessment:  General:   Mrs. Draper is a 70 yo female with Parkinson's disease on hospice who presents with altered consciousness. Pt's past medical history includes, Parkinson's disease, Depression, Peripheral neuropathy, Chronic pain. She is typically A&Ox4, most alert in the morning, but this morning she appeared more lethargic and was more confused. She also was having abdominal pain, nausea, vomiting and an instance of diarrhea. Pt diagnosed with septic shock and cholangitis.       Dyspnea: No  Last BM: 12/02/19  Pain: Yes  Depression: Unable to determine  Dementia: No    Spiritual:  Is [employer]ism or spirituality important for coping with this illness? Yes- Family will reach out to their own   Has a  or spiritual provider visit been requested? No    Palliative Performance Scale: 30%    Advance Directive: Advance Directive-directive and nomination of DPOA  DPOA: Yes- Anupam Draper  POLST: No    Code Status: Full changed to DNR/DNI    Social: Pt lives with her  of 36 years, Bull. They have two children, Conrad and Archie. Archie lives in [place] and plans to fly up to [place] today. Pt's sister Judith is also coming from [place] today. Bull is pt's primary caregiver. PC RN discussed pt with hospice RN Kim from [facility] prior to consult.     Outcome:  Discussed pt with Dr. Dailey and Dr. Christianson prior to consult. Per Dr. Christianson pt is confused and unable to participate in GOC discussion. Introduced self and role of Palliative Care to pt's  Bull in conference room.  Assessed Bull's understanding of pt's current medical status, overall health picture, and options for future care. Bull states that pt has been on hospice for over a year. Bull explains that pt was having abdominal pain, pt's hospice nurse felt pt was septic and told Bull to take pt to ER. Bull states that he thought the pt would get her UTI (which she  "gets chronically) treated and pt would return home. Bull states that the \"liver and gallbladder issues were a surprise.\" Bull states that he is pt's primary caregiver. Pt has been able to move into wheel chair with Bull's assistance, but she is unable to feed herself anymore. PC RN notes the chronic and progressive nature of Parkinson's and the high likelihood of further complications related to disease process. Bull verbalizes understanding.     Explored pt's values, beliefs, and preferences in order to identify GOC. Bull states that his goal would be getting the pt home on hospice again. PC RN assesses Bull's understanding of hospice care, Bull understands that hospice is intended for end of life care at home without admission to the hospital. Bull confirms that he wants pt to return home on hospice for the end of her life. However, Bull states that he would like to know if any procedures for her gallbladder would be beneficial for her comfort. Bull would like GI MD's insight on the procedures available. PC RN updated Dr. Christianson and Dr. Dailey of Bull's request.     Advance directive in Epic. Bull is pt's DPOA. Discussed code status including mechanical ventilation and what resuscitation looks like. Bull states pt would not want CPR or mechanical ventilation. Code status changed to DNR/DNI.    Spiritual visit offered, Bull will be contacting the pt's hospice chaplain. Declines visit from [facility] spiritual leader at this time.     Active listening, reflection, reminiscing, validation & normalization, and empathic support utilized throughout this encounter.  All questions answered.  PC contact information given.         Updated: Dr. Christianson, Dr. Dailey, Mckenna SANTOS RN    Plan: DNR/DNI. Hospice at discharge. Await GI input on whether intervention for gall bladder would benefit pt's comfort.       Thank you for allowing Palliative Care to participate in this patient's care. Please feel free to call [phone] with any questions or " concerns.

## 2019-12-02 NOTE — PROGRESS NOTES
This RN attempted to contact patient next of kin to plan for consult with palliative care in order to address patient plan of care.  No answer of  with telephone number provided.  Addressed to social work for investigation of patient  at this time.

## 2019-12-02 NOTE — ED NOTES
Lab called with critical result of AST 3,883 at 1932. Critical lab result read back to Lab.   Dr. King notified of critical lab result at 1932.  Critical lab result read back by Dr. King.

## 2019-12-02 NOTE — ASSESSMENT & PLAN NOTE
AST severely elevated compared to ALT  Check trop and CK  Rest of LFTs are c/w cholangitis, but it's odd the AST is so high

## 2019-12-02 NOTE — ASSESSMENT & PLAN NOTE
"Clinical presentation was abdominal pain, vomiting, diarrhea  CT with dilated biliary ducts and possible proctitis.  Pain is more impressive in RUQ.  Suspect the primary process is ascending cholangitis, however the severe elevation in AST and profound neutropenia are odd.  This could also be typhlitis.  Map goal 60 as she has chronic hypertension.    Pt on hospice.  If we proceed with aggressive care this would likely entail a central line, intubation for procedures as she's very altered, perc cholecystostomy tube vs ERCP, NGT or OGT for sinemet.  I discussed this with her  and my concern that all this may prolong her life a bit, but there is still a good chance she would die despite those interventions and her quality of life would probably be even more diminished after all those interventions.  Aggressive care clearly not in-line with hospice.  I had multiple prolonged conversations with the patient's  and 's brother who is a urologist.  Her  would like to proceed with interventions and aggressive treatment.  He indicated to the brother that he would have terrible guilt if he \"gives up.\"  The brother is understanding of the situation.   is concerned that patient hasn't gotten to say goodbye to their son in [place].      The plan we have decided on is to use IV antibiotics and peripheral vasopressors tonight.  Will not proceed with perc jacy tonight and will try and avoid a central line assuming she only requires low dose pressors.  In the morning will get palliative care on board to assist with ongoing discussions about goals of care.      This is Septic shock Present on admission  SIRS criteria identified on my evaluation include: Fever, with temperature greater than 101 deg F and Leukopenia, with WBC less than 4,000  Source is abdominal.  Cholangitis vs proctisis  Presentation includes: Severe sepsis present, persistent hypotension after 30 ml/kg completed, and initial " lactate level result is >= 4 mmol/L.   Despite appropriate fluid resuscitation with crystalloid given per sepsis guidelines, the patient remains hypotensive with systolic blood pressure less than 90 or MAP less than 65  Hemodynamic support with additional fluids and IV vasopressors as needed to maintain a SBP of 90 or MAP of 65  IV antibiotics as appropriate for source of sepsis  Reassessment: I have reassessed the patient's hemodynamic status

## 2019-12-02 NOTE — ED NOTES
Lab called with critical result of WBC 0.9, bands 13.7% at 1934. Critical lab result read back to lab.   Dr. King notified of critical lab result at 1934.  Critical lab result read back by Dr. King.

## 2019-12-02 NOTE — ASSESSMENT & PLAN NOTE
Apparently at baseline she does talk some in the morning, but is asleep most of the day  Currently pain responsive only  Most likely encephalopathy d/t sepsis

## 2019-12-02 NOTE — ASSESSMENT & PLAN NOTE
Has been on several recent UTI, so at some risk for C. difficile  If she stools check C. difficile

## 2019-12-02 NOTE — ASSESSMENT & PLAN NOTE
Improving.  Blood Cx + pan-sensitive E.coli   Found to have biliary dilation on CT abdomen concerning for obstruction vs. Cholangitis  Received 5 days of zosyn and 2 days of ceftriaxone for total 7 day course   Leukocytosis resolved, LFTs normalizing, no longer complaining of abdominal pain, feeling hungry and tolerating diet   The patient's family decided against ERCP after thorough discussions with GI, Palliative Care and Critical Care team.     Plan:  Completed 7 day course of IV antibiotics   Encourage oral hydration  Resume home diet as tolerated

## 2019-12-02 NOTE — ASSESSMENT & PLAN NOTE
Hgb dropped to 6.7 on 12/3/19, transfused 1 unit PRBCs, Hgb improved to 8.7 and has remained stable since.   No further evaluation given patient's family decision to resume hospice care for the patient.

## 2019-12-03 LAB
ABO GROUP BLD: NORMAL
ALBUMIN SERPL BCP-MCNC: 3.2 G/DL (ref 3.2–4.9)
ALBUMIN/GLOB SERPL: 1.3 G/DL
ALP SERPL-CCNC: 364 U/L (ref 30–99)
ALT SERPL-CCNC: 673 U/L (ref 2–50)
ANION GAP SERPL CALC-SCNC: 10 MMOL/L (ref 0–11.9)
ANION GAP SERPL CALC-SCNC: 11 MMOL/L (ref 0–11.9)
AST SERPL-CCNC: 560 U/L (ref 12–45)
BACTERIA UR CULT: ABNORMAL
BACTERIA UR CULT: ABNORMAL
BARCODED ABORH UBTYP: 5100
BARCODED PRD CODE UBPRD: NORMAL
BARCODED UNIT NUM UBUNT: NORMAL
BASOPHILS # BLD AUTO: 0.2 % (ref 0–1.8)
BASOPHILS # BLD: 0.02 K/UL (ref 0–0.12)
BILIRUB SERPL-MCNC: 6.6 MG/DL (ref 0.1–1.5)
BLD GP AB SCN SERPL QL: NORMAL
BUN SERPL-MCNC: 19 MG/DL (ref 8–22)
BUN SERPL-MCNC: 19 MG/DL (ref 8–22)
CALCIUM SERPL-MCNC: 8.5 MG/DL (ref 8.5–10.5)
CALCIUM SERPL-MCNC: 8.6 MG/DL (ref 8.5–10.5)
CHLORIDE SERPL-SCNC: 113 MMOL/L (ref 96–112)
CHLORIDE SERPL-SCNC: 113 MMOL/L (ref 96–112)
CO2 SERPL-SCNC: 21 MMOL/L (ref 20–33)
CO2 SERPL-SCNC: 23 MMOL/L (ref 20–33)
COMPONENT R 8504R: NORMAL
CREAT SERPL-MCNC: 0.61 MG/DL (ref 0.5–1.4)
CREAT SERPL-MCNC: 0.65 MG/DL (ref 0.5–1.4)
EOSINOPHIL # BLD AUTO: 0.02 K/UL (ref 0–0.51)
EOSINOPHIL NFR BLD: 0.2 % (ref 0–6.9)
ERYTHROCYTE [DISTWIDTH] IN BLOOD BY AUTOMATED COUNT: 55.8 FL (ref 35.9–50)
GLOBULIN SER CALC-MCNC: 2.4 G/DL (ref 1.9–3.5)
GLUCOSE SERPL-MCNC: 79 MG/DL (ref 65–99)
GLUCOSE SERPL-MCNC: 82 MG/DL (ref 65–99)
HCT VFR BLD AUTO: 22.2 % (ref 37–47)
HGB BLD-MCNC: 6.7 G/DL (ref 12–16)
IMM GRANULOCYTES # BLD AUTO: 0.21 K/UL (ref 0–0.11)
IMM GRANULOCYTES NFR BLD AUTO: 1.7 % (ref 0–0.9)
LYMPHOCYTES # BLD AUTO: 0.6 K/UL (ref 1–4.8)
LYMPHOCYTES NFR BLD: 4.7 % (ref 22–41)
MCH RBC QN AUTO: 31.9 PG (ref 27–33)
MCHC RBC AUTO-ENTMCNC: 30.2 G/DL (ref 33.6–35)
MCV RBC AUTO: 105.7 FL (ref 81.4–97.8)
MONOCYTES # BLD AUTO: 0.45 K/UL (ref 0–0.85)
MONOCYTES NFR BLD AUTO: 3.5 % (ref 0–13.4)
NEUTROPHILS # BLD AUTO: 11.42 K/UL (ref 2–7.15)
NEUTROPHILS NFR BLD: 89.7 % (ref 44–72)
NRBC # BLD AUTO: 0 K/UL
NRBC BLD-RTO: 0 /100 WBC
PLATELET # BLD AUTO: 151 K/UL (ref 164–446)
PMV BLD AUTO: 12.2 FL (ref 9–12.9)
POTASSIUM SERPL-SCNC: 4 MMOL/L (ref 3.6–5.5)
POTASSIUM SERPL-SCNC: 4.1 MMOL/L (ref 3.6–5.5)
PRODUCT TYPE UPROD: NORMAL
PROT SERPL-MCNC: 5.6 G/DL (ref 6–8.2)
RBC # BLD AUTO: 2.1 M/UL (ref 4.2–5.4)
RH BLD: NORMAL
SIGNIFICANT IND 70042: ABNORMAL
SITE SITE: ABNORMAL
SODIUM SERPL-SCNC: 144 MMOL/L (ref 135–145)
SODIUM SERPL-SCNC: 147 MMOL/L (ref 135–145)
SOURCE SOURCE: ABNORMAL
UNIT STATUS USTAT: NORMAL
WBC # BLD AUTO: 12.7 K/UL (ref 4.8–10.8)

## 2019-12-03 PROCEDURE — 36430 TRANSFUSION BLD/BLD COMPNT: CPT

## 2019-12-03 PROCEDURE — 86901 BLOOD TYPING SEROLOGIC RH(D): CPT

## 2019-12-03 PROCEDURE — A9270 NON-COVERED ITEM OR SERVICE: HCPCS | Performed by: INTERNAL MEDICINE

## 2019-12-03 PROCEDURE — 700111 HCHG RX REV CODE 636 W/ 250 OVERRIDE (IP): Performed by: INTERNAL MEDICINE

## 2019-12-03 PROCEDURE — 86850 RBC ANTIBODY SCREEN: CPT

## 2019-12-03 PROCEDURE — 86923 COMPATIBILITY TEST ELECTRIC: CPT

## 2019-12-03 PROCEDURE — 30233N1 TRANSFUSION OF NONAUTOLOGOUS RED BLOOD CELLS INTO PERIPHERAL VEIN, PERCUTANEOUS APPROACH: ICD-10-PCS | Performed by: INTERNAL MEDICINE

## 2019-12-03 PROCEDURE — 700111 HCHG RX REV CODE 636 W/ 250 OVERRIDE (IP): Performed by: PSYCHIATRY & NEUROLOGY

## 2019-12-03 PROCEDURE — 770022 HCHG ROOM/CARE - ICU (200)

## 2019-12-03 PROCEDURE — 80048 BASIC METABOLIC PNL TOTAL CA: CPT

## 2019-12-03 PROCEDURE — 85025 COMPLETE CBC W/AUTO DIFF WBC: CPT

## 2019-12-03 PROCEDURE — 700102 HCHG RX REV CODE 250 W/ 637 OVERRIDE(OP): Performed by: INTERNAL MEDICINE

## 2019-12-03 PROCEDURE — 99291 CRITICAL CARE FIRST HOUR: CPT | Mod: GC | Performed by: INTERNAL MEDICINE

## 2019-12-03 PROCEDURE — 86900 BLOOD TYPING SEROLOGIC ABO: CPT

## 2019-12-03 PROCEDURE — 700105 HCHG RX REV CODE 258: Performed by: INTERNAL MEDICINE

## 2019-12-03 PROCEDURE — 92610 EVALUATE SWALLOWING FUNCTION: CPT

## 2019-12-03 PROCEDURE — 80053 COMPREHEN METABOLIC PANEL: CPT

## 2019-12-03 PROCEDURE — P9016 RBC LEUKOCYTES REDUCED: HCPCS

## 2019-12-03 RX ADMIN — FENTANYL CITRATE 50 MCG: 0.05 INJECTION, SOLUTION INTRAMUSCULAR; INTRAVENOUS at 12:11

## 2019-12-03 RX ADMIN — FENTANYL CITRATE 50 MCG: 0.05 INJECTION, SOLUTION INTRAMUSCULAR; INTRAVENOUS at 17:28

## 2019-12-03 RX ADMIN — SENNOSIDES AND DOCUSATE SODIUM 2 TABLET: 8.6; 5 TABLET ORAL at 17:26

## 2019-12-03 RX ADMIN — LORAZEPAM 0.25 MG: 2 INJECTION INTRAMUSCULAR; INTRAVENOUS at 22:30

## 2019-12-03 RX ADMIN — FENTANYL CITRATE 50 MCG: 0.05 INJECTION, SOLUTION INTRAMUSCULAR; INTRAVENOUS at 22:30

## 2019-12-03 RX ADMIN — LORAZEPAM 0.25 MG: 2 INJECTION INTRAMUSCULAR; INTRAVENOUS at 20:30

## 2019-12-03 RX ADMIN — ENOXAPARIN SODIUM 40 MG: 100 INJECTION SUBCUTANEOUS at 17:26

## 2019-12-03 RX ADMIN — ONDANSETRON 4 MG: 2 INJECTION INTRAMUSCULAR; INTRAVENOUS at 06:34

## 2019-12-03 RX ADMIN — LORAZEPAM 1 MG: 2 INJECTION INTRAMUSCULAR; INTRAVENOUS at 00:28

## 2019-12-03 RX ADMIN — LORAZEPAM 0.25 MG: 2 INJECTION INTRAMUSCULAR; INTRAVENOUS at 04:18

## 2019-12-03 RX ADMIN — FENTANYL CITRATE 50 MCG: 0.05 INJECTION, SOLUTION INTRAMUSCULAR; INTRAVENOUS at 04:05

## 2019-12-03 RX ADMIN — PIPERACILLIN SODIUM AND TAZOBACTAM SODIUM 3.38 G: 3; .375 INJECTION, POWDER, FOR SOLUTION INTRAVENOUS at 13:46

## 2019-12-03 RX ADMIN — FENTANYL CITRATE 50 MCG: 0.05 INJECTION, SOLUTION INTRAMUSCULAR; INTRAVENOUS at 00:10

## 2019-12-03 RX ADMIN — PIPERACILLIN SODIUM AND TAZOBACTAM SODIUM 3.38 G: 3; .375 INJECTION, POWDER, FOR SOLUTION INTRAVENOUS at 04:20

## 2019-12-03 RX ADMIN — LORAZEPAM 0.26 MG: 2 INJECTION INTRAMUSCULAR; INTRAVENOUS at 08:03

## 2019-12-03 RX ADMIN — PIPERACILLIN SODIUM AND TAZOBACTAM SODIUM 3.38 G: 3; .375 INJECTION, POWDER, FOR SOLUTION INTRAVENOUS at 20:42

## 2019-12-03 RX ADMIN — FENTANYL CITRATE 50 MCG: 0.05 INJECTION, SOLUTION INTRAMUSCULAR; INTRAVENOUS at 20:30

## 2019-12-03 RX ADMIN — FENTANYL CITRATE 50 MCG: 0.05 INJECTION, SOLUTION INTRAMUSCULAR; INTRAVENOUS at 10:40

## 2019-12-03 RX ADMIN — LORAZEPAM 0.26 MG: 2 INJECTION INTRAMUSCULAR; INTRAVENOUS at 15:50

## 2019-12-03 ASSESSMENT — ENCOUNTER SYMPTOMS
DIARRHEA: 1
SORE THROAT: 0
NAUSEA: 0
VOMITING: 0
MYALGIAS: 1
HEMOPTYSIS: 0
SEIZURES: 0
BLOOD IN STOOL: 0
PALPITATIONS: 0
HEADACHES: 0
FEVER: 0
COUGH: 0
STRIDOR: 0
CHILLS: 0
ABDOMINAL PAIN: 1

## 2019-12-03 NOTE — CARE PLAN
Problem: Communication  Goal: The ability to communicate needs accurately and effectively will improve  Outcome: PROGRESSING AS EXPECTED  Intervention: Educate patient and significant other/support system about the plan of care, procedures, treatments, medications and allow for questions  Note:   Patient educated on plan of care and education provided on current disease process.  Patient demonstrates understanding.  Will continue to re-inforce education when required.       Problem: Safety  Goal: Will remain free from falls  Outcome: PROGRESSING AS EXPECTED  Intervention: Implement fall precautions  12/3/2019 1441 by Mckenna Mcgarry R.N.  Note:   Bed in lowest position, call light in reach, bed locked, alarm activated.  Hourly rounding on patient to ensure safety is maintained.  Patient educated on safety concerns at this time.    12/3/2019 1441 by Mckenna Mcgarry, R.N.  Note:   Bed in lowest position, call light in reach, bed locked, alarm activated.  Hourly rounding on patient to ensure safety is maintained.  Patient educated on safety concerns at this time.

## 2019-12-03 NOTE — PROGRESS NOTES
Gastroenterology Progress Note     Author: Maricruz Aftab   Date & Time Created: 12/3/2019 9:44 AM    Chief Complaint:  Biliary sepsis    Interval History:  Patient did not need vasopressor therapy overnight.  She denies abdominal pain upon exam but nursing reports that she complains of constant abdominal pain.  She denies any nausea or vomiting.  Extensive discussion was had with prognosis and plan of care by physician yesterday with the , he plan to go home overnight and give it some thought.  He has yet to return with his decision.  AST decreased to 560, ALT increased to 673, alk phos decreased to 364, bilirubin increased to 6.6 from 4.5.  She is currently n.p.o.  She is getting a unit of red blood cells for hemoglobin of 6.7.  She has not shown any signs of bleeding.  Her last bowel movement was yesterday, brown.    Review of Systems:  Review of Systems   Gastrointestinal: Positive for abdominal pain. Negative for blood in stool, melena, nausea and vomiting.       Physical Exam:  Physical Exam  Constitutional:       General: She is not in acute distress.     Appearance: She is ill-appearing.   HENT:      Head: Normocephalic.   Eyes:      Pupils: Pupils are equal, round, and reactive to light.   Cardiovascular:      Rate and Rhythm: Normal rate and regular rhythm.   Pulmonary:      Effort: Pulmonary effort is normal.      Comments: diminished  Abdominal:      General: Abdomen is flat. Bowel sounds are normal.      Palpations: Abdomen is soft.      Tenderness: There is tenderness (epigastric to palpation).   Skin:     General: Skin is warm and dry.         Labs:      Recent Labs     12/02/19  0515   CPKTOTAL 493*     Recent Labs     12/01/19  1812 12/02/19  0515 12/03/19  0430 12/03/19  0830   SODIUM 143 144 144 147*   POTASSIUM 3.4* 3.1* 4.1 4.0   CHLORIDE 102 109 113* 113*   CO2 28 28 21 23   BUN 25* 21 19 19   CREATININE 0.83 0.88 0.65 0.61   MAGNESIUM 2.0  --   --   --    CALCIUM 9.3 8.6  8.6 8.5     Recent Labs     19  0515 19  0430 19  0830   ALTSGPT 307* 558*  --  673*   ASTSGOT 3883* *  --  560*   ALKPHOSPHAT 657* 475*  --  364*   TBILIRUBIN 2.9* 4.5*  --  6.6*   GLUCOSE 167* 108* 82 79     Recent Labs     19  0515 19  0430   RBC 2.90* 2.43* 2.10*   HEMOGLOBIN 9.3* 7.7* 6.7*   HEMATOCRIT 30.0* 24.6* 22.2*   PLATELETCT 196 171 151*   PROTHROMBTM 15.2* 15.5*  --    APTT  --  31.9  --    INR 1.17* 1.20*  --      Recent Labs     19  0430 19  0830   WBC 0.9* 12.5* 12.7*  --    NEUTSPOLYS 44.60 81.00* 89.70*  --    LYMPHOCYTES 23.80 2.00* 4.70*  --    MONOCYTES 8.60 6.00 3.50  --    EOSINOPHILS 0.70 0.00 0.20  --    BASOPHILS 1.40 0.00 0.20  --    ASTSGOT 3883* *  --  560*   ALTSGPT 307* 558*  --  673*   ALKPHOSPHAT 657* 475*  --  364*   TBILIRUBIN 2.9* 4.5*  --  6.6*     Hemodynamics:  Temp (24hrs), Av.1 °C (98.8 °F), Min:36.8 °C (98.3 °F), Max:37.4 °C (99.3 °F)  Temperature: 36.8 °C (98.3 °F)  Pulse  Av.5  Min: 63  Max: 130   Blood Pressure : 107/55     Respiratory:    Respiration: 18, Pulse Oximetry: 96 %     Work Of Breathing / Effort: Mild  RUL Breath Sounds: Diminished, RML Breath Sounds: Diminished, RLL Breath Sounds: Diminished, RUPALI Breath Sounds: Diminished, LLL Breath Sounds: Diminished  Fluids:    Intake/Output Summary (Last 24 hours) at 12/3/2019 0944  Last data filed at 12/3/2019 0600  Gross per 24 hour   Intake 1666.15 ml   Output 450 ml   Net 1216.15 ml        GI/Nutrition:  Orders Placed This Encounter   Procedures   • Diet NPO     Standing Status:   Standing     Number of Occurrences:   1     Order Specific Question:   Restrict to:     Answer:   Strict [1]     Medical Decision Making, by Problem:  Active Hospital Problems    Diagnosis   • *Septic shock (HCC) [A41.9, R65.21]   • Cholangitis [K83.09]   • Proctitis [K62.89]   • Neutropenia associated with infection (HCC)  [D70.3]   • Elevated AST (SGOT) [R74.0]   • Diarrhea [R19.7]   • Encephalopathy [G93.40]   • Chronic pain [G89.29]   • Parkinson disease (HCC) [G20]   • Anemia [D64.9]       Plan:  Await husbands arrival for direction on wishes for care.  In the meantime, ok to start clear liquid diet.      Quality-Core Measures

## 2019-12-03 NOTE — CARE PLAN
Problem: Infection  Goal: Will remain free from infection  Note:   Pt is on isolation for C. Diff. Appropriate infection prevention protocols in place. Infection prevention education provided.     Problem: Venous Thromboembolism (VTW)/Deep Vein Thrombosis (DVT) Prevention:  Goal: Patient will participate in Venous Thrombosis (VTE)/Deep Vein Thrombosis (DVT)Prevention Measures  Note:   SCDs in place and turned on.      Problem: Skin Integrity  Goal: Risk for impaired skin integrity will decrease  Note:   Q2 turns in place, heels/elbows floated on pillows.

## 2019-12-03 NOTE — THERAPY
"Speech Language Therapy Clinical Swallow Evaluation completed.  Functional Status: Pt seen this date for clinical swallow evaluation. Pt tested on clear liquid diet only per GI. Pt's spouse and adult son at bedside. Pt was awake, alert, followed directions and participated fully in evaluation. Pt and family report preference for liquids (including soups) via straw, and that pt consumes a regular diet at home. Oral mechanism examination completed. Restricted ROM and strength of jaw, lingual and labial structures appreciated, which is consistent with Parkinson's disease diagnosis. Pt presents with reduced vocal intensity and weak cough. PO trials of thins by cup, consecutive sips of thins via straw, NTL, and icee assessed. Hyolaryngeal elevation palpated as complete, timely initiation of swallow appreciated and vocal quality remained clear throughout evaluation. No s/sx of aspiration appreciated with any consistencies consumed. No advanced textures trialed per GI request. Pt utilizes strategies such as slow rate and small sips/bites independently. Pt required feeding assistance throughout session. Recommend full THIN liquid diet with adherence to the following strategies: upright at 90* for PO, assistance with mealtray setup, feeding assistance as requested, liquids via straw, slow rate. SLP following.   Recommendations - Diet: Diet / Liquid Recommendation: (P) Thin Liquid, Other (Comments)(full liquid)                          Strategies: Monitor during meals, Assistance needed for meal tray set-up and Head of Bed at 90 Degrees                          Medication Administration: Medication Administration : (P) Whole with Liquid Wash  Plan of Care: Will benefit from Speech Therapy 3 times per week  Post-Acute Therapy: Recommend inpatient transitional care services for continued speech therapy services.      See \"Rehab Therapy-Acute\" Patient Summary Report for complete documentation.   "

## 2019-12-03 NOTE — CARE PLAN
Problem: Communication  Goal: The ability to communicate needs accurately and effectively will improve  Outcome: PROGRESSING AS EXPECTED  Intervention: Educate patient and significant other/support system about the plan of care, procedures, treatments, medications and allow for questions  Note:   Patient educated on plan of care and education provided on current disease process.  Patient demonstrates understanding.  Will continue to re-inforce education when required.       Problem: Safety  Goal: Will remain free from falls  Outcome: PROGRESSING AS EXPECTED  Intervention: Implement fall precautions  Note:   Bed in lowest position, call light in reach, bed locked, alarm activated.  Hourly rounding on patient to ensure safety is maintained.  Patient educated on safety concerns at this time.

## 2019-12-04 LAB
ALBUMIN SERPL BCP-MCNC: 3 G/DL (ref 3.2–4.9)
ALBUMIN/GLOB SERPL: 1.2 G/DL
ALP SERPL-CCNC: 309 U/L (ref 30–99)
ALT SERPL-CCNC: 604 U/L (ref 2–50)
ANION GAP SERPL CALC-SCNC: 7 MMOL/L (ref 0–11.9)
AST SERPL-CCNC: 225 U/L (ref 12–45)
BACTERIA BLD CULT: ABNORMAL
BASOPHILS # BLD AUTO: 0.3 % (ref 0–1.8)
BASOPHILS # BLD: 0.04 K/UL (ref 0–0.12)
BILIRUB SERPL-MCNC: 4 MG/DL (ref 0.1–1.5)
BUN SERPL-MCNC: 18 MG/DL (ref 8–22)
CALCIUM SERPL-MCNC: 8.5 MG/DL (ref 8.5–10.5)
CHLORIDE SERPL-SCNC: 111 MMOL/L (ref 96–112)
CO2 SERPL-SCNC: 24 MMOL/L (ref 20–33)
CREAT SERPL-MCNC: 0.6 MG/DL (ref 0.5–1.4)
EOSINOPHIL # BLD AUTO: 0.09 K/UL (ref 0–0.51)
EOSINOPHIL NFR BLD: 0.8 % (ref 0–6.9)
ERYTHROCYTE [DISTWIDTH] IN BLOOD BY AUTOMATED COUNT: 56.9 FL (ref 35.9–50)
GLOBULIN SER CALC-MCNC: 2.6 G/DL (ref 1.9–3.5)
GLUCOSE SERPL-MCNC: 107 MG/DL (ref 65–99)
HCT VFR BLD AUTO: 27.1 % (ref 37–47)
HGB BLD-MCNC: 8.2 G/DL (ref 12–16)
IMM GRANULOCYTES # BLD AUTO: 0.2 K/UL (ref 0–0.11)
IMM GRANULOCYTES NFR BLD AUTO: 1.7 % (ref 0–0.9)
LYMPHOCYTES # BLD AUTO: 0.93 K/UL (ref 1–4.8)
LYMPHOCYTES NFR BLD: 8 % (ref 22–41)
MCH RBC QN AUTO: 30.7 PG (ref 27–33)
MCHC RBC AUTO-ENTMCNC: 30.3 G/DL (ref 33.6–35)
MCV RBC AUTO: 101.5 FL (ref 81.4–97.8)
MONOCYTES # BLD AUTO: 0.51 K/UL (ref 0–0.85)
MONOCYTES NFR BLD AUTO: 4.4 % (ref 0–13.4)
NEUTROPHILS # BLD AUTO: 9.8 K/UL (ref 2–7.15)
NEUTROPHILS NFR BLD: 84.8 % (ref 44–72)
NRBC # BLD AUTO: 0 K/UL
NRBC BLD-RTO: 0 /100 WBC
PLATELET # BLD AUTO: 149 K/UL (ref 164–446)
PMV BLD AUTO: 11.7 FL (ref 9–12.9)
POTASSIUM SERPL-SCNC: 3.7 MMOL/L (ref 3.6–5.5)
PROT SERPL-MCNC: 5.6 G/DL (ref 6–8.2)
RBC # BLD AUTO: 2.67 M/UL (ref 4.2–5.4)
SIGNIFICANT IND 70042: ABNORMAL
SIGNIFICANT IND 70042: ABNORMAL
SITE SITE: ABNORMAL
SITE SITE: ABNORMAL
SODIUM SERPL-SCNC: 142 MMOL/L (ref 135–145)
SOURCE SOURCE: ABNORMAL
SOURCE SOURCE: ABNORMAL
WBC # BLD AUTO: 11.6 K/UL (ref 4.8–10.8)

## 2019-12-04 PROCEDURE — 80053 COMPREHEN METABOLIC PANEL: CPT

## 2019-12-04 PROCEDURE — 99233 SBSQ HOSP IP/OBS HIGH 50: CPT | Mod: GC | Performed by: INTERNAL MEDICINE

## 2019-12-04 PROCEDURE — 700111 HCHG RX REV CODE 636 W/ 250 OVERRIDE (IP): Performed by: INTERNAL MEDICINE

## 2019-12-04 PROCEDURE — A9270 NON-COVERED ITEM OR SERVICE: HCPCS | Performed by: STUDENT IN AN ORGANIZED HEALTH CARE EDUCATION/TRAINING PROGRAM

## 2019-12-04 PROCEDURE — 700105 HCHG RX REV CODE 258: Performed by: STUDENT IN AN ORGANIZED HEALTH CARE EDUCATION/TRAINING PROGRAM

## 2019-12-04 PROCEDURE — 770022 HCHG ROOM/CARE - ICU (200)

## 2019-12-04 PROCEDURE — 700111 HCHG RX REV CODE 636 W/ 250 OVERRIDE (IP): Performed by: STUDENT IN AN ORGANIZED HEALTH CARE EDUCATION/TRAINING PROGRAM

## 2019-12-04 PROCEDURE — 700105 HCHG RX REV CODE 258: Performed by: INTERNAL MEDICINE

## 2019-12-04 PROCEDURE — 85025 COMPLETE CBC W/AUTO DIFF WBC: CPT

## 2019-12-04 PROCEDURE — 700102 HCHG RX REV CODE 250 W/ 637 OVERRIDE(OP): Performed by: STUDENT IN AN ORGANIZED HEALTH CARE EDUCATION/TRAINING PROGRAM

## 2019-12-04 RX ORDER — OXYCODONE HYDROCHLORIDE 5 MG/1
5-10 TABLET ORAL EVERY 4 HOURS PRN
Status: DISCONTINUED | OUTPATIENT
Start: 2019-12-04 | End: 2019-12-07 | Stop reason: HOSPADM

## 2019-12-04 RX ORDER — CARBIDOPA AND LEVODOPA 50; 200 MG/1; MG/1
1 TABLET, EXTENDED RELEASE ORAL
Status: DISCONTINUED | OUTPATIENT
Start: 2019-12-04 | End: 2019-12-07 | Stop reason: HOSPADM

## 2019-12-04 RX ORDER — FLUOXETINE 10 MG/1
20 CAPSULE ORAL DAILY
Status: DISCONTINUED | OUTPATIENT
Start: 2019-12-04 | End: 2019-12-07 | Stop reason: HOSPADM

## 2019-12-04 RX ADMIN — CARBIDOPA AND LEVODOPA 1 TABLET: 25; 100 TABLET ORAL at 17:11

## 2019-12-04 RX ADMIN — FENTANYL CITRATE 50 MCG: 0.05 INJECTION, SOLUTION INTRAMUSCULAR; INTRAVENOUS at 11:02

## 2019-12-04 RX ADMIN — CARBIDOPA AND LEVODOPA 1 TABLET: 50; 200 TABLET, EXTENDED RELEASE ORAL at 20:18

## 2019-12-04 RX ADMIN — LORAZEPAM 0.25 MG: 2 INJECTION INTRAMUSCULAR; INTRAVENOUS at 22:10

## 2019-12-04 RX ADMIN — FENTANYL CITRATE 50 MCG: 0.05 INJECTION, SOLUTION INTRAMUSCULAR; INTRAVENOUS at 20:10

## 2019-12-04 RX ADMIN — LORAZEPAM 0.25 MG: 2 INJECTION INTRAMUSCULAR; INTRAVENOUS at 02:00

## 2019-12-04 RX ADMIN — CARBIDOPA AND LEVODOPA 1 TABLET: 25; 100 TABLET ORAL at 22:17

## 2019-12-04 RX ADMIN — ENOXAPARIN SODIUM 40 MG: 100 INJECTION SUBCUTANEOUS at 17:12

## 2019-12-04 RX ADMIN — LORAZEPAM 0.25 MG: 2 INJECTION INTRAMUSCULAR; INTRAVENOUS at 06:30

## 2019-12-04 RX ADMIN — POTASSIUM CHLORIDE: 149 INJECTION, SOLUTION, CONCENTRATE INTRAVENOUS at 06:00

## 2019-12-04 RX ADMIN — CARBIDOPA AND LEVODOPA 1 TABLET: 25; 100 TABLET ORAL at 20:00

## 2019-12-04 RX ADMIN — CARBIDOPA AND LEVODOPA 1 TABLET: 25; 100 TABLET ORAL at 13:34

## 2019-12-04 RX ADMIN — LORAZEPAM 0.26 MG: 2 INJECTION INTRAMUSCULAR; INTRAVENOUS at 17:11

## 2019-12-04 RX ADMIN — CARBIDOPA AND LEVODOPA 1 TABLET: 25; 100 TABLET ORAL at 11:48

## 2019-12-04 RX ADMIN — FENTANYL CITRATE 50 MCG: 0.05 INJECTION, SOLUTION INTRAMUSCULAR; INTRAVENOUS at 12:33

## 2019-12-04 RX ADMIN — PIPERACILLIN SODIUM AND TAZOBACTAM SODIUM 3.38 G: 3; .375 INJECTION, POWDER, FOR SOLUTION INTRAVENOUS at 06:41

## 2019-12-04 RX ADMIN — FLUOXETINE HYDROCHLORIDE 20 MG: 20 CAPSULE ORAL at 11:48

## 2019-12-04 RX ADMIN — OXYCODONE HYDROCHLORIDE 5 MG: 5 TABLET ORAL at 17:10

## 2019-12-04 RX ADMIN — FENTANYL CITRATE 50 MCG: 0.05 INJECTION, SOLUTION INTRAMUSCULAR; INTRAVENOUS at 09:53

## 2019-12-04 RX ADMIN — FENTANYL CITRATE 50 MCG: 0.05 INJECTION, SOLUTION INTRAMUSCULAR; INTRAVENOUS at 08:02

## 2019-12-04 RX ADMIN — FENTANYL CITRATE 50 MCG: 0.05 INJECTION, SOLUTION INTRAMUSCULAR; INTRAVENOUS at 06:30

## 2019-12-04 RX ADMIN — LORAZEPAM 0.25 MG: 2 INJECTION INTRAMUSCULAR; INTRAVENOUS at 20:16

## 2019-12-04 RX ADMIN — FENTANYL CITRATE 50 MCG: 0.05 INJECTION, SOLUTION INTRAMUSCULAR; INTRAVENOUS at 02:00

## 2019-12-04 RX ADMIN — PIPERACILLIN SODIUM AND TAZOBACTAM SODIUM 3.38 G: 3; .375 INJECTION, POWDER, FOR SOLUTION INTRAVENOUS at 20:18

## 2019-12-04 RX ADMIN — FENTANYL CITRATE 50 MCG: 0.05 INJECTION, SOLUTION INTRAMUSCULAR; INTRAVENOUS at 03:10

## 2019-12-04 RX ADMIN — LORAZEPAM 0.26 MG: 2 INJECTION INTRAMUSCULAR; INTRAVENOUS at 09:52

## 2019-12-04 RX ADMIN — OXYCODONE HYDROCHLORIDE 10 MG: 5 TABLET ORAL at 22:16

## 2019-12-04 RX ADMIN — PIPERACILLIN SODIUM AND TAZOBACTAM SODIUM 3.38 G: 3; .375 INJECTION, POWDER, FOR SOLUTION INTRAVENOUS at 13:35

## 2019-12-04 ASSESSMENT — ENCOUNTER SYMPTOMS
TINGLING: 1
BACK PAIN: 1
MYALGIAS: 1
BLOOD IN STOOL: 0
HEMOPTYSIS: 0
WEAKNESS: 1
VOMITING: 0
NAUSEA: 0
TREMORS: 1
SORE THROAT: 0
FEVER: 0
ABDOMINAL PAIN: 1
HEARTBURN: 0
WHEEZING: 0
PHOTOPHOBIA: 0
EYE PAIN: 0
CHILLS: 0

## 2019-12-04 ASSESSMENT — COGNITIVE AND FUNCTIONAL STATUS - GENERAL
HELP NEEDED FOR BATHING: A LOT
TOILETING: A LOT
MOBILITY SCORE: 8
DRESSING REGULAR LOWER BODY CLOTHING: A LOT
MOVING FROM LYING ON BACK TO SITTING ON SIDE OF FLAT BED: UNABLE
STANDING UP FROM CHAIR USING ARMS: TOTAL
DAILY ACTIVITIY SCORE: 13
SUGGESTED CMS G CODE MODIFIER MOBILITY: CM
CLIMB 3 TO 5 STEPS WITH RAILING: TOTAL
PERSONAL GROOMING: A LOT
EATING MEALS: A LITTLE
DRESSING REGULAR UPPER BODY CLOTHING: A LOT
WALKING IN HOSPITAL ROOM: TOTAL
SUGGESTED CMS G CODE MODIFIER DAILY ACTIVITY: CL
MOVING TO AND FROM BED TO CHAIR: A LOT
TURNING FROM BACK TO SIDE WHILE IN FLAT BAD: A LOT

## 2019-12-04 NOTE — CARE PLAN
Problem: Skin Integrity  Goal: Risk for impaired skin integrity will decrease  Note:   Q2 turns in place, elbows/heels floated on pillows, mepilex placed.     Problem: Mobility  Goal: Risk for activity intolerance will decrease  Note:   Pt sits up at the EOB and performs ROM exercises.

## 2019-12-04 NOTE — PROGRESS NOTES
[facility] ICU Progress Note    Admission Date: 2019    ICU Resident: Nitin Oneal M.D.   ICU Attending: Maximus Christianson M.D.    Patient ID:    Name:  Delmis Draper  :  1950  MRN:  [id number]    Today's Date:  12/3/2019     HPI:  69-year-old female with PD on hospice who was brought in by her  on 19 for abdominal pain, nausea and vomiting. Patient was unresponsive in the ED and all history was obtained from her . He reported onset of symptoms 19 morning. He denied hematemesis and blood per rectum. Patient is essentially immobilized from end-stage PD with severe dystonia and has chronic pain for which she takes Fentanyl and benzodiazepines at baseline. Patient had been on hospice prior to admission, her  changed code status to full code following admission, he is still finalizing this decision.    Found to likely have sepsis, intra-abdominal infection (cholangitis versus proctitis), and was started on Zosyn. Was seen by GI, and consideration for ERCP.  Patient/ still deciding on degree of aggressive care.        Interval Events:  · Alert and communicative.   · Notes abdominal tenderness, diffuse.   · Tested for C.diff - Negative.   · Getting SLP to evaluate for diet (final decision to be by GI).       Review of Systems   Constitutional: Negative for chills and fever.   HENT: Negative for sore throat.    Respiratory: Negative for cough, hemoptysis and stridor.    Cardiovascular: Negative for chest pain and palpitations.   Gastrointestinal: Positive for abdominal pain and diarrhea.   Musculoskeletal: Positive for joint pain and myalgias.        Limited mobility secondary to Parkinson's disease, and contractions.    Neurological: Negative for seizures and headaches.   Psychiatric/Behavioral:        States not depressed, but apparently sad when talking about loss of function from parkinson's.           Physical Exam  Vitals:    19 1600 19 1700  12/03/19 1728 12/03/19 1800   BP: 117/60 112/67 114/75 114/68   Pulse: 75 77 75 69   Resp: 20 20 (!) 23 20   Temp: 36.6 °C (97.8 °F)   36.6 °C (97.9 °F)   TempSrc: Temporal   Temporal   SpO2: 94% 93% 94% 95%   Weight:       Height:       Body mass index is 22.49 kg/m².  Pulse Oximetry: 95 %, O2 (LPM): 5, O2 Delivery: Silicone Nasal Cannula      Physical Exam   Constitutional: No distress.   Frail appearing, elderly female, resting in bed.    HENT:   Head: Normocephalic and atraumatic.   Eyes: EOM are normal. Scleral icterus is present.   Neck: Neck supple. No tracheal deviation present.   Cardiovascular: Normal rate and regular rhythm. Exam reveals no gallop.   No murmur heard.  Pulmonary/Chest: Effort normal. No stridor. No respiratory distress. She has no wheezes. She has no rales.   Abdominal: Soft. She exhibits no distension. There is tenderness (diffuse). There is no rebound and no guarding.   Musculoskeletal:         General: Deformity (contractions) present. No edema.   Neurological:   Alert and oriented to person, place, and year (but a bit off on date).    Skin: Skin is dry. She is not diaphoretic.   Psychiatric:   Normal affect, but then sad, when discussing decline in function.           Fluids:  Date 12/03/19 0700 - 12/04/19 0659   Shift 4647-5103 7014-1559 9753-7872 24 Hour Total   INTAKE   P.O. 350 150  500     P.O. 350 150  500   I.V. 600 300  900     IV Volume (LR w/20KCl) 600 300  900   Blood 350   350     Volume (RELEASE RED BLOOD CELLS) 350   350   Shift Total 1300 450  1750   OUTPUT   Shift Total       NET 1300 450  1750            Recent Labs     12/01/19  1812 12/02/19  0515 12/03/19  0430 12/03/19  0830   SODIUM 143 144 144 147*   POTASSIUM 3.4* 3.1* 4.1 4.0   CHLORIDE 102 109 113* 113*   CO2 28 28 21 23   BUN 25* 21 19 19   CREATININE 0.83 0.88 0.65 0.61   MAGNESIUM 2.0  --   --   --    CALCIUM 9.3 8.6 8.6 8.5     Respiratory:  Respiration: (!) 26  Pulse Oximetry: 96 %  O2 (LPM): 3  O2  Delivery: Silicone Nasal Cannula  Chest Tube Drains: NA    Hemodynamics:  Pulse: 69   Blood Pressure : 114/68     Neuro:  Alert and oriented to person, place, and year.       GI-Nutrition:  Recent Labs     190 19  0830   ALTSGPT 307* 558*  --  673*   ASTSGOT 3883* *  --  560*   ALKPHOSPHAT 657* 475*  --  364*   TBILIRUBIN 2.9* 4.5*  --  6.6*   GLUCOSE 167* 108* 82 79     Heme-Onc:  Recent Labs     19   RBC 2.90* 2.43* 2.10*   HEMOGLOBIN 9.3* 7.7* 6.7*   HEMATOCRIT 30.0* 24.6* 22.2*   PLATELETCT 196 171 151*   PROTHROMBTM 15.2* 15.5*  --    APTT  --  31.9  --    INR 1.17* 1.20*  --      Infectious Disease:  Temp  Av.9 °C (98.4 °F)  Min: 36.3 °C (97.3 °F)  Max: 37.3 °C (99.1 °F)  Recent Labs     19  0830   WBC 0.9* 12.5* 12.7*  --    NEUTSPOLYS 44.60 81.00* 89.70*  --    LYMPHOCYTES 23.80 2.00* 4.70*  --    MONOCYTES 8.60 6.00 3.50  --    EOSINOPHILS 0.70 0.00 0.20  --    BASOPHILS 1.40 0.00 0.20  --    ASTSGOT 3883* *  --  560*   ALTSGPT 307* 558*  --  673*   ALKPHOSPHAT 657* 475*  --  364*   TBILIRUBIN 2.9* 4.5*  --  6.6*     Medications:  • NORepinephrine (LEVOPHED) infusion  0-30 mcg/min Stopped (19 1430)   • enoxaparin  40 mg     • ondansetron  4 mg     • fentaNYL  1 Patch     • lactated ringers with KCL infusion   75 mL/hr at 198   • fentaNYL  50 mcg     • LORazepam  0.26 mg     • piperacillin-tazobactam  3.375 g Stopped (19 1746)   • senna-docusate  2 Tab      And   • polyethylene glycol/lytes  1 Packet      And   • magnesium hydroxide  30 mL      And   • bisacodyl  10 mg     • Respiratory Care per Protocol         Procedures:  TBD    Imaging:    CT-ABDOMEN-PELVIS WITH   Final Result      1.  Changes in the biliary tree suspicious for obstruction and/or cholangitis. No discrete obstructing mass is seen though small ampullary region mass or  stricture could be present.   2.  Changes in the rectum suggestive of proctitis. Underlying mass is not excluded.   3.  RIGHT renal calyceal stone   4.  Appendix not visualized   5.  Duodenal diverticulum               DX-CHEST-PORTABLE (1 VIEW)   Final Result      1.  Linear atelectasis within the left lung base.      2.  Mild cardiomegaly.        Assessment and Plan:    * Septic shock (HCC)  Assessment & Plan  Present on admission  SIRS criteria identified on my evaluation include: Fever with temperature greater than 101 F, Tachycardia, Tachypnea, Leukopenia  Source: Likely abdominal, ascending cholangitis vs. Proctitis  Presentation includes: Persistent hypotension despite appropriate fluid resuscitation with crystalloids administered per sepsis guidelines, vasopressor support to maintain SBP > 90 and MAP > 65    Plan:  Empiric Zosyn 3.375g q8h  Continue IVF and Levophed to keep MAP > 60  Source control for cholangitis via percutaneous cholecystostomy vs. ERCP, GI consulted, appreciate recs  Palliative Care consulted for goals of care planning  Holding aggressive interventions, e.g. central line, until goals of care clarified by patient's   - Continue zosyn.    - Awaiting final decision on procedures.    Neutropenia associated with infection (HCC)  Assessment & Plan  Resolved.  WBC 0.9 with ANC 0.5 on admission; WBC 12.5 with 88% neutrophils on repeat CBC.  CTM, treating sepsis as above.     Proctitis  Assessment & Plan  Finding on CT abdomen and pelvis.   Has been on ampicillin recently for UTI therefore at risk for C. diff infection.  Can not rule out proctitis as cause of her septic shock.    Plan:  Empiric Zosyn  C. diff - Negative    Cholangitis (possible)  Assessment & Plan   and septic shock with biliary dilation on CT abdomen concerning for obstruction vs. cholangitis.    Plan:  Zosyn 3.375g q8h empirically  Continue IVF and vasopressor support to maintain MAP > 60  GI following, - still  awaiting final decision by .      GI and SLP cleared for liquid diet. (until final decision)     Appreciate recommendations.   Palliative care consulted for goals of care, aggressive vs. conservative management accordingly      Appreciate assistance.     Encephalopathy  Assessment & Plan  Improved overnight.   Usually alert and oriented x 4, especially in the morning.   Current waxing and waning responsiveness is new.   Most likely due to her sepsis.     Plan:  Continue to monitor  Treating underlying cause(s)    Diarrhea  Assessment & Plan  At least one instance of loose stools.   She has been on ampicillin for UTI, therefore at risk for C. diff infection.   C. diff studies - Negative.     Elevated AST (SGOT)  Assessment & Plan  Improving.  AST 3883 on admission, markedly elevated compared with ALT.  Remainder of LFTs consistent with cholangitis.   CPK mildly elevated, likely due to PD and systemic infection.   Trop-T mildly elevated, likely demand ischemia in the setting of septic shock.    Plan:  CTM, treating potential underlying causes    Chronic pain- (present on admission)  Assessment & Plan  Continue home fentanyl    Parkinson disease (HCC)- (present on admission)  Assessment & Plan  End stage PD.  Largely immobile, had a Ta previously for neurogenic bladder but no longer with Ta.    Plan:  Patient is NPO and will need an NG tube to continue her Sinemet, follow up with  regarding goals of care  Palliative Care consulted, appreciate recs    Anemia  Assessment & Plan  Mild, Hgb 9.3 on admission.   Unclear if she has been having GI bleeding, though per her  she has not had blood in stools or in emesis. She has also been hypotensive, though this could be from sepsis.  FOBT - still pending.   GI consulted- appreciate recommendations.     AM Hgb - 6.7 (on 12/3/19)  - will transfuse 1 unit PRBC.   - Recheck in evening.         Consultants:   Gastroenterology   Palliative Care        Disposition: Inpatient, continue ICU level care  Code Status: DNAR/DNI (may change per patient's , was previously on hospice, code was changed to full on admission, Palliative Care consulted to help with goals of care)    Quality Measures:  Ta Catheter: No  DVT Prophylaxis: Lovenox  Ulcer Prophylaxis: Not at this time  Antibiotics: Zosyn  Lines: Has 2 PIVs      This note may have used voice recognition software. Despite review, there may be unintended errors in spelling, grammar or content.

## 2019-12-04 NOTE — CARE PLAN
Problem: Communication  Goal: The ability to communicate needs accurately and effectively will improve  Outcome: MET  Intervention: [place] patient and significant other/support system to call light to alert staff of needs  Note:   Call light in pt hand; pt educated on use and encouraged to use      Problem: Venous Thromboembolism (VTW)/Deep Vein Thrombosis (DVT) Prevention:  Goal: Patient will participate in Venous Thrombosis (VTE)/Deep Vein Thrombosis (DVT)Prevention Measures  Outcome: MET  Intervention: Ensure patient wears graduated elastic stockings (LOBO hose) and/or SCDs, if ordered, when in bed or chair (Remove at least once per shift for skin check)  Note:   SCDs on pt and in use

## 2019-12-04 NOTE — PROGRESS NOTES
"  Gastroenterology Progress Note     Author: Maricruz Aftab   Date & Time Created: 12/4/2019 9:44 AM    Chief Complaint:  Biliary sepsis    Interval History:  Patient reports she feels \"miserable\" pain in upper abdomen 9/10, about to receive pain meds.  Only wants to eat sorbet.  She denies any nausea or vomiting.  AST decreased to 225, ALT down to 604, alk phos decreased to 309, bilirubin decreased to 4.0.  She has not shown any signs of bleeding or fecal urgency.  Her last bowel movement was this morning, brown, loose.  She having 1-2 BMs per day    Review of Systems:  Review of Systems   Gastrointestinal: Positive for abdominal pain. Negative for blood in stool, heartburn, melena, nausea and vomiting.       Physical Exam:  Physical Exam  Constitutional:       General: She is not in acute distress.     Appearance: She is ill-appearing.   HENT:      Head: Normocephalic.   Eyes:      Pupils: Pupils are equal, round, and reactive to light.   Cardiovascular:      Rate and Rhythm: Normal rate and regular rhythm.   Pulmonary:      Effort: Pulmonary effort is normal.      Comments: diminished  Abdominal:      General: Abdomen is flat. Bowel sounds are normal.      Palpations: Abdomen is soft.      Tenderness: There is tenderness (epigastric to palpation).   Skin:     General: Skin is warm and dry.   Psychiatric:         Mood and Affect: Mood normal.         Behavior: Behavior normal.         Thought Content: Thought content normal.         Judgment: Judgment normal.         Labs:      Recent Labs     12/02/19  0515   CPKTOTAL 493*     Recent Labs     12/01/19  1812  12/03/19  0430 12/03/19  0830 12/04/19  0215   SODIUM 143   < > 144 147* 142   POTASSIUM 3.4*   < > 4.1 4.0 3.7   CHLORIDE 102   < > 113* 113* 111   CO2 28   < > 21 23 24   BUN 25*   < > 19 19 18   CREATININE 0.83   < > 0.65 0.61 0.60   MAGNESIUM 2.0  --   --   --   --    CALCIUM 9.3   < > 8.6 8.5 8.5    < > = values in this interval not " displayed.     Recent Labs     195   ALTSGPT 558*  --  673* 604*   ASTSGOT *  --  560* 225*   ALKPHOSPHAT 475*  --  364* 309*   TBILIRUBIN 4.5*  --  6.6* 4.0*   GLUCOSE 108* 82 79 107*     Recent Labs     19  1812 19   RBC 2.90* 2.43* 2.10* 2.67*   HEMOGLOBIN 9.3* 7.7* 6.7* 8.2*   HEMATOCRIT 30.0* 24.6* 22.2* 27.1*   PLATELETCT 196 171 151* 149*   PROTHROMBTM 15.2* 15.5*  --   --    APTT  --  31.9  --   --    INR 1.17* 1.20*  --   --      Recent Labs     19   WBC 12.5* 12.7*  --  11.6*   NEUTSPOLYS 81.00* 89.70*  --  84.80*   LYMPHOCYTES 2.00* 4.70*  --  8.00*   MONOCYTES 6.00 3.50  --  4.40   EOSINOPHILS 0.00 0.20  --  0.80   BASOPHILS 0.00 0.20  --  0.30   ASTSGOT *  --  560* 225*   ALTSGPT 558*  --  673* 604*   ALKPHOSPHAT 475*  --  364* 309*   TBILIRUBIN 4.5*  --  6.6* 4.0*     Hemodynamics:  Temp (24hrs), Av.6 °C (97.9 °F), Min:36.3 °C (97.3 °F), Max:37.1 °C (98.8 °F)  Temperature: 36.6 °C (97.8 °F)  Pulse  Av.2  Min: 57  Max: 130   Blood Pressure : 122/58     Respiratory:    Respiration: 19, Pulse Oximetry: 97 %     Work Of Breathing / Effort: Mild  RUL Breath Sounds: Diminished, RML Breath Sounds: Diminished, RLL Breath Sounds: Diminished, RUPALI Breath Sounds: Diminished, LLL Breath Sounds: Diminished  Fluids:    Intake/Output Summary (Last 24 hours) at 12/3/2019 0944  Last data filed at 12/3/2019 0600  Gross per 24 hour   Intake 1666.15 ml   Output 450 ml   Net 1216.15 ml     Weight: 61.8 kg (136 lb 3.9 oz)  GI/Nutrition:  Orders Placed This Encounter   Procedures   • Diet Order Full Liquid     Standing Status:   Standing     Number of Occurrences:   1     Order Specific Question:   Diet:     Answer:   Full Liquid [11]     Medical Decision Making, by Problem:  Active Hospital Problems    Diagnosis   • *Septic shock (HCC) [A41.9,  R65.21]   • Cholangitis [K83.09]   • Proctitis [K62.89]   • Neutropenia associated with infection (HCC) [D70.3]   • Elevated AST (SGOT) [R74.0]   • Diarrhea [R19.7]   • Encephalopathy [G93.40]   • Chronic pain [G89.29]   • Parkinson disease (HCC) [G20]   • Anemia [D64.9]       Plan:  LFTS improving, WBC improving.  No symptoms of proctitis although rectal abnormality seen on CT.  Could consider outpatient colonoscopy if family and patient would like in the future.  Family does not wish for ERCP at this time.  We will sign off for now, please feel free to call with any questions or concerns or if family requests intervention.       Quality-Core Measures

## 2019-12-04 NOTE — PROGRESS NOTES
[facility] ICU Progress Note      Admission Date: 2019    ICU Resident: Karie Dailey M.D.   ICU Attending: Maximus Christianson M.D.    Patient ID:    Name:  Delmis Draper  :  1950  MRN:  [id number]      HPI:  69-year-old female with PD on hospice who was brought in by her  on 19 for abdominal pain, nausea and vomiting. Patient was unresponsive in the ED and all history was obtained from her . He reported onset of symptoms 19 morning. He denied hematemesis and blood per rectum. Patient is essentially immobilized from end-stage PD with severe dystonia and has chronic pain for which she takes Fentanyl and benzodiazepines at baseline. Patient had been on hospice prior to admission, her  changed code status to full code following admission, he is still finalizing this decision.      Today's Date: 2019      Interval Events:  No levophed administered yesterday, BP WNL.  Family is still deciding on ERCP.  WBC and LFTs continue to trend down.   Patient is medically stable to be transferred to the floor for further management once family finalizes goals of care.      Review of Systems   Constitutional: Negative for chills and fever.   HENT: Negative for ear pain and sore throat.    Eyes: Negative for photophobia and pain.   Respiratory: Negative for hemoptysis and wheezing.    Cardiovascular: Negative for chest pain and leg swelling.   Gastrointestinal: Positive for abdominal pain. Negative for blood in stool, nausea and vomiting.   Genitourinary: Negative for dysuria and hematuria.   Musculoskeletal: Positive for back pain, joint pain and myalgias.        Chronic   Skin: Negative for itching and rash.   Neurological: Positive for tingling, tremors and weakness.        Chronic       Physical Exam  Vitals:    19 0300 19 0400 19 0500 19 0600   BP: 107/69 108/62 111/55 126/66   Pulse: 76 (!) 57 84 68   Resp:    Temp:  36.6 °C (97.9 °F)  36.4 °C  (97.5 °F)   TempSrc:  Temporal  Temporal   SpO2: 93% 96% 93% 97%   Weight:  61.8 kg (136 lb 3.9 oz)     Height:         Body mass index is 24.13 kg/m².  Pulse Oximetry: 97 %, O2 (LPM): 5, O2 Delivery: Silicone Nasal Cannula    Physical Exam  General: No acute distress, cooperative  Head: Normocephalic, atraumatic   Eyes: Normal conjunctivae, sclerae anicteric  Throat: Oropharynx clear, oral mucosa moist  Neck: Supple, no lymphadenopathy  Lungs: Normal work of breathing, clear to auscultation bilaterally  Heart: Normal rate, regular rhythm, no murmurs  Abdomen: Soft, bowel sounds present, no peritoneal signs  Extremities: Nontender, no cyanosis, no edema, chronic contractures of BL ankles and feet  Neuro: Alert, oriented x 4, no acute focal deficits  Skin: Warm, dry, intact      Fluids:     Intake-Output Summary Last 24 hours at 12/4/2019 0721  Last data filed at 12/4/2019 0600  Gross per 24 hour   Intake 3130 ml   Output 600 ml   Net 2530 ml     Weight: 61.8 kg (136 lb 3.9 oz)    Recent Labs     12/01/19  1812  12/03/19  0430 12/03/19  0830 12/04/19  0215   SODIUM 143   < > 144 147* 142   POTASSIUM 3.4*   < > 4.1 4.0 3.7   CHLORIDE 102   < > 113* 113* 111   CO2 28   < > 21 23 24   BUN 25*   < > 19 19 18   CREATININE 0.83   < > 0.65 0.61 0.60   MAGNESIUM 2.0  --   --   --   --    CALCIUM 9.3   < > 8.6 8.5 8.5    < > = values in this interval not displayed.       Respiratory:    Respiration: 12, Pulse Oximetry: 97 %  Chest Tube Drains: NA      Hemodynamics:  Pulse: 68   Blood Pressure : 126/66       Neuro:  Total Chandan Coma Score: 15       GI-Nutrition:  Recent Labs     12/02/19  0515 12/03/19  0430 12/03/19  0830 12/04/19  0215   ALTSGPT 558*  --  673* 604*   ASTSGOT 1987*  --  560* 225*   ALKPHOSPHAT 475*  --  364* 309*   TBILIRUBIN 4.5*  --  6.6* 4.0*   GLUCOSE 108* 82 79 107*       Heme-Onc:  Recent Labs     12/01/19  1812 12/02/19  0515 12/03/19  0430 12/04/19  0215   RBC 2.90* 2.43* 2.10* 2.67*   HEMOGLOBIN  9.3* 7.7* 6.7* 8.2*   HEMATOCRIT 30.0* 24.6* 22.2* 27.1*   PLATELETCT 196 171 151* 149*   PROTHROMBTM 15.2* 15.5*  --   --    APTT  --  31.9  --   --    INR 1.17* 1.20*  --   --        Infectious Disease:  Temp  Av.7 °C (98 °F)  Min: 36.3 °C (97.3 °F)  Max: 37.1 °C (98.8 °F)  Recent Labs     19  0515 19  0430 19  0830 19  0215   WBC 12.5* 12.7*  --  11.6*   NEUTSPOLYS 81.00* 89.70*  --  84.80*   LYMPHOCYTES 2.00* 4.70*  --  8.00*   MONOCYTES 6.00 3.50  --  4.40   EOSINOPHILS 0.00 0.20  --  0.80   BASOPHILS 0.00 0.20  --  0.30   ASTSGOT 1987*  --  560* 225*   ALTSGPT 558*  --  673* 604*   ALKPHOSPHAT 475*  --  364* 309*   TBILIRUBIN 4.5*  --  6.6* 4.0*       Medications:  • carbidopa-levodopa  1 Tab     • carbidopa-levodopa SR  1 Tab     • FLUoxetine  20 mg     • NORepinephrine (LEVOPHED) infusion  0-30 mcg/min Stopped (19 1430)   • enoxaparin  40 mg     • ondansetron  4 mg     • fentaNYL  1 Patch     • lactated ringers with KCL infusion   75 mL/hr at 19 0600   • fentaNYL  50 mcg     • LORazepam  0.26 mg     • piperacillin-tazobactam  3.375 g 3.375 g (19 0641)   • senna-docusate  2 Tab      And   • polyethylene glycol/lytes  1 Packet      And   • magnesium hydroxide  30 mL      And   • bisacodyl  10 mg     • Respiratory Care per Protocol           Procedures:  NA      Imaging:    CT-ABDOMEN-PELVIS WITH   Final Result      1.  Changes in the biliary tree suspicious for obstruction and/or cholangitis. No discrete obstructing mass is seen though small ampullary region mass or stricture could be present.   2.  Changes in the rectum suggestive of proctitis. Underlying mass is not excluded.   3.  RIGHT renal calyceal stone   4.  Appendix not visualized   5.  Duodenal diverticulum               DX-CHEST-PORTABLE (1 VIEW)   Final Result      1.  Linear atelectasis within the left lung base.      2.  Mild cardiomegaly.          Assessment and Plan:    * Septic shock  (HCC)  Assessment & Plan  Present on admission  SIRS criteria identified on admission: Fever with temperature greater than 101 F, Tachycardia, Tachypnea, Leukopenia  Presented with persistent hypotension despite appropriate fluid resuscitation with crystalloids administered per sepsis guidelines, vasopressor support to maintain SBP > 90 and MAP > 65.  Likely secondary to intra-abdominal infection, i.e. cholangitis, versus UTI.    Plan:  Blood cultures growing lactose fermenting GNRs, speciation and sensitivities pending  Empiric Zosyn 3.375g q8h, narrow spectrum per blood culture results  IVF PRN to keep MAP > 60  GI and Palliative Care following, awaiting family decision regarding ERCP    Neutropenia associated with infection (HCC)  Assessment & Plan  Resolved.  WBC 0.9 with ANC 0.5 on admission; WBC 12.5 with 88% neutrophils on repeat CBC.  CTM, treating sepsis as above.     Proctitis  Assessment & Plan  Finding on CT abdomen and pelvis.   Has been on ampicillin recently for UTI therefore at risk for C. diff infection.  Can not rule out proctitis as cause of her septic shock.  C. diff studies negative this admission.     Cholangitis (possible)  Assessment & Plan  Admitted with septic shock with biliary dilation on CT abdomen concerning for obstruction vs. cholangitis.  LFTs and WBC trending down.     Plan:  Zosyn 3.375g q8h empirically  Encourage oral hydration  IVF PRN to maintain MAP > 60  GI following, awaiting final decision by  regarding ERCP  Liquid diet per SLP and GI  Aspiration precautions    Encephalopathy  Assessment & Plan  Improved overnight.   Usually alert and oriented x 4, especially in the morning.   Current waxing and waning responsiveness is new.   Most likely due to her sepsis.     Plan:  Continue to monitor  Treating underlying cause(s)    Diarrhea  Assessment & Plan  At least one instance of loose stools.   C. diff studies negative.     Elevated AST (SGOT)  Assessment &  Plan  Improving.  AST 3883 on admission, markedly elevated compared with ALT.  Remainder of LFTs consistent with cholangitis.   CPK mildly elevated, likely due to PD and systemic infection.   Trop-T mildly elevated, likely demand ischemia in the setting of septic shock.    Plan:  CTM, treating potential underlying causes    Chronic pain- (present on admission)  Assessment & Plan  Continue home fentanyl    Parkinson disease (HCC)- (present on admission)  Assessment & Plan  End stage PD.  Largely immobile, had a Ta previously for neurogenic bladder but no longer with Ta.    Plan:  On a liquid diet per SLP  Resume Sinemet and Prozac  Follow up with  regarding goals of care  Fall, seizure, aspiration precautions     Anemia  Assessment & Plan  Hgb dropped to 6.7 on 12/3/19, transfused 1 unit PRBCs, Hgb improved to 8.7.  Unclear if she has been having GI bleeding, though per her  she has not had blood in stools or in emesis.   Hypotension has improved.    Plan:  GI following, awaiting family decision regarding goals of care  CTM, transfuse if Hgb < 7      Consultants:   Gastroenterology       Disposition: Medically stable to be transferred to the floor once family finalizes goals of care  Code Status: DNAR-DNI    Quality Measures:  Ta Catheter: No  DVT Prophylaxis: Lovenox  Ulcer Prophylaxis: No  Antibiotics: Empiric Zosyn  Lines: 3 peripheral IVs    This note was created using voice recognition software. There may be unintended errors in spelling, grammar or content.

## 2019-12-04 NOTE — PALLIATIVE CARE
"Palliative Care follow-up  Met pt, spouse, son and daughter at bedside. Long discussion regarding benefits vs burdens of treatment, quality vs quantity of life, and GOC with hospice care. Discussed concern about continued aggressive treatment and not changing the disease trajectory of parkinson's disease, family verbalized understanding.     Spouse expressed wanting to finish abx therapy on Sunday, then \"see where we go from there\". PC RN discussed home with hospice vs SNF for therapies, family expressed wanting to think about GOC.    PC RN provided contact card, encouraged family to call with any questions or concerns.    Active listening, education, validation, normalization, therapeutic touch, and emotional support provided.     Updated:   PC Team    Plan:   Family discussing GOC        Thank you for allowing Palliative Care to participate in this patient's care. Please feel free to call [phone] with any questions or concerns.  "

## 2019-12-05 LAB
ALBUMIN SERPL BCP-MCNC: 3 G/DL (ref 3.2–4.9)
ALBUMIN/GLOB SERPL: 1.2 G/DL
ALP SERPL-CCNC: 265 U/L (ref 30–99)
ALT SERPL-CCNC: 50 U/L (ref 2–50)
ANION GAP SERPL CALC-SCNC: 6 MMOL/L (ref 0–11.9)
AST SERPL-CCNC: 77 U/L (ref 12–45)
BASOPHILS # BLD AUTO: 1 % (ref 0–1.8)
BASOPHILS # BLD: 0.08 K/UL (ref 0–0.12)
BILIRUB SERPL-MCNC: 2.2 MG/DL (ref 0.1–1.5)
BUN SERPL-MCNC: 10 MG/DL (ref 8–22)
CALCIUM SERPL-MCNC: 8.2 MG/DL (ref 8.5–10.5)
CHLORIDE SERPL-SCNC: 108 MMOL/L (ref 96–112)
CO2 SERPL-SCNC: 25 MMOL/L (ref 20–33)
CREAT SERPL-MCNC: 0.45 MG/DL (ref 0.5–1.4)
EOSINOPHIL # BLD AUTO: 0.34 K/UL (ref 0–0.51)
EOSINOPHIL NFR BLD: 4.4 % (ref 0–6.9)
ERYTHROCYTE [DISTWIDTH] IN BLOOD BY AUTOMATED COUNT: 54.1 FL (ref 35.9–50)
GLOBULIN SER CALC-MCNC: 2.5 G/DL (ref 1.9–3.5)
GLUCOSE SERPL-MCNC: 89 MG/DL (ref 65–99)
HCT VFR BLD AUTO: 26.7 % (ref 37–47)
HGB BLD-MCNC: 8.2 G/DL (ref 12–16)
IMM GRANULOCYTES # BLD AUTO: 0.07 K/UL (ref 0–0.11)
IMM GRANULOCYTES NFR BLD AUTO: 0.9 % (ref 0–0.9)
LYMPHOCYTES # BLD AUTO: 1.11 K/UL (ref 1–4.8)
LYMPHOCYTES NFR BLD: 14.3 % (ref 22–41)
MAGNESIUM SERPL-MCNC: 1.4 MG/DL (ref 1.5–2.5)
MCH RBC QN AUTO: 31.2 PG (ref 27–33)
MCHC RBC AUTO-ENTMCNC: 30.7 G/DL (ref 33.6–35)
MCV RBC AUTO: 101.5 FL (ref 81.4–97.8)
MONOCYTES # BLD AUTO: 0.62 K/UL (ref 0–0.85)
MONOCYTES NFR BLD AUTO: 8 % (ref 0–13.4)
NEUTROPHILS # BLD AUTO: 5.52 K/UL (ref 2–7.15)
NEUTROPHILS NFR BLD: 71.4 % (ref 44–72)
NRBC # BLD AUTO: 0 K/UL
NRBC BLD-RTO: 0 /100 WBC
PLATELET # BLD AUTO: 151 K/UL (ref 164–446)
PMV BLD AUTO: 11.9 FL (ref 9–12.9)
POTASSIUM SERPL-SCNC: 3.4 MMOL/L (ref 3.6–5.5)
PROT SERPL-MCNC: 5.5 G/DL (ref 6–8.2)
RBC # BLD AUTO: 2.63 M/UL (ref 4.2–5.4)
SODIUM SERPL-SCNC: 139 MMOL/L (ref 135–145)
WBC # BLD AUTO: 7.7 K/UL (ref 4.8–10.8)

## 2019-12-05 PROCEDURE — 85025 COMPLETE CBC W/AUTO DIFF WBC: CPT

## 2019-12-05 PROCEDURE — 700105 HCHG RX REV CODE 258: Performed by: INTERNAL MEDICINE

## 2019-12-05 PROCEDURE — 80053 COMPREHEN METABOLIC PANEL: CPT

## 2019-12-05 PROCEDURE — 700102 HCHG RX REV CODE 250 W/ 637 OVERRIDE(OP): Performed by: INTERNAL MEDICINE

## 2019-12-05 PROCEDURE — 99152 MOD SED SAME PHYS/QHP 5/>YRS: CPT

## 2019-12-05 PROCEDURE — 83735 ASSAY OF MAGNESIUM: CPT

## 2019-12-05 PROCEDURE — A9270 NON-COVERED ITEM OR SERVICE: HCPCS | Performed by: INTERNAL MEDICINE

## 2019-12-05 PROCEDURE — 700102 HCHG RX REV CODE 250 W/ 637 OVERRIDE(OP): Performed by: STUDENT IN AN ORGANIZED HEALTH CARE EDUCATION/TRAINING PROGRAM

## 2019-12-05 PROCEDURE — 92526 ORAL FUNCTION THERAPY: CPT

## 2019-12-05 PROCEDURE — 770001 HCHG ROOM/CARE - MED/SURG/GYN PRIV*

## 2019-12-05 PROCEDURE — A9270 NON-COVERED ITEM OR SERVICE: HCPCS | Performed by: STUDENT IN AN ORGANIZED HEALTH CARE EDUCATION/TRAINING PROGRAM

## 2019-12-05 PROCEDURE — 700111 HCHG RX REV CODE 636 W/ 250 OVERRIDE (IP): Performed by: INTERNAL MEDICINE

## 2019-12-05 PROCEDURE — 700111 HCHG RX REV CODE 636 W/ 250 OVERRIDE (IP): Performed by: STUDENT IN AN ORGANIZED HEALTH CARE EDUCATION/TRAINING PROGRAM

## 2019-12-05 PROCEDURE — 99233 SBSQ HOSP IP/OBS HIGH 50: CPT | Mod: GC | Performed by: INTERNAL MEDICINE

## 2019-12-05 PROCEDURE — 700105 HCHG RX REV CODE 258: Performed by: STUDENT IN AN ORGANIZED HEALTH CARE EDUCATION/TRAINING PROGRAM

## 2019-12-05 RX ORDER — PROMETHAZINE HYDROCHLORIDE 25 MG/1
25 TABLET ORAL EVERY 6 HOURS PRN
Status: DISCONTINUED | OUTPATIENT
Start: 2019-12-05 | End: 2019-12-07 | Stop reason: HOSPADM

## 2019-12-05 RX ORDER — MAGNESIUM SULFATE HEPTAHYDRATE 40 MG/ML
4 INJECTION, SOLUTION INTRAVENOUS ONCE
Status: COMPLETED | OUTPATIENT
Start: 2019-12-05 | End: 2019-12-05

## 2019-12-05 RX ORDER — POTASSIUM CHLORIDE 20 MEQ/1
20 TABLET, EXTENDED RELEASE ORAL 2 TIMES DAILY
Status: COMPLETED | OUTPATIENT
Start: 2019-12-05 | End: 2019-12-05

## 2019-12-05 RX ADMIN — OXYCODONE HYDROCHLORIDE 10 MG: 5 TABLET ORAL at 13:26

## 2019-12-05 RX ADMIN — POTASSIUM CHLORIDE 20 MEQ: 1500 TABLET, EXTENDED RELEASE ORAL at 07:32

## 2019-12-05 RX ADMIN — FENTANYL CITRATE 50 MCG: 0.05 INJECTION, SOLUTION INTRAMUSCULAR; INTRAVENOUS at 16:15

## 2019-12-05 RX ADMIN — CARBIDOPA AND LEVODOPA 1 TABLET: 25; 100 TABLET ORAL at 17:00

## 2019-12-05 RX ADMIN — ONDANSETRON 4 MG: 2 INJECTION INTRAMUSCULAR; INTRAVENOUS at 16:42

## 2019-12-05 RX ADMIN — FENTANYL CITRATE 50 MCG: 0.05 INJECTION, SOLUTION INTRAMUSCULAR; INTRAVENOUS at 14:20

## 2019-12-05 RX ADMIN — FENTANYL CITRATE 100 MCG: 0.05 INJECTION, SOLUTION INTRAMUSCULAR; INTRAVENOUS at 21:42

## 2019-12-05 RX ADMIN — FENTANYL CITRATE 50 MCG: 0.05 INJECTION, SOLUTION INTRAMUSCULAR; INTRAVENOUS at 02:00

## 2019-12-05 RX ADMIN — OXYCODONE HYDROCHLORIDE 5 MG: 5 TABLET ORAL at 09:09

## 2019-12-05 RX ADMIN — FENTANYL 1 PATCH: 50 PATCH TRANSDERMAL at 04:30

## 2019-12-05 RX ADMIN — LORAZEPAM 0.26 MG: 2 INJECTION INTRAMUSCULAR; INTRAVENOUS at 21:42

## 2019-12-05 RX ADMIN — CARBIDOPA AND LEVODOPA 1 TABLET: 25; 100 TABLET ORAL at 17:06

## 2019-12-05 RX ADMIN — LORAZEPAM 0.26 MG: 2 INJECTION INTRAMUSCULAR; INTRAVENOUS at 09:08

## 2019-12-05 RX ADMIN — MAGNESIUM SULFATE IN WATER 4 G: 40 INJECTION, SOLUTION INTRAVENOUS at 09:09

## 2019-12-05 RX ADMIN — LORAZEPAM 0.26 MG: 2 INJECTION INTRAMUSCULAR; INTRAVENOUS at 12:53

## 2019-12-05 RX ADMIN — ONDANSETRON 4 MG: 2 INJECTION INTRAMUSCULAR; INTRAVENOUS at 12:42

## 2019-12-05 RX ADMIN — CARBIDOPA AND LEVODOPA 1 TABLET: 25; 100 TABLET ORAL at 09:13

## 2019-12-05 RX ADMIN — LORAZEPAM 0.26 MG: 2 INJECTION INTRAMUSCULAR; INTRAVENOUS at 16:17

## 2019-12-05 RX ADMIN — FENTANYL CITRATE 50 MCG: 0.05 INJECTION, SOLUTION INTRAMUSCULAR; INTRAVENOUS at 06:00

## 2019-12-05 RX ADMIN — POTASSIUM CHLORIDE 20 MEQ: 1500 TABLET, EXTENDED RELEASE ORAL at 17:05

## 2019-12-05 RX ADMIN — ONDANSETRON 4 MG: 2 INJECTION INTRAMUSCULAR; INTRAVENOUS at 04:40

## 2019-12-05 RX ADMIN — PIPERACILLIN SODIUM AND TAZOBACTAM SODIUM 3.38 G: 3; .375 INJECTION, POWDER, FOR SOLUTION INTRAVENOUS at 04:30

## 2019-12-05 RX ADMIN — FENTANYL CITRATE 50 MCG: 0.05 INJECTION, SOLUTION INTRAMUSCULAR; INTRAVENOUS at 07:33

## 2019-12-05 RX ADMIN — SENNOSIDES AND DOCUSATE SODIUM 2 TABLET: 8.6; 5 TABLET ORAL at 17:06

## 2019-12-05 RX ADMIN — LORAZEPAM 0.25 MG: 2 INJECTION INTRAMUSCULAR; INTRAVENOUS at 01:45

## 2019-12-05 RX ADMIN — FENTANYL CITRATE 50 MCG: 0.05 INJECTION, SOLUTION INTRAMUSCULAR; INTRAVENOUS at 11:04

## 2019-12-05 RX ADMIN — FLUOXETINE HYDROCHLORIDE 20 MG: 20 CAPSULE ORAL at 04:29

## 2019-12-05 RX ADMIN — FENTANYL CITRATE 50 MCG: 0.05 INJECTION, SOLUTION INTRAMUSCULAR; INTRAVENOUS at 12:42

## 2019-12-05 RX ADMIN — ONDANSETRON 4 MG: 2 INJECTION INTRAMUSCULAR; INTRAVENOUS at 21:42

## 2019-12-05 RX ADMIN — CEFTRIAXONE SODIUM 2 G: 2 INJECTION, POWDER, FOR SOLUTION INTRAMUSCULAR; INTRAVENOUS at 12:42

## 2019-12-05 RX ADMIN — CARBIDOPA AND LEVODOPA 1 TABLET: 25; 100 TABLET ORAL at 12:42

## 2019-12-05 RX ADMIN — POTASSIUM CHLORIDE: 149 INJECTION, SOLUTION, CONCENTRATE INTRAVENOUS at 01:46

## 2019-12-05 RX ADMIN — LORAZEPAM 0.25 MG: 2 INJECTION INTRAMUSCULAR; INTRAVENOUS at 04:27

## 2019-12-05 RX ADMIN — LORAZEPAM 0.26 MG: 2 INJECTION INTRAMUSCULAR; INTRAVENOUS at 18:17

## 2019-12-05 RX ADMIN — OXYCODONE HYDROCHLORIDE 10 MG: 5 TABLET ORAL at 04:10

## 2019-12-05 RX ADMIN — CARBIDOPA AND LEVODOPA 1 TABLET: 25; 100 TABLET ORAL at 06:00

## 2019-12-05 RX ADMIN — FENTANYL CITRATE 100 MCG: 0.05 INJECTION, SOLUTION INTRAMUSCULAR; INTRAVENOUS at 17:15

## 2019-12-05 ASSESSMENT — ENCOUNTER SYMPTOMS
WEAKNESS: 1
EYE PAIN: 0
FEVER: 0
TREMORS: 1
SHORTNESS OF BREATH: 0
NAUSEA: 0
HEMOPTYSIS: 0
CHILLS: 0
ABDOMINAL PAIN: 0
TINGLING: 1
PHOTOPHOBIA: 0
SORE THROAT: 0
WHEEZING: 0
VOMITING: 0
BACK PAIN: 1

## 2019-12-05 NOTE — THERAPY
"Speech Language Therapy dysphagia treatment completed.   Functional Status:  Pt seen this date for dysphagia intervention. Per RN, GI has signed off as family electing to have no further procedures. Pt on 5LO2 via nasal cannula. Pt was awake, alert, followed directions and participated fully in session, family at bedside.  PO trials of puree, soft, fibrous and mixed assessed. Hyolaryngeal elevation palpated as complete, timely initiation of swallow appreciated and vocal quality remained clear throughout session. Pt demonstrated functional and minimally prolonged mastication of all trials. No oral residue appreciated. No s/sx of aspiration appreciated with any consistencies consumed. SLP following.   Recommendations: 2/2 prolonged mastication and pt preference, recommend upgrade to D2/thins with adherence to the following strategies: upright at 90* for PO, slow rate, small bites, pt must be awake, stop PO with increased lethargy or s/sx of aspiration.    Plan of Care: Will benefit from Speech Therapy 3 times per week  Post-Acute Therapy: Recommend outpatient or home health transitional care services for continued speech therapy services    See \"Rehab Therapy-Acute\" Patient Summary Report for complete documentation.     "

## 2019-12-05 NOTE — CARE PLAN
Problem: Nutritional:  Goal: Achieve adequate nutritional intake  Description  Patient will consume >50% of meals. Diet will be advanced past full liquids.    Outcome: PROGRESSING SLOWER THAN EXPECTED

## 2019-12-05 NOTE — PROGRESS NOTES
[facility] ICU Progress Note      Admission Date: 2019    ICU Resident: Karie Dailey M.D.   ICU Attending: Maximus Christianson M.D.    Patient ID:    Name:  Delmis Draper  :  1950  MRN:  [id number]      HPI:  69-year-old female with PD on hospice who was brought in by her  on 19 for abdominal pain, nausea and vomiting. Patient was unresponsive in the ED and all history was obtained from her . He reported onset of symptoms 19 morning. He denied hematemesis and blood per rectum. Patient is essentially immobilized from end-stage PD with severe dystonia and has chronic pain for which she takes Fentanyl and benzodiazepines at baseline. Patient had been on hospice prior to admission, her  changed code status to full code following admission, he is still finalizing this decision.      Today's Date: 2019      Interval Events:  · Alert, oriented x 4, denies difficulty breathing, denies chest or abdominal pain, states that her pain is well managed  · BP stable off Levophed since 12/3/19  · Leukocytosis resolved  · H&H stable, platelet count 151 (stable)  · K 3.4, Mg 1.4 - IV Mg and PO KCl repletion ordered  · AST and ALT normalized, ALP continues to trend down   · Antibiotic de-escalated from Zosyn to Ceftriaxone per culture results, 10-day total course, last dose 12/10/19  · Tolerating full liquid diet with adequate oral intake  · Family has decided to resume hospice for the patient once she completes her antibiotic course, they have opted for comfort measures for the remainder of this hospital stay  · Medically stable to be transferred to the floor      Review of Systems   Constitutional: Negative for chills and fever.   HENT: Negative for ear pain and sore throat.    Eyes: Negative for photophobia and pain.   Respiratory: Negative for hemoptysis, shortness of breath and wheezing.    Cardiovascular: Negative for chest pain and leg swelling.   Gastrointestinal: Negative for  abdominal pain, nausea and vomiting.   Genitourinary: Negative for dysuria and hematuria.   Musculoskeletal: Positive for back pain and joint pain.        Chronic   Skin: Negative for itching and rash.   Neurological: Positive for tingling, tremors and weakness.        Chronic   All other systems reviewed and are negative.      Physical Exam  Vitals:    12/05/19 0400 12/05/19 0500 12/05/19 0600 12/05/19 0700   BP: 127/70 126/70 114/65 123/68   Pulse: 63 65 69 73   Resp: (!) 21 16 14 16   Temp: 36.4 °C (97.6 °F)  36.6 °C (97.9 °F)    TempSrc: Temporal  Temporal    SpO2: 100% 100% 96% 100%   Weight: 65.1 kg (143 lb 8.3 oz)      Height:         Body mass index is 25.42 kg/m².  Pulse Oximetry: 100 %, O2 (LPM): 5, O2 Delivery: Silicone Nasal Cannula    Physical Exam  General: No acute distress, pleasant, cooperative  Head: Normocephalic, atraumatic  Eyes: Normal conjunctivae, sclerae anicteric  Throat: Oropharynx clear, oral mucosa dry  Neck: Supple, no lymphadenopathy  Lungs: Normal work of breathing, no wheezing  Heart: Normal rate, regular rhythm, no murmurs  Abdomen: Soft, nontender, no rebound or guarding  Extremities: Nontender, no cyanosis, no edema, chronic contractures of bilateral ankles and feet  Neuro: Alert, oriented x 4, no acute focal deficits  Skin: Warm, dry, intact      Fluids:     Intake-Output Summary Last 24 hours at 12/5/2019 0955  Last data filed at 12/5/2019 0800  Gross per 24 hour   Intake 2690 ml   Output 850 ml   Net 1840 ml     PO intake 1020, IV intake 1650    Weight: 65.1 kg (143 lb 8.3 oz)    Recent Labs     12/03/19  0830 12/04/19  0215 12/05/19  0450   SODIUM 147* 142 139   POTASSIUM 4.0 3.7 3.4*   CHLORIDE 113* 111 108   CO2 23 24 25   BUN 19 18 10   CREATININE 0.61 0.60 0.45*   MAGNESIUM  --   --  1.4*   CALCIUM 8.5 8.5 8.2*       Respiratory:    Respiration: 16, Pulse Oximetry: 100 %  Chest Tube Drains: NA      Hemodynamics:  Pulse: 73   Blood Pressure : 123/68       Neuro:  Total  Chandan Coma Score: 15      GI-Nutrition:  Recent Labs     19   ALTSGPT 673* 604* 50   ASTSGOT 560* 225* 77*   ALKPHOSPHAT 364* 309* 265*   TBILIRUBIN 6.6* 4.0* 2.2*   GLUCOSE 79 107* 89       Heme-Onc:  Recent Labs     19   RBC 2.10* 2.67* 2.63*   HEMOGLOBIN 6.7* 8.2* 8.2*   HEMATOCRIT 22.2* 27.1* 26.7*   PLATELETCT 151* 149* 151*       Infectious Disease:  Temp  Av.7 °C (98.1 °F)  Min: 36.4 °C (97.6 °F)  Max: 37.1 °C (98.7 °F)  Recent Labs     19   WBC 12.7*  --  11.6* 7.7   NEUTSPOLYS 89.70*  --  84.80* 71.40   LYMPHOCYTES 4.70*  --  8.00* 14.30*   MONOCYTES 3.50  --  4.40 8.00   EOSINOPHILS 0.20  --  0.80 4.40   BASOPHILS 0.20  --  0.30 1.00   ASTSGOT  --  560* 225* 77*   ALTSGPT  --  673* 604* 50   ALKPHOSPHAT  --  364* 309* 265*   TBILIRUBIN  --  6.6* 4.0* 2.2*       Medications:  • potassium chloride SA  20 mEq     • magnesium sulfate  4 g 4 g (19 0909)   • cefTRIAXone (ROCEPHIN) 2 g IVPB  2,000 mg     • carbidopa-levodopa  1 Tab     • carbidopa-levodopa SR  1 Tab     • FLUoxetine  20 mg     • oxyCODONE immediate-release  5-10 mg     • enoxaparin  40 mg     • ondansetron  4 mg     • fentaNYL  1 Patch     • fentaNYL  50 mcg     • LORazepam  0.26 mg     • senna-docusate  2 Tab      And   • polyethylene glycol/lytes  1 Packet      And   • magnesium hydroxide  30 mL      And   • bisacodyl  10 mg     • Respiratory Care per Protocol           Procedures: None    Imaging: Reviewed      Assessment and Plan:    * Septic shock (HCC)  Assessment & Plan  Resolved.  Secondary to UTI, also with evidence of cholangitis on CT.  Presented with T > 101 F, tachycardia, tachypnea, lactic acidosis, acute liver injury and hypotension requiring vasopressor support to maintain SBP > 90 and MAP > 60. Hypotension resolved, breathing well on 4 L NC, afebrile > 72 hours.  Leukocytosis  resolved, LFTs are normalizing.   Extensive discussions had with family regarding goals of care with Palliative Care and GI consultation; the patient's family has decided to resume hospice, they would like the patient to complete her course of antibiotics with comfort measures for the remainder of this hospital stay.    Plan:  Blood and urine cultures from 12/1/19 growing E. coli  Zosyn discontinued, continue Ceftriaxone till 12/10/19 to complete a 10-day course of antibiotics  GI signed off, patient's family decided against ERCP  Advance diet as tolerated to a LOW FAT diet  Discharge home on hospice, Palliative Care to follow up with the patient's family prior to discharge  SW consulted for further counseling and information on community resources     Neutropenia associated with infection (HCC)  Assessment & Plan  Resolved.  Likely due to septic shock.  WBC normalized.    Proctitis  Assessment & Plan  Finding on CT abdomen and pelvis.   C. diff studies negative this admission.     Cholangitis (possible)  Assessment & Plan  Improving.  Found to have biliary dilation on CT abdomen concerning for obstruction vs. Cholangitis.  Received Zosyn 3.375g q8h empirically 12/1/19-12/5/19.  Blood cultures growing E. coli, same as urine culture.   Leukocytosis resolved, LFTs normalizing, no longer complaining of abdominal pain.   The patient's family decided against ERCP after thorough discussions with GI, Palliative Care and Critical Care team.     Plan:  Continuing Ceftriaxone till 12/10/19 for E. coli bacteremia  Aspiration precautions  Encourage oral hydration  Advance diet as tolerated to a LOW FAT diet    Encephalopathy  Assessment & Plan  Resolved.  Likely secondary to sepsis.  Mental status returned to the patient's baseline prior to admission with IVF resuscitation and resolution of septic shock.    Diarrhea  Assessment & Plan  Resolved.  At least one instance of loose stools.   C. diff studies negative.     Elevated  AST (SGOT)  Assessment & Plan  Resolved.   AST 3883 on admission, markedly elevated compared with ALT.  Remainder of LFTs consistent with cholangitis.   CPK mildly elevated, likely due to PD and systemic infection.   Trop-T mildly elevated, likely demand ischemia in the setting of septic shock.  AST and ALT normalized, ALP continuing to trend down towards normal.    Chronic pain- (present on admission)  Assessment & Plan  Continue home pain regimen; the family has decided to resume hospice.    Parkinson disease (HCC)- (present on admission)  Assessment & Plan  End stage PD.  Largely immobile, had a Ta previously for neurogenic bladder but no longer with Ta.  Admitted with septic shock, urine and blood cultures growing E. coli.     Plan:  The patient's family has decided to resume Hospice  Complete antibiotic course for bacteremia with comfort care measures per discussion with family  Continue Sinemet and Prozac  Continue home pain regimen  LOW FAT diet with aspiration precautions  Fall precautions  Palliative Care RN to follow up with family prior to discharge     Anemia  Assessment & Plan  Hgb dropped to 6.7 on 12/3/19, transfused 1 unit PRBCs, Hgb improved to 8.7 and has remained stable since.   The patient's family have decided to resume hospice care for the patient.       Consultants:   Gastroenterology   Palliative Care    Disposition: Medically stable to be transferred to the floor; resume Hospice care per family  Code Status: DNAR-DNI    Quality Measures:  Ta Catheter: No  DVT Prophylaxis: Lovenox  Ulcer Prophylaxis: No  Antibiotics: Ceftriaxone, last dose on 12/10/19  Lines: 3 peripheral IVs    This note was created using voice recognition software. There may be unintended errors in spelling, grammar or content.

## 2019-12-05 NOTE — PROGRESS NOTES
Dr Christianson at bedside to discuss POC with family and pt needs. All questions at this time answered.

## 2019-12-05 NOTE — DIETARY
"Nutrition services: Day 4 of admit.  Delmis Draper is a 69 y.o. female with admitting DX of Septic shock.   Follow up for adequacy of nutritional intake.     Spoke with pt and family at bedside. Most information gathered from family, unable to speak with pt as she was \"too sick to talk right now.\" Pt's family reported that she has been eating many of the items that come on her current full liquid diet trays. I noted that I had seen SLP's most recent note stating that she can upgrade to a dysphagia 2 diet and that this would have more options for the pt.     Recommendations/Plan:  1. Advance diet to a dysphagia 2 thin liquid diet per SLP recommendations.   2. Encourage intake of meals.   3. Document intake of all meals as % taken in ADL's to provide interdisciplinary communication across all shifts.   4. Monitor weight.  5. Nutrition rep to see patient for meal and snack preferences. Will attempt to have nutrition representative see patient when family is present to help with meal choices.      RD following.     "

## 2019-12-05 NOTE — CARE PLAN
Problem: Knowledge Deficit  Goal: Knowledge of disease process/condition, treatment plan, diagnostic tests, and medications will improve  Note:   This RN discusses the POC, meds, and treatments. Pt and family verbalize understanding.     Problem: Skin Integrity  Goal: Risk for impaired skin integrity will decrease  Note:   Q2 turns in place. Elbows and heels floated on pillows.

## 2019-12-05 NOTE — CARE PLAN
Problem: Pain Management  Goal: Pain level will decrease to patient's comfort goal  Outcome: NOT MET  Intervention: Follow pain managment plan developed in collaboration with patient and Interdisciplinary Team  Note:   50 mcg fentanyl given for 6/10 pain       Problem: Mobility  Goal: Risk for activity intolerance will decrease  Outcome: PROGRESSING AS EXPECTED  Intervention: Encourage patient to increase activity level in collaboration with Interdisciplinary Team  Note:   Pt mobilized to edge of bed with staff

## 2019-12-06 ENCOUNTER — APPOINTMENT (OUTPATIENT)
Dept: RADIOLOGY | Facility: MEDICAL CENTER | Age: 69
DRG: 871 | End: 2019-12-06
Attending: STUDENT IN AN ORGANIZED HEALTH CARE EDUCATION/TRAINING PROGRAM
Payer: MEDICARE

## 2019-12-06 LAB
ALBUMIN SERPL BCP-MCNC: 3.2 G/DL (ref 3.2–4.9)
ALBUMIN/GLOB SERPL: 1.2 G/DL
ALP SERPL-CCNC: 286 U/L (ref 30–99)
ALT SERPL-CCNC: 59 U/L (ref 2–50)
ANION GAP SERPL CALC-SCNC: 8 MMOL/L (ref 0–11.9)
AST SERPL-CCNC: 46 U/L (ref 12–45)
BASOPHILS # BLD AUTO: 0.6 % (ref 0–1.8)
BASOPHILS # BLD: 0.04 K/UL (ref 0–0.12)
BILIRUB SERPL-MCNC: 1.7 MG/DL (ref 0.1–1.5)
BUN SERPL-MCNC: 3 MG/DL (ref 8–22)
CALCIUM SERPL-MCNC: 8 MG/DL (ref 8.5–10.5)
CHLORIDE SERPL-SCNC: 104 MMOL/L (ref 96–112)
CO2 SERPL-SCNC: 28 MMOL/L (ref 20–33)
CREAT SERPL-MCNC: 0.41 MG/DL (ref 0.5–1.4)
EOSINOPHIL # BLD AUTO: 0.05 K/UL (ref 0–0.51)
EOSINOPHIL NFR BLD: 0.7 % (ref 0–6.9)
ERYTHROCYTE [DISTWIDTH] IN BLOOD BY AUTOMATED COUNT: 49.8 FL (ref 35.9–50)
GLOBULIN SER CALC-MCNC: 2.7 G/DL (ref 1.9–3.5)
GLUCOSE SERPL-MCNC: 114 MG/DL (ref 65–99)
HCT VFR BLD AUTO: 27.4 % (ref 37–47)
HGB BLD-MCNC: 8.7 G/DL (ref 12–16)
IMM GRANULOCYTES # BLD AUTO: 0.18 K/UL (ref 0–0.11)
IMM GRANULOCYTES NFR BLD AUTO: 2.6 % (ref 0–0.9)
LYMPHOCYTES # BLD AUTO: 0.84 K/UL (ref 1–4.8)
LYMPHOCYTES NFR BLD: 12 % (ref 22–41)
MAGNESIUM SERPL-MCNC: 1.9 MG/DL (ref 1.5–2.5)
MCH RBC QN AUTO: 31 PG (ref 27–33)
MCHC RBC AUTO-ENTMCNC: 31.8 G/DL (ref 33.6–35)
MCV RBC AUTO: 97.5 FL (ref 81.4–97.8)
MONOCYTES # BLD AUTO: 0.52 K/UL (ref 0–0.85)
MONOCYTES NFR BLD AUTO: 7.4 % (ref 0–13.4)
NEUTROPHILS # BLD AUTO: 5.37 K/UL (ref 2–7.15)
NEUTROPHILS NFR BLD: 76.7 % (ref 44–72)
NRBC # BLD AUTO: 0 K/UL
NRBC BLD-RTO: 0 /100 WBC
PHOSPHATE SERPL-MCNC: 1.3 MG/DL (ref 2.5–4.5)
PLATELET # BLD AUTO: 181 K/UL (ref 164–446)
PMV BLD AUTO: 11.5 FL (ref 9–12.9)
POTASSIUM SERPL-SCNC: 3.2 MMOL/L (ref 3.6–5.5)
PROT SERPL-MCNC: 5.9 G/DL (ref 6–8.2)
RBC # BLD AUTO: 2.81 M/UL (ref 4.2–5.4)
SODIUM SERPL-SCNC: 140 MMOL/L (ref 135–145)
WBC # BLD AUTO: 7 K/UL (ref 4.8–10.8)

## 2019-12-06 PROCEDURE — 99232 SBSQ HOSP IP/OBS MODERATE 35: CPT | Mod: GC | Performed by: HOSPITALIST

## 2019-12-06 PROCEDURE — 700105 HCHG RX REV CODE 258

## 2019-12-06 PROCEDURE — 85025 COMPLETE CBC W/AUTO DIFF WBC: CPT

## 2019-12-06 PROCEDURE — 700102 HCHG RX REV CODE 250 W/ 637 OVERRIDE(OP): Performed by: STUDENT IN AN ORGANIZED HEALTH CARE EDUCATION/TRAINING PROGRAM

## 2019-12-06 PROCEDURE — 83735 ASSAY OF MAGNESIUM: CPT

## 2019-12-06 PROCEDURE — A9270 NON-COVERED ITEM OR SERVICE: HCPCS | Performed by: INTERNAL MEDICINE

## 2019-12-06 PROCEDURE — A9270 NON-COVERED ITEM OR SERVICE: HCPCS | Performed by: STUDENT IN AN ORGANIZED HEALTH CARE EDUCATION/TRAINING PROGRAM

## 2019-12-06 PROCEDURE — 36415 COLL VENOUS BLD VENIPUNCTURE: CPT

## 2019-12-06 PROCEDURE — 770006 HCHG ROOM/CARE - MED/SURG/GYN SEMI*

## 2019-12-06 PROCEDURE — 80053 COMPREHEN METABOLIC PANEL: CPT

## 2019-12-06 PROCEDURE — 700102 HCHG RX REV CODE 250 W/ 637 OVERRIDE(OP): Performed by: INTERNAL MEDICINE

## 2019-12-06 PROCEDURE — 74018 RADEX ABDOMEN 1 VIEW: CPT

## 2019-12-06 PROCEDURE — 84100 ASSAY OF PHOSPHORUS: CPT

## 2019-12-06 PROCEDURE — 700105 HCHG RX REV CODE 258: Performed by: INTERNAL MEDICINE

## 2019-12-06 PROCEDURE — 700111 HCHG RX REV CODE 636 W/ 250 OVERRIDE (IP): Performed by: INTERNAL MEDICINE

## 2019-12-06 RX ORDER — CARISOPRODOL 350 MG/1
350 TABLET ORAL 4 TIMES DAILY
Status: DISCONTINUED | OUTPATIENT
Start: 2019-12-06 | End: 2019-12-07 | Stop reason: HOSPADM

## 2019-12-06 RX ORDER — POLYETHYLENE GLYCOL 3350 17 G/17G
1 POWDER, FOR SOLUTION ORAL DAILY
Status: DISCONTINUED | OUTPATIENT
Start: 2019-12-06 | End: 2019-12-07 | Stop reason: HOSPADM

## 2019-12-06 RX ORDER — POTASSIUM CHLORIDE 20 MEQ/1
40 TABLET, EXTENDED RELEASE ORAL ONCE
Status: COMPLETED | OUTPATIENT
Start: 2019-12-06 | End: 2019-12-06

## 2019-12-06 RX ORDER — TRAZODONE HYDROCHLORIDE 50 MG/1
50 TABLET ORAL ONCE
Status: COMPLETED | OUTPATIENT
Start: 2019-12-06 | End: 2019-12-06

## 2019-12-06 RX ORDER — SODIUM CHLORIDE 9 MG/ML
INJECTION, SOLUTION INTRAVENOUS
Status: COMPLETED
Start: 2019-12-06 | End: 2019-12-06

## 2019-12-06 RX ADMIN — CARISOPRODOL 350 MG: 350 TABLET ORAL at 12:19

## 2019-12-06 RX ADMIN — CARBIDOPA AND LEVODOPA 1 TABLET: 25; 100 TABLET ORAL at 17:58

## 2019-12-06 RX ADMIN — LORAZEPAM 0.26 MG: 2 INJECTION INTRAMUSCULAR; INTRAVENOUS at 08:26

## 2019-12-06 RX ADMIN — FENTANYL CITRATE 100 MCG: 0.05 INJECTION, SOLUTION INTRAMUSCULAR; INTRAVENOUS at 04:53

## 2019-12-06 RX ADMIN — FLUOXETINE HYDROCHLORIDE 20 MG: 20 CAPSULE ORAL at 04:52

## 2019-12-06 RX ADMIN — OXYCODONE HYDROCHLORIDE 10 MG: 5 TABLET ORAL at 09:14

## 2019-12-06 RX ADMIN — CARBIDOPA AND LEVODOPA 1 TABLET: 50; 200 TABLET, EXTENDED RELEASE ORAL at 22:14

## 2019-12-06 RX ADMIN — FENTANYL CITRATE 100 MCG: 0.05 INJECTION, SOLUTION INTRAMUSCULAR; INTRAVENOUS at 08:06

## 2019-12-06 RX ADMIN — CARBIDOPA AND LEVODOPA 1 TABLET: 25; 100 TABLET ORAL at 22:14

## 2019-12-06 RX ADMIN — TRAZODONE HYDROCHLORIDE 50 MG: 50 TABLET ORAL at 22:16

## 2019-12-06 RX ADMIN — CARBIDOPA AND LEVODOPA 1 TABLET: 25; 100 TABLET ORAL at 04:53

## 2019-12-06 RX ADMIN — CARISOPRODOL 350 MG: 350 TABLET ORAL at 16:21

## 2019-12-06 RX ADMIN — POTASSIUM CHLORIDE 40 MEQ: 1500 TABLET, EXTENDED RELEASE ORAL at 16:20

## 2019-12-06 RX ADMIN — SODIUM CHLORIDE: 9 INJECTION, SOLUTION INTRAVENOUS at 06:45

## 2019-12-06 RX ADMIN — ENOXAPARIN SODIUM 40 MG: 100 INJECTION SUBCUTANEOUS at 17:57

## 2019-12-06 RX ADMIN — CARISOPRODOL 350 MG: 350 TABLET ORAL at 22:14

## 2019-12-06 RX ADMIN — CARBIDOPA AND LEVODOPA 1 TABLET: 25; 100 TABLET ORAL at 12:20

## 2019-12-06 RX ADMIN — CEFTRIAXONE SODIUM 2 G: 2 INJECTION, POWDER, FOR SOLUTION INTRAMUSCULAR; INTRAVENOUS at 05:05

## 2019-12-06 RX ADMIN — LORAZEPAM 0.26 MG: 2 INJECTION INTRAMUSCULAR; INTRAVENOUS at 16:21

## 2019-12-06 RX ADMIN — CARBIDOPA AND LEVODOPA 1 TABLET: 25; 100 TABLET ORAL at 09:16

## 2019-12-06 RX ADMIN — POLYETHYLENE GLYCOL 3350 1 PACKET: 17 POWDER, FOR SOLUTION ORAL at 12:20

## 2019-12-06 RX ADMIN — FENTANYL CITRATE 100 MCG: 0.05 INJECTION, SOLUTION INTRAMUSCULAR; INTRAVENOUS at 00:06

## 2019-12-06 RX ADMIN — SENNOSIDES AND DOCUSATE SODIUM 2 TABLET: 8.6; 5 TABLET ORAL at 04:52

## 2019-12-06 RX ADMIN — LORAZEPAM 0.26 MG: 2 INJECTION INTRAMUSCULAR; INTRAVENOUS at 02:37

## 2019-12-06 RX ADMIN — FENTANYL CITRATE 100 MCG: 0.05 INJECTION, SOLUTION INTRAMUSCULAR; INTRAVENOUS at 16:21

## 2019-12-06 RX ADMIN — CARBIDOPA AND LEVODOPA 1 TABLET: 25; 100 TABLET ORAL at 16:20

## 2019-12-06 RX ADMIN — LORAZEPAM 0.26 MG: 2 INJECTION INTRAMUSCULAR; INTRAVENOUS at 00:06

## 2019-12-06 ASSESSMENT — PATIENT HEALTH QUESTIONNAIRE - PHQ9
1. LITTLE INTEREST OR PLEASURE IN DOING THINGS: NOT AT ALL
2. FEELING DOWN, DEPRESSED, IRRITABLE, OR HOPELESS: NOT AT ALL
SUM OF ALL RESPONSES TO PHQ9 QUESTIONS 1 AND 2: 0

## 2019-12-06 ASSESSMENT — ENCOUNTER SYMPTOMS
SHORTNESS OF BREATH: 0
VOMITING: 0
WHEEZING: 0
TREMORS: 1
WEAKNESS: 1
TINGLING: 1
PHOTOPHOBIA: 0
HEMOPTYSIS: 0
NAUSEA: 0
SORE THROAT: 0
FEVER: 0
EYE PAIN: 0
BACK PAIN: 1
ABDOMINAL PAIN: 0
CHILLS: 0

## 2019-12-06 NOTE — CARE PLAN
Problem: Skin Integrity  Goal: Risk for impaired skin integrity will decrease  Intervention: Assess risk factors for impaired skin integrity and/or pressure ulcers  Note:   Pt at risk for impaired skin integrity r/t severely impaired bed mobility secondary to chronic condition, as well as presence of various medical devices and muscle contracture/various bony prominences. Pt educated on importance of Q2h turns and mobility, skin preventative measures including placement of heel Mepilexes, turns, and frequent movement of devices in place.      Problem: Pain Management  Goal: Pain level will decrease to patient's comfort goal  Intervention: Follow pain managment plan developed in collaboration with patient and Interdisciplinary Team  Note:   Patient educated on medications available for pain and anxiety in addition to continuation of home Parkinson's Rx. Pt medicated per MAR in addition to education on and implementation of nonpharmacologic comfort measures.

## 2019-12-06 NOTE — PROGRESS NOTES
[facility] Transfer Note    ICU Admit Date: 12/1/2019      ICU Discharge Date: 12/06/19        Primary Diagnoses:   Septic shock  Sepsis secondary to UTI  Cholangitis        HPI:  69-year-old female with end-stage PD who was brought in by her  on 12/1/19 for abdominal pain, nausea and vomiting. Patient was unresponsive in the ED and all history was obtained from her . He reported onset of symptoms 12/1/19 morning. He denied hematemesis and blood per rectum. The patient is essentially immobilized from end-stage PD with severe dystonia and has chronic pain for which she takes Fentanyl and benzodiazepines at baseline; she had been on hospice prior to admission and her  changed her code status to full code following admission but declined interventions such as a central line and NG tube; the patient was admitted to the ICU with septic shock and Palliative Care was consulted to help clarify goals of care.       Problem-Based ICU Course:    Sepsis secondary to UTI  Presented with T > 101 F, tachycardia, tachypnea, lactic acidosis, acute liver injury and hypotension requiring vasopressor support to maintain SBP > 90 and MAP > 60. Septic shock resolved by 12/3/19. UA positive for UTI, also with evidence of cholangitis on CT. Received Zosyn 3.375g q8h empirically 12/1/19-12/5/19. Blood and urine cultures from 12/1/19 grew E. Coli sensitive to ceftriaxone, antibiotic switched on 12/5/19 to ceftriaxone. Leukocytosis resolved by 12/5/19, LFTs are normalizing.   Extensive discussions had with family regarding goals of care with Palliative Care and GI consultation; the patient's family has decided to forgo ERCP and resume hospice, they would like the patient to complete her course of antibiotics with comfort measures for the remainder of this hospital stay.  Plan:  Continue Ceftriaxone till 12/10/19 to complete a 10-day course of antibiotics  Advance diet as tolerated to a LOW FAT diet  Discharge home on  hospice, Palliative Care to follow up with the patient's family prior to discharge  SW consulted for further counseling and information on community resources     Cholangitis  Improving. Found to have biliary dilation on CT abdomen concerning for obstruction vs. cholangitis. Received Zosyn 3.375g q8h empirically 12/1/19-12/5/19. Blood cultures grew E. coli, same as urine culture. Leukocytosis resolved, LFTs normalizing, no longer complaining of abdominal pain. The patient's family decided against ERCP after thorough discussions with GI, Palliative Care and Critical Care team.   Plan:  Continuing Ceftriaxone till 12/10/19 for E. coli bacteremia  Aspiration precautions  Encourage oral hydration  Advance diet as tolerated to a LOW FAT diet    Anemia  Hgb dropped to 6.7 on 12/3/19, transfused 1 unit PRBCs, Hgb improved to 8.7 and has remained stable since.   The patient's family have decided to resume hospice care for the patient.     Parkinson disease  End stage PD. Largely immobile, had a Ta previously for neurogenic bladder but no longer with Ta. Admitted with septic shock, urine and blood cultures grew E. coli. The patient's family would like to complete antibiotic therapy for GN bacteremia and resume home hospice.   Plan:  Complete antibiotic course for bacteremia with comfort care measures per discussion with family  Continue Sinemet and Prozac  Continue home pain regimen  LOW FAT diet with aspiration precautions  Fall precautions  Palliative Care RN to follow up with family prior to discharge     Chronic pain  Continue home pain regimen; the family has decided to resume hospice.    Encephalopathy  Resolved. Likely secondary to sepsis. Mental status returned to the patient's baseline prior to admission with IVF resuscitation and resolution of septic shock.    Elevated AST  Resolved. AST 3883 on admission, markedly elevated compared with ALT. Remainder of LFTs were consistent with cholangitis. CPK was mildly  elevated, likely due to PD and systemic infection. Trop-T was also mildly elevated, likely from demand ischemia in the setting of septic shock. The patient's AST and ALT normalized by 12/5/19, her ALP has been trending down towards normal.    Neutropenia  Resolved. Likely due to septic shock. WBC normalized.    Proctitis  Finding on CT abdomen and pelvis. C. diff studies negative this admission.        Important Events in the ICU:   · Central Line: No   · Intubation: No  · Pressors: Required levophed via PIV on admission, BP stable off levophed since 12/3/19  · Ta catheter: No, female external urinary catheter used  · Tube feeding: No  · Antibiotics: Yes, Zosyn empirically 12/1/19-12/5/19, ceftriaxone 12/5/19-12/10/19 for total antibiotic course of 10 days for GN bacteremia  · Other procedures: No        Labs and Imagine to Continue, with Indications:  · CBC: Yes, monitor Hgb, monitor for leukocytosis   · CMP: Yes, monitor electrolytes, monitor LFTs    · Magnesium: Yes, replete for goal of Mg > 2  · Phosphorus: Yes, replete for goal of PO4 > 2.5  · Chest X-ray: No, unless having worsening respiratory status  · Other studies: No         Things to Follow:   · Completing a 10-day course of antibiotics for bacteremia; currently on ceftriaxone per culture results, last dose will be on 12/10/19  · Monitor CBC, transfuse if Hgb < 7  · Monitor BMP and LFTs, maintain K >4, Mg > 2, PO4 > 2.5  · Monitor for urinary retention, the patient previously had a chronic Ta for neurogenic bladder  · The patient's family opted for home hospice and comfort measures for the remainder of this hospital stay  · Palliative Care and SW following for home hospice education and support

## 2019-12-06 NOTE — DISCHARGE PLANNING
Received a call from pt's MD that pt can go home tomorrow under [facility] after pt received her last dose of antibiotic. Pt needs help with [facility] set up.

## 2019-12-06 NOTE — PROGRESS NOTES
0353: Report provided via telephone to RN who will be taking pt into T324-2. No further questions at this time. Addressed concerns of family and patient expressed this shift.     0406: Pt to T324-2 accompanied by transport staff, Fernando. Chart and belongings with pt, pt transported on 6L O2.

## 2019-12-06 NOTE — PROGRESS NOTES
Internal Medicine Interval Note  Note Author: Yandy Mendoza M.D.     Name Delmis Draper     1950   Age/Sex 69 y.o. female   MRN [id number]   Code Status DNAR/DNI     After 5PM or if no immediate response to page, please call for cross-coverage  Attending/Team: /Aaron See Patient List for primary contact information  Call [phone] to page    1st Call - Day Intern (R1):    2nd Call - Day Sr. Resident (R2/R3):            Reason for interval visit  (Principal Problem)   Abdominal pain/Nausea/Vomiting      Interval Problem Daily Status Update  (24 hours, problem oriented, brief subjective history, new lab/imaging data pertinent to that problem)   Delmis is a 70 y/o Female with PMHx of End stage parkinson's disease, depression, peripheral neuropathy was brought in by her  to the hospital on 19 for abdominal pain, nausea,vomiting with symptoms onset on 19 morning. Pt was on hospice prior to admission, her  changed code status to full code following admission. She was found to be in septic shock sec. to cholangitis requiring pressor support and eventually stabilized. Aggressive interventions(ERCP) have not been opted by the family considering the patent's condition. She was started on zosyn which was transitioned to ceftriaxone after blood &urine culture grew E.Coli. Her labs are improving and is currently stable, she is transferred to floor for further care.    - Pt. transferred to floor in the morning, family at bedside  - Family is concerned about the possibility of another episode of cholangitis and the options available infront of them  - Counselled them it would be difficult to tell at this point what the probable cause of the current episode(stricture/gall stones) was since intervention like ERCP was not done.  - Need to have Goals of care discussion b/n family and palliative team and confirm how they would like to proceed  forward  ROS  Review of Systems   Constitutional: Negative for chills and fever.   HENT: Negative for ear pain and sore throat.    Eyes: Negative for photophobia and pain.   Respiratory: Negative for hemoptysis, shortness of breath and wheezing.    Cardiovascular: Negative for chest pain and leg swelling.   Gastrointestinal: Negative for abdominal pain, nausea and vomiting.   Genitourinary: Negative for dysuria and hematuria.   Musculoskeletal: Positive for back pain and joint pain.        Chronic   Skin: Negative for itching and rash.   Neurological: Positive for tingling, tremors and weakness.        Chronic   All other systems reviewed and are negative.      Disposition/Barriers to discharge:   Goals of care discussion/Possible hospice    Consultants/Specialty  Pulmonary  Gastroenterology  PCP: Raul Iverson M.D.      Quality Measures  Quality-Core Measures   Reviewed items::  Labs reviewed, Medications reviewed and Radiology images reviewed  Ta catheter::  No Ta  DVT prophylaxis pharmacological::  Enoxaparin (Lovenox)  Antibiotics:  Treating active infection/contamination beyond 24 hours perioperative coverage          Physical Exam       Vitals:    12/06/19 0400 12/06/19 0430 12/06/19 0647 12/06/19 0810   BP: 141/68 128/78  123/67   Pulse: 72 72  71   Resp: 15 16  15   Temp: 37.4 °C (99.4 °F) 36.8 °C (98.3 °F)  36.6 °C (97.9 °F)   TempSrc: Axillary Temporal  Temporal   SpO2: 96% 94%  95%   Weight:   65.9 kg (145 lb 4.5 oz)    Height:         Body mass index is 24.94 kg/m². Weight: 65.9 kg (145 lb 4.5 oz)  Oxygen Therapy:  Pulse Oximetry: 95 %, O2 (LPM): 5, O2 Delivery: Silicone Nasal Cannula    Physical Exam   Constitutional: No distress.   Frail appearing, elderly female, resting in bed.    HENT:   Head: Normocephalic and atraumatic.   Eyes: EOM are normal. Scleral icterus is present.   Neck: Neck supple. No tracheal deviation present.   Cardiovascular: Normal rate and regular rhythm. Exam reveals no  gallop.   No murmur heard.  Pulmonary/Chest: Effort normal. No stridor. No respiratory distress. She has no wheezes. She has no rales.   Abdominal: Soft. She exhibits no distension. There is tenderness (diffuse). There is no rebound and no guarding.   Musculoskeletal:         General: Deformity (contractions) present. No edema.   Neurological:   Alert and oriented to person, place, and year (but a bit off on date).    Skin: Skin is dry. She is not diaphoretic.   Psychiatric:   Normal affect, but then sad, when discussing decline in function.              Assessment/Plan     * Septic shock (HCC)  Assessment & Plan  Resolved.  Secondary to UTI, also with evidence of cholangitis on CT.  Presented with T > 101 F, tachycardia, tachypnea, lactic acidosis, acute liver injury and hypotension requiring vasopressor support to maintain SBP > 90 and MAP > 60. Hypotension resolved, breathing well on 4 L NC, afebrile > 72 hours.  Leukocytosis resolved, LFTs are normalizing.   Extensive discussions had with family regarding goals of care with Palliative Care and GI consultation; the patient's family has decided to resume hospice, they would like the patient to complete her course of antibiotics with comfort measures for the remainder of this hospital stay.    Plan:  Blood and urine cultures from 12/1/19 growing E. coli  Zosyn discontinued, continue Ceftriaxone till 12/10/19 to complete a 10-day course of antibiotics  GI signed off, patient's family decided against ERCP  Advance diet as tolerated to a LOW FAT diet  Discharge home on hospice, Palliative Care to follow up with the patient's family prior to discharge  SW consulted for further counseling and information on community resources     Neutropenia associated with infection (HCC)  Assessment & Plan  Resolved.  Likely due to septic shock.  WBC normalized.    Proctitis  Assessment & Plan  Finding on CT abdomen and pelvis.   C. diff studies negative this admission.      Cholangitis (possible)  Assessment & Plan  Improving.  Found to have biliary dilation on CT abdomen concerning for obstruction vs. Cholangitis.  Received Zosyn 3.375g q8h empirically 12/1/19-12/5/19.  Blood cultures growing E. coli, same as urine culture.   Leukocytosis resolved, LFTs normalizing, no longer complaining of abdominal pain.   The patient's family decided against ERCP after thorough discussions with GI, Palliative Care and Critical Care team.     Plan:  Continuing Ceftriaxone till 12/10/19 for E. coli bacteremia  Aspiration precautions  Encourage oral hydration  Advance diet as tolerated to a LOW FAT diet    Encephalopathy  Assessment & Plan  Resolved.  Likely secondary to sepsis.  Mental status returned to the patient's baseline prior to admission with IVF resuscitation and resolution of septic shock.    Diarrhea  Assessment & Plan  Resolved.  At least one instance of loose stools.   C. diff studies negative.     Elevated AST (SGOT)  Assessment & Plan  Resolved.   AST 3883 on admission, markedly elevated compared with ALT.  Remainder of LFTs consistent with cholangitis.   CPK mildly elevated, likely due to PD and systemic infection.   Trop-T mildly elevated, likely demand ischemia in the setting of septic shock.  AST and ALT normalized, ALP continuing to trend down towards normal.    Chronic pain- (present on admission)  Assessment & Plan  Continue home pain regimen; the family has decided to resume hospice.    Parkinson disease (HCC)- (present on admission)  Assessment & Plan  End stage PD.  Largely immobile, had a Ta previously for neurogenic bladder but no longer with Ta.  Admitted with septic shock, urine and blood cultures growing E. coli.     Plan:  The patient's family has decided to resume Hospice  Complete antibiotic course for bacteremia with comfort care measures per discussion with family  Continue Sinemet and Prozac  Continue home pain regimen  LOW FAT diet with aspiration  precautions  Fall precautions  Palliative Care RN to follow up with family prior to discharge     Anemia  Assessment & Plan  Hgb dropped to 6.7 on 12/3/19, transfused 1 unit PRBCs, Hgb improved to 8.7 and has remained stable since.   The patient's family have decided to resume hospice care for the patient.

## 2019-12-06 NOTE — PROGRESS NOTES
Patient  asked if we can order Soma as this is a home-med that controls her tremors well. Paged [facility]. Orders placed.

## 2019-12-06 NOTE — CARE PLAN
Problem: Nutritional:  Goal: Achieve adequate nutritional intake  Description  Patient will consume >50% of meals.    Outcome: MET  Per chart pt PO 50-75% meals consistently. Pt is eating well. RD available prn

## 2019-12-06 NOTE — CARE PLAN
Problem: Bowel/Gastric:  Goal: Normal bowel function is maintained or improved  Outcome: PROGRESSING SLOWER THAN EXPECTED  Patient without bowel movement and stating discomfort in abdomen. Bowel protocol in place.      Problem: Knowledge Deficit  Goal: Knowledge of disease process/condition, treatment plan, diagnostic tests, and medications will improve  Outcome: PROGRESSING AS EXPECTED   Patient and family aware of plan of care and all questions answered at this time.

## 2019-12-07 VITALS
DIASTOLIC BLOOD PRESSURE: 63 MMHG | HEIGHT: 64 IN | BODY MASS INDEX: 24.8 KG/M2 | SYSTOLIC BLOOD PRESSURE: 111 MMHG | TEMPERATURE: 97.7 F | HEART RATE: 78 BPM | WEIGHT: 145.28 LBS | OXYGEN SATURATION: 96 % | RESPIRATION RATE: 16 BRPM

## 2019-12-07 PROBLEM — K83.09 CHOLANGITIS: Status: RESOLVED | Noted: 2019-12-01 | Resolved: 2019-12-07

## 2019-12-07 LAB
ALBUMIN SERPL BCP-MCNC: 3.4 G/DL (ref 3.2–4.9)
ALBUMIN/GLOB SERPL: 1.2 G/DL
ALP SERPL-CCNC: 271 U/L (ref 30–99)
ALT SERPL-CCNC: 43 U/L (ref 2–50)
ANION GAP SERPL CALC-SCNC: 10 MMOL/L (ref 0–11.9)
AST SERPL-CCNC: 37 U/L (ref 12–45)
BASOPHILS # BLD AUTO: 0.6 % (ref 0–1.8)
BASOPHILS # BLD: 0.05 K/UL (ref 0–0.12)
BILIRUB SERPL-MCNC: 1.5 MG/DL (ref 0.1–1.5)
BUN SERPL-MCNC: 3 MG/DL (ref 8–22)
CALCIUM SERPL-MCNC: 8.2 MG/DL (ref 8.5–10.5)
CHLORIDE SERPL-SCNC: 104 MMOL/L (ref 96–112)
CO2 SERPL-SCNC: 27 MMOL/L (ref 20–33)
CREAT SERPL-MCNC: 0.41 MG/DL (ref 0.5–1.4)
EOSINOPHIL # BLD AUTO: 0.25 K/UL (ref 0–0.51)
EOSINOPHIL NFR BLD: 3.2 % (ref 0–6.9)
ERYTHROCYTE [DISTWIDTH] IN BLOOD BY AUTOMATED COUNT: 49.7 FL (ref 35.9–50)
GLOBULIN SER CALC-MCNC: 2.9 G/DL (ref 1.9–3.5)
GLUCOSE SERPL-MCNC: 120 MG/DL (ref 65–99)
HCT VFR BLD AUTO: 29.5 % (ref 37–47)
HGB BLD-MCNC: 9.3 G/DL (ref 12–16)
IMM GRANULOCYTES # BLD AUTO: 0.18 K/UL (ref 0–0.11)
IMM GRANULOCYTES NFR BLD AUTO: 2.3 % (ref 0–0.9)
LYMPHOCYTES # BLD AUTO: 1.5 K/UL (ref 1–4.8)
LYMPHOCYTES NFR BLD: 19.1 % (ref 22–41)
MCH RBC QN AUTO: 30.7 PG (ref 27–33)
MCHC RBC AUTO-ENTMCNC: 31.5 G/DL (ref 33.6–35)
MCV RBC AUTO: 97.4 FL (ref 81.4–97.8)
MONOCYTES # BLD AUTO: 0.67 K/UL (ref 0–0.85)
MONOCYTES NFR BLD AUTO: 8.5 % (ref 0–13.4)
NEUTROPHILS # BLD AUTO: 5.2 K/UL (ref 2–7.15)
NEUTROPHILS NFR BLD: 66.3 % (ref 44–72)
NRBC # BLD AUTO: 0 K/UL
NRBC BLD-RTO: 0 /100 WBC
PLATELET # BLD AUTO: 220 K/UL (ref 164–446)
PMV BLD AUTO: 11.5 FL (ref 9–12.9)
POTASSIUM SERPL-SCNC: 2.9 MMOL/L (ref 3.6–5.5)
PROT SERPL-MCNC: 6.3 G/DL (ref 6–8.2)
RBC # BLD AUTO: 3.03 M/UL (ref 4.2–5.4)
SODIUM SERPL-SCNC: 141 MMOL/L (ref 135–145)
WBC # BLD AUTO: 7.9 K/UL (ref 4.8–10.8)

## 2019-12-07 PROCEDURE — A9270 NON-COVERED ITEM OR SERVICE: HCPCS | Performed by: STUDENT IN AN ORGANIZED HEALTH CARE EDUCATION/TRAINING PROGRAM

## 2019-12-07 PROCEDURE — 700102 HCHG RX REV CODE 250 W/ 637 OVERRIDE(OP): Performed by: STUDENT IN AN ORGANIZED HEALTH CARE EDUCATION/TRAINING PROGRAM

## 2019-12-07 PROCEDURE — 80053 COMPREHEN METABOLIC PANEL: CPT

## 2019-12-07 PROCEDURE — 700111 HCHG RX REV CODE 636 W/ 250 OVERRIDE (IP): Performed by: INTERNAL MEDICINE

## 2019-12-07 PROCEDURE — 36415 COLL VENOUS BLD VENIPUNCTURE: CPT

## 2019-12-07 PROCEDURE — 700105 HCHG RX REV CODE 258: Performed by: STUDENT IN AN ORGANIZED HEALTH CARE EDUCATION/TRAINING PROGRAM

## 2019-12-07 PROCEDURE — 85025 COMPLETE CBC W/AUTO DIFF WBC: CPT

## 2019-12-07 PROCEDURE — 99239 HOSP IP/OBS DSCHRG MGMT >30: CPT | Mod: GC | Performed by: HOSPITALIST

## 2019-12-07 PROCEDURE — 700111 HCHG RX REV CODE 636 W/ 250 OVERRIDE (IP): Performed by: STUDENT IN AN ORGANIZED HEALTH CARE EDUCATION/TRAINING PROGRAM

## 2019-12-07 RX ORDER — AMOXICILLIN 250 MG
1 CAPSULE ORAL 2 TIMES DAILY
Status: DISCONTINUED | OUTPATIENT
Start: 2019-12-07 | End: 2019-12-07 | Stop reason: HOSPADM

## 2019-12-07 RX ADMIN — OXYCODONE HYDROCHLORIDE 10 MG: 5 TABLET ORAL at 04:44

## 2019-12-07 RX ADMIN — CARBIDOPA AND LEVODOPA 1 TABLET: 25; 100 TABLET ORAL at 12:47

## 2019-12-07 RX ADMIN — CARBIDOPA AND LEVODOPA 1 TABLET: 25; 100 TABLET ORAL at 04:49

## 2019-12-07 RX ADMIN — CEFTRIAXONE SODIUM 2 G: 2 INJECTION, POWDER, FOR SOLUTION INTRAMUSCULAR; INTRAVENOUS at 06:04

## 2019-12-07 RX ADMIN — CARISOPRODOL 350 MG: 350 TABLET ORAL at 04:48

## 2019-12-07 RX ADMIN — POLYETHYLENE GLYCOL 3350 1 PACKET: 17 POWDER, FOR SOLUTION ORAL at 06:00

## 2019-12-07 RX ADMIN — SENNOSIDES AND DOCUSATE SODIUM 1 TABLET: 8.6; 5 TABLET ORAL at 08:44

## 2019-12-07 RX ADMIN — LORAZEPAM 0.26 MG: 2 INJECTION INTRAMUSCULAR; INTRAVENOUS at 11:59

## 2019-12-07 RX ADMIN — OXYCODONE HYDROCHLORIDE 5 MG: 5 TABLET ORAL at 12:50

## 2019-12-07 RX ADMIN — OXYCODONE HYDROCHLORIDE 5 MG: 5 TABLET ORAL at 12:51

## 2019-12-07 RX ADMIN — CARISOPRODOL 350 MG: 350 TABLET ORAL at 11:39

## 2019-12-07 RX ADMIN — LORAZEPAM 0.26 MG: 2 INJECTION INTRAMUSCULAR; INTRAVENOUS at 08:45

## 2019-12-07 RX ADMIN — CARBIDOPA AND LEVODOPA 1 TABLET: 25; 100 TABLET ORAL at 08:47

## 2019-12-07 RX ADMIN — FLUOXETINE HYDROCHLORIDE 20 MG: 20 CAPSULE ORAL at 04:44

## 2019-12-07 NOTE — DISCHARGE PLANNING
Spoke to Dayana ANGELES    Transport is scheduled for 12/07 @1330 going home with [facility].    RN ENOC meyer.

## 2019-12-07 NOTE — DISCHARGE PLANNING
Received Choice form at 0800  Agency/Facility Name: [facility]   Referral sent per Choice form @ 0900

## 2019-12-07 NOTE — PROGRESS NOTES
2 RN skin check complete with Trini.    Devices in place SCD sleeves, purewick.  Skin assessed under devices intact .  Generalized bruising, to extremities. Contractures noted to BLE. Sacrum pink and blanching.   The following interventions in place Mepilex to heels, pillows in use to float heels and support arms. Waffle mattress in use. Pt refusing Q2H turns.     Pt refusing SCD's, education provided.

## 2019-12-07 NOTE — CARE PLAN
Problem: Safety  Goal: Will remain free from injury  Outcome: PROGRESSING AS EXPECTED  Pt uses call light for needs, contracture prevents out of bed     Problem: Skin Integrity  Goal: Risk for impaired skin integrity will decrease  Outcome: PROGRESSING AS EXPECTED  Skin intact other than significant bruising     Problem: Pain Management  Goal: Pain level will decrease to patient's comfort goal  Outcome: PROGRESSING AS EXPECTED  Pt reports pain adequately controlled with PO meds, however tremors still porblematic

## 2019-12-07 NOTE — DISCHARGE SUMMARY
Internal Medicine Discharge Summary  Note Author: Ruth Stokes M.D.       Name Delmis Draper     1950   Age/Sex 69 y.o. female   MRN [id number]         Admit Date:  2019       Discharge Date:   2019    Service:   [facility] Internal Medicine Blue Team  Attending Physician(s):   Dr. Barrera       Senior Resident(s):   Dr. Stokes  Manoj Resident(s):   Dr. Mendoza  PCP: Raul Iverson M.D.      Primary Diagnosis:   Septic shock and bacteremia secondary to Ascending Cholangitis   Urinary tract infection  E.coli Bacteremia     Secondary Diagnoses:                Principal Problem:    Septic shock (HCC) POA: Unknown  Active Problems:    Ascending cholangitis POA: Unknown    Proctitis POA: Unknown    Neutropenia associated with infection (HCC) POA: Unknown    Parkinson disease (HCC) POA: Yes    Chronic pain POA: Yes    Elevated AST (SGOT) POA: Unknown    Diarrhea POA: Unknown    Encephalopathy POA: Unknown    Anemia POA: Unknown  Resolved Problems:    * No resolved hospital problems. *      Hospital Summary (Brief Narrative):         Mrs. Draper is a pleasant 69 year old female with a past medical history of advanced Parkinson's disease with severe dystonia and chronic pain on home hospice who presented to the hospital on 2019 with abdominal pain, nausea and vomiting. On admission patient was obtunded and non-responsive, she was febrile, tachycardic, hypotensive and had lactic acidosis consistent with septic shock. CT abdomen showed biliary dilation concerning for biliary obstruction vs cholangitis. Liver enzymes were grossly abnormal and urinalysis was also suggestive of pyuria. Blood cultures were positive for pan-sensitive E.coli. Patient was started on vasopressors for pressure support  and sepsis protocol. Pressure support was weaned. She received 5 days of  Zosyn which transitioned to Ceftriaxone to complete a total 7 day course.     Regarding ascending cholangitis, extensive  discussions were held with family regarding goals of care with palliative and gastroenterology. Family ultimately decided to forgo further evaluation with ERCP and resume hospice but wished to complete her antibiotic course. Through hospitalization patients cell counts and liver enzymes improved and her abdominal pain resolved, she was resumed on her home diet without issues.     During admission, she did receive 1 unit of packed red blood cells for a drop in hemoglobin to 6.7. this improved to 8.7 post transfusion and has remained stable, suspect this was secondary to septic shock on top of chronic underlying anemia, no further evaluation/workup was completed in setting of hospice care.     Regarding her chronic medical conditions, she was resumed on her home parkinson's medications as well as her chronic pain medications. She will return home with home hospice per family and patients wishes. Palliative met with patient and family, POLST form was completed.       Patient /Hospital Summary (Details -- Problem Oriented) :          Neutropenia associated with infection (HCC)  Assessment & Plan  Resolved.Likely due to septic shock.  WBC normalized.    Proctitis  Assessment & Plan  Finding on CT abdomen and pelvis. C. diff studies negative this admission.  -chronic constipation per patient   - resume home bowel regiment with daily miralax     Ascending cholangitis  Assessment & Plan  Improving.  Blood Cx + pan-sensitive E.coli   Found to have biliary dilation on CT abdomen concerning for obstruction vs. Cholangitis  Received 5 days of zosyn and 2 days of ceftriaxone for total 7 day course   Leukocytosis resolved, LFTs normalizing, no longer complaining of abdominal pain, feeling hungry and tolerating diet   The patient's family decided against ERCP after thorough discussions with GI, Palliative Care and Critical Care team.     Plan:  Completed 7 day course of IV antibiotics   Encourage oral hydration  Resume home diet  as tolerated    * Septic shock (HCC)  Assessment & Plan  Resolved.  Secondary to ascending cholangitis vs UTI  SIRS 4/4 ( T > 101 F, tachycardia, tachypnea, lactic acidosis, acute liver injury and hypotension requiring vasopressor support to maintain SBP > 90 and MAP > 60)  Leukocytosis resolved, LFTs are normalizing.   Extensive discussions had with family regarding goals of care with Palliative Care and GI consultation; the patient's family has decided to resume hospice, they would like the patient to complete her course of antibiotics with comfort measures for the remainder of this hospital stay.  Blood and urine cultures from 12/1/19 growing E. Coli    Plan:  Received 5 days of zosyn and additional 2 days of Ceftriaxone for total of 7 day course   GI signed off, patient's family decided against ERCP  Advance diet as tolerated to a LOW FAT diet  Discharge home on hospice    Encephalopathy  Assessment & Plan  Resolved. Likely secondary to sepsis.  Mental status returned to the patient's baseline prior to admission with IVF resuscitation and resolution of septic shock.    Diarrhea  Assessment & Plan  Resolved.  C. diff studies negative.   - resume home bowel protocol     Elevated AST (SGOT)  Assessment & Plan  Resolved. AST 3883 on admission, markedly elevated compared with ALT.  Remainder of LFTs consistent with cholangitis.   CPK mildly elevated, likely due to PD and systemic infection.   Trop-T mildly elevated, likely demand ischemia in the setting of septic shock.  AST and ALT normalized, ALP continuing to trend down towards normal.    Chronic pain  Assessment & Plan  Continue home pain regimen; the family has decided to resume hospice.    Parkinson disease (HCC)  Assessment & Plan  End stage PD.  Largely immobile, had a Ta previously for neurogenic bladder but no longer with Ta.  Admitted with septic shock, urine and blood cultures growing E. coli.     Plan:  The patient's family has decided to resume  Hospice  Completed antibiotic course for bacteremia with comfort care measures per discussion with family  Continue Sinemet and Prozac  Continue home pain regimen  Discharge back home with hospice    Anemia  Assessment & Plan  Hgb dropped to 6.7 on 12/3/19, transfused 1 unit PRBCs, Hgb improved to 8.7 and has remained stable since.   No further evaluation given patient's family decision to resume hospice care for the patient.       Consultants:     [facility]  Gastroenterology   Palliative Care    Procedures:        NA    Imaging/ Testing:      [id number] VIEW   Final Result      Moderate colonic gas and stool. No evidence small bowel obstruction.   Possible small costophrenic angle effusions.      CT-ABDOMEN-PELVIS WITH   Final Result      1.  Changes in the biliary tree suspicious for obstruction and/or cholangitis. No discrete obstructing mass is seen though small ampullary region mass or stricture could be present.   2.  Changes in the rectum suggestive of proctitis. Underlying mass is not excluded.   3.  RIGHT renal calyceal stone   4.  Appendix not visualized   5.  Duodenal diverticulum               DX-CHEST-PORTABLE (1 VIEW)   Final Result      1.  Linear atelectasis within the left lung base.      2.  Mild cardiomegaly.            Discharge Medications:         Medication Reconciliation: Completed       Medication List      CHANGE how you take these medications      Instructions   AZO URINARY PAIN RELIEF 95 MG tablet  What changed:  Another medication with the same name was removed. Continue taking this medication, and follow the directions you see here.  Generic drug:  phenazopyridine   Take 95 mg by mouth every day.  Dose:  95 mg        CONTINUE taking these medications      Instructions   ALIVE ONCE DAILY WOMENS Tabs   Doctor's comments:  supplement  Take 1 Tab by mouth every day.  Dose:  1 Tab     ALLERGY RELIEF 10 MG Tabs  Generic drug:  loratadine   Take 10 mg by mouth every day.  Dose:  10 mg     fentaNYL  50 MCG/HR Pt72  Commonly known as:  DURAGESIC   Apply 1 Patch to skin as directed every 72 hours.  Dose:  1 Patch     FLUoxetine 20 MG Caps  Commonly known as:  PROZAC   Take 20 mg by mouth every day.  Dose:  20 mg     gabapentin 300 MG Caps  Commonly known as:  NEURONTIN   Take 600 mg by mouth 3 times a day.  Dose:  600 mg     LORazepam 2 MG/ML Conc  Commonly known as:  ATIVAN   Take 0.5 mg by mouth every 6 hours as needed (anxiety, leg spams).  Dose:  0.5 mg     melatonin 3 MG Tabs   Take 3 mg by mouth every bedtime.  Dose:  3 mg     morphine 20 MG/5ML solution   Take 5 mg by mouth every 2 hours as needed (pain and or shortness of breath).  Dose:  5 mg     polyethylene glycol/lytes Pack  Commonly known as:  MIRALAX   Take 17 g by mouth every day.  Dose:  17 g     * SINEMET CR  MG per tablet  Generic drug:  carbidopa-levodopa SR   Take 1 Tab by mouth every bedtime.  Dose:  1 Tab     * carbidopa-levodopa  MG Tabs  Commonly known as:  SINEMET   Take 1 Tab by mouth 6 Times a Day. 0700, 1000, 1300, 1600, 1900, 2200  Dose:  1 Tab     SOMA 350 MG Tabs  Generic drug:  carisoprodol   Take 350 mg by mouth 4 times a day.  Dose:  350 mg     traZODone 50 MG Tabs  Commonly known as:  DESYREL   Take 50 mg by mouth every bedtime.  Dose:  50 mg         * This list has 2 medication(s) that are the same as other medications prescribed for you. Read the directions carefully, and ask your doctor or other care provider to review them with you.            STOP taking these medications    ampicillin 500 MG Caps  Commonly known as:  RODNEYEN            Disposition:   Discharge home with home hospice    Diet:   As tolerated, Low fat    Activity:   As tolerated     Instructions:      Return home with hospice, follow Hospice MD direction   Stay hydrated   The patient was instructed to return to the ER in the event of worsening symptoms. I have counseled the patient on the importance of compliance and the patient has agreed to  proceed with all medical recommendations and follow up plan indicated above.   The patient understands that all medications come with benefits and risks. Risks may include permanent injury or death and these risks can be minimized with close reassessment and monitoring.        Primary Care Provider:    Raul Iverson M.D.    Discharge summary faxed to primary care provider:  Deferred  Copy of discharge summary given to the patient: Deferred      Follow up appointment details :      No future appointments.  No follow-up provider specified.      Pending Studies:        NA    Time spent on discharge day patient visit, preparing discharge paperwork and arranging for patient follow up.    Summary of follow up issues:   Follow up on comfort, pt family with questions regarding what they should do if symptoms return. We addressed this during stay however more education may be required     Discharge Time (Minutes) :    60 minutes   Hospital Course Type:  Inpatient Stay >2 midnights      Condition on Discharge    ______________________________________________________________________    Interval history/exam for day of discharge:       No new complaints, feels that she is back to baseline. Feels hungry and wants to eat. Denies abdominal pain. Comfortable with discharge plan back on with home hospice.     Physical Exam   Constitutional: No distress. Frail appearing, elderly female, resting in bed.    HENT:   Head: Normocephalic and atraumatic.   Eyes: EOM are normal, normal conjunctiva   Neck: Neck supple. No tracheal deviation present.   Cardiovascular: Normal rate and regular rhythm. Exam reveals no gallop.   No murmur heard.  Pulmonary/Chest: Effort normal. No stridor. No respiratory distress. She has no wheezes. She has no rales.   Abdominal: Soft. She exhibits no distension, no tenderness. There is no rebound and no guarding.   Musculoskeletal:         General: Deformity (contractions) present chronically. No edema.    Neurological: Alert and oriented x 4, moving all extremities, facial asymmetry present and contractures and PD making exam difficult.     Skin: Skin is dry. She is not diaphoretic.          Most Recent Labs:    Lab Results   Component Value Date/Time    WBC 7.9 12/07/2019 08:49 AM    RBC 3.03 (L) 12/07/2019 08:49 AM    HEMOGLOBIN 9.3 (L) 12/07/2019 08:49 AM    HEMATOCRIT 29.5 (L) 12/07/2019 08:49 AM    MCV 97.4 12/07/2019 08:49 AM    MCH 30.7 12/07/2019 08:49 AM    MCHC 31.5 (L) 12/07/2019 08:49 AM    MPV 11.5 12/07/2019 08:49 AM    NEUTSPOLYS 66.30 12/07/2019 08:49 AM    LYMPHOCYTES 19.10 (L) 12/07/2019 08:49 AM    MONOCYTES 8.50 12/07/2019 08:49 AM    EOSINOPHILS 3.20 12/07/2019 08:49 AM    BASOPHILS 0.60 12/07/2019 08:49 AM    ANISOCYTOSIS 1+ 12/01/2019 06:12 PM      Lab Results   Component Value Date/Time    SODIUM 141 12/07/2019 08:49 AM    POTASSIUM 2.9 (L) 12/07/2019 08:49 AM    CHLORIDE 104 12/07/2019 08:49 AM    CO2 27 12/07/2019 08:49 AM    GLUCOSE 120 (H) 12/07/2019 08:49 AM    BUN 3 (L) 12/07/2019 08:49 AM    CREATININE 0.41 (L) 12/07/2019 08:49 AM    CREATININE 0.8 12/22/2008 09:30 AM      Lab Results   Component Value Date/Time    ALTSGPT 43 12/07/2019 08:49 AM    ASTSGOT 37 12/07/2019 08:49 AM    ALKPHOSPHAT 271 (H) 12/07/2019 08:49 AM    TBILIRUBIN 1.5 12/07/2019 08:49 AM    LIPASE 17 03/07/2018 03:39 PM    ALBUMIN 3.4 12/07/2019 08:49 AM    ALBUMIN 4.38 11/02/2012 02:56 PM    GLOBULIN 2.9 12/07/2019 08:49 AM    PREALBUMIN 20.0 07/11/2013 10:10 PM    INR 1.20 (H) 12/02/2019 05:15 AM    MACROCYTOSIS 1+ 12/01/2019 06:12 PM     Lab Results   Component Value Date/Time    PROTHROMBTM 15.5 (H) 12/02/2019 05:15 AM    INR 1.20 (H) 12/02/2019 05:15 AM

## 2019-12-07 NOTE — CARE PLAN
Problem: Safety  Goal: Will remain free from injury  Note:   Call light and belongings within reach, bed down and locked, hourly rounding in progress. Treaded socks in use, no DME at bedside. pt calls appropriately.       Problem: Pain Management  Goal: Pain level will decrease to patient's comfort goal  Note:   PRN pain medications in use for pain control.

## 2019-12-07 NOTE — DISCHARGE PLANNING
Received Transport Form @ 113  Spoke to Dayana @ [facility]    Transport is scheduled for 12/7 placed on will-call going home. This [facility] will call with time once consent forms are in place with [facility].

## 2019-12-07 NOTE — DISCHARGE INSTRUCTIONS
Urinary Tract Infection, Adult  Introduction  A urinary tract infection (UTI) is an infection of any part of the urinary tract. The urinary tract includes the:  · Kidneys.  · Ureters.  · Bladder.  · Urethra.  These organs make, store, and get rid of pee (urine) in the body.  Follow these instructions at home:  · Take over-the-counter and prescription medicines only as told by your doctor.  · If you were prescribed an antibiotic medicine, take it as told by your doctor. Do not stop taking the antibiotic even if you start to feel better.  · Avoid the following drinks:  ¨ Alcohol.  ¨ Caffeine.  ¨ Tea.  ¨ Carbonated drinks.  · Drink enough fluid to keep your pee clear or pale yellow.  · Keep all follow-up visits as told by your doctor. This is important.  · Make sure to:  ¨ Empty your bladder often and completely. Do not to hold pee for long periods of time.  ¨ Empty your bladder before and after sex.  ¨ Wipe from front to back after a bowel movement if you are female. Use each tissue one time when you wipe.  Contact a doctor if:    · You have back pain.  · You have a fever.  · You feel sick to your stomach (nauseous).  · You throw up (vomit).  · Your symptoms do not get better after 3 days.  · Your symptoms go away and then come back.  Get help right away if:  · You have very bad back pain.  · You have very bad lower belly (abdominal) pain.  · You are throwing up and cannot keep down any medicines or water.  This information is not intended to replace advice given to you by your health care provider. Make sure you discuss any questions you have with your health care provider.  Document Released: 06/05/2009 Document Revised: 05/25/2017 Document Reviewed: 11/07/2016  © 2017 Elsevier  Discharge Instructions    Discharged to home by AMBULANCE with escort. Discharged via ambulance, hospital escort: Yes.  Special equipment needed: Not Applicable    Be sure to schedule a follow-up appointment with your primary care doctor  or any specialists as instructed.     Discharge Plan:        I understand that a diet low in cholesterol, fat, and sodium is recommended for good health. Unless I have been given specific instructions below for another diet, I accept this instruction as my diet prescription.   Other diet: REGULAR    Special Instructions: None    · Is patient discharged on Warfarin / Coumadin?   No     Depression / Suicide Risk    As you are discharged from this [facility] facility, it is important to learn how to keep safe from harming yourself.    Recognize the warning signs:  · Abrupt changes in personality, positive or negative- including increase in energy   · Giving away possessions  · Change in eating patterns- significant weight changes-  positive or negative  · Change in sleeping patterns- unable to sleep or sleeping all the time   · Unwillingness or inability to communicate  · Depression  · Unusual sadness, discouragement and loneliness  · Talk of wanting to die  · Neglect of personal appearance   · Rebelliousness- reckless behavior  · Withdrawal from people/activities they love  · Confusion- inability to concentrate     If you or a loved one observes any of these behaviors or has concerns about self-harm, here's what you can do:  · Talk about it- your feelings and reasons for harming yourself  · Remove any means that you might use to hurt yourself (examples: pills, rope, extension cords, firearm)  · Get professional help from the community (Mental Health, Substance Abuse, psychological counseling)  · Do not be alone:Call your Safe Contact- someone whom you trust who will be there for you.  · Call your local CRISIS HOTLINE 074-9282 or 348-950-8470  · Call your local Children's Mobile Crisis Response Team [place] (218) 341-9287 or www.PeeplePass  · Call the toll free National Suicide Prevention Hotlines   · National Suicide Prevention Lifeline 560-118-WSRQ (5445)  · National Hope Line Network 800-SUICIDE  (202-3056)

## 2019-12-07 NOTE — DISCHARGE PLANNING
Agency/Facility Name: [facility]  Spoke To: Daryn  Outcome: Patient accepted. Will need a family member at bedside to sign consent forms.    CHRISSY Andres notified.

## 2019-12-08 NOTE — PALLIATIVE CARE
Palliative Care follow-up    LSW met with pt's spouse, Anupam regarding the POLST.  Anupam selected DNR, comfort focused treatment, and no artificial nutrition or feeding.  LSW scanned POLST into the pt's chart.        Plan: Pt will dc home w/ [facility] today.      Thank you for allowing Palliative Care to participate in this patient's care. Please feel free to call [phone] with any questions or concerns.

## 2020-01-20 NOTE — ASSESSMENT & PLAN NOTE
Urine culture is pending  UA result is noted  rocephin   How Severe Are Your Spot(S)?: mild Have Your Spot(S) Been Treated In The Past?: has not been treated Hpi Title: Evaluation of Skin Lesions Family Member: Brother

## 2020-01-23 NOTE — TELEPHONE ENCOUNTER
Pt was seen in 11/2016 states it was discussed that she could either be on Skelaxin or Soma one or the other. Pt would like to try Soma states she had blurred vision this morning already has dry eyes but says it's one of the side effects of Skelaxin. Soma rx was written for the pt in 8/2016 by Dr. Patel. Rx can be sent to the [facility] on file.    No

## 2020-06-04 ENCOUNTER — PATIENT OUTREACH (OUTPATIENT)
Dept: HEALTH INFORMATION MANAGEMENT | Facility: OTHER | Age: 70
End: 2020-06-04

## 2020-06-04 NOTE — PROGRESS NOTES
Patient  called regarding a bill from [place] 10-12 of 2018. He stated he spoke with [facility] billing office and [facility] had to wait to bill them due to a refund to Medicare. He is confused regarding this issue. I sent escalation e-mail to Angelia as I can not access 2018 claims. I will call patient back once I have an update.

## 2021-03-03 DIAGNOSIS — Z23 NEED FOR VACCINATION: ICD-10-CM

## 2021-10-13 NOTE — TELEPHONE ENCOUNTER
[facility]  [phone]     Patient Name: Henrandez Car  YOB: 1974  Age: 56 y.o. [address] RN Note:  Hospice consult received, reviewing chart. Will follow up with Unit Nurse and Care Manager to discuss plan of care, patient status and discharge disposition within the hour. Thank you for the opportunity to be of service to this patient.     Christian Stanley RN  Clinical Nurse Liaison   [address]  [phone] (j) [phone] Received another call from Marisol, patient's caregiver from [facility], stating that the patient is still experiencing a lot of tremors, dystonia, hot flashes, can not use her right leg and her right ankle is turned in. Marisol confirmed that the patient is still taking 4 Sinemet daily. They are wondering if they can do anything to relieve the symptoms? Please advise.

## 2024-06-24 NOTE — DISCHARGE PLANNING
Lyrica 50 mg twice per day sent over with a 30 days supply. Hopefully she will not need it that long. Lasix 20 mg sent over to take daily. Per my last message, please try compression stockings and keeping feet elevated to help alleviate swelling. I sent over a potassium supplement for her to take with this medication   This RN CM met with pt and her  at bedside. Per pt's  Anupam Draper pt is under [facility].     This RN CM spoke with Trent of [facility] and he states to just send in a referral when pt is ready for discharge. Pt needs [facility] transport set up when she is medically cleared.    This RN CM faxed choiceform for [facility] to JAE Goldman.